# Patient Record
Sex: FEMALE | Race: BLACK OR AFRICAN AMERICAN | Employment: UNEMPLOYED | URBAN - METROPOLITAN AREA
[De-identification: names, ages, dates, MRNs, and addresses within clinical notes are randomized per-mention and may not be internally consistent; named-entity substitution may affect disease eponyms.]

---

## 2017-01-04 ENCOUNTER — HOSPITAL ENCOUNTER (OUTPATIENT)
Dept: PHYSICAL THERAPY | Age: 42
Discharge: HOME | End: 2017-01-04
Payer: MEDICAID

## 2017-01-04 PROCEDURE — 97112 NEUROMUSCULAR REEDUCATION: CPT

## 2017-01-04 PROCEDURE — 97140 MANUAL THERAPY 1/> REGIONS: CPT

## 2017-01-04 NOTE — PROGRESS NOTES
PT DAILY TREATMENT NOTE 12    Patient Name: Yuly Lee  Date:2017  : 1975  [x]  Patient  Verified  Payor: Zoya Lorenzo / Plan: 2 Minutes / Product Type: Managed Care Medicaid /    In time:105  Out time:158  Total Treatment Time (min): 53  Visit #: 2 of 6    Treatment Area: Cervicalgia [M54.2]  Chronic tension-type headache, not intractable [G44.229]    SUBJECTIVE  Pain Level (0-10 scale): 5-6  Any medication changes, allergies to medications, adverse drug reactions, diagnosis change, or new procedure performed?: [x] No    [] Yes (see summary sheet for update)  Subjective functional status/changes:   [] No changes reported  Report she hasn't had any HAs in two weeks. Reports significant relief after last needling sessions and increase in postural awareness.      OBJECTIVE    Modality rationale: decrease pain to improve the patients ability to tolerate post needle soreness   Min Type Additional Details    [] Estim:  []Unatt       []IFC  []Premod                        []Other:  []w/ice   []w/heat  Position:  Location:    [] Estim: []Att    []TENS instruct  []NMES                    []Other:  []w/US   []w/ice   []w/heat  Position:  Location:    []  Traction: [] Cervical       []Lumbar                       [] Prone          []Supine                       []Intermittent   []Continuous Lbs:  [] before manual  [] after manual    []  Ultrasound: []Continuous   [] Pulsed                           []1MHz   []3MHz W/cm2:  Location:    []  Iontophoresis with dexamethasone         Location: [] Take home patch   [] In clinic   10 [x]  Ice     []  heat  []  Ice massage  []  Laser   []  Anodyne Position:s/l  Location:L [facility]    []  Laser with stim  []  Other:  Position:  Location:    []  Vasopneumatic Device Pressure:       [] lo [] med [] hi   Temperature: [] lo [] med [] hi   [x] Skin assessment post-treatment:  []intact []redness- no adverse reaction    []redness - adverse reaction: Dry Needling Procedure Note    Procedure: An intramuscular manual therapy (dry needling) and a neuro-muscular re-education treatment was done to deactivate myofascial trigger points with a 30 gauge filament needle under aseptic technique. Indications:  [x] Myofascial pain and dysfunction [] Muscled spasms  [] Myalgia/myositis   [] Muscle cramps  [x] Muscle imbalances  [] Temporomandibular Dysfunction  [] Other:    Chart reviewed for the following:  Collette LEDEZMA PT, have reviewed the medical history, summary list and precautions/contraindications for [employer].   TIME OUT performed immediately prior to start of procedure:  Collette LEDEZMA PT, have performed the following reviews on [employer] prior to the start of the session:      [x] Verified patient identification by name and date of birth    [x] Agreement on all muscles being treated was verified   [x] Purpose of dry needling, side effects, possible complications, risks and benefits were explained to the patient   [x] Procedure site(s) verified  [x] Patient was positioned for comfort and draped for privacy  [x] Informed Consent was signed (initial visit) and verified verbally (subsequent visits)  [x] Patient was instructed on the need to report the use of blood thinners and/or immunosuppressant medications  [x] How to respond to possible adverse effects of treatment  [x] Self treatment of post needling soreness: ice, heat (moist heat, heat wraps) and stretching  [x] Opportunity was given to ask any questions, all questions were answered            Time: 107  Date of procedure: 1/4/2017  Treatment: The following muscles were treated today with intramuscular dry needling  [] Left [] Right Masster  [] Left [] Right Temporalis  [] Left [] Right Zygomaticus Major / Minor  [] Left [] Right Lateral Pterygoid  [] Left [] Right Medial Pterygoid  [] Left [] Right Digastric Post / Anterior Belly  [] Left [] Right Sternocleidomastoid  [] Left [] Right Scalene Anterior / Medial / Posterior  [] Left [] Right Extra Laryngeal Muscles  [x] Left [] Right Upper Trapezius  [] Left [] Right Middle Trapezius  [] Left [] Right Lower Trapezius  [] Left [] Right Oblique Capitis Inferior  [] Left [] Right Splenius Capitis / Cervicis  [] Left [] Right Semispinalis: Capitis / Cervicis  [] Left [] Right Multifidi / Rotatores Cervicis / Thoracic  [x] Left [] Right Longissimus Thoracis / Illiocostalis  [x] Left [] Right Levator Scapulae  [] Left [] Right Supraspinatus / Infraspinatus  [] Left [] Right Teres Major / Minor  [] Left [] Right Rhomboids / Serratus posterior superior  [] Left [] Right Pectoralis Major / Minor  [] Left [] Right Serratus Anterior  [] Left [] Right Latissimus Dorsi  [] Left [] Right Subscapularis  [] Left [] Right Coracobrachialis  [] Left [] Right Biceps Brachii  [] Left [] Right Deltoid: Anterior / Medial / Posterior  [] Left [] Right Brachialis  [] Left [] Right Triceps  [] Left [] Right Brachioradialis  [] Left [] Right Extensor Carpi Radialis Brevis / Extensor Carpi Radialis Longus    [] Left [] Right  Extensor digitorum  [] Left [] Right Supinator / Pronator Teres  [] Left [] Right Flexor Carpi Radialis/ Flexor Carpi Ulnaris   [] Left [] Right  Flexor Digitorum Superficialis/ Flexor Digitorum Profundus  [] Left [] Right Flexor Pollicis Longus / Flexor Pollicis Brevis / Palmaris Longus  [] Left [] Right Abductor Pollicis Longus / Abductor Pollicis Brevis  [] Left [] Right Opponens Pollicis / Adductor Pollicis  [] Left [] Right Dorsal / Palmar Interossei / Lumbricalis  [] Left [] Right Abductor Digiti Minimi / Opponens Digiti Minimi    Patient's response to today's treatment:  [x] Latent Twitch Response     [x] Muscle relaxation [x] Pain Relief  [x] Post needling soreness    [x] without complications  [] Increased Range of Motion   [] Decreased headaches    [] Decreased Tinnitus  [] Other:     Performed by: Belle Jean, PT      15 min Neuromuscular Re-education:  []  See flow sheet :   Rationale: increase strength and improve coordination  to improve the patients ability to improve stability for posture    28 min Manual Therapy:  DN- see above   Rationale: decrease pain, increase tissue extensibility and decrease trigger points to decrease hypertonicity in L upper quadrant/improve tolerance for prolonged positioning       With   [] TE   [] TA   [] neuro   [] other: Patient Education: [x] Review HEP    [] Progressed/Changed HEP based on:   [] positioning   [] body mechanics   [] transfers   [] heat/ice application    [] other:      Other Objective/Functional Measures:      Pain Level (0-10 scale) post treatment: 7    ASSESSMENT/Changes in Function: Better recruitment of multifidus with isometrics. Fatigues quickly with posterior chain exercises. Patient will continue to benefit from skilled PT services to modify and progress therapeutic interventions, address strength deficits, analyze and address soft tissue restrictions, analyze and cue movement patterns and assess and modify postural abnormalities to attain remaining goals. []  See Plan of Care  []  See progress note/recertification  []  See Discharge Summary         Progress towards goals / Updated goals:  Short Term Goals: To be accomplished in 2 weeks:  1. Pt will be I and compliant with HEP to promote self management of symptoms. -Pt reports performing HEP. 12/02/16  2. Pt will increase B c/s SB to 35 deg without pain for improve functional mobility and ADL performance. not met 12/9/16  Long Term Goals: To be accomplished in 3 weeks:  1. Pt will increase FOTO score to 58 for improved quality of life and activity tolerance. progressing- increased to 52 12/21/16  2. Pt will improve global MMT to 4+/5 for improved postural stability and reduced c/s symptoms. not met; progressing 4-/5  3. Pt will report abolishment of R UE symptoms for improved activity tolerance and work performance. Reports not having symptoms more than 2-3x/wk now 12/19/2016  4. Pt will improve c/s AROM extension to WNL without reproduction of pain for improved functional mobility and ADL performance.  Not met; no change       PLAN  []  Upgrade activities as tolerated     [x]  Continue plan of care  []  Update interventions per flow sheet       []  Discharge due to:_  []  Other:_      Maile Leone, PT 1/4/2017  2:07 PM    Future Appointments  Date Time Provider Pablo Kenney   1/6/2017 10:00 AM Chemo Padgettial [facility]   1/24/2017 2:00 PM Sonny Lowery  [address]   3/13/2017 1:15 PM Abel Essex,  [address]

## 2017-01-06 ENCOUNTER — HOSPITAL ENCOUNTER (OUTPATIENT)
Dept: PHYSICAL THERAPY | Age: 42
End: 2017-01-06
Payer: MEDICAID

## 2017-01-13 NOTE — PROGRESS NOTES
[facility]  [address]  [place], [address]  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 Pt will be I and compliant with HEP to promote self management of symptoms. -Pt reports performing HEP. 16  2. Pt will increase B c/s SB to 35 deg without pain for improve functional mobility and ADL performance. not met 16  Long Term Goals: To be accomplished in 3 weeks:  1. Pt will increase FOTO score to 58 for improved quality of life and activity tolerance. progressing- increased to 52 16  2. Pt will improve global MMT to 4+/5 for improved postural stability and reduced c/s symptoms. not met; progressing 4-/5  3. Pt will report abolishment of R UE symptoms for improved activity tolerance and work performance. Reports not having symptoms more than 2-3x/wk now 2016  4. Pt will improve c/s AROM extension to WNL without reproduction of pain for improved functional mobility and ADL performance. Not met; no change    ASSESSMENT/RECOMMENDATIONS: Pt reports improvement in HA after initiation of DN. Limited by poor attendance/compliance with PT to date. Pt D/C at this time due to breach of attendance policy.  No further goal assessment of instruction given to pt.     [x]Discontinue therapy: []Patient has reached or is progressing toward set goals      [x]Patient is non-compliant or has abdicated      []Due to lack of appreciable progress towards set goals    Eddi Berman DPT 1/13/2017 3:17 PM

## 2017-01-24 ENCOUNTER — OFFICE VISIT (OUTPATIENT)
Dept: ORTHOPEDIC SURGERY | Age: 42
End: 2017-01-24

## 2017-01-24 ENCOUNTER — DOCUMENTATION ONLY (OUTPATIENT)
Dept: ORTHOPEDIC SURGERY | Age: 42
End: 2017-01-24

## 2017-01-24 VITALS
TEMPERATURE: 98.2 F | BODY MASS INDEX: 26.99 KG/M2 | HEIGHT: 65 IN | DIASTOLIC BLOOD PRESSURE: 63 MMHG | HEART RATE: 75 BPM | RESPIRATION RATE: 18 BRPM | SYSTOLIC BLOOD PRESSURE: 115 MMHG | WEIGHT: 162 LBS

## 2017-01-24 DIAGNOSIS — M79.18 CERVICAL MYOFASCIAL PAIN SYNDROME: Primary | ICD-10-CM

## 2017-01-24 DIAGNOSIS — M50.20 HNP (HERNIATED NUCLEUS PULPOSUS), CERVICAL: ICD-10-CM

## 2017-01-24 DIAGNOSIS — R20.2 PARESTHESIA: ICD-10-CM

## 2017-01-24 DIAGNOSIS — M54.2 CERVICAL PAIN: ICD-10-CM

## 2017-01-24 DIAGNOSIS — M50.30 DDD (DEGENERATIVE DISC DISEASE), CERVICAL: ICD-10-CM

## 2017-01-24 NOTE — PROGRESS NOTES
[address]  [address], [address]  [address]  Phone: [phone]  Fax: [phone]  INITIAL CONSULTATION  Patient: uSsu Tinajero                MRN: [id number]       SSN: [id number]  YOB: 1975        AGE: 46 y.o. SEX: female  Body mass index is 26.96 kg/(m^2). PCP: Argenis Ambrosio MD  01/24/17    Chief Complaint   Patient presents with    Neck Pain     SC         HISTORY OF PRESENT ILLNESS, RADIOGRAPHS, and PLAN:         HISTORY OF PRESENT ILLNESS:  Ms. Madelaine Dhaliwal is seen today at the request of Dr. Sukumar Zhang and Dr. Bill Arauz. Ms. Madelaine Dhaliwal is a 72-year-old female who has had this history of progressive neck and arm pain she has found increasingly disabling. She has been through pain management procedures and various physical therapy endeavors, all without improvement. The pain came on atraumatically with neck and arm pain. The patient denies bowel or bladder dysfunction, fevers, chills, night sweats, weight loss, or weight gain. The patient denies myelopathic findings. PHYSICAL EXAMINATION:  The patients physical exam has significant, antalgic range of motion with flexion and extension. No lateral bending symptoms. She has good strength to her deltoids, biceps, triceps, and intrinsics, EHL, tib/ant, hamstrings, and quadriceps. No clonus, no Engels, no Babinski. Normal gait and normal tandem gait. RADIOGRAPHS:  Radiographs demonstrate degenerative protrusion, right paracentral, at C5-6 contacting the cord, but no particular cord compression. No gliosis. This MRI is from a little over a year ago. No EMGs have been done. No x-rays have been done. ASSESSMENT/PLAN: I discussed the matter at length with the patient. This appears to be a radicular type syndrome and disc protrusion at C5-6, and it is just beyond what we normally expect to see from age.   I explained to her that she has what sounds to be radicular type pain syndrome. It is not quite severe enough that I would generally recommend intervention. I feel we need to get further information before we consider this to be a clear cervical radiculopathy. I would like to get some flexion extension x-rays of her cervical spine, as well as a current MRI, as well as a nerve conduction study. The symptoms that she describes in terms of her arm are nonspecific, but this appears to be in the lower cervical distribution than C5-6. At this point, certainly, she may be having a mechanically-induced radiculopathy. Her pain is primarily axial more than radicular. I will see her back again after her studies. We could reevaluate her radicular pain in light of her current studies and nerve conduction studies and see if there is any instability evident on x-ray. Given the chronicity and progression of her pain, it may be to the point where we need to consider a decompressive procedure. However, I would like to see if we can be more definitive on the nature of the symptoms and I can be more certain as to the prognosis with any intervention presented. cc: Veda Reyes M.D. Past Medical History   Diagnosis Date    Anemia 1997     during pregnancy 2nd child    Migraine headache 1997    Ovarian cyst     Vertigo        Family History   Problem Relation Age of Onset    Heart Disease Mother     Hypertension Mother    [facility] Mother     Other Mother 54     Lupus    Stroke Mother      x3    Asthma Sister     Diabetes Paternal Grandmother     Hypertension Paternal Grandmother        Current Outpatient Prescriptions   Medication Sig Dispense Refill    HYDROcodone-acetaminophen (NORCO) 5-325 mg per tablet TAKE 1 T PO PRN P Q 6 H  0    ibuprofen (MOTRIN) 800 mg tablet TK 1 T PO TID  0    baclofen (LIORESAL) 10 mg tablet Take 0.5 Tabs by mouth two (2) times daily as needed.  60 Tab 0    promethazine (PHENERGAN) 25 mg tablet Take 1 Tab by mouth every six (6) hours as needed for Nausea (headache). Caution: This medication may make you drowsy. Avoid driving while under the influence of this medication. 12 Tab 0    diphenhydrAMINE (BENADRYL) 25 mg capsule Take 25 mg by mouth every six (6) hours as needed.  meclizine (ANTIVERT) 25 mg tablet Take 25 mg by mouth three (3) times daily as needed.  diclofenac EC (VOLTAREN) 75 mg EC tablet Take 1 Tab by mouth two (2) times daily (with meals). 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      Social History Main Topics    Smoking status: Never Smoker    Smokeless tobacco: Never Used    Alcohol use Yes      Comment: rare    Drug use: No    Sexual activity: Yes     Partners: Male     Birth control/ protection: Implant      Comment: pregnancy test negative     Other Topics Concern    Not on file     Social History Narrative       REVIEW OF SYSTEMS:   CONSTITUTIONAL SYMPTOMS:  Negative. EYES:  Negative. EARS, NOSE, THROAT AND MOUTH:  Negative. CARDIOVASCULAR:  Negative. RESPIRATORY:  Negative. GENITOURINARY: Negative. GASTROINTESTINAL:  Negative. INTEGUMENTARY (SKIN AND/OR BREAST):  Negative. MUSCULOSKELETAL: Per HPI.   ENDOCRINE/RHEUMATOLOGIC:  Negative. NEUROLOGICAL:  Per HPI. HEMATOLOGIC/LYMPHATIC:  Negative. ALLERGIC/IMMUNOLOGIC:  Negative. PSYCHIATRIC:  Negative. PHYSICAL EXAMINATION:   Visit Vitals    /63 (BP 1 Location: Left arm, BP Patient Position: Sitting)    Pulse 75    Temp 98.2 °F (36.8 °C) (Oral)    Resp 18    Ht 5' 5\" (1.651 m)    Wt 162 lb (73.5 kg)    BMI 26.96 kg/m2    PAIN SCALE: 7/10    CONSTITUTIONAL: The patient is in no apparent distress and is alert and oriented x 3. HEENT: Normocephalic. Hearing grossly intact. NECK: Supple and symmetric. no tenderness, or masses were felt. RESPIRATORY: No labored breathing. CARDIOVASCULAR: The carotid pulses were normal. Peripheral pulses were 2+. CHEST: Normal AP diameter and normal contour without any kyphoscoliosis. LYMPHATIC: No lymphadenopathy was appreciated in the neck, axillae or groin. SKIN:  Negative for scars, rashes, lesions, or ulcers on the right upper, right lower, left upper, left lower and trunk. NEUROLOGICAL: Alert and oriented x 3. Ambulation without assistive device. FWB. EXTREMITIES:  See musculoskeletal.  MUSCULOSKELETAL:   Head and Neck: Neck pain. Prolonged sitting and laying down increases pain and paresthesias. Painful extension. Negative for misalignment, asymmetry, crepitation, defects, tenderness masses or effusions.  Left Upper Extremity: Paresthesia down posterior aspect of arm and into 4th and 5th digits. Inspection, percussion and palpation preformed. Engels sign is negative.  Right Upper Extremity: Paresthesia down posterior aspect of arm and into 4th and 5th digit.  Inspection, percussion and palpation preformed. Engels sign is negative.  Spine, Ribs and Pelvis: Inspection, percussion and palpation preformed. Negative for misalignment, asymmetry, crepitation, defects, tenderness masses or effusions.  Right Lower Extremity: Inspection, percussion and palpation preformed. Negative straight leg raise.  Left Lower Extremity: Inspection, percussion and palpation preformed. Negative straight leg raise. SPINE EXAM:     Cervical spine: Neck is midline. Normal muscle tone. No focal atrophy is noted. ASSESSMENT    ICD-10-CM ICD-9-CM    1. Cervical myofascial pain syndrome M79.1 729.1 AMB POC XRAY, SPINE, CERVICAL; 4+ VIE      EMG ONE EXTREMITY LOWER RT      MRI CERV SPINE WO CONT   2. DDD (degenerative disc disease), cervical M50.30 722.4 AMB POC XRAY, SPINE, CERVICAL; 4+ VIE      MRI CERV SPINE WO CONT   3. Cervical pain, axial M54.2 723.1 AMB POC XRAY, SPINE, CERVICAL; 4+ VIE      EMG ONE EXTREMITY LOWER RT      MRI CERV SPINE WO CONT   4. HNP (herniated nucleus pulposus), cervical M50.20 722.0 AMB POC XRAY, SPINE, CERVICAL; 4+ VIE      EMG ONE EXTREMITY LOWER RT      MRI CERV SPINE WO CONT   5.  Paresthesia R20.2 782.0 EMG ONE EXTREMITY LOWER RT      MRI CERV SPINE WO CONT       Written by Lashell Thomas, as dictated by Henrik Rodrigues MD.

## 2017-01-24 NOTE — MR AVS SNAPSHOT
Visit Information Date & Time Provider Department Dept. Phone Encounter #  
 1/24/2017  2:00 PM Patrice Judge MD [address] [facility] [phone] [id number] Follow-up Instructions Follow-up and Disposition History Your Appointments 2/16/2017  2:00 PM  
EMG with Kasandra Barillas MD  
[facility]) Appt Note: Dr. Sue Vitale and notes in cc  
 [place] [address] [address] [facility] [address]  
[phone]  
  
   
 [facility] [address]  
  
    
 2/28/2017 11:45 AM  
DIAG TEST F/U with Patrice Judge MD  
[address] [facility], [address] and [facility]ists [facility]) [address] [facility] [address]  
[phone]  
  
   
 [address] [facility] [address] 3/13/2017  1:15 PM  
Follow Up with Jeremiah Keller MD  
[facility]) Appt Note: 3 MONTH FU/NO COPAY PT [address] [facility] [address]  
[phone]  
  
   
 [address] [facility] [address] Upcoming Health Maintenance Date Due INFLUENZA AGE 9 TO ADULT 8/1/2016 PAP AKA CERVICAL CYTOLOGY 12/20/2016 DTaP/Tdap/Td series (2 - Td) 12/13/2022 Allergies as of 1/24/2017  Review Complete On: 1/24/2017 By: Patrice Judge MD  
  
 Severity Noted Reaction Type Reactions Ciprofloxacin (Bulk) High 06/13/2012   Side Effect Diarrhea Codeine High 12/12/2011    Itching Dilaudid [Hydromorphone (Bulk)] High 12/23/2011    Itching Lyrica [Pregabalin] High 12/28/2015    Swelling Tramadol Medium 06/13/2012    Other (comments), Unknown (comments) Gets headaches 
headache Codeine Phosphate  10/15/2015    Unknown (comments) Hydromorphone  10/15/2015    Unknown (comments) Current Immunizations  Never Reviewed Name Date Influenza Vaccine Split 12/13/2012 Tdap 12/13/2012 Not reviewed this visit You Were Diagnosed With   
  
 Codes Comments Cervical myofascial pain syndrome    -  Primary ICD-10-CM: M79.1 ICD-9-CM: 729.1 DDD (degenerative disc disease), cervical     ICD-10-CM: M50.30 ICD-9-CM: 722.4 Cervical pain     ICD-10-CM: M54.2 ICD-9-CM: 723.1 HNP (herniated nucleus pulposus), cervical     ICD-10-CM: M50.20 ICD-9-CM: 722.0 Paresthesia     ICD-10-CM: R20.2 ICD-9-CM: 568. 0 Vitals BP Pulse Temp Resp Height(growth percentile) Weight(growth percentile)  
 115/63 (BP 1 Location: Left arm, BP Patient Position: Sitting) 75 98.2 °F (36.8 °C) (Oral) 18 5' 5\" (1.651 m) 162 lb (73.5 kg) BMI OB Status Smoking Status 26.96 kg/m2 Having regular periods Never Smoker BMI and BSA Data Body Mass Index Body Surface Area  
 26.96 kg/m 2 1.84 m 2 Preferred Pharmacy Pharmacy Name Phone [facility] [address] [place] [address] Your Updated Medication List  
  
   
This list is accurate as of: 1/24/17  4:01 PM.  Always use your most recent med list.  
  
  
  
  
 baclofen 10 mg tablet Commonly known as:  LIORESAL Take 0.5 Tabs by mouth two (2) times daily as needed. BENADRYL 25 mg capsule Generic drug:  diphenhydrAMINE Take 25 mg by mouth every six (6) hours as needed. diclofenac EC 75 mg EC tablet Commonly known as:  VOLTAREN Take 1 Tab by mouth two (2) times daily (with meals). HYDROcodone-acetaminophen 5-325 mg per tablet Commonly known as:  NORCO  
TAKE 1 T PO PRN P Q 6 H  
  
 ibuprofen 800 mg tablet Commonly known as:  MOTRIN  
TK 1 T PO TID  
  
 meclizine 25 mg tablet Commonly known as:  ANTIVERT Take 25 mg by mouth three (3) times daily as needed. methylPREDNISolone 4 mg tablet Commonly known as:  Judithe Shirley  
 Per dose pack instructions  
  
 promethazine 25 mg tablet Commonly known as:  PHENERGAN Take 1 Tab by mouth every six (6) hours as needed for Nausea (headache). Caution: This medication may make you drowsy. Avoid driving while under the influence of this medication. We Performed the Following AMB POC XRAY, SPINE, CERVICAL; 4+ VIE [19090 CPT(R)] To-Do List   
 01/24/2017 Neurology:  EMG ONE EXTREMITY UPPER RT   
  
 01/24/2017 Imaging:  MRI CERV SPINE WO CONT Introducing [facility] SERVICES! Dear Breann Clarity: 
Thank you for requesting a Home-Account account. Our records indicate that you already have an active Home-Account account. You can access your account anytime at [email or web address] Did you know that you can access your hospital and ER discharge instructions at any time in Home-Account? You can also review all of your test results from your hospital stay or ER visit. Additional Information If you have questions, please visit the Frequently Asked Questions section of the Home-Account website at [email or web address] Remember, Home-Account is NOT to be used for urgent needs. For medical emergencies, dial 911. Now available from your iPhone and Android! Please provide this summary of care documentation to your next provider. Your primary care clinician is listed as Linda Lenz. If you have any questions after today's visit, please call [phone].

## 2017-02-16 ENCOUNTER — OFFICE VISIT (OUTPATIENT)
Dept: NEUROLOGY | Age: 42
End: 2017-02-16

## 2017-02-16 ENCOUNTER — HOSPITAL ENCOUNTER (OUTPATIENT)
Dept: MRI IMAGING | Age: 42
Discharge: HOME | End: 2017-02-16
Attending: ORTHOPAEDIC SURGERY
Payer: MEDICAID

## 2017-02-16 DIAGNOSIS — M79.18 CERVICAL MYOFASCIAL PAIN SYNDROME: ICD-10-CM

## 2017-02-16 DIAGNOSIS — M54.2 CERVICAL PAIN: ICD-10-CM

## 2017-02-16 DIAGNOSIS — M47.812 OSTEOARTHRITIS OF CERVICAL SPINE, UNSPECIFIED SPINAL OSTEOARTHRITIS COMPLICATION STATUS: ICD-10-CM

## 2017-02-16 DIAGNOSIS — M50.20 HNP (HERNIATED NUCLEUS PULPOSUS), CERVICAL: ICD-10-CM

## 2017-02-16 DIAGNOSIS — R20.2 PARESTHESIA: ICD-10-CM

## 2017-02-16 DIAGNOSIS — M79.18 CERVICAL MYOFASCIAL PAIN SYNDROME: Primary | ICD-10-CM

## 2017-02-16 DIAGNOSIS — M50.30 DDD (DEGENERATIVE DISC DISEASE), CERVICAL: ICD-10-CM

## 2017-02-16 PROCEDURE — 72141 MRI NECK SPINE W/O DYE: CPT

## 2017-02-16 NOTE — COMMUNICATION BODY
[facility] Neuroscience  [address] [facility] [address], [address] [phone]      [place] [address]    Neurophysiology Report      Patient: Sara Steel     ID: [id number] Physician: Alis England. Kaz Segovia MD   Gender: Male Ref Phys: Jannet Ames MD   Handedness:      Study Date: February 16, 2017         Patient History: This 72-year-old woman has been complaining of right greater than left arm and hand tingling. She also has been having pain in her neck. This been going on for greater than 2 years. On brief exam she has intact strength and sensation.       Nerve Conduction Studies  Anti Sensory Summary Table     Stim Site NR Peak (ms) Norm Peak (ms) O-P Amp (µV) Norm O-P Amp Dist (cm) Geo (m/s)   Left Median 2nd Digit Anti Sensory (2nd Digit)   Wrist    3.4 <3.5 74.4 >20 13.0 76.5   Site 2    3.4  55.3      Site 3    3.4  62.5      Right Median 2nd Digit Anti Sensory (2nd Digit)   Wrist    3.4 <3.5 46.1 >20 13.0 50.0   Site 2    3.5  47.1      Left Ulnar Anti Sensory (5th Digit )   Wrist    3.1 <3.1 45.0 >17.0 11.0 50.0   Site 2    3.1  55.7      Site 3    3.1  51.8      Right Ulnar Anti Sensory (5th Digit )   Wrist    3.1 <3.1 50.0 >17.0 11.0 50.0   Site 2    3.2  31.1        Motor Summary Table     Stim Site NR Onset (ms) Norm Onset (ms) O-P Amp (mV) Norm O-P Amp Dist (cm) Geo (m/s) Norm Geo (m/s)   Left Median Motor (Abd Poll Brev)   Wrist    4.9 <4.4 11.4 >4.0 22.0 62.9 >49   Elbow    8.4  9.4       Right Median Motor (Abd Poll Brev)   Wrist    4.1 <4.4 10.7 >4.0 21.0 53.8 >49   Elbow    8.0  10.0       Left Ulnar Motor (Abd Dig Minimi )   Wrist    3.0 <3.3 5.8 >6.0 18.0 51.4 >49   B Elbow    6.5  6.5  12.0 63.2 >50   A Elbow    8.4  6.2       Right Ulnar Motor (Abd Dig Minimi )   Wrist    2.9 <3.3 7.8 >6.0 19.0 51.4 >49   B Elbow    6.6  6.8  13.0 52.0 >50   A Elbow    9.1  5.7           EMG     Side Muscle Nerve Root Ins Act Fibs Psw Fasc Amp Dur Poly Recrt Int Carol Challenger Comment   Right Deltoid Axillary C5-6 Nml Nml Nml None Nml Nml 0 Nml Nml    Right Biceps Musculocut C5-6 Nml Nml Nml None Nml Nml 0 Nml Nml    Right Triceps Radial C6-7-8 Nml Nml Nml None Nml Nml 0 Nml Nml    Right FlexCarRad Median C6-7 Nml Nml Nml None Nml Nml 0 Nml Nml    Right 1stDorInt Ulnar C8-T1 Nml Nml Nml None Nml Nml 0 Nml Nml    Right Abd Poll Brev Median C8-T1 Nml Nml Nml None Nml Nml 0 Nml Nml    Right Cervical Parasp Up Rami C1-3 Nml Nml Nml          Right Cervical Parasp Mid Rami C4-6 Nml Nml Nml            NCS/EMG FINDINGS:     Evaluation of the Left median motor nerve showed prolonged distal onset latency (4.9 ms).  The Right median motor, the Left ulnar motor, the Right ulnar motor, the Left Median 2nd Digit sensory, the Right Median 2nd Digit sensory, the Left ulnar sensory, and the Right ulnar sensory nerves were unremarkable. INTERPRETATION: This was an abnormal nerve conduction EMG study showing it to be prolongation of the distal latency in the left median nerve. This is consistent with the diagnosis of left-sided carpal tunnel syndrome. No sign of radiculopathy or neuropathy was appreciated.      ___________________________  Wallace Crowe.  Thang Kyle MD          Waveforms:

## 2017-02-16 NOTE — LETTER
2/16/2017 3:32 PM 
 
Patient:  Carmine Trinh YOB: 1975 Date of Visit: 2/16/2017 Dear Fanny Flynn MD 
[address] [address] [place] [id number] VIA In Basket Karuna Kim MD 
[address] [facility] [address] VIA In Basket 
 : Thank you for referring Ms. Kathie Romero to me for evaluation/treatment. Below are the relevant portions of my assessment and plan of care. [facility] [address] [facility], [address] 
[phone]      [facility] [address] Neurophysiology Report Patient: Halle De Jesus    
ID: [id number] Physician: Mady Cordova. Tosha Ansari MD  
Gender: Male Ref Phys: Evette Pruitt MD  
Handedness:     
Study Date: February 16, 2017 Patient History: This 72-year-old woman has been complaining of right greater than left arm and hand tingling. She also has been having pain in her neck. This been going on for greater than 2 years. On brief exam she has intact strength and sensation. Nerve Conduction Studies Anti Sensory Summary Table Stim Site NR Peak (ms) Norm Peak (ms) O-P Amp (µV) Norm O-P Amp Dist (cm) Geo (m/s) Left Median 2nd Digit Anti Sensory (2nd Digit) Wrist    3.4 <3.5 74.4 >20 13.0 76.5 Site 2    3.4  55.3 Site 3    3.4  62.5 Right Median 2nd Digit Anti Sensory (2nd Digit) Wrist    3.4 <3.5 46.1 >20 13.0 50.0 Site 2    3.5  47.1 Left Ulnar Anti Sensory (5th Digit ) Wrist    3.1 <3.1 45.0 >17.0 11.0 50.0 Site 2    3.1  55.7 Site 3    3.1  51.8 Right Ulnar Anti Sensory (5th Digit ) Wrist    3.1 <3.1 50.0 >17.0 11.0 50.0 Site 2    3.2  31.1 Motor Summary Table Stim Site NR Onset (ms) Norm Onset (ms) O-P Amp (mV) Norm O-P Amp Dist (cm) Geo (m/s) Norm Geo (m/s) Left Median Motor (Abd Poll Brev) Wrist    4.9 <4.4 11.4 >4.0 22.0 62.9 >49 Elbow    8.4  9.4 Right Median Motor (Abd Poll Brev) Wrist    4.1 <4.4 10.7 >4.0 21.0 53.8 >49 Elbow    8.0  10.0 Left Ulnar Motor (Abd Dig Minimi ) Wrist    3.0 <3.3 5.8 >6.0 18.0 51.4 >49 B Elbow    6.5  6.5  12.0 63.2 >50 A Elbow    8.4  6.2 Right Ulnar Motor (Abd Dig Minimi ) Wrist    2.9 <3.3 7.8 >6.0 19.0 51.4 >49 B Elbow    6.6  6.8  13.0 52.0 >50 A Elbow    9.1  5.7 EMG Side Muscle Nerve Root Ins Act Fibs Psw Fasc Amp Dur Poly Recrt Int Aleksandr Gene Comment Right Deltoid Axillary C5-6 Nml Nml Nml None Nml Nml 0 Nml Nml Right Biceps Musculocut C5-6 Nml Nml Nml None Nml Nml 0 Nml Nml Right Triceps Radial C6-7-8 Nml Nml Nml None Nml Nml 0 Nml Nml Right FlexCarRad Median C6-7 Nml Nml Nml None Nml Nml 0 Nml Nml Right 1stDorInt Ulnar C8-T1 Nml Nml Nml None Nml Nml 0 Nml Nml Right Abd Poll Brev Median C8-T1 Nml Nml Nml None Nml Nml 0 Nml Nml Right Cervical Parasp Up Rami C1-3 Nml Nml Nml Right Cervical Parasp Mid Rami C4-6 Nml Nml Nml NCS/EMG FINDINGS: 
 
? Evaluation of the Left median motor nerve showed prolonged distal onset latency (4.9 ms). ? The Right median motor, the Left ulnar motor, the Right ulnar motor, the Left Median 2nd Digit sensory, the Right Median 2nd Digit sensory, the Left ulnar sensory, and the Right ulnar sensory nerves were unremarkable. INTERPRETATION: This was an abnormal nerve conduction EMG study showing it to be prolongation of the distal latency in the left median nerve. This is consistent with the diagnosis of left-sided carpal tunnel syndrome. No sign of radiculopathy or neuropathy was appreciated. 
 
 
___________________________ Irving Beyer MD 
 
 
 
 
Waveforms: If you have questions, please do not hesitate to call me. I look forward to following Ms. Nunes along with you.  
 
 
 
Sincerely, 
 
 
Concepcion Jeff MD

## 2017-02-16 NOTE — PROGRESS NOTES
[facility]  [address] [facility] [place], [address] [phone]      [facility] [address]    Neurophysiology Report      Patient: Ravindra Couch     ID: [id number] Physician: Gris Fitch. Bala Asher MD   Gender: Male Ref Phys: Mary Siddiqui MD   Handedness:      Study Date: February 16, 2017         Patient History: This 33-year-old woman has been complaining of right greater than left arm and hand tingling. She also has been having pain in her neck. This been going on for greater than 2 years. On brief exam she has intact strength and sensation.       Nerve Conduction Studies  Anti Sensory Summary Table     Stim Site NR Peak (ms) Norm Peak (ms) O-P Amp (µV) Norm O-P Amp Dist (cm) Geo (m/s)   Left Median 2nd Digit Anti Sensory (2nd Digit)   Wrist    3.4 <3.5 74.4 >20 13.0 76.5   Site 2    3.4  55.3      Site 3    3.4  62.5      Right Median 2nd Digit Anti Sensory (2nd Digit)   Wrist    3.4 <3.5 46.1 >20 13.0 50.0   Site 2    3.5  47.1      Left Ulnar Anti Sensory (5th Digit )   Wrist    3.1 <3.1 45.0 >17.0 11.0 50.0   Site 2    3.1  55.7      Site 3    3.1  51.8      Right Ulnar Anti Sensory (5th Digit )   Wrist    3.1 <3.1 50.0 >17.0 11.0 50.0   Site 2    3.2  31.1        Motor Summary Table     Stim Site NR Onset (ms) Norm Onset (ms) O-P Amp (mV) Norm O-P Amp Dist (cm) Geo (m/s) Norm Geo (m/s)   Left Median Motor (Abd Poll Brev)   Wrist    4.9 <4.4 11.4 >4.0 22.0 62.9 >49   Elbow    8.4  9.4       Right Median Motor (Abd Poll Brev)   Wrist    4.1 <4.4 10.7 >4.0 21.0 53.8 >49   Elbow    8.0  10.0       Left Ulnar Motor (Abd Dig Minimi )   Wrist    3.0 <3.3 5.8 >6.0 18.0 51.4 >49   B Elbow    6.5  6.5  12.0 63.2 >50   A Elbow    8.4  6.2       Right Ulnar Motor (Abd Dig Minimi )   Wrist    2.9 <3.3 7.8 >6.0 19.0 51.4 >49   B Elbow    6.6  6.8  13.0 52.0 >50   A Elbow    9.1  5.7           EMG     Side Muscle Nerve Root Ins Act Fibs Psw Fasc Amp Dur Poly Recrt Int Akash Pates Comment   Right Deltoid Axillary C5-6 Nml Nml Nml None Nml Nml 0 Nml Nml    Right Biceps Musculocut C5-6 Nml Nml Nml None Nml Nml 0 Nml Nml    Right Triceps Radial C6-7-8 Nml Nml Nml None Nml Nml 0 Nml Nml    Right FlexCarRad Median C6-7 Nml Nml Nml None Nml Nml 0 Nml Nml    Right 1stDorInt Ulnar C8-T1 Nml Nml Nml None Nml Nml 0 Nml Nml    Right Abd Poll Brev Median C8-T1 Nml Nml Nml None Nml Nml 0 Nml Nml    Right Cervical Parasp Up Rami C1-3 Nml Nml Nml          Right Cervical Parasp Mid Rami C4-6 Nml Nml Nml            NCS/EMG FINDINGS:     Evaluation of the Left median motor nerve showed prolonged distal onset latency (4.9 ms).  The Right median motor, the Left ulnar motor, the Right ulnar motor, the Left Median 2nd Digit sensory, the Right Median 2nd Digit sensory, the Left ulnar sensory, and the Right ulnar sensory nerves were unremarkable. INTERPRETATION: This was an abnormal nerve conduction EMG study showing it to be prolongation of the distal latency in the left median nerve. This is consistent with the diagnosis of left-sided carpal tunnel syndrome. No sign of radiculopathy or neuropathy was appreciated.      ___________________________  Juventino Hayes.  Diaz Bowles MD          Waveforms:                      [facility]  OFFICE PROCEDURE PROGRESS NOTE        Chart reviewed for the following:   Wes Huff MD, have reviewed the History, Physical and updated the Allergic reactions for [address] [place] [address] performed immediately prior to start of procedure:   Wes Huff MD, have performed the following reviews on [facility] prior to the start of the procedure:            * Patient was identified by name and date of birth   * Agreement on procedure being performed was verified  * Risks and Benefits explained to the patient  * Procedure site verified and marked as necessary  * Patient was positioned for comfort  * Consent was signed and verified     Time: 3:32 PM    Date of procedure: 2/16/2017    Procedure performed by:  Concepcion Jeff MD    Provider assisted by: Elizabeth Collins     Patient assisted by: self    How tolerated by patient: tolerated the procedure well with no complications    Post Procedural Pain Scale: 0 - No Hurt    Comments: none

## 2017-02-28 ENCOUNTER — OFFICE VISIT (OUTPATIENT)
Dept: ORTHOPEDIC SURGERY | Age: 42
End: 2017-02-28

## 2017-02-28 VITALS
WEIGHT: 162 LBS | SYSTOLIC BLOOD PRESSURE: 112 MMHG | RESPIRATION RATE: 18 BRPM | OXYGEN SATURATION: 99 % | DIASTOLIC BLOOD PRESSURE: 63 MMHG | HEIGHT: 65 IN | TEMPERATURE: 98.2 F | BODY MASS INDEX: 26.99 KG/M2 | HEART RATE: 76 BPM

## 2017-02-28 DIAGNOSIS — M50.20 HNP (HERNIATED NUCLEUS PULPOSUS), CERVICAL: Primary | ICD-10-CM

## 2017-02-28 NOTE — PROGRESS NOTES
[address] [facility]   Pre-Surgical Worksheet    Patient: Martin Gupta                         MRN: [id number]     Age:  46 y.o.,      Sex: female    YOB: 1975           SERGIO: February 28, 2017  PCP: Anita Mahan MD    Allergies   Allergen Reactions    Ciprofloxacin (Bulk) Diarrhea    Codeine Itching    Dilaudid [Hydromorphone (Bulk)] Itching    Lyrica [Pregabalin] Swelling    Tramadol Other (comments) and Unknown (comments)     Gets headaches  headache    Codeine Phosphate Unknown (comments)    Hydromorphone Unknown (comments)         ICD-10-CM ICD-9-CM    1. HNP (herniated nucleus pulposus), cervical M50.20 722.0        Surgery: ACDF C5/6. Pain Assessment   Pain Assessment  2/28/2017   Location of Pain Back;Neck   Severity of Pain 6   Quality of Pain Aching   Quality of Pain Comment -   Duration of Pain Persistent   Frequency of Pain Constant   Aggravating Factors Stairs; Walking;Standing;Squatting;Kneeling;Exercise;Bending;Stretching;Straightening   Aggravating Factors Comment -   Limiting Behavior Yes   Relieving Factors NSAID   Result of Injury No       Visit Vitals    /63    Pulse 76    Temp 98.2 °F (36.8 °C) (Oral)    Resp 18    Ht 5' 5\" (1.651 m)    Wt 162 lb (73.5 kg)    SpO2 99%    BMI 26.96 kg/m2       ADL Limits: Bathing and Dressing. PT STATES SHE HAS TROUBLE LIFTING, HAS LIMITED ROM, AND UNABLE TO STAY SEDENTARY FOR TOO LONG. Spine Surgery?: Yes When N/A. Where N/A. Spinal Injections?: Yes  When 2016 AND IN THE PAST. Where DR. BLACK AND OTHER. Physical Therapy?: Yes  When 2016 AND IN THE PAST. Where IN [facility] AND OTHER.    NSAID's?: Yes    Pain Medications?: Yes  Type: DICLOFENAC, BACLOFEN. In Pain Management: NO, Where: IN PAST, SAW DR. Des Riojas FOR INJECTIONS. Current Outpatient Prescriptions   Medication Sig    diclofenac EC (VOLTAREN) 75 mg EC tablet Take 1 Tab by mouth two (2) times daily (with meals).     ibuprofen (MOTRIN) 800 mg tablet TK 1 T PO TID    diphenhydrAMINE (BENADRYL) 25 mg capsule Take 25 mg by mouth every six (6) hours as needed.  HYDROcodone-acetaminophen (NORCO) 5-325 mg per tablet TAKE 1 T PO PRN P Q 6 H    methylPREDNISolone (MEDROL DOSEPACK) 4 mg tablet Per dose pack instructions    baclofen (LIORESAL) 10 mg tablet Take 0.5 Tabs by mouth two (2) times daily as needed.  promethazine (PHENERGAN) 25 mg tablet Take 1 Tab by mouth every six (6) hours as needed for Nausea (headache). Caution: This medication may make you drowsy. Avoid driving while under the influence of this medication.  meclizine (ANTIVERT) 25 mg tablet Take 25 mg by mouth three (3) times daily as needed. No current facility-administered medications for this visit.         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

## 2017-02-28 NOTE — PROGRESS NOTES
[address], [address]  [place]  Phone: [phone]  Fax: [phone]  PROGRESS NOTE  Patient: Martin Gupta                MRN: [id number]       SSN: [id number]  YOB: 1975        AGE: 46 y.o. SEX: female  Body mass index is 26.96 kg/(m^2). PCP: Anita Mahan MD  02/28/17    Chief Complaint   Patient presents with    Back Pain     MRI anf EMG follow up    Neck Pain       HISTORY OF PRESENT ILLNESS, RADIOGRAPHS, and PLAN:     HISTORY:  Ms. Marco Howell comes in today continuing to have neck pain with left greater than right arm pain. I reviewed the studies we had obtained. Her recent MRI demonstrates progression of her disc pathology with now left paracentral disc protrusion of left greater than right lateral recess nerve root compression contacting the cord, moderate but not severe. The remainder of her neck is normal.  EMGs were benign. ASSESSMENT/PLAN: I discussed the matter at length with her. The patient has had chronic, disabling neck pain with left greater than right arm pain. She failed injections and physical therapy. She has been unable to work and function. She is desperate for some relief. I explained to her the uncertainties of her diagnosis and moderate nature for disc pathology. I think surgery is reasonable given the duration of her symptoms, her disabling pain, and concordance of her disc pathology with the complaints of pain. Surgery, as I would see it, would be a discectomy and fusion or a disc arthroplasty, and I discussed the two options with her. Between the two, she would rather go with the more tried and true discectomy and fusion. I reviewed again the risks, benefits, complications, and alternatives and she would like to proceed. We will proceed with the cervical decompression and fusion once the appropriate approvals and clearances take place.     cc: Na Ha, M.D.         Past Medical History:   Diagnosis Date    Anemia     during pregnancy 2nd child    Migraine headache 1997    Ovarian cyst     Vertigo        Family History   Problem Relation Age of Onset    Heart Disease Mother     Hypertension Mother    [facility] MS Mother     Other Mother 54     Lupus    Stroke Mother      x3    Asthma Sister     Diabetes Paternal Grandmother     Hypertension Paternal Grandmother        Current Outpatient Prescriptions   Medication Sig Dispense Refill    diclofenac EC (VOLTAREN) 75 mg EC tablet Take 1 Tab by mouth two (2) times daily (with meals). 60 Tab 1    ibuprofen (MOTRIN) 800 mg tablet TK 1 T PO TID  0    diphenhydrAMINE (BENADRYL) 25 mg capsule Take 25 mg by mouth every six (6) hours as needed.  HYDROcodone-acetaminophen (NORCO) 5-325 mg per tablet TAKE 1 T PO PRN P Q 6 H  0    methylPREDNISolone (MEDROL DOSEPACK) 4 mg tablet Per dose pack instructions 1 Dose Pack 0    baclofen (LIORESAL) 10 mg tablet Take 0.5 Tabs by mouth two (2) times daily as needed. 60 Tab 0    promethazine (PHENERGAN) 25 mg tablet Take 1 Tab by mouth every six (6) hours as needed for Nausea (headache). Caution: This medication may make you drowsy. Avoid driving while under the influence of this medication. 12 Tab 0    meclizine (ANTIVERT) 25 mg tablet Take 25 mg by mouth three (3) times daily as needed.          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 Social History Main Topics    Smoking status: Never Smoker    Smokeless tobacco: Never Used    Alcohol use Yes      Comment: rare    Drug use: No    Sexual activity: Yes     Partners: Male     Birth control/ protection: Implant      Comment: pregnancy test negative     Other Topics Concern    Not on file     Social History Narrative         REVIEW OF SYSTEMS:   CONSTITUTIONAL SYMPTOMS:  Negative. EYES:  Negative. EARS, NOSE, THROAT AND MOUTH:  Negative. CARDIOVASCULAR:  Negative. RESPIRATORY:  Negative. GENITOURINARY: Negative. GASTROINTESTINAL:  Negative. INTEGUMENTARY (SKIN AND/OR BREAST):  Negative. MUSCULOSKELETAL: Per HPI.   ENDOCRINE/RHEUMATOLOGIC:  Negative. NEUROLOGICAL:  Per HPI. HEMATOLOGIC/LYMPHATIC:  Negative. ALLERGIC/IMMUNOLOGIC:  Negative. PSYCHIATRIC:  Negative. PHYSICAL EXAMINATION:   Visit Vitals    /63    Pulse 76    Temp 98.2 °F (36.8 °C) (Oral)    Resp 18    Ht 5' 5\" (1.651 m)    Wt 162 lb (73.5 kg)    SpO2 99%    BMI 26.96 kg/m2    PAIN SCALE: 6/10    CONSTITUTIONAL: The patient is in no apparent distress and is alert and oriented x 3. HEENT: Normocephalic. Hearing grossly intact. NECK: Supple and symmetric. no tenderness, or masses were felt. RESPIRATORY: No labored breathing. CARDIOVASCULAR: The carotid pulses were normal. Peripheral pulses were 2+. CHEST: Normal AP diameter and normal contour without any kyphoscoliosis. LYMPHATIC: No lymphadenopathy was appreciated in the neck, axillae or groin.   SKIN: Negative for scars, rashes, lesions, or ulcers on the right upper, right lower, left upper, left lower and trunk. NEUROLOGICAL: Alert and oriented x 3. Ambulation without assistive device. FWB. EXTREMITIES: See musculoskeletal.  MUSCULOSKELETAL:   Head and Neck: Neck pain that goes in between shoulder blades. ROM increases pain. Prolonged sitting and laying down increasewd pain and paresthesias. Negative for misalignment, asymmetry, crepitation, defects, tenderness masses or effusions.  Left Upper Extremity: Paresthesia. Inspection, percussion and palpation preformed. Engels sign is negative.  Right Upper Extremity: Paresthesia. Inspection, percussion and palpation preformed. Engels sign is negative.  Spine, Ribs and Pelvis: Inspection, percussion and palpation preformed. Negative for misalignment, asymmetry, crepitation, defects, tenderness masses or effusions.  Left Lower Extremity: Inspection, percussion and palpation preformed. Negative straight leg raise.  Right Lower Extremity: Inspection, percussion and palpation preformed. Negative straight leg raise. SPINE EXAM:     Cervical spine: Neck is midline. Normal muscle tone. No focal atrophy is noted. ASSESSMENT    ICD-10-CM ICD-9-CM    1.  HNP (herniated nucleus pulposus), cervical M50.20 722.0        Written by Connie Brooks, as dictated by Kassidy Hampton MD.

## 2017-03-17 ENCOUNTER — HOSPITAL ENCOUNTER (OUTPATIENT)
Dept: LAB | Age: 42
Discharge: HOME | End: 2017-03-17

## 2017-03-17 ENCOUNTER — HOSPITAL ENCOUNTER (OUTPATIENT)
Dept: PREADMISSION TESTING | Age: 42
Discharge: HOME | End: 2017-03-17
Payer: MEDICAID

## 2017-03-17 DIAGNOSIS — Z01.818 PRE-OP TESTING: ICD-10-CM

## 2017-03-17 LAB — SENTARA SPECIMEN COL,SENBCF: NORMAL

## 2017-03-17 PROCEDURE — 93005 ELECTROCARDIOGRAM TRACING: CPT

## 2017-03-17 PROCEDURE — 99001 SPECIMEN HANDLING PT-LAB: CPT | Performed by: ORTHOPAEDIC SURGERY

## 2017-03-18 LAB
ATRIAL RATE: 66 BPM
CALCULATED P AXIS, ECG09: 54 DEGREES
CALCULATED R AXIS, ECG10: 82 DEGREES
CALCULATED T AXIS, ECG11: 68 DEGREES
DIAGNOSIS, 93000: NORMAL
P-R INTERVAL, ECG05: 156 MS
Q-T INTERVAL, ECG07: 372 MS
QRS DURATION, ECG06: 78 MS
QTC CALCULATION (BEZET), ECG08: 389 MS
VENTRICULAR RATE, ECG03: 66 BPM

## 2017-03-20 NOTE — H&P
Pre-Admission History and Physical    Patient: Shashank Mercedes   MRN: [id number]   SSN: [id number]   YOB: 1975   Age: 46 y.o. Sex: female     Patient scheduled for: ACDF C5/6 . Date of surgery: 3/22/17. [facility] Surgeon: Saroj Leger MD    HPI:  Shashank Mercedes is a 46 y.o. female with neck pain with left greater than right arm pain. .   She reports a pain level of 6/10. MRI demonstratesprogression of her disc pathology with now left paracentral disc protrusion of left greater than right lateral recess nerve root compression contacting the cord, moderate but not severe. The remainder of her neck is normal. EMGs were benign. This patient has failed the presurgical conservative treatments  including physical therapy, spinal block injections and medications. Pain has impacted the patient's functional ability to bathe, dress, lift and stand sedentary for long. She  is being admitted for surgical intervention.          Past Medical History:   Diagnosis Date    Anemia     during pregnancy 2nd child    Migraine headache     Ovarian cyst     Vertigo      Social History     Social History    Marital status: SINGLE     Spouse name: N/A    Number of children: N/A    Years of education: N/A     Social History Main Topics    Smoking status: Never Smoker    Smokeless tobacco: Never Used    Alcohol use Yes      Comment: rare    Drug use: No    Sexual activity: Yes     Partners: Male     Birth control/ protection: Implant      Comment: pregnancy test negative     Other Topics Concern    Not on file     Social History Narrative     Past Surgical History:   Procedure Laterality Date     DELIVERY ONLY      DELIVERY       1st child    HX ORTHOPAEDIC  2005    RIGHT ELBOW    HX WISDOM TEETH EXTRACTION  1996    x4    INTESTINE SURG PROCEDURE UNLISTED      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   24 [facility] SHOULDER SURG PROC UNLISTED  9/08/2011    left shoulder     Family History   Problem Relation Age of Onset    Heart Disease Mother     Hypertension Mother    [facility] Pam[facility] MS Mother     Other Mother 54     Lupus    Stroke Mother      x3    Asthma Sister     Diabetes Paternal Grandmother     Hypertension Paternal Grandmother      Allergies   Allergen Reactions    Ciprofloxacin (Bulk) Diarrhea    Codeine Itching    Dilaudid [Hydromorphone (Bulk)] Itching    Lyrica [Pregabalin] Swelling    Tramadol Other (comments) and Unknown (comments)     Gets headaches  headache    Codeine Phosphate Unknown (comments)    Hydromorphone Unknown (comments)     Current Outpatient Prescriptions   Medication Sig Dispense Refill    promethazine (PHENERGAN) 25 mg tablet Take 1 Tab by mouth every six (6) hours as needed for Nausea (headache). Caution: This medication may make you drowsy. Avoid driving while under the influence of this medication. 12 Tab 0    diphenhydrAMINE (BENADRYL) 25 mg capsule Take 25 mg by mouth every six (6) hours as needed.  meclizine (ANTIVERT) 25 mg tablet Take 25 mg by mouth three (3) times daily as needed.  diclofenac EC (VOLTAREN) 75 mg EC tablet Take 1 Tab by mouth two (2) times daily (with meals). 60 Tab 1    HYDROcodone-acetaminophen (NORCO) 5-325 mg per tablet TAKE 1 T PO PRN P Q 6 H  0    baclofen (LIORESAL) 10 mg tablet Take 0.5 Tabs by mouth two (2) times daily as needed. 60 Tab 0       ROS:  Denies chills, fever,night sweats,  bowel or bladder dysfunction, unexplained weight loss/weight gain, chest pain, sob or anxiety. Physical Examination    Gen: Well developed, well nourished 46 y.o. female    /63    Pulse 76    Temp 98.2 °F (36.8 °C) (Oral)    Resp 18    Ht 5' 5\" (1.651 m)    Wt 162 lb (73.5 kg)    SpO2 99%    BMI 26.96 kg/m2    PAIN SCALE: 6/10     CONSTITUTIONAL: The patient is in no apparent distress and is alert and oriented x 3. HEENT: Normocephalic.  Hearing grossly intact. NECK: Supple and symmetric. no tenderness, or masses were felt. RESPIRATORY: No labored breathing. CARDIOVASCULAR: The carotid pulses were normal. Peripheral pulses were 2+. CHEST: Normal AP diameter and normal contour without any kyphoscoliosis. LYMPHATIC: No lymphadenopathy was appreciated in the neck, axillae or groin. SKIN: Negative for scars, rashes, lesions, or ulcers on the right upper, right lower, left upper, left lower and trunk. NEUROLOGICAL: Alert and oriented x 3. Ambulation without assistive device. FWB. EXTREMITIES: See musculoskeletal.  MUSCULOSKELETAL:  · Head and Neck: Neck pain that goes in between shoulder blades. ROM increases pain. Prolonged sitting and laying down increasewd pain and paresthesias. Negative for misalignment, asymmetry, crepitation, defects, tenderness masses or effusions. · Left Upper Extremity: Paresthesia. Inspection, percussion and palpation preformed. Engels sign is negative. · Right Upper Extremity: Paresthesia. Inspection, percussion and palpation preformed. Engels sign is negative. · Spine, Ribs and Pelvis: Inspection, percussion and palpation preformed. Negative for misalignment, asymmetry, crepitation, defects, tenderness masses or effusions. · Left Lower Extremity: Inspection, percussion and palpation preformed. Negative straight leg raise. · Right Lower Extremity: Inspection, percussion and palpation preformed. Negative straight leg raise.        SPINE EXAM:      Cervical spine: Neck is midline. Normal muscle tone. No focal atrophy is noted.           Assessment and Plan    Due to the pt's persistent symptoms unrelieved by conservative measure Leonard Perry is being admitted [facility] to undergo surgical intervention. The post-operative plan of care consists of physical therapy, home health and a 2 week f/u office visit.   The risks, benefits, complications and alternatives to surgery have been discussed in detail with the patient. The patient understands and agrees to proceed.      JOSE GiordanoC dictating for Romi Carpio MD

## 2017-03-21 ENCOUNTER — ANESTHESIA EVENT (OUTPATIENT)
Dept: SURGERY | Age: 42
End: 2017-03-21
Payer: MEDICAID

## 2017-03-22 ENCOUNTER — ANESTHESIA (OUTPATIENT)
Dept: SURGERY | Age: 42
End: 2017-03-22
Payer: MEDICAID

## 2017-03-22 ENCOUNTER — HOSPITAL ENCOUNTER (OUTPATIENT)
Age: 42
Setting detail: OBSERVATION
Discharge: HOME | End: 2017-03-23
Attending: ORTHOPAEDIC SURGERY | Admitting: ORTHOPAEDIC SURGERY
Payer: MEDICAID

## 2017-03-22 ENCOUNTER — APPOINTMENT (OUTPATIENT)
Dept: GENERAL RADIOLOGY | Age: 42
End: 2017-03-22
Attending: ORTHOPAEDIC SURGERY
Payer: MEDICAID

## 2017-03-22 ENCOUNTER — SURGERY (OUTPATIENT)
Age: 42
End: 2017-03-22

## 2017-03-22 LAB — HCG UR QL: NEGATIVE

## 2017-03-22 PROCEDURE — 99218 HC RM OBSERVATION: CPT

## 2017-03-22 PROCEDURE — 74011250636 HC RX REV CODE- 250/636

## 2017-03-22 PROCEDURE — 77030020782 HC GWN BAIR PAWS FLX 3M -B: Performed by: ORTHOPAEDIC SURGERY

## 2017-03-22 PROCEDURE — 77030002933 HC SUT MCRYL J&J -A: Performed by: ORTHOPAEDIC SURGERY

## 2017-03-22 PROCEDURE — 76010000149 HC OR TIME 1 TO 1.5 HR: Performed by: ORTHOPAEDIC SURGERY

## 2017-03-22 PROCEDURE — 77030011640 HC PAD GRND REM COVD -A: Performed by: ORTHOPAEDIC SURGERY

## 2017-03-22 PROCEDURE — 74011250636 HC RX REV CODE- 250/636: Performed by: ORTHOPAEDIC SURGERY

## 2017-03-22 PROCEDURE — 77030004402 HC BUR NEUR STRY -C: Performed by: ORTHOPAEDIC SURGERY

## 2017-03-22 PROCEDURE — 81025 URINE PREGNANCY TEST: CPT

## 2017-03-22 PROCEDURE — 74011000272 HC RX REV CODE- 272: Performed by: ORTHOPAEDIC SURGERY

## 2017-03-22 PROCEDURE — 77030027138 HC INCENT SPIROMETER -A

## 2017-03-22 PROCEDURE — 74011250637 HC RX REV CODE- 250/637: Performed by: ORTHOPAEDIC SURGERY

## 2017-03-22 PROCEDURE — 77030012893: Performed by: ORTHOPAEDIC SURGERY

## 2017-03-22 PROCEDURE — 77030018836 HC SOL IRR NACL ICUM -A: Performed by: ORTHOPAEDIC SURGERY

## 2017-03-22 PROCEDURE — 76060000033 HC ANESTHESIA 1 TO 1.5 HR: Performed by: ORTHOPAEDIC SURGERY

## 2017-03-22 PROCEDURE — 77030033263 HC DRSG MEPILEX 16-48IN BORD MOLN -B: Performed by: ORTHOPAEDIC SURGERY

## 2017-03-22 PROCEDURE — 76210000006 HC OR PH I REC 0.5 TO 1 HR: Performed by: ORTHOPAEDIC SURGERY

## 2017-03-22 PROCEDURE — 77030011267 HC ELECTRD BLD COVD -A: Performed by: ORTHOPAEDIC SURGERY

## 2017-03-22 PROCEDURE — 77030031139 HC SUT VCRL2 J&J -A: Performed by: ORTHOPAEDIC SURGERY

## 2017-03-22 PROCEDURE — 77030010507 HC ADH SKN DERMBND J&J -B: Performed by: ORTHOPAEDIC SURGERY

## 2017-03-22 PROCEDURE — 74011250636 HC RX REV CODE- 250/636: Performed by: NURSE ANESTHETIST, CERTIFIED REGISTERED

## 2017-03-22 PROCEDURE — 77030016293 HC SPCR SPN ZEROP SYNT -I2: Performed by: ORTHOPAEDIC SURGERY

## 2017-03-22 PROCEDURE — C1713 ANCHOR/SCREW BN/BN,TIS/BN: HCPCS | Performed by: ORTHOPAEDIC SURGERY

## 2017-03-22 PROCEDURE — 77030008683 HC TU ET CUF COVD -A: Performed by: NURSE ANESTHETIST, CERTIFIED REGISTERED

## 2017-03-22 PROCEDURE — 77030013079 HC BLNKT BAIR HGGR 3M -A: Performed by: ANESTHESIOLOGY

## 2017-03-22 PROCEDURE — 74011250636 HC RX REV CODE- 250/636: Performed by: NURSE PRACTITIONER

## 2017-03-22 PROCEDURE — 77030032490 HC SLV COMPR SCD KNE COVD -B: Performed by: ORTHOPAEDIC SURGERY

## 2017-03-22 PROCEDURE — 74011000250 HC RX REV CODE- 250: Performed by: ORTHOPAEDIC SURGERY

## 2017-03-22 PROCEDURE — 74011250637 HC RX REV CODE- 250/637: Performed by: NURSE ANESTHETIST, CERTIFIED REGISTERED

## 2017-03-22 PROCEDURE — 74011000250 HC RX REV CODE- 250

## 2017-03-22 PROCEDURE — 77030019908 HC STETH ESOPH SIMS -A: Performed by: ANESTHESIOLOGY

## 2017-03-22 DEVICE — GRAFT BONE PASTE DEMINERLIZED BONE MTRX 1CC ALLOFUSE +: Type: IMPLANTABLE DEVICE | Site: ANTERIOR CERVICAL | Status: FUNCTIONAL

## 2017-03-22 DEVICE — SCREW SPNL L14MM DIA3.7MM G ANT CERV TI SELF DRL ZERO-P VA: Type: IMPLANTABLE DEVICE | Site: ANTERIOR CERVICAL | Status: FUNCTIONAL

## 2017-03-22 DEVICE — SPACER SPNL ZERO-P VA IMPL 8MM LORDOTIC STERILE: Type: IMPLANTABLE DEVICE | Site: ANTERIOR CERVICAL | Status: FUNCTIONAL

## 2017-03-22 RX ORDER — LIDOCAINE HYDROCHLORIDE 20 MG/ML
INJECTION, SOLUTION EPIDURAL; INFILTRATION; INTRACAUDAL; PERINEURAL AS NEEDED
Status: DISCONTINUED | OUTPATIENT
Start: 2017-03-22 | End: 2017-03-22 | Stop reason: HOSPADM

## 2017-03-22 RX ORDER — NALOXONE HYDROCHLORIDE 0.4 MG/ML
0.4 INJECTION, SOLUTION INTRAMUSCULAR; INTRAVENOUS; SUBCUTANEOUS AS NEEDED
Status: DISCONTINUED | OUTPATIENT
Start: 2017-03-22 | End: 2017-03-22 | Stop reason: HOSPADM

## 2017-03-22 RX ORDER — SUCCINYLCHOLINE CHLORIDE 20 MG/ML
INJECTION INTRAMUSCULAR; INTRAVENOUS AS NEEDED
Status: DISCONTINUED | OUTPATIENT
Start: 2017-03-22 | End: 2017-03-22 | Stop reason: HOSPADM

## 2017-03-22 RX ORDER — MIDAZOLAM HYDROCHLORIDE 1 MG/ML
INJECTION, SOLUTION INTRAMUSCULAR; INTRAVENOUS AS NEEDED
Status: DISCONTINUED | OUTPATIENT
Start: 2017-03-22 | End: 2017-03-22 | Stop reason: HOSPADM

## 2017-03-22 RX ORDER — SODIUM CHLORIDE 0.9 % (FLUSH) 0.9 %
5-10 SYRINGE (ML) INJECTION AS NEEDED
Status: DISCONTINUED | OUTPATIENT
Start: 2017-03-22 | End: 2017-03-22 | Stop reason: HOSPADM

## 2017-03-22 RX ORDER — KETOROLAC TROMETHAMINE 30 MG/ML
INJECTION, SOLUTION INTRAMUSCULAR; INTRAVENOUS AS NEEDED
Status: DISCONTINUED | OUTPATIENT
Start: 2017-03-22 | End: 2017-03-22 | Stop reason: HOSPADM

## 2017-03-22 RX ORDER — ALBUTEROL SULFATE 0.83 MG/ML
2.5 SOLUTION RESPIRATORY (INHALATION) AS NEEDED
Status: DISCONTINUED | OUTPATIENT
Start: 2017-03-22 | End: 2017-03-22

## 2017-03-22 RX ORDER — ROCURONIUM BROMIDE 10 MG/ML
INJECTION, SOLUTION INTRAVENOUS AS NEEDED
Status: DISCONTINUED | OUTPATIENT
Start: 2017-03-22 | End: 2017-03-22 | Stop reason: HOSPADM

## 2017-03-22 RX ORDER — ONDANSETRON 2 MG/ML
INJECTION INTRAMUSCULAR; INTRAVENOUS AS NEEDED
Status: DISCONTINUED | OUTPATIENT
Start: 2017-03-22 | End: 2017-03-22 | Stop reason: HOSPADM

## 2017-03-22 RX ORDER — SODIUM CHLORIDE 0.9 % (FLUSH) 0.9 %
5-10 SYRINGE (ML) INJECTION EVERY 8 HOURS
Status: DISCONTINUED | OUTPATIENT
Start: 2017-03-22 | End: 2017-03-23 | Stop reason: HOSPADM

## 2017-03-22 RX ORDER — CEFAZOLIN SODIUM 2 G/50ML
2 SOLUTION INTRAVENOUS EVERY 8 HOURS
Status: DISCONTINUED | OUTPATIENT
Start: 2017-03-22 | End: 2017-03-23 | Stop reason: HOSPADM

## 2017-03-22 RX ORDER — ALBUTEROL SULFATE 0.83 MG/ML
2.5 SOLUTION RESPIRATORY (INHALATION) AS NEEDED
Status: DISCONTINUED | OUTPATIENT
Start: 2017-03-22 | End: 2017-03-22 | Stop reason: HOSPADM

## 2017-03-22 RX ORDER — LORAZEPAM 2 MG/ML
1 INJECTION INTRAMUSCULAR
Status: DISCONTINUED | OUTPATIENT
Start: 2017-03-22 | End: 2017-03-23 | Stop reason: HOSPADM

## 2017-03-22 RX ORDER — METOPROLOL TARTRATE 5 MG/5ML
INJECTION INTRAVENOUS AS NEEDED
Status: DISCONTINUED | OUTPATIENT
Start: 2017-03-22 | End: 2017-03-22 | Stop reason: HOSPADM

## 2017-03-22 RX ORDER — FAMOTIDINE 10 MG/ML
20 INJECTION INTRAVENOUS EVERY 12 HOURS
Status: DISCONTINUED | OUTPATIENT
Start: 2017-03-22 | End: 2017-03-23 | Stop reason: HOSPADM

## 2017-03-22 RX ORDER — SODIUM CHLORIDE 9 MG/ML
INJECTION, SOLUTION INTRAVENOUS
Status: DISCONTINUED | OUTPATIENT
Start: 2017-03-22 | End: 2017-03-22

## 2017-03-22 RX ORDER — SODIUM CHLORIDE 0.9 % (FLUSH) 0.9 %
5-10 SYRINGE (ML) INJECTION AS NEEDED
Status: DISCONTINUED | OUTPATIENT
Start: 2017-03-22 | End: 2017-03-23 | Stop reason: HOSPADM

## 2017-03-22 RX ORDER — BACLOFEN 10 MG/1
5 TABLET ORAL 2 TIMES DAILY
Status: DISCONTINUED | OUTPATIENT
Start: 2017-03-22 | End: 2017-03-23 | Stop reason: HOSPADM

## 2017-03-22 RX ORDER — SODIUM CHLORIDE, SODIUM LACTATE, POTASSIUM CHLORIDE, CALCIUM CHLORIDE 600; 310; 30; 20 MG/100ML; MG/100ML; MG/100ML; MG/100ML
50 INJECTION, SOLUTION INTRAVENOUS CONTINUOUS
Status: DISCONTINUED | OUTPATIENT
Start: 2017-03-22 | End: 2017-03-22 | Stop reason: HOSPADM

## 2017-03-22 RX ORDER — CELECOXIB 400 MG/1
400 CAPSULE ORAL ONCE
Status: COMPLETED | OUTPATIENT
Start: 2017-03-22 | End: 2017-03-22

## 2017-03-22 RX ORDER — FENTANYL CITRATE 50 UG/ML
INJECTION, SOLUTION INTRAMUSCULAR; INTRAVENOUS AS NEEDED
Status: DISCONTINUED | OUTPATIENT
Start: 2017-03-22 | End: 2017-03-22 | Stop reason: HOSPADM

## 2017-03-22 RX ORDER — DIPHENHYDRAMINE HCL 25 MG
25 CAPSULE ORAL
Status: DISCONTINUED | OUTPATIENT
Start: 2017-03-22 | End: 2017-03-23 | Stop reason: HOSPADM

## 2017-03-22 RX ORDER — ONDANSETRON 2 MG/ML
4 INJECTION INTRAMUSCULAR; INTRAVENOUS
Status: DISCONTINUED | OUTPATIENT
Start: 2017-03-22 | End: 2017-03-23 | Stop reason: HOSPADM

## 2017-03-22 RX ORDER — HYDROMORPHONE HYDROCHLORIDE 1 MG/ML
1 INJECTION, SOLUTION INTRAMUSCULAR; INTRAVENOUS; SUBCUTANEOUS
Status: DISCONTINUED | OUTPATIENT
Start: 2017-03-22 | End: 2017-03-23 | Stop reason: HOSPADM

## 2017-03-22 RX ORDER — SODIUM CHLORIDE 9 MG/ML
INJECTION, SOLUTION INTRAVENOUS
Status: DISCONTINUED | OUTPATIENT
Start: 2017-03-22 | End: 2017-03-22 | Stop reason: HOSPADM

## 2017-03-22 RX ORDER — CEFAZOLIN SODIUM 2 G/50ML
2 SOLUTION INTRAVENOUS ONCE
Status: COMPLETED | OUTPATIENT
Start: 2017-03-22 | End: 2017-03-22

## 2017-03-22 RX ORDER — NALOXONE HYDROCHLORIDE 0.4 MG/ML
0.4 INJECTION, SOLUTION INTRAMUSCULAR; INTRAVENOUS; SUBCUTANEOUS AS NEEDED
Status: DISCONTINUED | OUTPATIENT
Start: 2017-03-22 | End: 2017-03-23 | Stop reason: HOSPADM

## 2017-03-22 RX ORDER — GLYCOPYRROLATE 0.2 MG/ML
INJECTION INTRAMUSCULAR; INTRAVENOUS AS NEEDED
Status: DISCONTINUED | OUTPATIENT
Start: 2017-03-22 | End: 2017-03-22 | Stop reason: HOSPADM

## 2017-03-22 RX ORDER — DIPHENHYDRAMINE HYDROCHLORIDE 50 MG/ML
12.5 INJECTION, SOLUTION INTRAMUSCULAR; INTRAVENOUS
Status: DISCONTINUED | OUTPATIENT
Start: 2017-03-22 | End: 2017-03-22 | Stop reason: HOSPADM

## 2017-03-22 RX ORDER — DEXTROSE, SODIUM CHLORIDE, AND POTASSIUM CHLORIDE 5; .45; .15 G/100ML; G/100ML; G/100ML
50 INJECTION INTRAVENOUS CONTINUOUS
Status: DISCONTINUED | OUTPATIENT
Start: 2017-03-22 | End: 2017-03-23 | Stop reason: HOSPADM

## 2017-03-22 RX ORDER — SODIUM CHLORIDE 0.9 % (FLUSH) 0.9 %
5-10 SYRINGE (ML) INJECTION EVERY 8 HOURS
Status: DISCONTINUED | OUTPATIENT
Start: 2017-03-22 | End: 2017-03-22 | Stop reason: HOSPADM

## 2017-03-22 RX ORDER — DEXAMETHASONE SODIUM PHOSPHATE 4 MG/ML
INJECTION, SOLUTION INTRA-ARTICULAR; INTRALESIONAL; INTRAMUSCULAR; INTRAVENOUS; SOFT TISSUE AS NEEDED
Status: DISCONTINUED | OUTPATIENT
Start: 2017-03-22 | End: 2017-03-22 | Stop reason: HOSPADM

## 2017-03-22 RX ORDER — FENTANYL CITRATE 50 UG/ML
50 INJECTION, SOLUTION INTRAMUSCULAR; INTRAVENOUS
Status: DISCONTINUED | OUTPATIENT
Start: 2017-03-22 | End: 2017-03-22 | Stop reason: HOSPADM

## 2017-03-22 RX ORDER — DIPHENHYDRAMINE HYDROCHLORIDE 50 MG/ML
12.5 INJECTION, SOLUTION INTRAMUSCULAR; INTRAVENOUS
Status: DISCONTINUED | OUTPATIENT
Start: 2017-03-22 | End: 2017-03-23 | Stop reason: HOSPADM

## 2017-03-22 RX ORDER — DIAZEPAM 5 MG/1
5 TABLET ORAL
Status: DISCONTINUED | OUTPATIENT
Start: 2017-03-22 | End: 2017-03-23 | Stop reason: HOSPADM

## 2017-03-22 RX ORDER — HYDROCODONE BITARTRATE AND ACETAMINOPHEN 10; 325 MG/1; MG/1
1 TABLET ORAL
Status: DISCONTINUED | OUTPATIENT
Start: 2017-03-22 | End: 2017-03-23 | Stop reason: HOSPADM

## 2017-03-22 RX ORDER — PROPOFOL 10 MG/ML
INJECTION, EMULSION INTRAVENOUS AS NEEDED
Status: DISCONTINUED | OUTPATIENT
Start: 2017-03-22 | End: 2017-03-22 | Stop reason: HOSPADM

## 2017-03-22 RX ORDER — FAMOTIDINE 20 MG/1
20 TABLET, FILM COATED ORAL ONCE
Status: COMPLETED | OUTPATIENT
Start: 2017-03-22 | End: 2017-03-22

## 2017-03-22 RX ORDER — ONDANSETRON 2 MG/ML
4 INJECTION INTRAMUSCULAR; INTRAVENOUS ONCE
Status: DISCONTINUED | OUTPATIENT
Start: 2017-03-22 | End: 2017-03-22 | Stop reason: HOSPADM

## 2017-03-22 RX ADMIN — THROMBIN, TOPICAL (RECOMBINANT) 5000 UNITS: KIT at 13:54

## 2017-03-22 RX ADMIN — GLYCOPYRROLATE 0.2 MG: 0.2 INJECTION INTRAMUSCULAR; INTRAVENOUS at 13:27

## 2017-03-22 RX ADMIN — METOPROLOL TARTRATE 2.5 MG: 5 INJECTION INTRAVENOUS at 14:04

## 2017-03-22 RX ADMIN — DIAZEPAM 5 MG: 5 TABLET ORAL at 18:02

## 2017-03-22 RX ADMIN — CEFAZOLIN SODIUM 2 G: 2 SOLUTION INTRAVENOUS at 22:19

## 2017-03-22 RX ADMIN — Medication 10 ML: at 22:00

## 2017-03-22 RX ADMIN — HYDROCODONE BITARTRATE AND ACETAMINOPHEN 1 TABLET: 10; 325 TABLET ORAL at 19:42

## 2017-03-22 RX ADMIN — ONDANSETRON 4 MG: 2 INJECTION INTRAMUSCULAR; INTRAVENOUS at 14:29

## 2017-03-22 RX ADMIN — MIDAZOLAM HYDROCHLORIDE 2 MG: 1 INJECTION, SOLUTION INTRAMUSCULAR; INTRAVENOUS at 13:27

## 2017-03-22 RX ADMIN — ROCURONIUM BROMIDE 5 MG: 10 INJECTION, SOLUTION INTRAVENOUS at 13:32

## 2017-03-22 RX ADMIN — SODIUM CHLORIDE, SODIUM LACTATE, POTASSIUM CHLORIDE, AND CALCIUM CHLORIDE: 600; 310; 30; 20 INJECTION, SOLUTION INTRAVENOUS at 13:27

## 2017-03-22 RX ADMIN — FENTANYL CITRATE 50 MCG: 50 INJECTION INTRAMUSCULAR; INTRAVENOUS at 15:27

## 2017-03-22 RX ADMIN — FAMOTIDINE 20 MG: 20 TABLET, FILM COATED ORAL at 11:42

## 2017-03-22 RX ADMIN — FENTANYL CITRATE 100 MCG: 50 INJECTION, SOLUTION INTRAMUSCULAR; INTRAVENOUS at 14:42

## 2017-03-22 RX ADMIN — KETOROLAC TROMETHAMINE 30 MG: 30 INJECTION, SOLUTION INTRAMUSCULAR; INTRAVENOUS at 14:28

## 2017-03-22 RX ADMIN — FAMOTIDINE 20 MG: 10 INJECTION, SOLUTION INTRAVENOUS at 20:48

## 2017-03-22 RX ADMIN — FENTANYL CITRATE 50 MCG: 50 INJECTION, SOLUTION INTRAMUSCULAR; INTRAVENOUS at 14:50

## 2017-03-22 RX ADMIN — SODIUM CHLORIDE, SODIUM LACTATE, POTASSIUM CHLORIDE, AND CALCIUM CHLORIDE 50 ML/HR: 600; 310; 30; 20 INJECTION, SOLUTION INTRAVENOUS at 11:43

## 2017-03-22 RX ADMIN — HYDROMORPHONE HYDROCHLORIDE 1 MG: 1 INJECTION, SOLUTION INTRAMUSCULAR; INTRAVENOUS; SUBCUTANEOUS at 16:49

## 2017-03-22 RX ADMIN — DIPHENHYDRAMINE HYDROCHLORIDE 12.5 MG: 50 INJECTION INTRAMUSCULAR; INTRAVENOUS at 16:49

## 2017-03-22 RX ADMIN — FENTANYL CITRATE 50 MCG: 50 INJECTION, SOLUTION INTRAMUSCULAR; INTRAVENOUS at 14:06

## 2017-03-22 RX ADMIN — CELECOXIB 400 MG: 400 CAPSULE ORAL at 11:42

## 2017-03-22 RX ADMIN — FENTANYL CITRATE 50 MCG: 50 INJECTION, SOLUTION INTRAMUSCULAR; INTRAVENOUS at 13:32

## 2017-03-22 RX ADMIN — DEXAMETHASONE SODIUM PHOSPHATE 10 MG: 4 INJECTION, SOLUTION INTRA-ARTICULAR; INTRALESIONAL; INTRAMUSCULAR; INTRAVENOUS; SOFT TISSUE at 13:32

## 2017-03-22 RX ADMIN — GELATIN ABSORBABLE SPONGE SIZE 100 1 EACH: MISC at 13:54

## 2017-03-22 RX ADMIN — SUCCINYLCHOLINE CHLORIDE 140 MG: 20 INJECTION INTRAMUSCULAR; INTRAVENOUS at 13:32

## 2017-03-22 RX ADMIN — SODIUM CHLORIDE: 9 INJECTION, SOLUTION INTRAVENOUS at 13:41

## 2017-03-22 RX ADMIN — DEXTROSE MONOHYDRATE, SODIUM CHLORIDE, AND POTASSIUM CHLORIDE 50 ML/HR: 50; 4.5; 1.49 INJECTION, SOLUTION INTRAVENOUS at 16:49

## 2017-03-22 RX ADMIN — LIDOCAINE HYDROCHLORIDE 50 MG: 20 INJECTION, SOLUTION EPIDURAL; INFILTRATION; INTRACAUDAL; PERINEURAL at 14:28

## 2017-03-22 RX ADMIN — PROPOFOL 200 MG: 10 INJECTION, EMULSION INTRAVENOUS at 13:32

## 2017-03-22 RX ADMIN — HYDROMORPHONE HYDROCHLORIDE 1 MG: 1 INJECTION, SOLUTION INTRAMUSCULAR; INTRAVENOUS; SUBCUTANEOUS at 22:16

## 2017-03-22 RX ADMIN — CEFAZOLIN SODIUM 2 G: 2 SOLUTION INTRAVENOUS at 17:53

## 2017-03-22 RX ADMIN — FENTANYL CITRATE 50 MCG: 50 INJECTION INTRAMUSCULAR; INTRAVENOUS at 15:04

## 2017-03-22 RX ADMIN — ONDANSETRON 4 MG: 2 INJECTION INTRAMUSCULAR; INTRAVENOUS at 13:27

## 2017-03-22 RX ADMIN — MIDAZOLAM HYDROCHLORIDE 1 MG: 1 INJECTION, SOLUTION INTRAMUSCULAR; INTRAVENOUS at 13:24

## 2017-03-22 RX ADMIN — LIDOCAINE HYDROCHLORIDE 50 MG: 20 INJECTION, SOLUTION EPIDURAL; INFILTRATION; INTRACAUDAL; PERINEURAL at 13:32

## 2017-03-22 RX ADMIN — CEFAZOLIN SODIUM 2 G: 2 SOLUTION INTRAVENOUS at 13:27

## 2017-03-22 RX ADMIN — Medication 10 ML: at 18:00

## 2017-03-22 RX ADMIN — DIPHENHYDRAMINE HYDROCHLORIDE 12.5 MG: 50 INJECTION INTRAMUSCULAR; INTRAVENOUS at 15:34

## 2017-03-22 RX ADMIN — FENTANYL CITRATE 100 MCG: 50 INJECTION, SOLUTION INTRAMUSCULAR; INTRAVENOUS at 13:57

## 2017-03-22 NOTE — ROUTINE PROCESS
Patient AOX4, pain 9/10 on the posterior neck denies SOB, pedal pulses palpable, cap refill < 3 sec, sensation present, denies tingling, no edema noted, dressing to neck is  clean , dry and intact, neck collar intact resting in bed in low position. No sign and symptoms of distress. Call bell in reach. Family in  The room.

## 2017-03-22 NOTE — PERIOP NOTES
TRANSFER - OUT REPORT:    Verbal report given to [employer] on Leonard Perry  being transferred to  for routine post - op       Report consisted of patients Situation, Background, Assessment and   Recommendations(SBAR). Information from the following report(s) SBAR and MAR was reviewed with the receiving nurse. Lines:   Peripheral IV 03/22/17 Right Arm (Active)   Site Assessment Clean, dry, & intact 3/22/2017 11:00 AM   Phlebitis Assessment 0 3/22/2017 11:00 AM   Infiltration Assessment 0 3/22/2017 11:00 AM   Dressing Status Clean, dry, & intact 3/22/2017 11:00 AM   Dressing Type Transparent;Tape 3/22/2017 11:00 AM   Hub Color/Line Status Pink; Infusing 3/22/2017 11:00 AM       Peripheral IV 03/22/17 Left Hand (Active)        Opportunity for questions and clarification was provided.       Patient transported with:   O2 @ 2 liters

## 2017-03-22 NOTE — BRIEF OP NOTE
BRIEF OPERATIVE NOTE    Date of Procedure: 3/22/2017   Preoperative Diagnosis: HNP (herniated nucleus pulposus), cervical [M50.20]  Postoperative Diagnosis: HNP (herniated nucleus pulposus), cervical [M50.20]    Procedure(s):  ANTERIOR CERVICAL DISCECTOMY WITH FUSION C5/6  Surgeon(s) and Role:     * Lizeth Caldwell MD - Primary            Surgical Staff:  Circ-1: Danae Lloyd RN  Radiology Technician: Pedro Camarena  Scrub Tech-1: Linda Pearce  Surg Asst-1: Aviva Lopez  Event Time In   Incision Start 1350   Incision Close 1435     Anesthesia: General   Estimated Blood Loss: 5  Specimens: * No specimens in log *   Findings: hnp   Complications: 0  Implants:   Implant Name Type Inv.  Item Serial No.  Lot No. LRB No. Used Action   SPACER 0-P VA ACIF 8MM --  - [id number]  SPACER 0-P VA ACIF 8MM --   SYNTHES [place] [id number] N/A 1 Implanted   GRAFT DBM PLUS PASTE 1ML -- ALLOFUSE - [id number]  GRAFT DBM PLUS PASTE 1ML -- ALLOFUSE  ALLO SOURCE [id number] N/A 1 Implanted   SCR SPNE CERV SD V-A 3.7X14MM -- ZERO-P VA - [id number]   SCR SPNE CERV SD V-A 3.7X14MM -- ZERO-P VA   SYNTHES [place] [id number] N/A 2 Implanted

## 2017-03-22 NOTE — PROGRESS NOTES
conducted an initial consultation and Spiritual Assessment for Es Desai, who is a 46 y.o.,female. Patients Primary Language is: [place]. According to the patients EMR Mandaeism Affiliation is: Non Jainism.     The reason the Patient came to the hospital is:   Patient Active Problem List    Diagnosis Date Noted    Cervical myofascial pain syndrome 11/02/2016    HNP (herniated nucleus pulposus), cervical 11/02/2016    Muscle spasm 11/02/2016    Cervical spondylosis 01/07/2016    DDD (degenerative disc disease), cervical 01/07/2016    Myofascial muscle pain 10/15/2015    Muscle spasms of neck 10/15/2015    Ovarian mass 07/13/2012    Cystitis 06/06/2012    Family history of blood dyscrasia 03/25/2010    Family history of cardiovascular disease 03/25/2010    Abnormal cervical Papanicolaou smear with positive human papilloma virus (HPV) DNA test 03/25/2010        The  provided the following Interventions:  Initiated a relationship of care and support. Explored issues of kina, belief, spirituality and Baptist/ritual needs while hospitalized. Listened empathically. Provided chaplaincy education. Provided information about Spiritual Care Services. Offered prayer and assurance of continued prayers on patient's behalf. Chart reviewed. The following outcomes where achieved:  Patient shared limited information about both their medical narrative and spiritual journey/beliefs.  confirmed Patient's Mandaeism Affiliation. Patient processed feeling about current hospitalization. Patient expressed gratitude for 's visit. Assessment:  Patient does not have any Baptist/cultural needs that will affect patients preferences in health care. There are no spiritual or Baptist issues which require intervention at this time. Plan:  Chaplains will continue to follow and will provide pastoral care on an as needed/requested basis.    recommends bedside caregivers page  on duty if patient shows signs of acute spiritual or emotional distress. Chaplain Kacie DE LA TORRE  [address] [facility]  [phone]

## 2017-03-22 NOTE — PROGRESS NOTES
OR today  ACDF C5/6  VSS  C/o posterior neck pain, waiting pharmacy to confirm meds and will then medicate  Swallowing ice chips okay  LAZARUS = 0 cc  Neuro intact    Barbara Qiu, NP

## 2017-03-22 NOTE — IP AVS SNAPSHOT
Current Discharge Medication List  
  
CONTINUE these medications which have CHANGED Dose & Instructions Dispensing Information Comments Morning Noon Evening Bedtime * HYDROcodone-acetaminophen 5-325 mg per tablet Commonly known as:  Alexandrea Infante What changed:  Another medication with the same name was added. Make sure you understand how and when to take each. Your last dose was: Your next dose is: TAKE 1 T PO PRN P Q 6 H Refills:  0  
     
   
   
   
  
 * HYDROcodone-acetaminophen  mg tablet Commonly known as:  Alexandrea Infante What changed: You were already taking a medication with the same name, and this prescription was added. Make sure you understand how and when to take each. Your last dose was: Your next dose is:    
   
   
 Dose:  1 Tab Take 1 Tab by mouth every six (6) hours as needed for Pain. Max Daily Amount: 4 Tabs. Quantity:  30 Tab Refills:  0  
     
   
   
   
  
 * Notice: This list has 2 medication(s) that are the same as other medications prescribed for you. Read the directions carefully, and ask your doctor or other care provider to review them with you. CONTINUE these medications which have NOT CHANGED Dose & Instructions Dispensing Information Comments Morning Noon Evening Bedtime  
 baclofen 10 mg tablet Commonly known as:  LIORESAL Your last dose was: Your next dose is:    
   
   
 Dose:  5 mg Take 0.5 Tabs by mouth two (2) times daily as needed. Quantity:  60 Tab Refills:  0  
     
   
   
   
  
 BENADRYL 25 mg capsule Generic drug:  diphenhydrAMINE Your last dose was: Your next dose is:    
   
   
 Dose:  25 mg Take 25 mg by mouth every six (6) hours as needed. Refills:  0  
     
   
   
   
  
 meclizine 25 mg tablet Commonly known as:  ANTIVERT Your last dose was:     
   
Your next dose is:    
   
   
 Dose:  25 mg  
 Take 25 mg by mouth three (3) times daily as needed. Refills:  0  
     
   
   
   
  
 promethazine 25 mg tablet Commonly known as:  PHENERGAN Your last dose was: Your next dose is:    
   
   
 Dose:  25 mg Take 1 Tab by mouth every six (6) hours as needed for Nausea (headache). Caution: This medication may make you drowsy. Avoid driving while under the influence of this medication. Quantity:  12 Tab Refills:  0 STOP taking these medications   
 diclofenac EC 75 mg EC tablet Commonly known as:  VOLTAREN Where to Get Your Medications Information on where to get these meds will be given to you by the nurse or doctor. ! Ask your nurse or doctor about these medications HYDROcodone-acetaminophen  mg tablet

## 2017-03-22 NOTE — ANESTHESIA POSTPROCEDURE EVALUATION
Post-Anesthesia Evaluation and Assessment    Patient: Papi Renner MRN: [id number]  SSN: [id number]    YOB: 1975  Age: 46 y.o. Sex: female       Cardiovascular Function/Vital Signs  Visit Vitals    /76 (BP 1 Location: Left arm, BP Patient Position: At rest)    Pulse 83    Temp 36.1 °C (97 °F)    Resp 18    Ht 5' 5\" (1.651 m)    Wt 70.3 kg (155 lb)    SpO2 100%    BMI 25.79 kg/m2       Patient is status post general anesthesia for Procedure(s):  ANTERIOR CERVICAL DISCECTOMY WITH FUSION C5/6. Nausea/Vomiting: None    Postoperative hydration reviewed and adequate. Pain:  Pain Scale 1: Numeric (0 - 10) (03/22/17 1803)  Pain Intensity 1: 6 (03/22/17 1803)   Managed    Neurological Status:   Neuro (WDL): Within Defined Limits (03/22/17 1449)   At baseline    Mental Status and Level of Consciousness: Arousable    Pulmonary Status:   O2 Device: Nasal cannula (03/22/17 1513)   Adequate oxygenation and airway patent    Complications related to anesthesia: None    Post-anesthesia assessment completed.  No concerns    Signed By: Sreedhar Delcid MD     March 22, 2017

## 2017-03-22 NOTE — IP AVS SNAPSHOT
[address] Patient: Kelsey Aguilar MRN: [id number] SRR:[id number] You are allergic to the following Allergen Reactions Ciprofloxacin (Bulk) Diarrhea Codeine Itching Dilaudid (Hydromorphone (Bulk)) Itching Lyrica (Pregabalin) Swelling Tramadol Other (comments) Unknown (comments) Gets headaches 
headache Codeine Phosphate Unknown (comments) Hydromorphone Unknown (comments) Recent Documentation Height Weight BMI OB Status Smoking Status 1.651 m 70.3 kg 25.79 kg/m2 Having regular periods Never Smoker Emergency Contacts Name Discharge Info Relation Home Work Mobile Naty Bassett  Daughter [21] [phone] CAREGIVER [3] Mother [14]   [phone] CervantesCarey torres (Aunt) DISCHARGE CAREGIVER [3] Other Relative [6]   [phone] About your hospitalization You were admitted on:  March 22, 2017 You last received care in the:  [facility] [address] You were discharged on:  March 23, 2017 Unit phone number:  [phone] Why you were hospitalized Your primary diagnosis was:  Not on File Providers Seen During Your Hospitalizations Provider Role Specialty Primary office phone Sonido Mathew MD Attending Provider Orthopedic Surgery [phone] Your Primary Care Physician (PCP) Primary Care Physician Office Phone Office Fax 1202 [address], [address] [address] [phone] Follow-up Information Follow up With Details Comments Contact Info Any Finney MD On 3/29/2017 Appointment at 9:00 am [address] [place] [id number] [phone] Jose Roth NP On 4/4/2017 Appointment at 10:20 am [address] [facility] [address] 
[phone] Your Appointments Wednesday March 29, 2017  9:00 AM EDT TRANSITIONAL CARE MANAGEMENT with Raymond Haas MD  
[facility] (--)  
 [address] [address] [address] [address] [phone] Tuesday April 04, 2017 10:20 AM EDT  
POST OP with Smitha Segura, EZ  
[facility] ([address] [place] [address]) [address] [facility] [address]  
[phone] Tuesday May 02, 2017  1:15 PM EDT  
POST OP with Wing Jonelle MD  
[address] [place] [address], [address] [facility] [address] [place] [address]) [address] [facility] [id number] [phone] Current Discharge Medication List  
  
CONTINUE these medications which have CHANGED Dose & Instructions Dispensing Information Comments Morning Noon Evening Bedtime * HYDROcodone-acetaminophen 5-325 mg per tablet Commonly known as:  Birda Enville What changed:  Another medication with the same name was added. Make sure you understand how and when to take each. Your last dose was: Your next dose is: TAKE 1 T PO PRN P Q 6 H Refills:  0  
     
   
   
   
  
 * HYDROcodone-acetaminophen  mg tablet Commonly known as:  Birda Enville What changed: You were already taking a medication with the same name, and this prescription was added. Make sure you understand how and when to take each. Your last dose was: Your next dose is:    
   
   
 Dose:  1 Tab Take 1 Tab by mouth every six (6) hours as needed for Pain. Max Daily Amount: 4 Tabs. Quantity:  30 Tab Refills:  0  
     
   
   
   
  
 * Notice: This list has 2 medication(s) that are the same as other medications prescribed for you. Read the directions carefully, and ask your doctor or other care provider to review them with you. CONTINUE these medications which have NOT CHANGED Dose & Instructions Dispensing Information Comments Morning Noon Evening Bedtime  
 baclofen 10 mg tablet Commonly known as:  LIORESAL Your last dose was: Your next dose is:    
   
   
 Dose:  5 mg Take 0.5 Tabs by mouth two (2) times daily as needed. Quantity:  60 Tab Refills:  0  
     
   
   
   
  
 BENADRYL 25 mg capsule Generic drug:  diphenhydrAMINE Your last dose was: Your next dose is:    
   
   
 Dose:  25 mg Take 25 mg by mouth every six (6) hours as needed. Refills:  0  
     
   
   
   
  
 meclizine 25 mg tablet Commonly known as:  ANTIVERT Your last dose was: Your next dose is:    
   
   
 Dose:  25 mg Take 25 mg by mouth three (3) times daily as needed. Refills:  0  
     
   
   
   
  
 promethazine 25 mg tablet Commonly known as:  PHENERGAN Your last dose was: Your next dose is:    
   
   
 Dose:  25 mg Take 1 Tab by mouth every six (6) hours as needed for Nausea (headache). Caution: This medication may make you drowsy. Avoid driving while under the influence of this medication. Quantity:  12 Tab Refills:  0 STOP taking these medications   
 diclofenac EC 75 mg EC tablet Commonly known as:  VOLTAREN Where to Get Your Medications Information on where to get these meds will be given to you by the nurse or doctor. ! Ask your nurse or doctor about these medications HYDROcodone-acetaminophen  mg tablet Discharge Instructions [employer] Activation Thank you for requesting access to [employer]. Please follow the instructions below to securely access and download your online medical record. UCampus allows you to send messages to your doctor, view your test results, renew your prescriptions, schedule appointments, and more. How Do I Sign Up? 1. In your internet browser, go to [email or web address] 
2. Click on the First Time User? Click Here link in the Sign In box.  You will be redirect to the New Member Sign Up page. 3. Enter your [employer]t Access Code exactly as it appears below. You will not need to use this code after youve completed the sign-up process. If you do not sign up before the expiration date, you must request a new code. MyChart Access Code: Activation code not generated Current [employer] Status: Active (This is the date your Tracourhart access code will ) 4. Enter the last four digits of your Social Security Number (xxxx) and Date of Birth (mm/dd/yyyy) as indicated and click Submit. You will be taken to the next sign-up page. 5. Create a [employer]t ID. This will be your [employer] login ID and cannot be changed, so think of one that is secure and easy to remember. 6. Create a [employer] password. You can change your password at any time. 7. Enter your Password Reset Question and Answer. This can be used at a later time if you forget your password. 8. Enter your e-mail address. You will receive e-mail notification when new information is available in [address] 9. Click Sign Up. You can now view and download portions of your medical record. 10. Click the Download Summary menu link to download a portable copy of your medical information. Additional Information If you have questions, please visit the Frequently Asked Questions section of the [employer] website at [email or web address] Remember, [employer] is NOT to be used for urgent needs. For medical emergencies, dial 911. Patient armband removed and shredded DISCHARGE SUMMARY from Nurse The following personal items are in your possession at time of discharge: 
 
Dental Appliances: None Visual Aid: None Home Medications: None Jewelry: None Clothing: Jacket/Coat, Footwear, Shirt, Undergarments, Pants, Socks Other Valuables: None PATIENT INSTRUCTIONS: 
 
 
Recognize signs and symptoms of STROKE: 
 
 F-face looks uneven A-arms unable to move or move unevenly S-speech slurred or non-existent T-time-call 911 as soon as signs and symptoms begin-DO NOT go Back to bed or wait to see if you get better-TIME IS BRAIN. Warning Signs of HEART ATTACK Call 911 if you have these symptoms: 
? Chest discomfort. Most heart attacks involve discomfort in the center of the chest that lasts more than a few minutes, or that goes away and comes back. It can feel like uncomfortable pressure, squeezing, fullness, or pain. ? Discomfort in other areas of the upper body. Symptoms can include pain or discomfort in one or both arms, the back, neck, jaw, or stomach. ? Shortness of breath with or without chest discomfort. ? Other signs may include breaking out in a cold sweat, nausea, or lightheadedness. Don't wait more than five minutes to call [address]! Fast action can save your life. Calling 911 is almost always the fastest way to get lifesaving treatment. Emergency Medical Services staff can begin treatment when they arrive  up to an hour sooner than if someone gets to the hospital by car. The discharge information has been reviewed with the patient. The patient verbalized understanding. Discharge medications reviewed with the patient and appropriate educational materials and side effects teaching were provided. Discharge Orders None [facility] Announcement We are excited to announce that we are making your provider's discharge notes available to you in Netviewer. You will see these notes when they are completed and signed by the physician that discharged you from your recent hospital stay. If you have any questions or concerns about any information you see in Xambalat, please call the Health Information Department where you were seen or reach out to your Primary Care Provider for more information about your plan of care. Introducing [facility]!    
 Dear Luis A Dyer: 
 Thank you for requesting a [employer] account. Our records indicate that you already have an active [employer] account. You can access your account anytime at [email or web address] Did you know that you can access your hospital and ER discharge instructions at any time in [employer]? You can also review all of your test results from your hospital stay or ER visit. Additional Information If you have questions, please visit the Frequently Asked Questions section of the [employer] website at [email or web address] Remember, [employer] is NOT to be used for urgent needs. For medical emergencies, dial 911. Now available from your iPhone and Android! General Information Please provide this summary of care documentation to your next provider. Patient Signature:  ____________________________________________________________ Date:  ____________________________________________________________  
  
[facility] Provider Signature:  ____________________________________________________________ Date:  ____________________________________________________________

## 2017-03-22 NOTE — INTERVAL H&P NOTE
H&P Update:  Vonnie [place] was seen and examined. History and physical has been reviewed. The patient has been examined.  There have been no significant clinical changes since the completion of the originally dated History and Physical.    Signed By: Adrain Burkitt, MD     March 22, 2017 12:55 PM

## 2017-03-22 NOTE — ANESTHESIA PREPROCEDURE EVALUATION
Anesthetic History   No history of anesthetic complications            Review of Systems / Medical History  Patient summary reviewed and pertinent labs reviewed    Pulmonary  Within defined limits                 Neuro/Psych   Within defined limits           Cardiovascular                  Exercise tolerance: >4 METS     GI/Hepatic/Renal  Within defined limits              Endo/Other        Arthritis     Other Findings   Comments:   Risk Factors for Postoperative nausea/vomiting:       History of postoperative nausea/vomiting? NO       Female? YES       Motion sickness? NO       Intended opioid administration for postoperative analgesia?   YES           Physical Exam    Airway  Mallampati: II  TM Distance: 4 - 6 cm  Neck ROM: normal range of motion   Mouth opening: Normal     Cardiovascular  Regular rate and rhythm,  S1 and S2 normal,  no murmur, click, rub, or gallop             Dental  No notable dental hx       Pulmonary  Breath sounds clear to auscultation               Abdominal  GI exam deferred       Other Findings            Anesthetic Plan    ASA: 2  Anesthesia type: general          Induction: Intravenous  Anesthetic plan and risks discussed with: Patient

## 2017-03-23 ENCOUNTER — HOME HEALTH ADMISSION (OUTPATIENT)
Dept: HOME HEALTH SERVICES | Facility: HOME HEALTH | Age: 42
End: 2017-03-23
Payer: MEDICAID

## 2017-03-23 VITALS
RESPIRATION RATE: 18 BRPM | TEMPERATURE: 98.5 F | DIASTOLIC BLOOD PRESSURE: 85 MMHG | SYSTOLIC BLOOD PRESSURE: 112 MMHG | HEART RATE: 65 BPM | OXYGEN SATURATION: 95 % | WEIGHT: 155 LBS | HEIGHT: 65 IN | BODY MASS INDEX: 25.83 KG/M2

## 2017-03-23 LAB
ANION GAP BLD CALC-SCNC: 7 MMOL/L (ref 3–18)
BUN SERPL-MCNC: 10 MG/DL (ref 7–18)
BUN/CREAT SERPL: 11 (ref 12–20)
CALCIUM SERPL-MCNC: 8 MG/DL (ref 8.5–10.1)
CHLORIDE SERPL-SCNC: 104 MMOL/L (ref 100–108)
CO2 SERPL-SCNC: 25 MMOL/L (ref 21–32)
CREAT SERPL-MCNC: 0.87 MG/DL (ref 0.6–1.3)
GLUCOSE SERPL-MCNC: 193 MG/DL (ref 74–99)
POTASSIUM SERPL-SCNC: 4.2 MMOL/L (ref 3.5–5.5)
SODIUM SERPL-SCNC: 136 MMOL/L (ref 136–145)

## 2017-03-23 PROCEDURE — 80048 BASIC METABOLIC PNL TOTAL CA: CPT | Performed by: ORTHOPAEDIC SURGERY

## 2017-03-23 PROCEDURE — 74011250636 HC RX REV CODE- 250/636: Performed by: ORTHOPAEDIC SURGERY

## 2017-03-23 PROCEDURE — 36415 COLL VENOUS BLD VENIPUNCTURE: CPT | Performed by: ORTHOPAEDIC SURGERY

## 2017-03-23 PROCEDURE — 99218 HC RM OBSERVATION: CPT

## 2017-03-23 PROCEDURE — 74011250637 HC RX REV CODE- 250/637: Performed by: ORTHOPAEDIC SURGERY

## 2017-03-23 PROCEDURE — 97161 PT EVAL LOW COMPLEX 20 MIN: CPT

## 2017-03-23 PROCEDURE — 74011000250 HC RX REV CODE- 250: Performed by: ORTHOPAEDIC SURGERY

## 2017-03-23 PROCEDURE — 97165 OT EVAL LOW COMPLEX 30 MIN: CPT

## 2017-03-23 PROCEDURE — 97535 SELF CARE MNGMENT TRAINING: CPT

## 2017-03-23 RX ORDER — HYDROCODONE BITARTRATE AND ACETAMINOPHEN 10; 325 MG/1; MG/1
1 TABLET ORAL
Qty: 30 TAB | Refills: 0 | Status: SHIPPED | OUTPATIENT
Start: 2017-03-23 | End: 2017-12-15

## 2017-03-23 RX ADMIN — FAMOTIDINE 20 MG: 10 INJECTION, SOLUTION INTRAVENOUS at 09:55

## 2017-03-23 RX ADMIN — Medication 10 ML: at 05:05

## 2017-03-23 RX ADMIN — CEFAZOLIN SODIUM 2 G: 2 SOLUTION INTRAVENOUS at 05:05

## 2017-03-23 RX ADMIN — HYDROCODONE BITARTRATE AND ACETAMINOPHEN 1 TABLET: 10; 325 TABLET ORAL at 09:55

## 2017-03-23 RX ADMIN — HYDROCODONE BITARTRATE AND ACETAMINOPHEN 1 TABLET: 10; 325 TABLET ORAL at 04:28

## 2017-03-23 NOTE — ROUTINE PROCESS
Pt alert and oriented x 4. Denies pain at this time. Dressing clean dry and intact, neck collar in place. Denies any numbness or tingling. Requesting patch for Some shoulder muscle pain. Instructed to use heat massage as instructed by EZ Maldonado. The discharge information has been reviewed with the patient. The patient verbalized understanding. Discharge medications reviewed with the patient and appropriate educational materials and side effects teaching were provided. Iv removed.

## 2017-03-23 NOTE — ROUTINE PROCESS
MEDHAT drain DC'ed , pt tolerated well. Tip intact. 4x4 and clear tape used to cover the incision and medhat drain site. Pt stable ready to go home.

## 2017-03-23 NOTE — ROUTINE PROCESS
Bedside and Verbal shift change report given to [address]  (oncoming nurse) by Carine Edwards (offgoing nurse). Report included the following information SBAR, Kardex, Procedure Summary, Intake/Output, MAR, Recent Results and Med Rec Status.

## 2017-03-23 NOTE — HOME CARE
[facility] received referral for PT - Kimberly Protocol** - verified address and contact numbers - daughter and mother live with patient and will assist post discharge - no DME used prior to admission or recommended by therapy - discharge orders noted - referral processed for [facility] 3/24/17 - SVEN Candelario LPN

## 2017-03-23 NOTE — ROUTINE PROCESS
Bedside and Verbal shift change report given to [address] [place] [address] (oncoming nurse) by Kristin Moore RN (offgoing nurse). Report included the following information SBAR, Kardex, MAR and Recent Results.     SITUATION:    Code Status: Prior   Reason for Admission: HNP (herniated nucleus pulposus), cervical [M50.20]    [facility] day: 0   Problem List:       Hospital Problems  Date Reviewed: 1/24/2017    None          BACKGROUND:    Past Medical History:   Past Medical History:   Diagnosis Date    Anemia 1997    during pregnancy 2nd child    Migraine headache 1997    Ovarian cyst     Vertigo          Patient taking anticoagulants no     ASSESSMENT:    Changes in Assessment Throughout Shift: no     Patient has Central Line: no Reasons if yes: n/a   Patient has Roche Cath: no Reasons if yes: ny      Last Vitals:     Vitals:    03/22/17 1628 03/22/17 2000 03/23/17 0000 03/23/17 0400   BP: 110/76 101/63 94/64 97/65   Pulse: 83 66 70 64   Resp: 18 18 18 18   Temp: 97 °F (36.1 °C) 96.6 °F (35.9 °C) 97.3 °F (36.3 °C) 96.6 °F (35.9 °C)   SpO2: 100% 100% 97% 100%   Weight:       Height:       LMP: 03/02/2017        IV and DRAINS (will only show if present)   Simon-Sinclair Drain 03/22/17 Anterior Neck-Site Assessment: Clean, dry, & intact  Peripheral IV 03/22/17 Right Arm-Site Assessment: Clean, dry, & intact  Peripheral IV 03/22/17 Left Hand-Site Assessment: Clean, dry, & intact     WOUND (if present)   Wound Type:  none   Dressing present Dressing Present : No   Wound Concerns/Notes:  none     PAIN    Pain Assessment    Pain Intensity 1: 3 (03/23/17 0429)    Pain Location 1: Neck    Pain Intervention(s) 1: Medication (see MAR)    Patient Stated Pain Goal: 0  o Interventions for Pain:  none  o Intervention effective: no  o Time of last intervention: 0428   o Reassessment Completed: no      Last 3 Weights:  Last 3 Recorded Weights in this Encounter    03/17/17 1351 03/22/17 1148   Weight: 73.5 kg (162 lb) 70.3 kg (155 lb) Weight change:      INTAKE/OUPUT    Current Shift: 03/22 1901 - 03/23 0700  In: 1080 [P.O.:480; I.V.:600]  Out: 602 [Urine:600; Drains:2]    Last three shifts: 03/21 0701 - 03/22 1900  In: 1150 [I.V.:1150]  Out: 50      LAB RESULTS   No results for input(s): WBC, HGB, HCT, PLT, HGBEXT, HCTEXT, PLTEXT in the last 72 hours. Recent Labs      03/23/17   0348   NA  136   K  4.2   GLU  193*   BUN  10   CREA  0.87   CA  8.0*       RECOMMENDATIONS AND DISCHARGE PLANNING     1. Pending tests/procedures/ Plan of Care or Other Needs: PT     2. Discharge plan for patient and Needs/Barriers: Home    3. Estimated Discharge Date: 3/23/17 Posted on Whiteboard in Patients Room: no      4. The patient's care plan was reviewed with the oncoming nurse. \"HEALS\" SAFETY CHECK      Fall Risk    Total Score: 3    Safety Measures: Safety Measures: Bed in low position, Call light within reach    A safety check occurred in the patient's room between off going nurse and oncoming nurse listed above. The safety check included the below items  Area Items   H  High Alert Medications - Verify all high alert medication drips (heparin, PCA, etc.)   E  Equipment - Suction is set up for ALL patients (with yanker)  - Red plugs utilized for all equipment (IV pumps, etc.)  - WOWs wiped down at end of shift.  - Room stocked with oxygen, suction, and other unit-specific supplies   A  Alarms - Bed alarm is set for fall risk patients  - Ensure chair alarm is in place and activated if patient is up in a chair   L  Lines - Check IV for any infiltration  - Roche bag is empty if patient has a Roche   - Tubing and IV bags are labeled   S  Safety   - Room is clean, patient is clean, and equipment is clean. - Hallways are clear from equipment besides carts. - Fall bracelet on for fall risk patients  - Ensure room is clear and free of clutter  - Suction is set up for ALL patients (with yanker)  - Hallways are clear from equipment besides carts. - Isolation precautions followed, supplies available outside room, sign posted     Germaine Chavez RN

## 2017-03-23 NOTE — DISCHARGE INSTRUCTIONS
[employer] Activation    Thank you for requesting access to CompleteSet. Please follow the instructions below to securely access and download your online medical record. CompleteSet allows you to send messages to your doctor, view your test results, renew your prescriptions, schedule appointments, and more. How Do I Sign Up? 1. In your internet browser, go to [email or web address]  2. Click on the First Time User? Click Here link in the Sign In box. You will be redirect to the New Member Sign Up page. 3. Enter your CompleteSet Access Code exactly as it appears below. You will not need to use this code after youve completed the sign-up process. If you do not sign up before the expiration date, you must request a new code. CompleteSet Access Code: Activation code not generated  Current CompleteSet Status: Active (This is the date your CompleteSet access code will )    4. Enter the last four digits of your Social Security Number (xxxx) and Date of Birth (mm/dd/yyyy) as indicated and click Submit. You will be taken to the next sign-up page. 5. Create a CompleteSet ID. This will be your CompleteSet login ID and cannot be changed, so think of one that is secure and easy to remember. 6. Create a CompleteSet password. You can change your password at any time. 7. Enter your Password Reset Question and Answer. This can be used at a later time if you forget your password. 8. Enter your e-mail address. You will receive e-mail notification when new information is available in [address] 9. Click Sign Up. You can now view and download portions of your medical record. 10. Click the Download Summary menu link to download a portable copy of your medical information. Additional Information    If you have questions, please visit the Frequently Asked Questions section of the CompleteSet website at [email or web address] Remember, CompleteSet is NOT to be used for urgent needs. For medical emergencies, dial 911.       Patient armband removed and shredded    DISCHARGE SUMMARY from Nurse    The following personal items are in your possession at time of discharge:    Dental Appliances: None  Visual Aid: None     Home Medications: None  Jewelry: None  Clothing: Jacket/Coat, Footwear, Shirt, Undergarments, Pants, Socks  Other Valuables: None             PATIENT INSTRUCTIONS:    After general anesthesia or intravenous sedation, for 24 hours or while taking prescription Narcotics:  · Limit your activities  · Do not drive and operate hazardous machinery  · Do not make important personal or business decisions  · Do  not drink alcoholic beverages  · If you have not urinated within 8 hours after discharge, please contact your surgeon on call. Report the following to your surgeon:  · Excessive pain, swelling, redness or odor of or around the surgical area  · Temperature over 100.5  · Nausea and vomiting lasting longer than 4 hours or if unable to take medications  · Any signs of decreased circulation or nerve impairment to extremity: change in color, persistent  numbness, tingling, coldness or increase pain  · Any questions        What to do at Home:  Recommended activity: Activity as tolerated,      *  Please give a list of your current medications to your Primary Care Provider. *  Please update this list whenever your medications are discontinued, doses are      changed, or new medications (including over-the-counter products) are added. *  Please carry medication information at all times in case of emergency situations. These are general instructions for a healthy lifestyle:    No smoking/ No tobacco products/ Avoid exposure to second hand smoke    Surgeon General's Warning:  Quitting smoking now greatly reduces serious risk to your health.     Obesity, smoking, and sedentary lifestyle greatly increases your risk for illness    A healthy diet, regular physical exercise & weight monitoring are important for maintaining a healthy lifestyle    You may be retaining fluid if you have a history of heart failure or if you experience any of the following symptoms:  Weight gain of 3 pounds or more overnight or 5 pounds in a week, increased swelling in our hands or feet or shortness of breath while lying flat in bed. Please call your doctor as soon as you notice any of these symptoms; do not wait until your next office visit. Recognize signs and symptoms of STROKE:    F-face looks uneven    A-arms unable to move or move unevenly    S-speech slurred or non-existent    T-time-call 911 as soon as signs and symptoms begin-DO NOT go       Back to bed or wait to see if you get better-TIME IS BRAIN. Warning Signs of HEART ATTACK     Call 911 if you have these symptoms:   Chest discomfort. Most heart attacks involve discomfort in the center of the chest that lasts more than a few minutes, or that goes away and comes back. It can feel like uncomfortable pressure, squeezing, fullness, or pain.  Discomfort in other areas of the upper body. Symptoms can include pain or discomfort in one or both arms, the back, neck, jaw, or stomach.  Shortness of breath with or without chest discomfort.  Other signs may include breaking out in a cold sweat, nausea, or lightheadedness. Don't wait more than five minutes to call 911 - MINUTES MATTER! Fast action can save your life. Calling 911 is almost always the fastest way to get lifesaving treatment. Emergency Medical Services staff can begin treatment when they arrive -- up to an hour sooner than if someone gets to the hospital by car. The discharge information has been reviewed with the patient. The patient verbalized understanding. Discharge medications reviewed with the patient and appropriate educational materials and side effects teaching were provided.

## 2017-03-23 NOTE — PROGRESS NOTES
Care Management Interventions  Transition of Care Consult (CM Consult): Other, [address]: Yes  Physical Therapy Consult: Yes  Occupational Therapy Consult: Yes  Current Support Network: Relative's Home, Family Lives Nearby  Confirm Follow Up Transport: Family  Plan discussed with Pt/Family/Caregiver: Yes  Freedom of Choice Offered: Yes (For home health)  Discharge Location  Discharge Placement: Home with home health     Pt is a 46year old female admitted for HNP. Pt is alert and oriented in room with her daughter and friend. Pt consents to guest remaining in room during visit. Pt states that she resides with her mother and daughter, her plan is to return home and family will be transporting her home. Pt indicates being independent with ADLs and ambulation prior to admission. Pt is being discharged with home health and chooses [facility]. Agency Negar Nichols) informed and referral submitted.

## 2017-03-23 NOTE — DISCHARGE SUMMARY
Discharge  Summary     Patient: Susu Tinajero MRN: [id number]  SSN: [id number]    YOB: 1975  Age: 46 y.o.   Sex: female       Admit Date: 3/22/2017    Discharge Date: 3/23/2017      Admission Diagnoses: HNP (herniated nucleus pulposus), cervical [M50.20]    Discharge Diagnoses:   Problem List as of 3/23/2017  Date Reviewed: 2017          Codes Class Noted - Resolved    Cervical myofascial pain syndrome ICD-10-CM: M79.1  ICD-9-CM: 729.1  2016 - Present        HNP (herniated nucleus pulposus), cervical ICD-10-CM: M50.20  ICD-9-CM: 722.0  2016 - Present        Muscle spasm ICD-10-CM: G77.868  ICD-9-CM: 728.85  2016 - Present        Cervical spondylosis ICD-10-CM: D31.903  ICD-9-CM: 721.0  2016 - Present        DDD (degenerative disc disease), cervical ICD-10-CM: M50.30  ICD-9-CM: 722.4  2016 - Present        Myofascial muscle pain ICD-10-CM: M79.1  ICD-9-CM: 729.1  10/15/2015 - Present        Muscle spasms of neck ICD-10-CM: M62.838  ICD-9-CM: 728.85  10/15/2015 - Present        Ovarian mass ICD-10-CM: N83.9  ICD-9-CM: 620.9  2012 - Present        Cystitis ICD-10-CM: N30.90  ICD-9-CM: 595.9  2012 - Present        Family history of blood dyscrasia ICD-10-CM: Z83.2  ICD-9-CM: V18.3  3/25/2010 - Present        Family history of cardiovascular disease ICD-10-CM: Z82.49  ICD-9-CM: V17.49  3/25/2010 - Present        Abnormal cervical Papanicolaou smear with positive human papilloma virus (HPV) DNA test ICD-10-CM: [id number]  ICD-9-CM: 795.09, 079.4  3/25/2010 - Present        RESOLVED: Previous  section ICD-10-CM: I83.823  ICD-9-CM: V45.89  3/29/2013 - 2013        RESOLVED: GBS (group B Streptococcus carrier), +RV culture, currently pregnant ICD-10-CM: O99.820  ICD-9-CM: V23.89, V02.51  3/20/2013 - 2013        RESOLVED: Normal pregnancy, repeat ICD-10-CM: Z34.80  ICD-9-CM: V22.1  3/8/2013 - 2013        RESOLVED: Breech presentation ICD-10-CM: O32. 1XX0  ICD-9-CM: 652.20  2/8/2013 - 3/8/2013               Discharge Condition: Good    Procedure: Anterior cervical diskectomy and  fusion, C5-6; Zero-P interbody device with demineralized bone matrix;  Zero-P plate and screws.       Hospital Course: Normal hospital course for this procedure. Tolerated surgical intervention well. Incision dry and intact. Ambulatory. Swallowing well. Disposition: home    Discharge Medications:   Current Discharge Medication List      START taking these medications    Details   HYDROcodone-acetaminophen (NORCO)  mg tablet Take 1 Tab by mouth every six (6) hours as needed for Pain. Max Daily Amount: 4 Tabs. Qty: 30 Tab, Refills: 0         CONTINUE these medications which have NOT CHANGED    Details   HYDROcodone-acetaminophen (NORCO) 5-325 mg per tablet TAKE 1 T PO PRN P Q 6 H  Refills: 0      baclofen (LIORESAL) 10 mg tablet Take 0.5 Tabs by mouth two (2) times daily as needed. Qty: 60 Tab, Refills: 0    Associated Diagnoses: Cervical myofascial pain syndrome; HNP (herniated nucleus pulposus), cervical      promethazine (PHENERGAN) 25 mg tablet Take 1 Tab by mouth every six (6) hours as needed for Nausea (headache). Caution: This medication may make you drowsy. Avoid driving while under the influence of this medication. Qty: 12 Tab, Refills: 0      diphenhydrAMINE (BENADRYL) 25 mg capsule Take 25 mg by mouth every six (6) hours as needed. meclizine (ANTIVERT) 25 mg tablet Take 25 mg by mouth three (3) times daily as needed. STOP taking these medications       diclofenac EC (VOLTAREN) 75 mg EC tablet Comments:   Reason for Stopping: Follow-up Appointments   Procedures    FOLLOW UP VISIT Appointment in: Two Weeks     Standing Status:   Standing     Number of Occurrences:   1     Order Specific Question:   Appointment in     Answer:    Two Weeks       Signed By: Marshall Hernández NP     March 23, 2017

## 2017-03-23 NOTE — PROGRESS NOTES
Problem: Self Care Deficits Care Plan (Adult)  Goal: *Acute Goals and Plan of Care (Insert Text)  Occupational Therapy Goals  Initiated 3/23/2017     1. Patient will perform upper/lower body dressing with modified independence with adaptive strategies. -achieved 3/23/17  Outcome: Resolved/Met Date Met:  03/23/17  OCCUPATIONAL THERAPY EVALUATION/DISCHARGE     Patient: Latanya Hudson (61 y.o. female)  Date: 3/23/2017  Primary Diagnosis: HNP (herniated nucleus pulposus), cervical [M50.20]  Procedure(s) (LRB):  ANTERIOR CERVICAL DISCECTOMY WITH FUSION C5/6 (N/A) 1 Day Post-Op   Precautions:   Fall (cervical)      ASSESSMENT AND RECOMMENDATIONS:  Based on the objective data described below, the patient was independent with functional mobility with no AD and was independent/modified independent with self care tasks. Patient had decreased, but functional BUE AROM, secondary to cervical precautions, and WFL  strength. Educated patient on cervical precautions as it relates to function; patient was able to recall and implement them during functional tasks independently. Educated patient on adaptive strategies during UE/LE dressing while adhering to cervical precautions; patient reported/demonstrated understanding with mod I. Discharge Recommendations: Home Health  Further Equipment Recommendations for Discharge: N/A       COMPLEXITY      Eval Complexity: History: LOW Complexity : Brief history review ; Examination: LOW Complexity : 1-3 performance deficits relating to physical, cognitive , or psychosocial skils that result in activity limitations and / or participation restrictions ; Decision Making:LOW Complexity : No comorbidities that affect functional and no verbal or physical assistance needed to complete eval tasks  Assessment: Low Complexity          G-CODES:      Self Care  Current  CI= 1-19%   Goal  CI= 1-19%   D/C  CI= 1-19%.   The severity rating is based on the Level of Assistance required for Functional Mobility and ADLs. SUBJECTIVE:   Patient stated I can get dressed? I would like to do that. 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 Home Situation  Home Environment: Private residence  # Steps to Enter: 4  One/Two Story Residence: Two story  # of Interior Steps: 13  Lift Chair Available: No  Living Alone: No  Support Systems: Family member(s)  Patient Expects to be Discharged to[de-identified] Private residence  Current DME Used/Available at Home: None  [X]     Right hand dominant       [ ]     Left hand dominant  Cognitive/Behavioral Status:  Neurologic State: Alert  Orientation Level: Oriented X4  Cognition: Follows commands     Skin: No skin changes noted     Edema: No edema noted      Vision/Perceptual:    Acuity: Within Defined Limits       Coordination:  Coordination: Within functional limits (BUEs)     Balance:  Sitting: Intact  Standing: Intact; Without support      Strength:  Strength:  Within functional limits ( strength)      Tone & Sensation:  Tone: Normal (BUEs)  Sensation: Intact (BUEs)      Range of Motion:  AROM: Generally decreased, functional (BUEs, within cervical precautions)      Functional Mobility and Transfers for ADLs:  Bed Mobility:  Rolling: Independent  Supine to Sit: Independent  Sit to Supine: Independent  Scooting: Independent  Transfers:  Sit to Stand: Independent              Toilet Transfer : Independent (simulated)                             ADL Assessment:(clincial judgement)  Feeding: Independent     Oral Facial Hygiene/Grooming: Modified Independent     Bathing: Modified independent     Upper Body Dressing: Modified independent     Lower Body Dressing: Modified independent     Toileting: Independent      Pain:  Pt reports 2/10 pain or discomfort prior to treatment. Pt reports 2/10 pain or discomfort post treatment. Activity Tolerance:   Good     Please refer to the flowsheet for vital signs taken during this treatment. After treatment:   [ ]  Patient left in no apparent distress sitting up in chair  [X]  Patient left in no apparent distress in bed  [X]  Call bell left within reach  [X]  Nursing present  [X]  Caregiver present  [ ]  Bed alarm activated      COMMUNICATION/EDUCATION:   Communication/Collaboration:  [X]      Home safety education was provided and the patient/caregiver indicated understanding. [X]      Patient/family have participated as able and agree with findings and recommendations. [ ]      Patient is unable to participate in plan of care at this time.      Marissa France OTR/L  Time Calculation: 28 mins

## 2017-03-23 NOTE — ROUTINE PROCESS
Pain score:  Patient complaining 6/10 incisional pain    Name and time of last pain med given:  Dilaudid 1 mg iv was given at 2216    Neuro status: Patient is AAOx4     Dressing/incision status:  Anterior neck dressing dry and clean, LAZARUS drain intact. LAZARUS/Hemovace output:  2cc scant serosanguinous liquid    Voiding status:  Patient assisted to bathroom voided 350    Ml if yellow urine    Mobility status:  Ambulates with steady gait. Other:  Patient move all extremities denies numbness or tingling.

## 2017-03-23 NOTE — OP NOTES
[address]  OPERATIVE REPORTS    Name:  Robinson Villavicencio  MR#:  [id number]  :  1975  Account #:  [de-identified]  Date of Adm:  2017  Date of Surgery:  2017      SURGEON: Aniya Apple MD    PREOPERATIVE DIAGNOSIS: Herniated nucleus pulposus C5-6. POSTOPERATIVE DIAGNOSIS: Herniated nucleus pulposus C5-6. PROCEDURES PERFORMED: Anterior cervical diskectomy and  fusion, C5-6; Zero-P interbody device with demineralized bone matrix;  Zero-P plate and screws. FINDINGS: The patient had a defect in the posterior longitudinal  ligament on the left with disk material from the left lateral recess. ANESTHESIA:    DESCRIPTION OF PROCEDURE: Following induction of endotracheal  anesthesia, the patient was placed with head in neutral position in  White headrest. The patient was prepped and draped in the usual  fashion. Right-sided approach was utilized. Sternocleidomastoid  vessels mobilized laterally. The esophagus and trachea were  mobilized medially with blunt finger dissection. Prevertebral fascia was  entered and C-arm image verified our surgical level. Lignum pins  placed at bodies of C5 and C6. Cloward self-retaining retractor was  placed to cover longus coli musculature bilaterally. Annular tissues  incised and a radical diskectomy done back to the posterior margin. Posterior longitudinal ligament was defined and there was a defect in  the left aspect of it. Posterior longitudinal ligament was resected and  nuclear material was found in the left lateral recess. A thorough  decompression was accomplished and most of the uncinate on the left  resected. Once decompression was confirmed, endplates prepared. Sizers utilized and appropriate Zero-P interbody device was tamped  firmly into place with excellent bony apposition. It was filled with  demineralized bone matrix, tamped firmly into place.  The anterior plate  was utilized to locking plate and a screw in C5, a screw in C6, and the  locking device engaged. The wound was copiously irrigated. There  was no bleeding. A drain placed. The neck closed in layers. Skin  closed with subcuticular suture and Dermabond. Sterile occlusive  dressing was placed upon the wound. All counts were correct. ESTIMATED BLOOD LOSS: 5 mL. COMPLICATIONS: There were no complications. SPECIMENS REMOVED: No specimens. CONDITION: The patient went to the recovery room in good and stable  condition.         MD Dwayne [facility] / Ana Laura Adhikari  D:  03/22/2017   14:45  T:  03/22/2017   21:29  Job #:  [id number]

## 2017-03-23 NOTE — PROGRESS NOTES
Problem: Mobility Impaired (Adult and Pediatric)  Goal: *Acute Goals and Plan of Care (Insert Text)  Outcome: Resolved/Met Date Met:  17  PHYSICAL THERAPY EVALUATION & DISCHARGE     Patient: Armand Guerrero (61 y.o. female)  Date: 3/23/2017  Primary Diagnosis: HNP (herniated nucleus pulposus), cervical [M50.20]  Procedure(s) (LRB):  ANTERIOR CERVICAL DISCECTOMY WITH FUSION C5/6 (N/A) 1 Day Post-Op   Precautions: cervical         ASSESSMENT AND RECOMMENDATIONS:  Based on the objective data described below, the patient presents at independent level with ambulation and transfers without assistive device. Skilled physical therapy is not indicated at this time. Discharge Recommendations: None  Further Equipment Recommendations for Discharge: N/A        G-CODES:      Mobility  Current  CH= 0%   Goal  CH= 0%  D/C  CH= 0%. The severity rating is based on the Level of Assistance required for Functional Mobility and ADLs. Eval Complexity: History: MEDIUM  Complexity : 1-2 comorbidities / personal factors will impact the outcome/ POC Exam:LOW Complexity : 1-2 Standardized tests and measures addressing body structure, function, activity limitation and / or participation in recreation  Presentation: MEDIUM Complexity : Evolving with changing characteristics  Clinical Decision Making:Low Complexity , Overall Complexity:LOW       SUBJECTIVE:   Patient stated I'm sore but not having a lot of pain. 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   Mirta Leaks SHOULDER SURG PROC UNLISTED   9/08/2011     left shoulder     Barriers to Learning/Limitations: None  Compensate with: visual, verbal, tactile, kinesthetic cues/model  Prior Level of Function/Home Situation: independent with mobility without assistive device  Home Situation  Home Environment: Private residence  # Steps to Enter: 4  One/Two Story Residence: Two story  # of Interior Steps: 13  Lift Chair Available: No  Living Alone: No  Support Systems: Family member(s)  Patient Expects to be Discharged to[de-identified] Private residence  Current DME Used/Available at Home: None  Critical Behavior:  Neurologic State: Alert  Orientation Level: Oriented X4  Cognition: Appropriate decision making     Strength:    Strength: Within functional limits (B LEs)      Tone & Sensation:   Tone: Normal (B LEs)       Range Of Motion:  AROM: Within functional limits (B LEs)      Functional Mobility:  Bed Mobility:  Supine to Sit: Independent  Sit to Supine: Independent     Transfers:  Sit to Stand: Independent  Stand to Sit: Independent  Balance:   Sitting: Intact  Standing: Intact; Without support  Ambulation/Gait Training:  Distance (ft): 300 Feet (ft)  Assistive Device:  (none)  Ambulation - Level of Assistance: Independent              Ascended/descended 4 steps with rail mod I     Pain:  Pt reports 4/10 pain or discomfort prior to treatment. Pt reports 5/10 pain or discomfort post treatment. Activity Tolerance:   good  Please refer to the flowsheet for vital signs taken during this treatment.   After treatment:   [ ]         Patient left in no apparent distress sitting up in chair  [X]         Patient left in no apparent distress in bed  [X]         Call bell left within reach  [X]         Nursing notified that patient is requesting pain medication and cleared for mobility independently  [X]         Caregiver present  [ ]         Bed alarm activated      COMMUNICATION/EDUCATION:   [X]         Fall prevention education was provided and the patient/caregiver indicated understanding. [X]         Patient/family have participated as able in goal setting and plan of care.-eval only  [ ]         Patient/family agree to work toward stated goals and plan of care. [ ]         Patient understands intent and goals of therapy, but is neutral about his/her participation. [ ]         Patient is unable to participate in goal setting and plan of care.   Pt was educated on cervical precautions and ambulating in the hallway throughout the day     Thank you for this referral.  Yen Miller, PT   Time Calculation: 15 mins

## 2017-03-23 NOTE — PROGRESS NOTES
03/23/17 0749   Patient Observation   Observations pt  in bed sleeping. no s/s of distress at this time. bed in low position, call bell within reach. family in the room.

## 2017-03-25 ENCOUNTER — HOME CARE VISIT (OUTPATIENT)
Dept: SCHEDULING | Facility: HOME HEALTH | Age: 42
End: 2017-03-25
Payer: MEDICAID

## 2017-03-25 PROCEDURE — G0151 HHCP-SERV OF PT,EA 15 MIN: HCPCS

## 2017-03-25 PROCEDURE — 400013 HH SOC

## 2017-03-27 ENCOUNTER — HOME CARE VISIT (OUTPATIENT)
Dept: HOME HEALTH SERVICES | Facility: HOME HEALTH | Age: 42
End: 2017-03-27
Payer: MEDICAID

## 2017-03-27 PROCEDURE — G0157 HHC PT ASSISTANT EA 15: HCPCS

## 2017-03-27 PROCEDURE — A4450 NON-WATERPROOF TAPE: HCPCS

## 2017-03-27 PROCEDURE — A6402 STERILE GAUZE <= 16 SQ IN: HCPCS

## 2017-03-28 ENCOUNTER — HOME CARE VISIT (OUTPATIENT)
Dept: SCHEDULING | Facility: HOME HEALTH | Age: 42
End: 2017-03-28
Payer: MEDICAID

## 2017-03-28 VITALS — SYSTOLIC BLOOD PRESSURE: 106 MMHG | OXYGEN SATURATION: 98 % | HEART RATE: 88 BPM | DIASTOLIC BLOOD PRESSURE: 74 MMHG

## 2017-03-28 PROCEDURE — G0157 HHC PT ASSISTANT EA 15: HCPCS

## 2017-03-29 ENCOUNTER — OFFICE VISIT (OUTPATIENT)
Dept: FAMILY MEDICINE CLINIC | Age: 42
End: 2017-03-29

## 2017-03-29 VITALS
RESPIRATION RATE: 14 BRPM | BODY MASS INDEX: 25.66 KG/M2 | HEIGHT: 65 IN | WEIGHT: 154 LBS | SYSTOLIC BLOOD PRESSURE: 118 MMHG | OXYGEN SATURATION: 100 % | DIASTOLIC BLOOD PRESSURE: 80 MMHG | TEMPERATURE: 98 F | HEART RATE: 83 BPM

## 2017-03-29 DIAGNOSIS — R11.0 NAUSEA: ICD-10-CM

## 2017-03-29 DIAGNOSIS — Z98.890 S/P CERVICAL DISCECTOMY: Primary | ICD-10-CM

## 2017-03-29 DIAGNOSIS — K21.9 GASTROESOPHAGEAL REFLUX DISEASE, ESOPHAGITIS PRESENCE NOT SPECIFIED: ICD-10-CM

## 2017-03-29 NOTE — PROGRESS NOTES
Erika Santos 46 y.o. female   Chief Complaint   Patient presents with   [facility] Follow Up     S/P Anterior Cervical Discectomy w/DR. Jesn Weaver @ [facility] on 3-22-17. Pain scale today 4/10, no f/u w/surgeon until May    Nausea     after drinking water    Heartburn     when eating sweet foods         1. Have you been to the ER, urgent care clinic since your last visit? Hospitalized since your last visit? Yes When: 3-22-17 Where: [facility] Reason for visit: Neck surgery    2. Have you seen or consulted any other health care providers outside of the [address] [place] [address] since your last visit? Include any pap smears or colon screening.  No

## 2017-03-29 NOTE — PROGRESS NOTES
HISTORY OF PRESENT ILLNESS  Daniella Morris is a 46 y.o. female. Chief Complaint   Patient presents with    Transitions Of Care     S/P Anterior Cervical Discectomy w/. Jaclyn Gunter @ [facility] on 3-22-17. Pain scale today 4/10, no f/u w/surgeon until May    Nausea     after drinking water    Heartburn     when eating sweet foods       HPI  Pt here for hospital f/u. She has had an ant cervical discectomy with Dr Jaclyn Gunter. She has been doing well at home since her surgery. Pt states that her home health agency advised her that we should clean her wound today and clear her to remove her soft collar. She is aware that she cannot drive until cleared by Dr Jaclyn Gunter. She would like to know when she is able to lay flat to sleep. Pt is finding that plain water makes her nauseous. She has been drinking lots of ginger ale. She has not tried water for 2 days now. She did drink it with not problems in the hospital.  Pt has been having heartburn with sweets like gummy bears or cinnamon rolls. She is not having any problems with regular food. Review of Systems   Constitutional: Negative. HENT: Negative. Respiratory: Negative. Cardiovascular: Negative. Gastrointestinal: Positive for heartburn and nausea. Musculoskeletal: Positive for neck pain. All other systems reviewed and are negative. Physical Exam  Physical Exam   Nursing note and vitals reviewed. Constitutional: She is oriented to person, place, and time. She appears well-developed and well-nourished. HENT:   Head: Normocephalic and atraumatic. Right Ear: External ear normal.   Left Ear: External ear normal.   Nose: Nose normal.   Eyes: Conjunctivae and EOM are normal.   Neck: Normal range of motion. Neck supple. No JVD present. Carotid bruit is not present. No thyromegaly present. Cardiovascular: Normal rate, regular rhythm, normal heart sounds and intact distal pulses. Exam reveals no gallop and no friction rub.     No murmur heard.  Pulmonary/Chest: Effort normal and breath sounds normal. She has no wheezes. She has no rhonchi. She has no rales. Abdominal: Soft. Bowel sounds are normal.   Musculoskeletal: Normal range of motion. Neurological: She is alert and oriented to person, place, and time. Coordination normal.   Skin: Skin is warm and dry. Psychiatric: She has a normal mood and affect. Her behavior is normal. Judgment and thought content normal.     ASSESSMENT and PLAN  [facility] was seen today for hospital follow up, nausea and heartburn. Diagnoses and all orders for this visit:    S/P cervical discectomy  Appears to be healing well. Pt advised to contact Jamil Jovi, NP today for clarification on when she can recline. Pt also advised that we typically are not involved in wound care after a surgery such as hers and would completely defer wound care to the spine surg team.  Pt can discuss this with the NP as well. Nausea  Encouraged pt to try water again but to just have small sips. Gastroesophageal reflux disease, esophagitis presence not specified  Ok to take otc antacids prn.       Follow-up Disposition: prn

## 2017-03-30 ENCOUNTER — HOME CARE VISIT (OUTPATIENT)
Dept: SCHEDULING | Facility: HOME HEALTH | Age: 42
End: 2017-03-30
Payer: MEDICAID

## 2017-03-30 VITALS — OXYGEN SATURATION: 98 % | DIASTOLIC BLOOD PRESSURE: 78 MMHG | HEART RATE: 98 BPM | SYSTOLIC BLOOD PRESSURE: 116 MMHG

## 2017-03-30 PROCEDURE — G0157 HHC PT ASSISTANT EA 15: HCPCS

## 2017-03-31 ENCOUNTER — HOME CARE VISIT (OUTPATIENT)
Dept: SCHEDULING | Facility: HOME HEALTH | Age: 42
End: 2017-03-31
Payer: MEDICAID

## 2017-03-31 VITALS — OXYGEN SATURATION: 98 % | SYSTOLIC BLOOD PRESSURE: 114 MMHG | DIASTOLIC BLOOD PRESSURE: 74 MMHG | HEART RATE: 78 BPM

## 2017-03-31 VITALS — OXYGEN SATURATION: 98 % | HEART RATE: 61 BPM

## 2017-03-31 PROCEDURE — G0151 HHCP-SERV OF PT,EA 15 MIN: HCPCS

## 2017-04-04 ENCOUNTER — OFFICE VISIT (OUTPATIENT)
Dept: ORTHOPEDIC SURGERY | Age: 42
End: 2017-04-04

## 2017-04-04 VITALS
HEIGHT: 65 IN | RESPIRATION RATE: 18 BRPM | OXYGEN SATURATION: 100 % | DIASTOLIC BLOOD PRESSURE: 63 MMHG | BODY MASS INDEX: 26.33 KG/M2 | TEMPERATURE: 98.1 F | HEART RATE: 74 BPM | SYSTOLIC BLOOD PRESSURE: 107 MMHG | WEIGHT: 158 LBS

## 2017-04-04 DIAGNOSIS — Z98.1 S/P CERVICAL SPINAL FUSION: Primary | ICD-10-CM

## 2017-04-04 NOTE — PATIENT INSTRUCTIONS
Cervical Spinal Fusion: What to Expect at Home  Your Recovery    After surgery, you can expect your neck to feel stiff and sore. This should improve in the weeks after surgery. You may have trouble sitting or standing in one position for very long and may need pain medicine in the weeks after your surgery. You may need to wear a neck brace for a while. It may take 4 to 6 weeks to get back to your usual activities, but it may depend on what kind of surgery you had. Your doctor may advise you to work with a physical therapist to strengthen the muscles around your neck and back. This care sheet gives you a general idea about how long it will take for you to recover. But each person recovers at a different pace. Follow the steps below to get better as quickly as possible. How can you care for yourself at home? Activity  · Rest when you feel tired. Getting enough sleep will help you recover. · Try to walk each day. Start by walking a little more than you did the day before. Bit by bit, increase the amount you walk. Walking boosts blood flow and helps prevent pneumonia and constipation. Walking may also decrease your muscle soreness after surgery. · Follow your doctor's directions about not lifting anything that would strain your neck and back. This may include a child, heavy grocery bags and milk containers, a heavy briefcase or backpack, cat litter or dog food bags, or a vacuum . · Avoid strenuous activities, such as bicycle riding, jogging, weightlifting, or aerobic exercise, until your doctor says it is okay. · Do not drive for 2 to 4 weeks after your surgery or until your doctor says it is okay. · Avoid taking long car trips for 2 to 4 weeks after surgery. Your neck may become tired and painful from sitting too long in one position. · You will probably need to take 4 to 6 weeks off from work. It depends on the type of work you do and how you feel.   · You may have sex as soon as you feel able, but avoid positions that put stress on your neck or cause pain. Diet  · You can eat your normal diet. If your stomach is upset, try bland, low-fat foods like plain rice, broiled chicken, toast, and yogurt. · Drink plenty of fluids. If you have kidney, heart, or liver disease and have to limit fluids, talk with your doctor before you increase the amount of fluids you drink. · You may notice that your bowel movements are not regular right after your surgery. This is common. Try to avoid constipation and straining with bowel movements. You may want to take a fiber supplement every day. If you have not had a bowel movement after a couple of days, ask your doctor about taking a mild laxative. Medicines  · Your doctor will tell you if and when you can restart your medicines. He or she will also give you instructions about taking any new medicines. · If you take blood thinners, such as warfarin (Coumadin), clopidogrel (Plavix), or aspirin, be sure to talk to your doctor. He or she will tell you if and when to start taking those medicines again. Make sure that you understand exactly what your doctor wants you to do. · Be safe with medicines. Take pain medicines exactly as directed. ¨ If the doctor gave you a prescription medicine for pain, take it as prescribed. ¨ If you are not taking a prescription pain medicine, ask your doctor if you can take an over-the-counter medicine. · If your doctor prescribed antibiotics, take them as directed. Do not stop taking them just because you feel better. You need to take the full course of antibiotics. · If you think your pain pill is making you sick to your stomach:  ¨ Take your pills after meals (unless your doctor has told you not to). ¨ Ask your doctor for a different pain pill. Incision care  · You will be given specific instructions about how to care for the cut (incision) the doctor made.  The instructions will depend on the type of materials used to close the cut.  Exercise  · Do exercises as instructed by your doctor or physical therapist to improve your strength and flexibility. Other instructions  · To reduce stiffness and help sore muscles, use a warm water bottle, a heating pad set on low, or a warm cloth on your neck. Do not put heat right over the incision. Do not go to sleep with a heating pad on your skin. Follow-up care is a key part of your treatment and safety. Be sure to make and go to all appointments, and call your doctor if you are having problems. It's also a good idea to know your test results and keep a list of the medicines you take. When should you call for help? Call 911 anytime you think you may need emergency care. For example, call if:  · You passed out (lost consciousness). · You have sudden chest pain and shortness of breath, or you cough up blood. · You cannot swallow. · You have severe pain in your neck or back. · You are unable to move an arm or a leg at all. Call your doctor now or seek immediate medical care if:  · You have pain that does not get better after you take pain pills. · You have signs of infection, such as:  ¨ Increased pain, swelling, warmth, or redness. ¨ Red streaks leading from the site. ¨ Pus draining from the site. ¨ A fever. · You have new or worse symptoms in your arms, legs, chest, belly, or buttocks. Symptoms may include:  ¨ Numbness or tingling. ¨ Weakness. ¨ Pain. · You lose bladder or bowel control. · You have loose stitches, or your incision comes open. · You have blood or fluid draining from the incision. Watch closely for changes in your health, and be sure to contact your doctor if:  · You do not have a bowel movement after taking a laxative. · You are not getting better as expected. Where can you learn more? Go to http://delores-scotty.info/. Enter R357 in the search box to learn more about \"Cervical Spinal Fusion: What to Expect at Home. \"  Current as of: May 23, 2016  Content Version: 11.2  © 7898-9981 [employer]. Care instructions adapted under license by [employer] (which disclaims liability or warranty for this information). If you have questions about a medical condition or this instruction, always ask your healthcare professional. [address] any warranty or liability for your use of this information.

## 2017-04-04 NOTE — PROGRESS NOTES
Chief complaint/History of Present Illness:  Chief Complaint   Patient presents with    Neck Pain     post op     HPI  Jacqueline Lew is a  46 y.o.  female      HISTORY OF PRESENT ILLNESS:  The patient comes in today two weeks status post her ACDF of C5-6. She is doing really good. Her symptoms with the numbness and tingling in her arms has resolved. She gets a little bit of right shoulder tightening at night. She is off all pain medications. She is swallowing good. She denies fever or bowel or bladder dysfunction. She is very happy with the outcome of her surgery. She does not work currently. She is a student working on becoming a . She is a nonsmoker. PHYSICAL EXAM:  Ms. Mary Jane Vilchis is a 42-year-old female. She is alert and oriented. She has a full weight bearing, non-antalgic gait. She has 5/5 strength of the bilateral upper extremities and negative Engels. The anterior cervical incision is healing nicely. The edges are well approximated. There is no erythema, warmth, drainage, or signs of infection. ASSESSENT/PLAN:  This is a patient two weeks out from her ACDF of C5-6. She is doing well. We went over wound care and activity level. We will see her back in four weeks with Dr. Fabián Ulloa at which time we will get a cervical AP and lateral x-ray.         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 Social History Main Topics    Smoking status: Never Smoker    Smokeless tobacco: Never Used    Alcohol use Yes      Comment: rare    Drug use: No    Sexual activity: Yes     Partners: Male     Birth control/ protection: Implant      Comment: pregnancy test negative     Other Topics Concern    Not on file     Social History Narrative     Family History   Problem Relation Age of Onset    Heart Disease Mother     Hypertension Mother    [facility] Mother     Other Mother 54     Lupus    Stroke Mother      x3    Asthma Sister     Diabetes Paternal Grandmother     Hypertension Paternal Grandmother        Physical Exam:  Visit Vitals    /63    Pulse 74    Temp 98.1 °F (36.7 °C) (Oral)    Resp 18    Ht 5' 5\" (1.651 m)    Wt 158 lb (71.7 kg)    LMP 03/02/2017    SpO2 100%    BMI 26.29 kg/m2     Pain Scale: 4/10     has been . reviewed and is appropriate      Noreen Bowles was seen today for neck pain. Diagnoses and all orders for this visit:    S/P cervical spinal fusion            Follow-up Disposition:  Return in about 4 weeks (around 5/2/2017) for with Dr Los Young.         We have informed Lauri Marcial to notify us for immediate appointment if she has any worsening neurogical symptoms or if an emergency situation presents, then call 911

## 2017-05-02 ENCOUNTER — OFFICE VISIT (OUTPATIENT)
Dept: ORTHOPEDIC SURGERY | Age: 42
End: 2017-05-02

## 2017-05-02 VITALS
RESPIRATION RATE: 20 BRPM | SYSTOLIC BLOOD PRESSURE: 114 MMHG | DIASTOLIC BLOOD PRESSURE: 65 MMHG | HEIGHT: 65 IN | HEART RATE: 73 BPM | BODY MASS INDEX: 26.49 KG/M2 | WEIGHT: 159 LBS | TEMPERATURE: 98.2 F | OXYGEN SATURATION: 100 %

## 2017-05-02 DIAGNOSIS — M50.20 HNP (HERNIATED NUCLEUS PULPOSUS), CERVICAL: ICD-10-CM

## 2017-05-02 DIAGNOSIS — Z98.1 S/P CERVICAL SPINAL FUSION: Primary | ICD-10-CM

## 2017-06-27 ENCOUNTER — HOSPITAL ENCOUNTER (OUTPATIENT)
Dept: PHYSICAL THERAPY | Age: 42
Discharge: HOME | End: 2017-06-27
Payer: MEDICAID

## 2017-06-27 PROCEDURE — 97110 THERAPEUTIC EXERCISES: CPT

## 2017-06-27 PROCEDURE — 97161 PT EVAL LOW COMPLEX 20 MIN: CPT

## 2017-06-27 NOTE — PROGRESS NOTES
PT DAILY TREATMENT NOTE/CERVICAL EVAL3-16    Patient Name: Anne Marie Hernandez  Date:2017  : 1975  [x]  Patient  Verified  Payor: Pat Owusu / Plan: [facility] MEDICAID / Product Type: Managed Care Medicaid /    In time:502  Out time:538  Total Treatment Time (min):36  Total Timed Codes (min): 8  1:1 Treatment Time ( [address] only): 8   Visit #: 1 of 10    Treatment Area: Other cervical disc displacement, unspecified cervical region [M50.20]  Arthrodesis status [Z98.1]    SUBJECTIVE  Pain Level (0-10 scale): 3  [x]constant []intermittent [x]improving []worsening []no change since onset  Worse sedentary   Better moving around,   Any medication changes, allergies to medications, adverse drug reactions, diagnosis change, or new procedure performed?: [x] No    [] Yes (see summary sheet for update)  Subjective functional status/changes:     PLOF: I   Limitations to PLOF: shoulder and neck muscle tightness, pain  Mechanism of Injury:insidious onset approx  , progressively worsened leading to the surgery  Current symptoms/Complaints: 47 YO female diagnosed as above and with S/S consistent with above diagnosis presents to skilled outpatient PT. CCO B shoulder and neck muscle tightness/knots, mild residual pain P/O Csp fusion C5-6 3/22/17. Residual pain 3/10 however not the same type of pain she had preoperatively. Pain is worse when sedentary and better with moving around.      Previous Treatment/Compliance: PT, Dry needling,medication, injections, MRI, X-rays, HHPT P/O  PMHx/Surgical Hx: anemia and low blood pressure  Work Hx: unemployed  Living Situation: lives in 2 story house , 4-5 steps to enter with rails, does not live alone ( mother and daughter are there)  Pt Goals: loosen muscles , strengthening  Barriers: [x]pain []financial []time []transportation []other  Motivation: high  Substance use: []Alcohol []Tobacco []other:   FABQ Score: []low []elevate  Cognition: A & O x 4 Other: OBJECTIVE/EXAMINATION  Domestic Life: household chores, enjoyed rose and exercise  Activity/Recreational Limitations: involvement and now post op  Mobility: I  Self Care: I        Modality rationale:     Min Type Additional Details    [] Estim:  []Unatt       []IFC  []Premod                        []Other:  []w/ice   []w/heat  Position:  Location:    [] Estim: []Att    []TENS instruct  []NMES                    []Other:  []w/US   []w/ice   []w/heat  Position:  Location:    []  Traction: [] Cervical       []Lumbar                       [] Prone          []Supine                       []Intermittent   []Continuous Lbs:  [] before manual  [] after manual    []  Ultrasound: []Continuous   [] Pulsed                           []1MHz   []3MHz Location:  W/cm2:    []  Iontophoresis with dexamethasone         Location: [] Take home patch   [] In clinic    []  Ice     []  heat  []  Ice massage  []  Laser   []  Anodyne Position:  Location:    []  Laser with stim  []  Other: Position:  Location:    []  Vasopneumatic Device Pressure:       [] lo [] med [] hi   Temperature: [] lo [] med [] hi   [] Skin assessment post-treatment:  []intact []redness- no adverse reaction    []redness - adverse reaction:     28 min [x]Eval                  []Re-Eval       8 min Therapeutic Exercise:  [x] See flow sheet :   Rationale: increase ROM, increase strength and improve coordination to improve the patients ability to aid with increase tolerance to ADLS and activities     min Therapeutic Activity:  []  See flow sheet :   Rationale:   to improve the patients ability to       min Neuromuscular Re-education:  []  See flow sheet :   Rationale:   to improve the patients ability to      min Manual Therapy:     Rationale:  to      min Gait Training:  ___ feet with ___ device on level surfaces with ___ level of assist   Rationale:           With   [] TE   [] TA   [] neuro   [] other: Patient Education: [x] Review HEP    [] Progressed/Changed HEP based on:   [] positioning   [] body mechanics   [] transfers   [] heat/ice application    [] other:      Other Objective/Functional Measures:     Physical Therapy Evaluation Cervical Spine     SUBJECTIVE  Chief Complaint:    Mechanism of injury:    Symptoms  Aggravated by:   [] Bending [] Sitting [] Standing [] Reaching Overhead   [] Moving [] Cough [] Sneeze [] Eating   [] AM  [] PM  Lying:  [] sup   [] pro   [] sidelying   [] Other:     Eased by:    [] Bending [] Sitting [] Standing Lying: [] sup  [] pro  [] sidelying   [] Moving [] AM  [] PM  [] Other:     General Health:  Red Flags Indicated? [] Yes    [] No  [] Yes [x] No Recent weight change (If yes, due to dieting? [] Yes  [] No)   [] Yes [x] No Persistent cough  [] Yes [x] No Unremitting pain at night  [] Yes [x] No Dizziness  [] Yes [x] No Blurred vision  [] Yes [x] No Hands more cold or painful in cold weather  [] Yes [x] No Ringing in ears  [] Yes [x] No Difficulty swallowing  [] Yes [x] No Dysfunction of bowel or bladder  [] Yes [x] No Recent illness within past 3 weeks (i.e, cold, flu)  [] Yes [x] No Jaw pain    Past History/Treatments:    Diagnostic Tests: [] Lab work [x] X-rays    [] CT [x] MRI     [] Other:  Results:    Functional Status  Prior level of function:  Present functional limitations:  What position do you sleep in?:    Headaches: Do you have headaches? [] Yes   [x] No  How often do you get headaches? How long does the headache last?  What aggravates it? What relieves it? Does the headache coincide with any other symptoms (visual disturbances, light sensitivity)? Where is the headache? Does it change locations?   Other:    OBJECTIVE  Posture: [] WNL  Head Position:FH, RS guarded Csp  Shoulder/Scapular Position:  C-Kyphosis:  [] increased   [] decreased   C-Lordosis:   [] increased   [] decreased  T-Kyphosis:  [] increased   [] decreased  T-Lordosis:   [] increased   [] decreased     TMJ: [x] N/A [] Abnormal - ROM:   Palpation:    Cervical Retraction: [x] WNL    [] Abnormal:    Shoulder/Scapular Screen: [x] WNL  B UE AROM essen = and full for overhead F and abduction , also for HBH HBB    [] Abnormal:    Active Movements: [] N/A   [] Too acute   [] Other:  ROM  AROM  degrees % PROM Comments:pain, area   Forward flexion 25  ERP   Extension 30  ERP   SB right 33     SB left  35     Rotation right 45     Rotation left 42       Thoracic Spine: [] N/A    [] WNL   [] Other:    PROM:    Palpation:  [] Min  [x] Mod  [] Severe    Location:[facility]medial [facility]  [] Min  [] Mod  [] Severe    Location:  [] Min  [] Mod  [] Severe    Location:    Neuro Screen (myotome/dematome/felexes): [] WNL  Myotome Level Muscle Test Myotome Level Muscle Test   C5 Shoulder Adduction - Deltoid C8 Finger Flexors   C6 Wrist Extension T1 Finger Abduction - Interossei   C7 Elbow Extension     Comments:  Upper Limb Tension Tests: [] N/A       Ulnar: [] R    [] L    [] +    [] -       Median: [] R    [] L    [] +    [] -       Radial: [] R    [] L    [] +    [] -    Special Tests:  Cervical:        Vertebral Artery:  [] R    [] L    [] +    [] -       Alar Ligament: [] R    [] L    [] +    [] -       Transverse Lig: [] R    [] L    [] +    [] -       Spurling's:  [] R    [] L    [] +    [] -       Distraction:  [] R    [] L    [] +    [] -       Compression: [] R    [] L    [] +    [] -    Thoracic Outlet Tests: [] N/A       Adson's:  [] R    [] L    [] +    [] -       Hyperabduction: [] R    [] L    [] +    [] -       Santos's:  [] R    [] L    [] +    [] -       Barrera:  [] R    [] L    [] +    [] -    Diaphragmatic Breathing: [] Normal    [] Abnormal    Muscle Flexibility: [] N/A   Scalenes: [] WNL    [] Tight    [] R    [] L   Upper Trap: [] WNL    [] Tight    [] R    [] L   Levator: [] WNL    [] Tight    [] R    [] L   Pect.  Minor: [] WNL    [] Tight    [] R    [] L    Global Muscular Weakness: [] N/A   Lower Trap:   Rhomboids:   Middle Trap:   Serratus Ant:   Ext Rotators: Other:    Other tests/comments:       Pain Level (0-10 scale) post treatment: 3    ASSESSMENT/Changes in Function: Patient demonstrates the potential to make gains with improved ROM, strength, endurance/activity tolerance, functional FOTO survey score  and all within a reasonable time frame so as to increase their functional independence with ADLs and activities for carryover to  Improve quality of life and tolerance to household chores and long term resume to rose and exercising. Patient requires skilled Physical Therapy so as to monitor their response to and modify their treatment plan accordingly. Patient appears to be an appropriate candidate for skilled outpatient Physical Therapy. Patient will continue to benefit from skilled PT services to modify and progress therapeutic interventions, address functional mobility deficits, address ROM deficits, address strength deficits, analyze and address soft tissue restrictions, analyze and cue movement patterns, analyze and modify body mechanics/ergonomics, assess and modify postural abnormalities, address imbalance/dizziness and instruct in home and community integration to attain remaining goals.      [x]  See Plan of Care  []  See progress note/recertification  []  See Discharge Summary         Progress towards goals / Updated goals:       PLAN  [x]  Upgrade activities as tolerated     [x]  Continue plan of care  []  Update interventions per flow sheet       []  Discharge due to:_  []  Other:_      Alex Melo, PT 6/27/2017  4:57 PM

## 2017-06-27 NOTE — PROGRESS NOTES
[facility]  [address] [facility], [address],[place] [address]  [phone] fax    Plan of Care/ Statement of Necessity for Physical Therapy Services    Patient name: Anne Marie Hernandez Start of Care: 2017   Referral source: Alba Wall MD : 1975    Medical Diagnosis: Other cervical disc displacement, unspecified cervical region [M50.20]  Arthrodesis status [Z98.1]   Onset Date: now P/O 3/22/17    Treatment Diagnosis: decrease tolerance to ADLS and activities due to P/O residual neck pain and shoulder/neck muscle tightness, LOM Csp   Prior Hospitalization: see medical history Provider#: [id number]   Medications: Verified on Patient summary List    Comorbidities: anemia and low blood pressure   Prior Level of Function: I all areas, no AD use, does not work, needs to tolerate household chores and community activities, enjoys exercise and rose     The Plan of Care and following information is based on the information from the initial evaluation. Assessment/ key information: 47 YO female diagnosed as above and with S/S consistent with above diagnosis presents to skilled outpatient PT. CCO B shoulder and neck muscle tightness/knots, mild residual pain P/O Csp fusion C5-6 3/22/17. Residual pain 3/10 however not the same type of pain she had preoperatively.  Pain is worse when sedentary and better with moving around.    Previous Treatment/Compliance: PT, Dry needling,medication, injections, MRI, X-rays, HHPT P/O  Pain 3, FOTO 57, no headaches, FH, RS, guarded Csp, AROM Csp FF 25, BB 30,  SB R/L 33/35  ROT R/L 45/42, Moderated TTP STC B UT/LI/medial ST. B UE AROM essen = and full for overhead F and abduction , also for [address] HBB  Patient demonstrates the potential to make gains with improved ROM, strength, endurance/activity tolerance, functional FOTO survey score  and all within a reasonable time frame so as to increase their functional independence with ADLs and activities for carryover to  Improve quality of life and tolerance to household chores and long term resume to rose and exercising. Patient requires skilled Physical Therapy so as to monitor their response to and modify their treatment plan accordingly. Patient appears to be an appropriate candidate for skilled outpatient Physical Therapy.     Evaluation Complexity History MEDIUM  Complexity : 1-2 comorbidities / personal factors will impact the outcome/ POC ; Examination HIGH Complexity : 4+ Standardized tests and measures addressing body structure, function, activity limitation and / or participation in recreation  ;Presentation LOW Complexity : Stable, uncomplicated  ;Clinical Decision Making MEDIUM Complexity : FOTO score of 26-74  Overall Complexity Rating: LOW   Problem List: pain affecting function, decrease ROM, decrease strength, decrease ADL/ functional abilitiies, decrease activity tolerance, decrease flexibility/ joint mobility and other FOTO 57   Treatment Plan may include any combination of the following: Therapeutic exercise, Therapeutic activities, Neuromuscular re-education, Physical agent/modality, Manual therapy and Patient education  Patient / Family readiness to learn indicated by: asking questions, trying to perform skills and interest  Persons(s) to be included in education: patient (P)  Barriers to Learning/Limitations: None  Patient Goal (s): loosen muscles , strengthening  Patient Self Reported Health Status: excellent  Rehabilitation Potential: good    Short Term Goals: To be accomplished in 5 treatments:   1 patient will have established and be independent with HEP to aid with progression of skilled PT program   EVAL issued   CURRENT   2 patient will have pain <2/10 to aid with increase tolerance to ADLS and activities   EVAL 3   CURRENT   3 patient will have FOTO improved to 61 to show increase tolerance to ADLS and household chores   EVAL 57   CURRENT  Long Term Goals:  To be accomplished in 10 treatments:   1 patient will have pain <0-1/10 to aid with increase tolerance to ADLS and activities   EVAL 3   CURRENT   2 patient will have FOTO improved to 65 to show increase tolerance to ADLS and household chores   EVAL 57   CURRENT   3 patient will have overall improvement 50% to aid with increase tolerance to ADLS and activities   EVAL    CURRENT   4 patient will tolerate mid back strengthening exercises with red Tband 15 reps to aid with improved posture awareness   EVAL    CURRENT    Frequency / Duration: Patient to be seen 2-3 times per week for 10 treatments. Patient/ Caregiver education and instruction: Diagnosis, prognosis, self care, activity modification and exercises   [x]  Plan of care has been reviewed with MARIKA Melo, PT 6/27/2017 4:58 PM  ________________________________________________________________________    I certify that the above Therapy Services are being furnished while the patient is under my care. I agree with the treatment plan and certify that this therapy is necessary.     [de-identified] Signature:____________________  Date:____________Time: _________    Please sign and return to [facility] - CESAR MACHUCA [address],[facility] [address]  [phone] fax

## 2017-07-12 ENCOUNTER — APPOINTMENT (OUTPATIENT)
Dept: PHYSICAL THERAPY | Age: 42
End: 2017-07-12

## 2017-07-13 ENCOUNTER — APPOINTMENT (OUTPATIENT)
Dept: PHYSICAL THERAPY | Age: 42
End: 2017-07-13

## 2017-07-17 ENCOUNTER — APPOINTMENT (OUTPATIENT)
Dept: PHYSICAL THERAPY | Age: 42
End: 2017-07-17

## 2017-07-19 ENCOUNTER — APPOINTMENT (OUTPATIENT)
Dept: PHYSICAL THERAPY | Age: 42
End: 2017-07-19

## 2017-07-21 ENCOUNTER — APPOINTMENT (OUTPATIENT)
Dept: PHYSICAL THERAPY | Age: 42
End: 2017-07-21

## 2017-07-24 ENCOUNTER — APPOINTMENT (OUTPATIENT)
Dept: PHYSICAL THERAPY | Age: 42
End: 2017-07-24

## 2017-07-26 ENCOUNTER — APPOINTMENT (OUTPATIENT)
Dept: PHYSICAL THERAPY | Age: 42
End: 2017-07-26

## 2017-07-28 ENCOUNTER — APPOINTMENT (OUTPATIENT)
Dept: PHYSICAL THERAPY | Age: 42
End: 2017-07-28

## 2017-08-03 NOTE — PROGRESS NOTES
[facility]  [address] [facility], [address] [address],[place] [address]  [phone] fax    Discharge Summary    Patient name: Piyush Fernandez     Start of Care:17  Referral source: Raleigh Ahmadi MD    : 1975  Medical/Treatment Diagnosis: Other cervical disc displacement, unspecified cervical region [M50.20]  Arthrodesis status [Z98.1]  Onset Date:, now P/O 3/22/17  Prior Hospitalization: see medical history   Provider#: [id number]  Comorbidities:anemia and low blood pressure  Prior Level of Function: I all areas, no AD use, does not work, needs to tolerate household chores and community activities, enjoys exercise and rose      Medications: Verified on Patient Summary List    Visits from [facility] of Care:1    Missed Visits:4  Reporting Period : 17to 17      Summary of Care:Patient seen for 1 session, eval only. She had multiple missed visits and is being DC due to non attendance. Thank you. Goal:patient will have established and be independent with HEP to aid with progression of skilled PT program  Status at last note/certification:eval  Status at discharge: met    Goal: patient will have pain <2/10 to aid with increase tolerance to ADLS and activities  Status at last note/certification:eval  Status at discharge: not met    Goal:patient will have FOTO improved to 61 to show increase tolerance to ADLS and household chores  Status at last note/certification:eval  Status at discharge: not met    Goal:patient will have overall improvement 50% to aid with increase tolerance to ADLS and activities  Status at last note/certification:eval  Status at discharge: not met      ASSESSMENT/RECOMMENDATIONS:  []Discontinue therapy progressing towards or have reached established goals  []Discontinue therapy due to lack of appreciable progress towards goals  [x]Discontinue therapy due to lack of attendance or compliance  []Other:     Thank you for this referral.     Melanie Vogel Bryant Conroy, PT 8/3/2017 1:52 PM

## 2017-12-15 ENCOUNTER — OFFICE VISIT (OUTPATIENT)
Dept: FAMILY MEDICINE CLINIC | Facility: CLINIC | Age: 42
End: 2017-12-15

## 2017-12-15 VITALS
BODY MASS INDEX: 26.36 KG/M2 | TEMPERATURE: 98.3 F | DIASTOLIC BLOOD PRESSURE: 71 MMHG | OXYGEN SATURATION: 100 % | SYSTOLIC BLOOD PRESSURE: 107 MMHG | RESPIRATION RATE: 14 BRPM | WEIGHT: 164 LBS | HEIGHT: 66 IN | HEART RATE: 67 BPM

## 2017-12-15 DIAGNOSIS — R42 VERTIGO: Primary | ICD-10-CM

## 2017-12-15 DIAGNOSIS — R11.0 NAUSEA: ICD-10-CM

## 2017-12-15 RX ORDER — MECLIZINE HYDROCHLORIDE 25 MG/1
25 TABLET ORAL
Qty: 30 TAB | Refills: 1 | Status: SHIPPED | OUTPATIENT
Start: 2017-12-15 | End: 2017-12-25

## 2017-12-15 RX ORDER — ONDANSETRON 4 MG/1
4 TABLET, ORALLY DISINTEGRATING ORAL
COMMUNITY
End: 2018-03-06 | Stop reason: SDUPTHER

## 2017-12-15 NOTE — PATIENT INSTRUCTIONS
Vertigo: Exercises  Your Care Instructions  Here are some examples of typical rehabilitation exercises for your condition. Start each exercise slowly. Ease off the exercise if you start to have pain. Your doctor or physical therapist will tell you when you can start these exercises and which ones will work best for you. How to do the exercises  Exercise 1    1. Stand with a chair in front of you and a wall behind you. If you begin to fall, you may use them for support. 2. Stand with your feet together and your arms at your sides. 3. Move your head up and down 10 times. Exercise 2    1. Move your head side to side 10 times. Exercise 3    1. Move your head diagonally up and down 10 times. Exercise 4    1. Move your head diagonally up and down 10 times on the other side. Follow-up care is a key part of your treatment and safety. Be sure to make and go to all appointments, and call your doctor if you are having problems. It's also a good idea to know your test results and keep a list of the medicines you take. Where can you learn more? Go to http://delores-scotty.info/. Enter F349 in the search box to learn more about \"Vertigo: Exercises. \"  Current as of: May 4, 2017  Content Version: 11.4  © 3328-7982 Healthwise, Incorporated. Care instructions adapted under license by [employer] (which disclaims liability or warranty for this information). If you have questions about a medical condition or this instruction, always ask your healthcare professional. [address] any warranty or liability for your use of this information.

## 2017-12-15 NOTE — MR AVS SNAPSHOT
Visit Information Date & Time Provider Department Dept. Phone Encounter #  
 12/15/2017  3:15 PM Quan Velasco NP [facility] [employer] [phone] [id number] Follow-up Instructions Return if symptoms worsen or fail to improve, for f/u with PCP in 1 week. Your Appointments 12/22/2017  9:00 AM  
Office Visit with Karmen Wyatt MD  
[facility] (--) Appt Note: f/u from 12/15 visit at [facility] [id number] [phone]  
  
   
 [facility] [address] Upcoming Health Maintenance Date Due  
 PAP AKA CERVICAL CYTOLOGY 12/20/2016 Influenza Age 5 to Adult 8/1/2017 DTaP/Tdap/Td series (2 - Td) 12/13/2022 Allergies as of 12/15/2017  Review Complete On: 12/15/2017 By: Joleen Fisher Severity Noted Reaction Type Reactions Ciprofloxacin (Bulk) High 06/13/2012   Side Effect Diarrhea Codeine High 12/12/2011    Itching Dilaudid [Hydromorphone (Bulk)] High 12/23/2011    Itching Lyrica [Pregabalin] High 12/28/2015    Swelling Tramadol Medium 06/13/2012    Other (comments), Unknown (comments) Gets headaches 
headache Codeine Phosphate  10/15/2015    Unknown (comments) Hydromorphone  10/15/2015    Unknown (comments) Current Immunizations  Never Reviewed Name Date Influenza Vaccine Split 12/13/2012 Tdap 12/13/2012 Not reviewed this visit You Were Diagnosed With   
  
 Codes Comments Vertigo    -  Primary ICD-10-CM: S31 ICD-9-CM: 780.4 Nausea     ICD-10-CM: R11.0 ICD-9-CM: 787.02 Vitals BP Pulse Temp Resp Height(growth percentile) Weight(growth percentile) 107/71 67 98.3 °F (36.8 °C) 14 5' 6\" (1.676 m) 164 lb (74.4 kg) LMP SpO2 BMI OB Status Smoking Status 12/04/2017 100% 26.47 kg/m2 Having regular periods Never Smoker Vitals History BMI and BSA Data Body Mass Index Body Surface Area 26.47 kg/m 2 1.86 m 2 Preferred Pharmacy Pharmacy Name Phone [facility] [address] [place] [address] [phone] Your Updated Medication List  
  
   
This list is accurate as of: 12/15/17  3:51 PM.  Always use your most recent med list.  
  
  
  
  
 BENADRYL 25 mg capsule Generic drug:  diphenhydrAMINE Take 25 mg by mouth every six (6) hours as needed. prn allergy  
  
 meclizine 25 mg tablet Commonly known as:  ANTIVERT Take 1 Tab by mouth three (3) times daily as needed for up to 10 days. ondansetron 4 mg disintegrating tablet Commonly known as:  ZOFRAN ODT Take 4 mg by mouth every eight (8) hours as needed for Nausea. Prescriptions Sent to Pharmacy Refills  
 meclizine (ANTIVERT) 25 mg tablet 1 Sig: Take 1 Tab by mouth three (3) times daily as needed for up to 10 days. Class: Normal  
 Pharmacy: [facility] [address] [place] [address] Ph #: [phone] Route: Oral  
  
We Performed the Following REFERRAL TO ENT-OTOLARYNGOLOGY [DVW62 Custom] Follow-up Instructions Return if symptoms worsen or fail to improve, for f/u with PCP in 1 week. Referral Information Referral ID Referred By Referred To  
  
 [id number] [place], [address] Phone: [phone] Fax: [phone] Visits Status Start Date End Date 1 New Request 12/15/17 12/15/18 If your referral has a status of pending review or denied, additional information will be sent to support the outcome of this decision. Patient Instructions Vertigo: Exercises Your Care Instructions Here are some examples of typical rehabilitation exercises for your condition. Start each exercise slowly. Ease off the exercise if you start to have pain. Your doctor or physical therapist will tell you when you can start these exercises and which ones will work best for you. How to do the exercises Exercise 1 1. Stand with a chair in front of you and a wall behind you. If you begin to fall, you may use them for support. 2. Stand with your feet together and your arms at your sides. 3. Move your head up and down 10 times. Exercise 2 1. Move your head side to side 10 times. Exercise 3 1. Move your head diagonally up and down 10 times. Exercise 4 1. Move your head diagonally up and down 10 times on the other side. Follow-up care is a key part of your treatment and safety. Be sure to make and go to all appointments, and call your doctor if you are having problems. It's also a good idea to know your test results and keep a list of the medicines you take. Where can you learn more? Go to http://delores-scotty.info/. Enter F349 in the search box to learn more about \"Vertigo: Exercises. \" Current as of: May 4, 2017 Content Version: 11.4 © 1677-0504 Healthwise, Incorporated. Care instructions adapted under license by [employer] (which disclaims liability or warranty for this information). If you have questions about a medical condition or this instruction, always ask your healthcare professional. [place] [address] any warranty or liability for your use of this information. Introducing [facility]! Dear Brijesh Shelton: 
Thank you for requesting a PROnoise account. Our records indicate that you already have an active PROnoise account. You can access your account anytime at [email or web address] Did you know that you can access your hospital and ER discharge instructions at any time in PROnoise? You can also review all of your test results from your hospital stay or ER visit. Additional Information If you have questions, please visit the Frequently Asked Questions section of the [email or web address] website at [email or web address] Remember, get2play is NOT to be used for urgent needs. For medical emergencies, dial 911. Now available from your iPhone and Android! Please provide this summary of care documentation to your next provider. Your primary care clinician is listed as Becky Lewis. If you have any questions after today's visit, please call [phone].

## 2017-12-15 NOTE — PROGRESS NOTES
HISTORY OF PRESENT ILLNESS  Ree Castillo is a 38 y.o. female. HPI Comments: Acute care visit. Pt with a h/o vertigo presents with c/o dizziness and nausea for more than 2 days. She reports similar symptoms in the past and she wa sgiven a script for meclizine. She had a few tabs left but has not taken this because they are old. She has however been taking zofran as needed which is not helping the dizziness. She reports dizziness with or without activity. Going up and down the stairs makes the dizziness worse. Reports she was seen by and ENT about 2 years ago and told \"it was the way my ears are shaped\" that is causing the vertigo. She did not f/u with the ENT at the time and is requesting a new referral today. Dizziness    The history is provided by the patient. This is a new problem. The current episode started more than 1 week ago. The problem occurs daily. The problem has not changed since onset. There was no loss of consciousness. The problem is associated with nothing. Associated symptoms include nausea and dizziness. Pertinent negatives include no fever, no seizures and no slurred speech. She has tried bed rest (zofran) for the symptoms. The treatment provided no relief. Her past medical history is significant for vertigo. Review of Systems   Constitutional: Negative for fever. HENT: Negative. Respiratory: Negative. Cardiovascular: Negative. Gastrointestinal: Positive for nausea. Genitourinary: Negative. Neurological: Positive for dizziness. Negative for seizures.      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 prn allergy       No current facility-administered medications on file prior to visit. Allergies and Intolerances: Allergies   Allergen Reactions    Ciprofloxacin (Bulk) Diarrhea    Codeine Itching    Dilaudid [Hydromorphone (Bulk)] Itching    Lyrica [Pregabalin] Swelling    Tramadol Other (comments) and Unknown (comments)     Gets headaches  headache    Codeine Phosphate Unknown (comments)    Hydromorphone Unknown (comments)       Family History:   Family History   Problem Relation Age of Onset    Heart Disease Mother     Hypertension Mother    [facility] MS Mother     Other Mother 54     Lupus    Stroke Mother      x3    Asthma Sister     Diabetes Paternal Grandmother     Hypertension Paternal Grandmother        Social History:   She  reports that she has never smoked. She has never used smokeless tobacco. She  reports that she drinks alcohol. Vitals:   Visit Vitals    /71    Pulse 67    Temp 98.3 °F (36.8 °C)    Resp 14    Ht 5' 6\" (1.676 m)    Wt 164 lb (74.4 kg)    LMP 12/04/2017    SpO2 100%    BMI 26.47 kg/m2     Body surface area is 1.86 meters squared. Physical Exam   Constitutional: She is oriented to person, place, and time. She appears well-developed and well-nourished. HENT:   Head: Atraumatic. Cardiovascular: Normal rate. Pulmonary/Chest: Effort normal.   Neurological: She is alert and oriented to person, place, and time. Psychiatric: She has a normal mood and affect. Her behavior is normal.   Nursing note and vitals reviewed. ASSESSMENT and PLAN    ICD-10-CM ICD-9-CM    1.  Vertigo R42 780.4 meclizine (ANTIVERT) 25 mg tablet      REFERRAL TO ENT-OTOLARYNGOLOGY   2. Nausea R11.0 787.02      Follow-up Disposition:  Return if symptoms worsen or fail to improve, for f/u with PCP in 1 week. reviewed medications and side effects in detail  Informed pt to have medical release form completed at [facility] office for previous ENT records. Stand up slow. - Alarm signals discussed. ER precautions  - Plan of care reviewed with patient. Understanding verbalized and they are in agreement with plan of care.

## 2018-01-18 ENCOUNTER — HOSPITAL ENCOUNTER (EMERGENCY)
Age: 43
Discharge: HOME | End: 2018-01-18
Attending: EMERGENCY MEDICINE
Payer: MEDICAID

## 2018-01-18 ENCOUNTER — APPOINTMENT (OUTPATIENT)
Dept: GENERAL RADIOLOGY | Age: 43
End: 2018-01-18
Attending: PHYSICIAN ASSISTANT
Payer: MEDICAID

## 2018-01-18 ENCOUNTER — APPOINTMENT (OUTPATIENT)
Dept: CT IMAGING | Age: 43
End: 2018-01-18
Attending: PHYSICIAN ASSISTANT
Payer: MEDICAID

## 2018-01-18 VITALS
SYSTOLIC BLOOD PRESSURE: 138 MMHG | HEART RATE: 69 BPM | RESPIRATION RATE: 18 BRPM | HEIGHT: 65 IN | DIASTOLIC BLOOD PRESSURE: 87 MMHG | OXYGEN SATURATION: 99 % | TEMPERATURE: 98.1 F | WEIGHT: 162 LBS | BODY MASS INDEX: 26.99 KG/M2

## 2018-01-18 DIAGNOSIS — G43.909 MIGRAINE WITHOUT STATUS MIGRAINOSUS, NOT INTRACTABLE, UNSPECIFIED MIGRAINE TYPE: Primary | ICD-10-CM

## 2018-01-18 DIAGNOSIS — R42 VERTIGO: ICD-10-CM

## 2018-01-18 LAB
ALBUMIN SERPL-MCNC: 4 G/DL (ref 3.4–5)
ALBUMIN/GLOB SERPL: 1 {RATIO} (ref 0.8–1.7)
ALP SERPL-CCNC: 56 U/L (ref 45–117)
ALT SERPL-CCNC: 23 U/L (ref 13–56)
ANION GAP SERPL CALC-SCNC: 7 MMOL/L (ref 3–18)
AST SERPL-CCNC: 48 U/L (ref 15–37)
BASOPHILS # BLD: 0 K/UL (ref 0–0.1)
BASOPHILS NFR BLD: 1 % (ref 0–2)
BILIRUB SERPL-MCNC: 0.6 MG/DL (ref 0.2–1)
BUN SERPL-MCNC: 11 MG/DL (ref 7–18)
BUN/CREAT SERPL: 15 (ref 12–20)
CALCIUM SERPL-MCNC: 8.5 MG/DL (ref 8.5–10.1)
CHLORIDE SERPL-SCNC: 106 MMOL/L (ref 100–108)
CO2 SERPL-SCNC: 24 MMOL/L (ref 21–32)
CREAT SERPL-MCNC: 0.71 MG/DL (ref 0.6–1.3)
DIFFERENTIAL METHOD BLD: ABNORMAL
EOSINOPHIL # BLD: 0.1 K/UL (ref 0–0.4)
EOSINOPHIL NFR BLD: 3 % (ref 0–5)
ERYTHROCYTE [DISTWIDTH] IN BLOOD BY AUTOMATED COUNT: 12.6 % (ref 11.6–14.5)
GLOBULIN SER CALC-MCNC: 3.9 G/DL (ref 2–4)
GLUCOSE SERPL-MCNC: 78 MG/DL (ref 74–99)
HCG SERPL-ACNC: <1 MIU/ML (ref 0–10)
HCT VFR BLD AUTO: 34.3 % (ref 35–45)
HGB BLD-MCNC: 11.4 G/DL (ref 12–16)
LYMPHOCYTES # BLD: 1.9 K/UL (ref 0.9–3.6)
LYMPHOCYTES NFR BLD: 37 % (ref 21–52)
MCH RBC QN AUTO: 29 PG (ref 24–34)
MCHC RBC AUTO-ENTMCNC: 33.2 G/DL (ref 31–37)
MCV RBC AUTO: 87.3 FL (ref 74–97)
MONOCYTES # BLD: 0.4 K/UL (ref 0.05–1.2)
MONOCYTES NFR BLD: 7 % (ref 3–10)
NEUTS SEG # BLD: 2.7 K/UL (ref 1.8–8)
NEUTS SEG NFR BLD: 52 % (ref 40–73)
PLATELET # BLD AUTO: 104 K/UL (ref 135–420)
PMV BLD AUTO: 11.6 FL (ref 9.2–11.8)
POTASSIUM SERPL-SCNC: 4.9 MMOL/L (ref 3.5–5.5)
PROT SERPL-MCNC: 7.9 G/DL (ref 6.4–8.2)
RBC # BLD AUTO: 3.93 M/UL (ref 4.2–5.3)
SODIUM SERPL-SCNC: 137 MMOL/L (ref 136–145)
TROPONIN I SERPL-MCNC: <0.02 NG/ML (ref 0–0.04)
WBC # BLD AUTO: 5.1 K/UL (ref 4.6–13.2)

## 2018-01-18 PROCEDURE — 93005 ELECTROCARDIOGRAM TRACING: CPT

## 2018-01-18 PROCEDURE — 80053 COMPREHEN METABOLIC PANEL: CPT | Performed by: PHYSICIAN ASSISTANT

## 2018-01-18 PROCEDURE — 70450 CT HEAD/BRAIN W/O DYE: CPT

## 2018-01-18 PROCEDURE — 74011250637 HC RX REV CODE- 250/637: Performed by: PHYSICIAN ASSISTANT

## 2018-01-18 PROCEDURE — 99283 EMERGENCY DEPT VISIT LOW MDM: CPT

## 2018-01-18 PROCEDURE — 85025 COMPLETE CBC W/AUTO DIFF WBC: CPT | Performed by: PHYSICIAN ASSISTANT

## 2018-01-18 PROCEDURE — 84484 ASSAY OF TROPONIN QUANT: CPT | Performed by: PHYSICIAN ASSISTANT

## 2018-01-18 PROCEDURE — 71046 X-RAY EXAM CHEST 2 VIEWS: CPT

## 2018-01-18 PROCEDURE — 84702 CHORIONIC GONADOTROPIN TEST: CPT | Performed by: PHYSICIAN ASSISTANT

## 2018-01-18 RX ORDER — ACETAMINOPHEN 500 MG
1000 TABLET ORAL
Status: COMPLETED | OUTPATIENT
Start: 2018-01-18 | End: 2018-01-18

## 2018-01-18 RX ADMIN — ACETAMINOPHEN 1000 MG: 500 TABLET ORAL at 17:00

## 2018-01-18 NOTE — LETTER
[facility] 
[facility] EMERGENCY DEPT 
[address] [facility] [address] 
[phone] Work/School Note Date: 1/18/2018 To Whom It May concern: 
 
Siddhartha Benson was seen and treated today in the emergency room by the following provider(s): 
Attending Provider: Roslyn Fam MD 
Physician Assistant: Juan Antonio Mason. Siddhartha Benson may return to work on 1/20/18. Sincerely, 
 
 
 
 
Juan Antonio Mason

## 2018-01-18 NOTE — DISCHARGE INSTRUCTIONS
Dizziness: Care Instructions  Your Care Instructions  Dizziness is the feeling of unsteadiness or fuzziness in your head. It is different than having vertigo, which is a feeling that the room is spinning or that you are moving or falling. It is also different from lightheadedness, which is the feeling that you are about to faint. It can be hard to know what causes dizziness. Some people feel dizzy when they have migraine headaches. Sometimes bouts of flu can make you feel dizzy. Some medical conditions, such as heart problems or high blood pressure, can make you feel dizzy. Many medicines can cause dizziness, including medicines for high blood pressure, pain, or anxiety. If a medicine causes your symptoms, your doctor may recommend that you stop or change the medicine. If it is a problem with your heart, you may need medicine to help your heart work better. If there is no clear reason for your symptoms, your doctor may suggest watching and waiting for a while to see if the dizziness goes away on its own. Follow-up care is a key part of your treatment and safety. Be sure to make and go to all appointments, and call your doctor if you are having problems. It's also a good idea to know your test results and keep a list of the medicines you take. How can you care for yourself at home? · If your doctor recommends or prescribes medicine, take it exactly as directed. Call your doctor if you think you are having a problem with your medicine. · Do not drive while you feel dizzy. · Try to prevent falls. Steps you can take include:  ¨ Using nonskid mats, adding grab bars near the tub, and using night-lights. ¨ Clearing your home so that walkways are free of anything you might trip on. ¨ Letting family and friends know that you have been feeling dizzy. This will help them know how to help you. When should you call for help? Call 911 anytime you think you may need emergency care.  For example, call if:  ? · You passed out (lost consciousness). ? · You have dizziness along with symptoms of a heart attack. These may include:  ¨ Chest pain or pressure, or a strange feeling in the chest.  ¨ Sweating. ¨ Shortness of breath. ¨ Nausea or vomiting. ¨ Pain, pressure, or a strange feeling in the back, neck, jaw, or upper belly or in one or both shoulders or arms. ¨ Lightheadedness or sudden weakness. ¨ A fast or irregular heartbeat. ? · You have symptoms of a stroke. These may include:  ¨ Sudden numbness, tingling, weakness, or loss of movement in your face, arm, or leg, especially on only one side of your body. ¨ Sudden vision changes. ¨ Sudden trouble speaking. ¨ Sudden confusion or trouble understanding simple statements. ¨ Sudden problems with walking or balance. ¨ A sudden, severe headache that is different from past headaches. ?Call your doctor now or seek immediate medical care if:  ? · You feel dizzy and have a fever, headache, or ringing in your ears. ? · You have new or increased nausea and vomiting. ? · Your dizziness does not go away or comes back. ? Watch closely for changes in your health, and be sure to contact your doctor if:  ? · You do not get better as expected. Where can you learn more? Go to http://delores-scotty.info/. Enter Z695 in the search box to learn more about \"Dizziness: Care Instructions. \"  Current as of: March 20, 2017  Content Version: 11.4  © 6184-6262 [employer]. Care instructions adapted under license by [employer] (which disclaims liability or warranty for this information). If you have questions about a medical condition or this instruction, always ask your healthcare professional. [place] [address] any warranty or liability for your use of this information. Lightheadedness or Faintness: Care Instructions  Your Care Instructions  Lightheadedness is a feeling that you are about to faint or \"pass out. \" You do not feel as if you or your surroundings are moving. It is different from vertigo, which is the feeling that you or things around you are spinning or tilting. Lightheadedness usually goes away or gets better when you lie down. If lightheadedness gets worse, it can lead to a fainting spell. It is common to feel lightheaded from time to time. Lightheadedness usually is not caused by a serious problem. It often is caused by a short-lasting drop in blood pressure and blood flow to your head that occurs when you get up too quickly from a seated or lying position. Follow-up care is a key part of your treatment and safety. Be sure to make and go to all appointments, and call your doctor if you are having problems. It's also a good idea to know your test results and keep a list of the medicines you take. How can you care for yourself at home? · Lie down for 1 or 2 minutes when you feel lightheaded. After lying down, sit up slowly and remain sitting for 1 to 2 minutes before slowly standing up. · Avoid movements, positions, or activities that have made you lightheaded in the past.  · Get plenty of rest, especially if you have a cold or flu, which can cause lightheadedness. · Make sure you drink plenty of fluids, especially if you have a fever or have been sweating. · Do not drive or put yourself and others in danger while you feel lightheaded. When should you call for help? Call 911 anytime you think you may need emergency care. For example, call if:  ? · You have symptoms of a stroke. These may include:  ¨ Sudden numbness, tingling, weakness, or loss of movement in your face, arm, or leg, especially on only one side of your body. ¨ Sudden vision changes. ¨ Sudden trouble speaking. ¨ Sudden confusion or trouble understanding simple statements. ¨ Sudden problems with walking or balance. ¨ A sudden, severe headache that is different from past headaches. ? · You have symptoms of a heart attack.  These may include:  ¨ Chest pain or pressure, or a strange feeling in the chest.  ¨ Sweating. ¨ Shortness of breath. ¨ Nausea or vomiting. ¨ Pain, pressure, or a strange feeling in the back, neck, jaw, or upper belly or in one or both shoulders or arms. ¨ Lightheadedness or sudden weakness. ¨ A fast or irregular heartbeat. After you call 911, the  may tell you to chew 1 adult-strength or 2 to 4 low-dose aspirin. Wait for an ambulance. Do not try to drive yourself. ? Watch closely for changes in your health, and be sure to contact your doctor if:  ? · Your lightheadedness gets worse or does not get better with home care. Where can you learn more? Go to http://delores-scotty.info/. Enter B468 in the search box to learn more about \"Lightheadedness or Faintness: Care Instructions. \"  Current as of: March 20, 2017  Content Version: 11.4  © 6421-4671 [employer]. Care instructions adapted under license by [employer] (which disclaims liability or warranty for this information). If you have questions about a medical condition or this instruction, always ask your healthcare professional. [place] [address] any warranty or liability for your use of this information. Migraine Headache: Care Instructions  Your Care Instructions  Migraines are painful, throbbing headaches that often start on one side of the head. They may cause nausea and vomiting and make you sensitive to light, sound, or smell. Without treatment, migraines can last from 4 hours to a few days. Medicines can help prevent migraines or stop them after they have started. Your doctor can help you find which ones work best for you. Follow-up care is a key part of your treatment and safety. Be sure to make and go to all appointments, and call your doctor if you are having problems. It's also a good idea to know your test results and keep a list of the medicines you take.   How can you care for yourself at home? · Do not drive if you have taken a prescription pain medicine. · Rest in a quiet, dark room until your headache is gone. Close your eyes, and try to relax or go to sleep. Don't watch TV or read. · Put a cold, moist cloth or cold pack on the painful area for 10 to 20 minutes at a time. Put a thin cloth between the cold pack and your skin. · Use a warm, moist towel or a heating pad set on low to relax tight shoulder and neck muscles. · Have someone gently massage your neck and shoulders. · Take your medicines exactly as prescribed. Call your doctor if you think you are having a problem with your medicine. You will get more details on the specific medicines your doctor prescribes. · Be careful not to take pain medicine more often than the instructions allow. You could get worse or more frequent headaches when the medicine wears off. To prevent migraines  · Keep a headache diary so you can figure out what triggers your headaches. Avoiding triggers may help you prevent headaches. Record when each headache began, how long it lasted, and what the pain was like. (Was it throbbing, aching, stabbing, or dull?) Write down any other symptoms you had with the headache, such as nausea, flashing lights or dark spots, or sensitivity to bright light or loud noise. Note if the headache occurred near your period. List anything that might have triggered the headache. Triggers may include certain foods (chocolate, cheese, wine) or odors, smoke, bright light, stress, or lack of sleep. · If your doctor has prescribed medicine for your migraines, take it as directed. You may have medicine that you take only when you get a migraine and medicine that you take all the time to help prevent migraines. ¨ If your doctor has prescribed medicine for when you get a headache, take it at the first sign of a migraine, unless your doctor has given you other instructions.   ¨ If your doctor has prescribed medicine to prevent migraines, take it exactly as prescribed. Call your doctor if you think you are having a problem with your medicine. · Find healthy ways to deal with stress. Migraines are most common during or right after stressful times. Take time to relax before and after you do something that has caused a migraine in the past.  · Try to keep your muscles relaxed by keeping good posture. Check your jaw, face, neck, and shoulder muscles for tension. Try to relax them. When you sit at a desk, change positions often. And make sure to stretch for 30 seconds each hour. · Get plenty of sleep and exercise. · Eat meals on a regular schedule. Avoid foods and drinks that often trigger migraines. These include chocolate, alcohol (especially red wine and port), aspartame, monosodium glutamate (MSG), and some additives found in foods (such as hot dogs, bauer, cold cuts, aged cheeses, and pickled foods). · Limit caffeine. Don't drink too much coffee, tea, or soda. But don't quit caffeine suddenly. That can also give you migraines. · Do not smoke or allow others to smoke around you. If you need help quitting, talk to your doctor about stop-smoking programs and medicines. These can increase your chances of quitting for good. · If you are taking birth control pills or hormone therapy, talk to your doctor about whether they are triggering your migraines. When should you call for help? Call 911 anytime you think you may need emergency care. For example, call if:  ? · You have signs of a stroke. These may include:  ¨ Sudden numbness, paralysis, or weakness in your face, arm, or leg, especially on only one side of your body. ¨ Sudden vision changes. ¨ Sudden trouble speaking. ¨ Sudden confusion or trouble understanding simple statements. ¨ Sudden problems with walking or balance. ¨ A sudden, severe headache that is different from past headaches.    ?Call your doctor now or seek immediate medical care if:  ? · You have new or worse nausea and vomiting. ? · You have a new or higher fever. ? · Your headache gets much worse. ? Watch closely for changes in your health, and be sure to contact your doctor if:  ? · You are not getting better after 2 days (48 hours). Where can you learn more? Go to http://delores-scotty.info/. Enter Q905 in the search box to learn more about \"Migraine Headache: Care Instructions. \"  Current as of: 2016  Content Version: 11.4  © 7369-6899 [employer]. Care instructions adapted under license by [employer] (which disclaims liability or warranty for this information). If you have questions about a medical condition or this instruction, always ask your healthcare professional. [address] any warranty or liability for your use of this information. [employer] Activation    Thank you for requesting access to [employer]. Please follow the instructions below to securely access and download your online medical record. [employer] allows you to send messages to your doctor, view your test results, renew your prescriptions, schedule appointments, and more. How Do I Sign Up? 1. In your internet browser, go to [email or web address]  2. Click on the First Time User? Click Here link in the Sign In box. You will be redirect to the New Member Sign Up page. 3. Enter your [employer] Access Code exactly as it appears below. You will not need to use this code after youve completed the sign-up process. If you do not sign up before the expiration date, you must request a new code. [employer] Access Code: Activation code not generated  Current [employer] Status: Active (This is the date your [employer] access code will )    4. Enter the last four digits of your Social Security Number (xxxx) and Date of Birth (mm/dd/yyyy) as indicated and click Submit. You will be taken to the next sign-up page. 5. Create a [employer] ID.  This will be your [employer] login ID and cannot be changed, so think of one that is secure and easy to remember. 6. Create a [employer] password. You can change your password at any time. 7. Enter your Password Reset Question and Answer. This can be used at a later time if you forget your password. 8. Enter your e-mail address. You will receive e-mail notification when new information is available in [address] 9. Click Sign Up. You can now view and download portions of your medical record. 10. Click the Download Summary menu link to download a portable copy of your medical information. Additional Information    If you have questions, please visit the Frequently Asked Questions section of the [employer] website at [email or web address] Remember, [employer] is NOT to be used for urgent needs. For medical emergencies, dial 911.

## 2018-01-18 NOTE — ED TRIAGE NOTES
Pt reports headache last night and then becoming \"unconcious\" for 8+ hours over night. States she was put to bed by mother and woke up feeling dizzy. No weakness, no drift, normal speech. Hx of similar sx with previous migraines. Has apt with Dr. Talisha Michelle Monday. I performed a brief evaluation, including history and physical, of the patient here in triage and I have determined that pt will need further treatment and evaluation from the main side ER physician. I have placed initial orders to help in expediting patients care.      January 18, 2018 at 1:26 PM - Pamla Schirmer, PA-C        Visit Vitals    /87 (BP 1 Location: Left arm, BP Patient Position: At rest)    Pulse 92    Temp 98.1 °F (36.7 °C)    Resp 18    Ht 5' 5\" (1.651 m)    Wt 73.5 kg (162 lb)    SpO2 99%    BMI 26.96 kg/m2

## 2018-01-18 NOTE — ED PROVIDER NOTES
EMERGENCY DEPARTMENT HISTORY AND PHYSICAL EXAM    4:04 PM      Date: 1/18/2018  Patient Name: Tomas Sheldon    History of Presenting Illness     Chief Complaint   Patient presents with    Headache    Dizziness         History Provided By: Patient    Chief Complaint: headache, dizziness  Duration:  Days  Timing:  Acute  Location: diffuse  Quality: Aching  Severity: Moderate  Modifying Factors: None  Associated Symptoms: syncopal episode      Additional History (Context): Tomas Sheldon is a 38 y.o. female with anemia, headache who presents with c/o diffuse headache x 1 day. Pt notes she felt \"dizzy\" yesterday that seemed consistent with her vertigo. She took Meclizine which helped with the symptoms. Pt notes she had a witnessed syncopal episode yesterday while sitting on the couch. Her friend notes she was \"out for a few minutes\". Notes she was alert and oriented when she woke up. No urinary/bowel incontinence/ retention. Then \"I went to bed for 8 hours\". Denies changes in vision, chest pain, dyspnea, n/v, weakness, hx of cardiac disease. Notes she has had a similar episode in the past. Notes she has not been evaluated by Dr. Emery Coyne in years, has an appointment next week for follow-up . PCP: Donnie Damon MD    Current Facility-Administered Medications   Medication Dose Route Frequency Provider Last Rate Last Dose    acetaminophen (TYLENOL) tablet 1,000 mg  1,000 mg Oral NOW Susi Oliver         Current Outpatient Prescriptions   Medication Sig Dispense Refill    ondansetron (ZOFRAN ODT) 4 mg disintegrating tablet Take 4 mg by mouth every eight (8) hours as needed for Nausea.  diphenhydrAMINE (BENADRYL) 25 mg capsule Take 25 mg by mouth every six (6) hours as needed.  prn allergy         Past History     Past Medical History:  Past Medical History:   Diagnosis Date    Anemia 1997    during pregnancy 2nd child    Migraine headache 1997    Ovarian cyst     Vertigo        Past Surgical History:  Past Surgical History:   Procedure Laterality Date     DELIVERY ONLY      DELIVERY   14    1st child    HX ORTHOPAEDIC  2005    RIGHT ELBOW    HX WISDOM TEETH EXTRACTION  1996    x4    INTESTINE SURG PROCEDURE UNLISTED      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   [employer] SHOULDER SURG [address] UNLISTED  2011    left shoulder       Family History:  Family History   Problem Relation Age of Onset    Heart Disease Mother     Hypertension Mother    [employer] MS Mother     Other Mother 54     Lupus    Stroke Mother      x3    Asthma Sister     Diabetes Paternal Grandmother     Hypertension Paternal Grandmother        Social History:  Social History   Substance Use Topics    Smoking status: Never Smoker    Smokeless tobacco: Never Used    Alcohol use Yes      Comment: rare       Allergies: Allergies   Allergen Reactions    Ciprofloxacin (Bulk) Diarrhea    Codeine Itching    Dilaudid [Hydromorphone (Bulk)] Itching    Lyrica [Pregabalin] Swelling    Tramadol Other (comments) and Unknown (comments)     Gets headaches  headache    Codeine Phosphate Unknown (comments)    Hydromorphone Unknown (comments)         Review of Systems       Review of Systems   Constitutional: Negative for chills and fever. HENT: Positive for dental problem. Respiratory: Negative for shortness of breath. Cardiovascular: Negative for chest pain. Gastrointestinal: Negative for abdominal pain, nausea and vomiting. Skin: Negative for rash. Neurological: Positive for dizziness and headaches. Negative for seizures, facial asymmetry, weakness and numbness. All other systems reviewed and are negative.         Physical Exam     Visit Vitals    /87 (BP 1 Location: Left arm, BP Patient Position: At rest)    Pulse 92    Temp 98.1 °F (36.7 °C)    Resp 18    Ht 5' 5\" (1.651 m)    Wt 73.5 kg (162 lb)    SpO2 92%    BMI 26.96 kg/m2       Physical Exam   Constitutional: She is oriented to person, place, and time. She appears well-developed and well-nourished. No distress. HENT:   Head: Normocephalic and atraumatic. Eyes: Pupils are equal, round, and reactive to light. Neck: Normal range of motion. Neck supple. Cardiovascular: Normal rate, regular rhythm, normal heart sounds and intact distal pulses. Exam reveals no gallop and no friction rub. No murmur heard. Pulmonary/Chest: Effort normal and breath sounds normal. No respiratory distress. She has no wheezes. She has no rales. Abdominal: Soft. Bowel sounds are normal. She exhibits no distension and no mass. There is no tenderness. There is no rebound and no guarding. Musculoskeletal: Normal range of motion. Neurological: She is alert and oriented to person, place, and time. She has normal strength. She is not disoriented. No cranial nerve deficit or sensory deficit. She displays a negative Romberg sign. Coordination and gait normal. GCS eye subscore is 4. GCS verbal subscore is 5. GCS motor subscore is 6. Skin: Skin is warm. No rash noted. She is not diaphoretic. Nursing note and vitals reviewed. Diagnostic Study Results     Labs -  Recent Results (from the past 12 hour(s))   CBC WITH AUTOMATED DIFF    Collection Time: 01/18/18  2:09 PM   Result Value Ref Range    WBC 5.1 4.6 - 13.2 K/uL    RBC 3.93 (L) 4.20 - 5.30 M/uL    HGB 11.4 (L) 12.0 - 16.0 g/dL    HCT 34.3 (L) 35.0 - 45.0 %    MCV 87.3 74.0 - 97.0 FL    MCH 29.0 24.0 - 34.0 PG    MCHC 33.2 31.0 - 37.0 g/dL    RDW 12.6 11.6 - 14.5 %    PLATELET 158 (L) 872 - 420 K/uL    MPV 11.6 9.2 - 11.8 FL    NEUTROPHILS 52 40 - 73 %    LYMPHOCYTES 37 21 - 52 %    MONOCYTES 7 3 - 10 %    EOSINOPHILS 3 0 - 5 %    BASOPHILS 1 0 - 2 %    ABS. NEUTROPHILS 2.7 1.8 - 8.0 K/UL    ABS. LYMPHOCYTES 1.9 0.9 - 3.6 K/UL    ABS. MONOCYTES 0.4 0.05 - 1.2 K/UL    ABS. EOSINOPHILS 0.1 0.0 - 0.4 K/UL    ABS.  BASOPHILS 0.0 0.0 - 0.1 K/UL    DF AUTOMATED     METABOLIC PANEL, COMPREHENSIVE    Collection Time: 01/18/18  2:09 PM   Result Value Ref Range    Sodium 137 136 - 145 mmol/L    Potassium 4.9 3.5 - 5.5 mmol/L    Chloride 106 100 - 108 mmol/L    CO2 24 21 - 32 mmol/L    Anion gap 7 3.0 - 18 mmol/L    Glucose 78 74 - 99 mg/dL    BUN 11 7.0 - 18 MG/DL    Creatinine 0.71 0.6 - 1.3 MG/DL    BUN/Creatinine ratio 15 12 - 20      GFR est AA >60 >60 ml/min/1.73m2    GFR est non-AA >60 >60 ml/min/1.73m2    Calcium 8.5 8.5 - 10.1 MG/DL    Bilirubin, total 0.6 0.2 - 1.0 MG/DL    ALT (SGPT) 23 13 - 56 U/L    AST (SGOT) 48 (H) 15 - 37 U/L    Alk. phosphatase 56 45 - 117 U/L    Protein, total 7.9 6.4 - 8.2 g/dL    Albumin 4.0 3.4 - 5.0 g/dL    Globulin 3.9 2.0 - 4.0 g/dL    A-G Ratio 1.0 0.8 - 1.7     BETA HCG, QT    Collection Time: 01/18/18  2:09 PM   Result Value Ref Range    Beta HCG, QT <1 0 - 10 MIU/ML   TROPONIN I    Collection Time: 01/18/18  2:09 PM   Result Value Ref Range    Troponin-I, Qt. <0.02 0.0 - 0.045 NG/ML   EKG, 12 LEAD, INITIAL    Collection Time: 01/18/18  4:11 PM   Result Value Ref Range    Ventricular Rate 62 BPM    Atrial Rate 62 BPM    P-R Interval 154 ms    QRS Duration 80 ms    Q-T Interval 390 ms    QTC Calculation (Bezet) 395 ms    Calculated P Axis 37 degrees    Calculated R Axis 70 degrees    Calculated T Axis 69 degrees    Diagnosis       Normal sinus rhythm  Normal ECG  When compared with ECG of 17-MAR-2017 14:24,  No significant change was found         Radiologic Studies -   XR CHEST PA LAT   Final Result      CT HEAD WO CONT   Final Result      IMPRESSION:  1. No acute cardiopulmonary process. CT head:  IMPRESSION  IMPRESSION: CT head is within normal limits. Medical Decision Making   I am the first provider for this patient. I reviewed the vital signs, available nursing notes, past medical history, past surgical history, family history and social history. Vital Signs-Reviewed the patient's vital signs. EKG: Interpreted by the EP.     Rate: 62   Rhythm: sinus rhythm    Records Reviewed: Nursing Notes and Old Medical Records (Time of Review: 4:04 PM)    ED Course: Progress Notes, Reevaluation, and Consults:  4:51 PM: Reviewed all results with patient and family member. Feel comfortable with discharge home. Provider Notes (Medical Decision Making): Pt with hx of vertigo and migraines presents due to dizziness yesterday that resolved. Notes possible witnessed syncopal episode without head trauma or seizure-like activity. CT brain without acute process. No neurologic deficit on examination. EKG without ischemic changes or arrhythmia, troponin negative. Pt looks well, VS stable. Stable for d/c with outpatient follow-up with neurology. Diagnosis     Clinical Impression:   1. Migraine without status migrainosus, not intractable, unspecified migraine type    2. Vertigo        Disposition: home    Follow-up Information     Follow up With Details Comments Contact Info    [facility] EMERGENCY DEPT  If symptoms worsen [address] [facility] [address] [facility], Blane Currie MD In 2 days  [address] [facility] [id number] [phone]             Patient's Medications   Start Taking    No medications on file   Continue Taking    DIPHENHYDRAMINE (BENADRYL) 25 MG CAPSULE    Take 25 mg by mouth every six (6) hours as needed. prn allergy    ONDANSETRON (ZOFRAN ODT) 4 MG DISINTEGRATING TABLET    Take 4 mg by mouth every eight (8) hours as needed for Nausea.    These Medications have changed    No medications on file   Stop Taking    No medications on file

## 2018-01-18 NOTE — ED TRIAGE NOTES
Pt c/o headache last night with dizziness & c/o \"passing out. \"  Pt states she passed out for the entire night- Pt alert & oriented times 4. Pt denies seizure.

## 2018-01-19 LAB
ATRIAL RATE: 62 BPM
CALCULATED P AXIS, ECG09: 37 DEGREES
CALCULATED R AXIS, ECG10: 70 DEGREES
CALCULATED T AXIS, ECG11: 69 DEGREES
DIAGNOSIS, 93000: NORMAL
P-R INTERVAL, ECG05: 154 MS
Q-T INTERVAL, ECG07: 390 MS
QRS DURATION, ECG06: 80 MS
QTC CALCULATION (BEZET), ECG08: 395 MS
VENTRICULAR RATE, ECG03: 62 BPM

## 2018-01-22 ENCOUNTER — OFFICE VISIT (OUTPATIENT)
Dept: NEUROLOGY | Age: 43
End: 2018-01-22

## 2018-01-22 VITALS
TEMPERATURE: 98.4 F | HEART RATE: 86 BPM | OXYGEN SATURATION: 99 % | BODY MASS INDEX: 27.86 KG/M2 | SYSTOLIC BLOOD PRESSURE: 100 MMHG | RESPIRATION RATE: 14 BRPM | DIASTOLIC BLOOD PRESSURE: 68 MMHG | WEIGHT: 167.2 LBS | HEIGHT: 65 IN

## 2018-01-22 DIAGNOSIS — G43.009 MIGRAINE WITHOUT AURA AND WITHOUT STATUS MIGRAINOSUS, NOT INTRACTABLE: Primary | ICD-10-CM

## 2018-01-22 RX ORDER — TOPIRAMATE 25 MG/1
25 TABLET ORAL 2 TIMES DAILY
Qty: 60 TAB | Refills: 2 | Status: SHIPPED | OUTPATIENT
Start: 2018-01-22 | End: 2018-06-04 | Stop reason: SDUPTHER

## 2018-01-22 RX ORDER — MECLIZINE HCL 12.5 MG 12.5 MG/1
TABLET ORAL
COMMUNITY
End: 2018-12-11

## 2018-01-22 NOTE — COMMUNICATION BODY
Annabel Vogel is a 38 y.o., right handed female, with history of cervical spinal fusion, who comes in now complaining of headaches and vertigo. She gets a headache 3-4 times per week, starting as a pain behind the eyes and in the back of the head. Associated with the headache is the sense that the room is spinning. She also gets nausea but no vomiting. She did have one spell where she actually lost consciousness for a few minutes during a headache. She's been getting headaches/migraines starting in . She's been getting pain this frequently 4 months. She was taking Imitrex at one time with good results. The pain itself feels like an aching, pressure like sensation. In the back of the head she feels sharp pain. Generally she has pain between 7-10/10. The pain lasts hours to days. Nothing in particular brings them on. There's family histroy with her mother having migraines. Social History; she's single, lives with her mother and daughter. She denies alcohol, tobacco or illicit drugs. Works as  with [employer]. Family History; mother alive with heart disease, stroke and heart attack. Father  from complications from drug abuse. Older brother in good health, 1 sister with asthma and seizures. Current Outpatient Prescriptions   Medication Sig Dispense Refill    meclizine (ANTIVERT) 12.5 mg tablet Take  by mouth three (3) times daily as needed.  ondansetron (ZOFRAN ODT) 4 mg disintegrating tablet Take 4 mg by mouth every eight (8) hours as needed for Nausea.          Past Medical History:   Diagnosis Date    Anemia     during pregnancy 2nd child    Migraine headache     Ovarian cyst     Vertigo        Past Surgical History:   Procedure Laterality Date     DELIVERY ONLY      DELIVERY   14    1st child    HX ORTHOPAEDIC      RIGHT ELBOW    HX WISDOM TEETH EXTRACTION  1996    x4    INTESTINE SURG PROCEDURE UNLISTED      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   [facility] [place] SHOULDER SURG [address] UNLISTED  9/08/2011    left shoulder       Allergies   Allergen Reactions    Ciprofloxacin (Bulk) Diarrhea    Codeine Itching    Dilaudid [Hydromorphone (Bulk)] Itching    Lyrica [Pregabalin] Swelling    Tramadol Other (comments) and Unknown (comments)     Gets headaches  headache    Codeine Phosphate Unknown (comments)    Hydromorphone Unknown (comments)       Patient Active Problem List   Diagnosis Code    Cystitis N30.90    Ovarian mass N83.9    Family history of blood dyscrasia Z83.2    Family history of cardiovascular disease Z82.49    Abnormal cervical Papanicolaou smear with positive human papilloma virus (HPV) DNA test [id number]    Myofascial muscle pain M79.1    Muscle spasms of neck M62.838    Cervical spondylosis M47.812    DDD (degenerative disc disease), cervical M50.30    Cervical myofascial pain syndrome M79.1    HNP (herniated nucleus pulposus), cervical M50.20    Muscle spasm M62.838    S/P cervical spinal fusion Z98.1         Review of Systems:   As above otherwise 11 point review of systems negative including;   Constitutional no fever or chills  Skin denies rash or itching  HENT  Denies tinnitus, hearing lose  Eyes denies diplopia vision lose  Respiratory denies shortness of breath  Cardiovascular denies chest pain, dyspnea on exertion  Gastrointestinal denies nausea, vomiting, diarrhea, constipation  Genitourinary denies incontinence  Musculoskeletal denies joint pain or swelling  Endocrine denies weight change  Hematology denies easy bruising or bleeding   Neurological as above in HPI      PHYSICAL EXAMINATION:      VITAL SIGNS:    Visit Vitals    /68 (BP 1 Location: Left arm, BP Patient Position: Sitting)    Pulse 86    Temp 98.4 °F (36.9 °C) (Oral)    Resp 14    Ht 5' 5\" (1.651 m)    Wt 75.8 kg (167 lb 3.2 oz)    LMP 01/12/2018    SpO2 99%    BMI 27.82 kg/m2       GENERAL: The patient is well developed, well nourished, and in no apparent distress. EXTREMITIES: No clubbing, cyanosis, or edema is identified. Pulses 2+ and symmetrical.  Muscle tone is normal.  HEAD:   Ear, nose, and throat appear to be without trauma. The patient is normocephalic. NEUROLOGIC EXAMINATION    MENTAL STATUS: The patient is awake, alert, and oriented x 4. Fund of knowledge is adequate. Speech is fluent and memory appears to be intact, both long and short term. CRANIAL NERVES: II - Visual fields are full to confrontation. Funduscopic examination reveals flat disks bilaterally. Pupils are both 4 mm and briskly reactive to light and accommodation. III, IV, VI - Extraocular movements are intact and there is no nystagmus. V - Facial sensation is intact to pinprick and light touch. VII - Face is symmetrical.   VIII - Hearing is present. IX, X, XII- Palate rises symmetrically. Gag is present. Tongue is in the midline. XI - Shoulder shrugging and head turning intact  MOTOR:  The patient is 5/5 in all four limbs without any drift. Fine finger movements are symmetrical.  Isolated motor group testing reveals no focal abnormalities. Tone is normal.  Sensory examination is intact to pinprick, light touch and position sense testing. Reflexes are 2+ and symmetrical. Plantars are down going. Cerebellar examination reveals no gross ataxia or dysmetria. Gait is normal and the patient can tandem walk without any difficulty. Final result (Exam End: 1/18/2018  2:29 PM) Open    Study Result   EXAM:  CT HEAD WO CONT     INDICATION:   migraine     COMPARISON: CT head October 2016.     TECHNIQUE: Unenhanced CT of the head was performed using 5 mm images. Brain and  bone windows were generated.   CT dose reduction was achieved through use of a  standardized protocol tailored for this examination and automatic exposure  control for dose modulation.      FINDINGS:  The ventricles and sulci are normal in size, shape and configuration and  midline. There is no significant white matter disease. There is no intracranial  hemorrhage, extra-axial collection, mass, mass effect or midline shift. The  basilar cisterns are open. The bone windows demonstrate no abnormalities. The  visualized portions of the paranasal sinuses and mastoid air cells are clear.     IMPRESSION  IMPRESSION: CT head is within normal limits.            I have reviewed the above imagines myself. CBC:   Lab Results   Component Value Date/Time    WBC 5.1 01/18/2018 02:09 PM    RBC 3.93 01/18/2018 02:09 PM    HGB 11.4 01/18/2018 02:09 PM    HCT 34.3 01/18/2018 02:09 PM    PLATELET 769 47/15/7489 02:09 PM     BMP:   Lab Results   Component Value Date/Time    Glucose 78 01/18/2018 02:09 PM    Sodium 137 01/18/2018 02:09 PM    Potassium 4.9 01/18/2018 02:09 PM    Chloride 106 01/18/2018 02:09 PM    CO2 24 01/18/2018 02:09 PM    BUN 11 01/18/2018 02:09 PM    Creatinine 0.71 01/18/2018 02:09 PM    Calcium 8.5 01/18/2018 02:09 PM     CMP:   Lab Results   Component Value Date/Time    Glucose 78 01/18/2018 02:09 PM    Sodium 137 01/18/2018 02:09 PM    Potassium 4.9 01/18/2018 02:09 PM    Chloride 106 01/18/2018 02:09 PM    CO2 24 01/18/2018 02:09 PM    BUN 11 01/18/2018 02:09 PM    Creatinine 0.71 01/18/2018 02:09 PM    Calcium 8.5 01/18/2018 02:09 PM    Anion gap 7 01/18/2018 02:09 PM    BUN/Creatinine ratio 15 01/18/2018 02:09 PM    Alk. phosphatase 56 01/18/2018 02:09 PM    Protein, total 7.9 01/18/2018 02:09 PM    Albumin 4.0 01/18/2018 02:09 PM    Globulin 3.9 01/18/2018 02:09 PM    A-G Ratio 1.0 01/18/2018 02:09 PM     Coagulation: No results found for: PTP, INR, APTT, PTTT  Cardiac markers: No results found for: CPK, CKND1, CLEOPATRA       Impression: Likely that this patient has migraine headaches with associated nausea and vertigo who has risk factors including strong family history with her mother having migraines.   She has had migraine headaches for many years and I think the nature of the headache is change especially with her cervical spine disease. Now that she is postsurgical she still has the trigger of having had the surgery which has her headaches occurring up to 4 times per week. I explained to the patient the nature of migraines and the reason for treatment. Plan: Will initiate treatment at this time with Topamax 25 mg twice a day. She was told to take medication with that she has a headache or not. I will see her back in the office in about 4 weeks. PLEASE NOTE:   This document has been produced using voice recognition software. Unrecognized errors in transcription may be present.

## 2018-01-22 NOTE — PROGRESS NOTES
Khushbu Greer is a 38 y.o., right handed female, with history of cervical spinal fusion, who comes in now complaining of headaches and vertigo. She gets a headache 3-4 times per week, starting as a pain behind the eyes and in the back of the head. Associated with the headache is the sense that the room is spinning. She also gets nausea but no vomiting. She did have one spell where she actually lost consciousness for a few minutes during a headache. She's been getting headaches/migraines starting in . She's been getting pain this frequently 4 months. She was taking Imitrex at one time with good results. The pain itself feels like an aching, pressure like sensation. In the back of the head she feels sharp pain. Generally she has pain between 7-10/10. The pain lasts hours to days. Nothing in particular brings them on. There's family histroy with her mother having migraines. Social History; she's single, lives with her mother and daughter. She denies alcohol, tobacco or illicit drugs. Works as  with [employer]. Family History; mother alive with heart disease, stroke and heart attack. Father  from complications from drug abuse. Older brother in good health, 1 sister with asthma and seizures. Current Outpatient Prescriptions   Medication Sig Dispense Refill    meclizine (ANTIVERT) 12.5 mg tablet Take  by mouth three (3) times daily as needed.  ondansetron (ZOFRAN ODT) 4 mg disintegrating tablet Take 4 mg by mouth every eight (8) hours as needed for Nausea.          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   24 \[facility] [place] [facility]"" SHOULDER SURG [address] UNLISTED  9/08/2011    left shoulder       Allergies   Allergen Reactions    Ciprofloxacin (Bulk) Diarrhea    Codeine Itching    Dilaudid [Hydromorphone (Bulk)] Itching    Lyrica [Pregabalin] Swelling    Tramadol Other (comments) and Unknown (comments)     Gets headaches  headache    Codeine Phosphate Unknown (comments)    Hydromorphone Unknown (comments)       Patient Active Problem List   Diagnosis Code    Cystitis N30.90    Ovarian mass N83.9    Family history of blood dyscrasia Z83.2    Family history of cardiovascular disease Z82.49    Abnormal cervical Papanicolaou smear with positive human papilloma virus (HPV) DNA test [id number]    Myofascial muscle pain M79.1    Muscle spasms of neck M62.838    Cervical spondylosis M47.812    DDD (degenerative disc disease), cervical M50.30    Cervical myofascial pain syndrome M79.1    HNP (herniated nucleus pulposus), cervical M50.20    Muscle spasm M62.838    S/P cervical spinal fusion Z98.1         Review of Systems:   As above otherwise 11 point review of systems negative including;   Constitutional no fever or chills  Skin denies rash or itching  HENT  Denies tinnitus, hearing lose  Eyes denies diplopia vision lose  Respiratory denies shortness of breath  Cardiovascular denies chest pain, dyspnea on exertion  Gastrointestinal denies nausea, vomiting, diarrhea, constipation  Genitourinary denies incontinence  Musculoskeletal denies joint pain or swelling  Endocrine denies weight change  Hematology denies easy bruising or bleeding   Neurological as above in HPI      PHYSICAL EXAMINATION:      VITAL SIGNS:    Visit Vitals    /68 (BP 1 Location: Left arm, BP Patient Position: Sitting)    Pulse 86    Temp 98.4 °F (36.9 °C) (Oral)    Resp 14    Ht 5' 5\" (1.651 m)    Wt 75.8 kg (167 lb 3.2 oz)    LMP 01/12/2018    SpO2 99%    BMI 27.82 kg/m2       GENERAL: The patient is well developed, well nourished, and in no apparent distress. EXTREMITIES: No clubbing, cyanosis, or edema is identified. Pulses 2+ and symmetrical.  Muscle tone is normal.  HEAD:   Ear, nose, and throat appear to be without trauma. The patient is normocephalic. NEUROLOGIC EXAMINATION    MENTAL STATUS: The patient is awake, alert, and oriented x 4. Fund of knowledge is adequate. Speech is fluent and memory appears to be intact, both long and short term. CRANIAL NERVES: II - Visual fields are full to confrontation. Funduscopic examination reveals flat disks bilaterally. Pupils are both 4 mm and briskly reactive to light and accommodation. III, IV, VI - Extraocular movements are intact and there is no nystagmus. V - Facial sensation is intact to pinprick and light touch. VII - Face is symmetrical.   VIII - Hearing is present. IX, X, XII- Palate rises symmetrically. Gag is present. Tongue is in the midline. XI - Shoulder shrugging and head turning intact  MOTOR:  The patient is 5/5 in all four limbs without any drift. Fine finger movements are symmetrical.  Isolated motor group testing reveals no focal abnormalities. Tone is normal.  Sensory examination is intact to pinprick, light touch and position sense testing. Reflexes are 2+ and symmetrical. Plantars are down going. Cerebellar examination reveals no gross ataxia or dysmetria. Gait is normal and the patient can tandem walk without any difficulty. Final result (Exam End: 1/18/2018  2:29 PM) Open    Study Result   EXAM:  CT HEAD WO CONT     INDICATION:   migraine     COMPARISON: CT head October 2016.     TECHNIQUE: Unenhanced CT of the head was performed using 5 mm images. Brain and  bone windows were generated.   CT dose reduction was achieved through use of a  standardized protocol tailored for this examination and automatic exposure  control for dose modulation.      FINDINGS:  The ventricles and sulci are normal in size, shape and configuration and  midline. There is no significant white matter disease. There is no intracranial  hemorrhage, extra-axial collection, mass, mass effect or midline shift. The  basilar cisterns are open. The bone windows demonstrate no abnormalities. The  visualized portions of the paranasal sinuses and mastoid air cells are clear.     IMPRESSION  IMPRESSION: CT head is within normal limits.            I have reviewed the above imagines myself. CBC:   Lab Results   Component Value Date/Time    WBC 5.1 01/18/2018 02:09 PM    RBC 3.93 01/18/2018 02:09 PM    HGB 11.4 01/18/2018 02:09 PM    HCT 34.3 01/18/2018 02:09 PM    PLATELET 219 83/18/9059 02:09 PM     BMP:   Lab Results   Component Value Date/Time    Glucose 78 01/18/2018 02:09 PM    Sodium 137 01/18/2018 02:09 PM    Potassium 4.9 01/18/2018 02:09 PM    Chloride 106 01/18/2018 02:09 PM    CO2 24 01/18/2018 02:09 PM    BUN 11 01/18/2018 02:09 PM    Creatinine 0.71 01/18/2018 02:09 PM    Calcium 8.5 01/18/2018 02:09 PM     CMP:   Lab Results   Component Value Date/Time    Glucose 78 01/18/2018 02:09 PM    Sodium 137 01/18/2018 02:09 PM    Potassium 4.9 01/18/2018 02:09 PM    Chloride 106 01/18/2018 02:09 PM    CO2 24 01/18/2018 02:09 PM    BUN 11 01/18/2018 02:09 PM    Creatinine 0.71 01/18/2018 02:09 PM    Calcium 8.5 01/18/2018 02:09 PM    Anion gap 7 01/18/2018 02:09 PM    BUN/Creatinine ratio 15 01/18/2018 02:09 PM    Alk. phosphatase 56 01/18/2018 02:09 PM    Protein, total 7.9 01/18/2018 02:09 PM    Albumin 4.0 01/18/2018 02:09 PM    Globulin 3.9 01/18/2018 02:09 PM    A-G Ratio 1.0 01/18/2018 02:09 PM     Coagulation: No results found for: PTP, INR, APTT, PTTT  Cardiac markers: No results found for: CPK, CKND1, CLEOPATRA       Impression: Likely that this patient has migraine headaches with associated nausea and vertigo who has risk factors including strong family history with her mother having migraines.   She has had migraine headaches for many years and I think the nature of the headache is change especially with her cervical spine disease. Now that she is postsurgical she still has the trigger of having had the surgery which has her headaches occurring up to 4 times per week. I explained to the patient the nature of migraines and the reason for treatment. Plan: Will initiate treatment at this time with Topamax 25 mg twice a day. She was told to take medication with that she has a headache or not. I will see her back in the office in about 4 weeks. PLEASE NOTE:   This document has been produced using voice recognition software. Unrecognized errors in transcription may be present.

## 2018-01-22 NOTE — LETTER
2018 2:52 PM 
 
Patient:  Cat Robert YOB: 1975 Date of Visit: 2018 Dear Ashwin Marcial MD 
[place] [id number] [address] [address] VIA In Basket 
 : Thank you for referring Ms. Sada Lee to me for evaluation/treatment. Below are the relevant portions of my assessment and plan of care. Cat Robert is a 38 y.o., right handed female, with history of cervical spinal fusion, who comes in now complaining of headaches and vertigo. She gets a headache 3-4 times per week, starting as a pain behind the eyes and in the back of the head. Associated with the headache is the sense that the room is spinning. She also gets nausea but no vomiting. She did have one spell where she actually lost consciousness for a few minutes during a headache. She's been getting headaches/migraines starting in . She's been getting pain this frequently 4 months. She was taking Imitrex at one time with good results. The pain itself feels like an aching, pressure like sensation. In the back of the head she feels sharp pain. Generally she has pain between 7-10/10. The pain lasts hours to days. Nothing in particular brings them on. There's family histroy with her mother having migraines. Social History; she's single, lives with her mother and daughter. She denies alcohol, tobacco or illicit drugs. Works as  with [employer]. Family History; mother alive with heart disease, stroke and heart attack. Father  from complications from drug abuse. Older brother in good health, 1 sister with asthma and seizures. Current Outpatient Prescriptions Medication Sig Dispense Refill  meclizine (ANTIVERT) 12.5 mg tablet Take  by mouth three (3) times daily as needed.  ondansetron (ZOFRAN ODT) 4 mg disintegrating tablet Take 4 mg by mouth every eight (8) hours as needed for Nausea. Past Medical History:  
Diagnosis Date  Anemia  during pregnancy 2nd child  Migraine headache   
 Ovarian cyst   
 Vertigo Past Surgical History:  
Procedure Laterality Date   DELIVERY ONLY  DELIVERY     
 1st child  HX ORTHOPAEDIC  2005 RIGHT ELBOW  
 HX WISDOM TEETH EXTRACTION  1996  
 x4  
 INTESTINE SURG PROCEDURE UNLISTED    
 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  
 left shoulder Allergies Allergen Reactions  Ciprofloxacin (Bulk) Diarrhea  Codeine Itching  Dilaudid [Hydromorphone (Bulk)] Itching  Lyrica [Pregabalin] Swelling  Tramadol Other (comments) and Unknown (comments) Gets headaches 
headache  Codeine Phosphate Unknown (comments)  Hydromorphone Unknown (comments) Patient Active Problem List  
Diagnosis Code  Cystitis N30.90  
 Ovarian mass N83.9  Family history of blood dyscrasia Z83.2  Family history of cardiovascular disease Z82.49  Abnormal cervical Papanicolaou smear with positive human papilloma virus (HPV) DNA test [id number]  Myofascial muscle pain M79.1  Muscle spasms of neck M62.838  
 Cervical spondylosis M47.812  
 DDD (degenerative disc disease), cervical M50.30  Cervical myofascial pain syndrome M79.1  HNP (herniated nucleus pulposus), cervical M50.20  Muscle spasm U14.398  S/P cervical spinal fusion Z98.1 Review of Systems: As above otherwise 11 point review of systems negative including;  
Constitutional no fever or chills Skin denies rash or itching HENT  Denies tinnitus, hearing lose Eyes denies diplopia vision lose Respiratory denies shortness of breath Cardiovascular denies chest pain, dyspnea on exertion Gastrointestinal denies nausea, vomiting, diarrhea, constipation Genitourinary denies incontinence Musculoskeletal denies joint pain or swelling Endocrine denies weight change Hematology denies easy bruising or bleeding Neurological as above in HPI PHYSICAL EXAMINATION:   
 
VITAL SIGNS:   
Visit Vitals  /68 (BP 1 Location: Left arm, BP Patient Position: Sitting)  Pulse 86  Temp 98.4 °F (36.9 °C) (Oral)  Resp 14  
 Ht 5' 5\" (1.651 m)  Wt 75.8 kg (167 lb 3.2 oz)  LMP 01/12/2018  SpO2 99%  BMI 27.82 kg/m2 GENERAL: The patient is well developed, well nourished, and in no apparent distress. EXTREMITIES: No clubbing, cyanosis, or edema is identified. Pulses 2+ and symmetrical.  Muscle tone is normal. 
HEAD:   Ear, nose, and throat appear to be without trauma. The patient is normocephalic. NEUROLOGIC EXAMINATION 
 
MENTAL STATUS: The patient is awake, alert, and oriented x 4. Fund of knowledge is adequate. Speech is fluent and memory appears to be intact, both long and short term. CRANIAL NERVES: II  Visual fields are full to confrontation. Funduscopic examination reveals flat disks bilaterally. Pupils are both 4 mm and briskly reactive to light and accommodation. III, IV, VI  Extraocular movements are intact and there is no nystagmus. V  Facial sensation is intact to pinprick and light touch. VII  Face is symmetrical.  
VIII - Hearing is present. IX, X, [address] rises symmetrically. Gag is present. Tongue is in the midline. XI - Shoulder shrugging and head turning intact MOTOR:  The patient is 5/5 in all four limbs without any drift. Fine finger movements are symmetrical.  Isolated motor group testing reveals no focal abnormalities. Tone is normal.  Sensory examination is intact to pinprick, light touch and position sense testing. Reflexes are 2+ and symmetrical. Plantars are down going. Cerebellar examination reveals no gross ataxia or dysmetria. Gait is normal and the patient can tandem walk without any difficulty. Final result (Exam End: 1/18/2018  2:29 PM) Open Study Result EXAM:  CT HEAD WO CONT 
  
INDICATION:   migraine 
  
 COMPARISON: CT head October 2016. 
  
TECHNIQUE: Unenhanced CT of the head was performed using 5 mm images. Brain and 
bone windows were generated. CT dose reduction was achieved through use of a 
standardized protocol tailored for this examination and automatic exposure 
control for dose modulation.  
  
FINDINGS: 
The ventricles and sulci are normal in size, shape and configuration and 
midline. There is no significant white matter disease. There is no intracranial 
hemorrhage, extra-axial collection, mass, mass effect or midline shift. The 
basilar cisterns are open. The bone windows demonstrate no abnormalities. The 
visualized portions of the paranasal sinuses and mastoid air cells are clear. 
  
IMPRESSION IMPRESSION: CT head is within normal limits. 
   
 
 
 
I have reviewed the above imagines myself. CBC:  
Lab Results Component Value Date/Time WBC 5.1 01/18/2018 02:09 PM  
 RBC 3.93 01/18/2018 02:09 PM  
 HGB 11.4 01/18/2018 02:09 PM  
 HCT 34.3 01/18/2018 02:09 PM  
 PLATELET 958 31/47/5552 02:09 PM  
 
BMP:  
Lab Results Component Value Date/Time Glucose 78 01/18/2018 02:09 PM  
 Sodium 137 01/18/2018 02:09 PM  
 Potassium 4.9 01/18/2018 02:09 PM  
 Chloride 106 01/18/2018 02:09 PM  
 CO2 24 01/18/2018 02:09 PM  
 BUN 11 01/18/2018 02:09 PM  
 Creatinine 0.71 01/18/2018 02:09 PM  
 Calcium 8.5 01/18/2018 02:09 PM  
 
CMP:  
Lab Results Component Value Date/Time Glucose 78 01/18/2018 02:09 PM  
 Sodium 137 01/18/2018 02:09 PM  
 Potassium 4.9 01/18/2018 02:09 PM  
 Chloride 106 01/18/2018 02:09 PM  
 CO2 24 01/18/2018 02:09 PM  
 BUN 11 01/18/2018 02:09 PM  
 Creatinine 0.71 01/18/2018 02:09 PM  
 Calcium 8.5 01/18/2018 02:09 PM  
 Anion gap 7 01/18/2018 02:09 PM  
 BUN/Creatinine ratio 15 01/18/2018 02:09 PM  
 Alk.  phosphatase 56 01/18/2018 02:09 PM  
 Protein, total 7.9 01/18/2018 02:09 PM  
 Albumin 4.0 01/18/2018 02:09 PM  
 Globulin 3.9 01/18/2018 02:09 PM  
 A-G Ratio 1.0 01/18/2018 02:09 PM  
 
Coagulation: No results found for: PTP, INR, APTT, PTTT Cardiac markers: No results found for: CPK, CKND1, CLEOPATRA Impression: Likely that this patient has migraine headaches with associated nausea and vertigo who has risk factors including strong family history with her mother having migraines. She has had migraine headaches for many years and I think the nature of the headache is change especially with her cervical spine disease. Now that she is postsurgical she still has the trigger of having had the surgery which has her headaches occurring up to 4 times per week. I explained to the patient the nature of migraines and the reason for treatment. Plan: Will initiate treatment at this time with Topamax 25 mg twice a day. She was told to take medication with that she has a headache or not. I will see her back in the office in about 4 weeks. PLEASE NOTE:  
This document has been produced using voice recognition software. Unrecognized errors in transcription may be present. If you have questions, please do not hesitate to call me. I look forward to following Ms. Nunes along with you.  
 
 
 
Sincerely, 
 
 
Glenda Lopez MD

## 2018-01-22 NOTE — PROGRESS NOTES
Chief Complaint   Patient presents with    Establish Care    Migraine     Pt states she didn't know she was having migraines until ENT told her that was what was causing her vertigo. Had an episode this past Wednesday- she had a sudden, intense head pain and lost consciousness while sitting at the table with her family. Went to ER afterwards.     Dizziness     vertigo- has tried Meclizine

## 2018-01-22 NOTE — MR AVS SNAPSHOT
[address] [address] 
[phone] Patient: Siddhartha Benson MRN: [id number] JLS:[id number] Visit Information Date & Time Provider Department Dept. Phone Encounter #  
 1/22/2018  2:00 PM Jose Montenegro, [address] [phone] [id number] Follow-up Instructions Return in about 6 weeks (around 3/5/2018). Follow-up and Disposition History Upcoming Health Maintenance Date Due  
 PAP AKA CERVICAL CYTOLOGY 12/20/2016 Influenza Age 5 to Adult 8/1/2017 DTaP/Tdap/Td series (2 - Td) 12/13/2022 Allergies as of 1/22/2018  Review Complete On: 1/22/2018 By: Jose Montenegro MD  
  
 Severity Noted Reaction Type Reactions Ciprofloxacin (Bulk) High 06/13/2012   Side Effect Diarrhea Codeine High 12/12/2011    Itching Dilaudid [Hydromorphone (Bulk)] High 12/23/2011    Itching Lyrica [Pregabalin] High 12/28/2015    Swelling Tramadol Medium 06/13/2012    Other (comments), Unknown (comments) Gets headaches 
headache Codeine Phosphate  10/15/2015    Unknown (comments) Hydromorphone  10/15/2015    Unknown (comments) Current Immunizations  Never Reviewed Name Date Influenza Vaccine Split 12/13/2012 Tdap 12/13/2012 Not reviewed this visit You Were Diagnosed With   
  
 Codes Comments Migraine without aura and without status migrainosus, not intractable    -  Primary ICD-10-CM: G43.009 ICD-9-CM: 346.10 Vitals BP Pulse Temp Resp Height(growth percentile) Weight(growth percentile) 100/68 (BP 1 Location: Left arm, BP Patient Position: Sitting) 86 98.4 °F (36.9 °C) (Oral) 14 5' 5\" (1.651 m) 167 lb 3.2 oz (75.8 kg) LMP SpO2 BMI OB Status Smoking Status 01/12/2018 99% 27.82 kg/m2 Having regular periods Never Smoker Vitals History BMI and BSA Data  Body Mass Index Body Surface Area  
 27.82 kg/m 2 1.86 m 2  
  
  
 Preferred Pharmacy Pharmacy Name Phone [facility] [address] [place], [place] [address] [phone] Your Updated Medication List  
  
   
This list is accurate as of: 1/22/18  3:04 PM.  Always use your most recent med list.  
  
  
  
  
 meclizine 12.5 mg tablet Commonly known as:  ANTIVERT Take  by mouth three (3) times daily as needed. ondansetron 4 mg disintegrating tablet Commonly known as:  ZOFRAN ODT Take 4 mg by mouth every eight (8) hours as needed for Nausea. topiramate 25 mg tablet Commonly known as:  TOPAMAX Take 1 Tab by mouth two (2) times a day. Indications: MIGRAINE PREVENTION Prescriptions Sent to Pharmacy Refills  
 topiramate (TOPAMAX) 25 mg tablet 2 Sig: Take 1 Tab by mouth two (2) times a day. Indications: MIGRAINE PREVENTION Class: Normal  
 Pharmacy: [facility] [address] [place] [address] Ph #: [phone] Route: Oral  
  
Follow-up Instructions Return in about 6 weeks (around 3/5/2018). Introducing [facility]! Dear Lázaro Khan: 
Thank you for requesting a [employer] account. Our records indicate that you already have an active [employer] account. You can access your account anytime at [email or web address] Did you know that you can access your hospital and ER discharge instructions at any time in [employer]? You can also review all of your test results from your hospital stay or ER visit. Additional Information If you have questions, please visit the Frequently Asked Questions section of the [employer] website at [email or web address] Remember, [employer] is NOT to be used for urgent needs. For medical emergencies, dial 911. Now available from your iPhone and Android! Please provide this summary of care documentation to your next provider. Your primary care clinician is listed as Annmarie Love. If you have any questions after today's visit, please call [phone].

## 2018-02-12 ENCOUNTER — TELEPHONE (OUTPATIENT)
Dept: NEUROLOGY | Age: 43
End: 2018-02-12

## 2018-02-12 DIAGNOSIS — R55 VASOVAGAL SYNCOPE: Primary | ICD-10-CM

## 2018-02-12 NOTE — TELEPHONE ENCOUNTER
Pt states that Topamax is not working. She has been taking it as directed since prescribed date. She complains of still having headaches and passing out. Please advise for stronger dose or different medication.  Pt can be reached at [phone]

## 2018-02-13 ENCOUNTER — OFFICE VISIT (OUTPATIENT)
Dept: FAMILY MEDICINE CLINIC | Age: 43
End: 2018-02-13

## 2018-02-13 VITALS
TEMPERATURE: 97.8 F | HEIGHT: 65 IN | WEIGHT: 165 LBS | BODY MASS INDEX: 27.49 KG/M2 | SYSTOLIC BLOOD PRESSURE: 106 MMHG | RESPIRATION RATE: 16 BRPM | DIASTOLIC BLOOD PRESSURE: 78 MMHG | HEART RATE: 78 BPM

## 2018-02-13 DIAGNOSIS — G43.009 MIGRAINE WITHOUT AURA AND WITHOUT STATUS MIGRAINOSUS, NOT INTRACTABLE: ICD-10-CM

## 2018-02-13 DIAGNOSIS — R05.9 COUGH: ICD-10-CM

## 2018-02-13 DIAGNOSIS — J01.40 ACUTE NON-RECURRENT PANSINUSITIS: Primary | ICD-10-CM

## 2018-02-13 DIAGNOSIS — J02.9 SORE THROAT: ICD-10-CM

## 2018-02-13 DIAGNOSIS — N89.8 VAGINAL DISCHARGE: ICD-10-CM

## 2018-02-13 LAB
S PYO AG THROAT QL: NEGATIVE
VALID INTERNAL CONTROL?: YES

## 2018-02-13 RX ORDER — AMOXICILLIN AND CLAVULANATE POTASSIUM 875; 125 MG/1; MG/1
1 TABLET, FILM COATED ORAL 2 TIMES DAILY
Qty: 20 TAB | Refills: 0 | Status: SHIPPED | OUTPATIENT
Start: 2018-02-13 | End: 2018-02-23 | Stop reason: ALTCHOICE

## 2018-02-13 NOTE — TELEPHONE ENCOUNTER
Patient aware of recommendations; however she is still concerned about the passing out. Attempted to clarify if her pain from HAs were causing her to pass out; patient unsure. Please advise further.

## 2018-02-13 NOTE — PROGRESS NOTES
Sidra Gravely 38 y.o. female   Chief Complaint   Patient presents with    Cough     x 5 days    Nasal Congestion    Sore Throat         1. Have you been to the ER, urgent care clinic since your last visit? Hospitalized since your last visit? No    2. Have you seen or consulted any other health care providers outside of the [address] [place] [address] since your last visit? Include any pap smears or colon screening.  No

## 2018-02-13 NOTE — PROGRESS NOTES
HISTORY OF PRESENT ILLNESS  Merline Rivera is a 38 y.o. female   Chief Complaint   Patient presents with    Cough     x 5 days    Nasal Congestion    Sore Throat       HPI    Merline Rivera is a 38 y.o. female who complains of congestion, sore throat and cough x 5 days. Patient denies any fever or chills. The right side of her face feels puffy and she has a pressure sensation behind the r eye. Pt also reports that her migraines are an issue again. She is having syncopal episodes. She is seeing neuro for this. Pt reports that she has had a vaginal discharge. It is yellow and irritating. The d/c is thin and copious. There is not foul odor. She does have some itching. She has tried an otc yeast treatment. Review of Systems   Constitutional: Positive for malaise/fatigue. Negative for chills and fever. HENT: Positive for congestion, sinus pain and sore throat. Respiratory: Positive for cough and shortness of breath. Negative for sputum production and wheezing. Cardiovascular: Negative. All other systems reviewed and are negative. Physical Exam   Constitutional: She is oriented to person, place, and time. She appears well-developed and well-nourished. No distress. HENT:   Head: Normocephalic and atraumatic. Right Ear: Hearing, tympanic membrane, external ear and ear canal normal.   Left Ear: Hearing, tympanic membrane, external ear and ear canal normal.   Nose: Mucosal edema present. Right sinus exhibits maxillary sinus tenderness and frontal sinus tenderness. Left sinus exhibits no maxillary sinus tenderness. Mouth/Throat: Uvula is midline and mucous membranes are normal. Posterior oropharyngeal erythema present. No oropharyngeal exudate or posterior oropharyngeal edema. Eyes: Conjunctivae and EOM are normal.   Neck: Normal range of motion. Neck supple. No thyromegaly present. Cardiovascular: Normal rate, regular rhythm and normal heart sounds.   Exam reveals no gallop and no friction rub. No murmur heard. Pulmonary/Chest: Effort normal and breath sounds normal. She has no wheezes. She has no rhonchi. She has no rales. Musculoskeletal: Normal range of motion. Neurological: She is alert and oriented to person, place, and time. Coordination normal.   Skin: Skin is warm and dry. Psychiatric: She has a normal mood and affect. Her behavior is normal. Judgment and thought content normal.   Nursing note and vitals reviewed. Recent Results (from the past 12 hour(s))   AMB POC RAPID STREP A    Collection Time: 02/13/18 10:46 AM   Result Value Ref Range    VALID INTERNAL CONTROL POC Yes     Group A Strep Ag Negative Negative       ASSESSMENT and PLAN  Diagnoses and all orders for this visit:    1. Acute non-recurrent pansinusitis  -     amoxicillin-clavulanate (AUGMENTIN) 875-125 mg per tablet; Take 1 Tab by mouth two (2) times a day for 10 days. 2. Cough  -     AMB POC RAPID STREP A    3. Sore throat  -     AMB POC RAPID STREP A    4. Vaginal discharge  Advised that this may be bv. Would recommend that pt return for pelvic exam after completing abx. Pt understands and agrees with plan. 5. Migraine   Care as per neuro.      Follow-up Disposition: prn

## 2018-02-23 ENCOUNTER — OFFICE VISIT (OUTPATIENT)
Dept: FAMILY MEDICINE CLINIC | Age: 43
End: 2018-02-23

## 2018-02-23 VITALS
TEMPERATURE: 98.5 F | RESPIRATION RATE: 16 BRPM | DIASTOLIC BLOOD PRESSURE: 68 MMHG | WEIGHT: 168.5 LBS | HEIGHT: 65 IN | BODY MASS INDEX: 28.07 KG/M2 | HEART RATE: 68 BPM | SYSTOLIC BLOOD PRESSURE: 107 MMHG

## 2018-02-23 DIAGNOSIS — N30.01 ACUTE CYSTITIS WITH HEMATURIA: ICD-10-CM

## 2018-02-23 DIAGNOSIS — R31.9 HEMATURIA, UNSPECIFIED TYPE: ICD-10-CM

## 2018-02-23 DIAGNOSIS — N89.8 VAGINAL DISCHARGE: Primary | ICD-10-CM

## 2018-02-23 DIAGNOSIS — N89.8 VAGINAL ITCHING: ICD-10-CM

## 2018-02-23 LAB
BILIRUB UR QL STRIP: NEGATIVE
GLUCOSE UR-MCNC: NEGATIVE MG/DL
KETONES P FAST UR STRIP-MCNC: NEGATIVE MG/DL
PH UR STRIP: 6 [PH] (ref 4.6–8)
PROT UR QL STRIP: NEGATIVE
SP GR UR STRIP: 1.02 (ref 1–1.03)
UA UROBILINOGEN AMB POC: NORMAL (ref 0.2–1)
URINALYSIS CLARITY POC: CLEAR
URINALYSIS COLOR POC: YELLOW
URINE BLOOD POC: NORMAL
URINE LEUKOCYTES POC: NORMAL
URINE NITRITES POC: NEGATIVE

## 2018-02-23 RX ORDER — FLUCONAZOLE 150 MG/1
150 TABLET ORAL DAILY
Qty: 1 TAB | Refills: 0 | Status: SHIPPED | OUTPATIENT
Start: 2018-02-23 | End: 2018-02-24

## 2018-02-23 RX ORDER — SULFAMETHOXAZOLE AND TRIMETHOPRIM 800; 160 MG/1; MG/1
1 TABLET ORAL 2 TIMES DAILY
Qty: 6 TAB | Refills: 0 | Status: SHIPPED | OUTPATIENT
Start: 2018-02-23 | End: 2018-02-26

## 2018-02-23 NOTE — PATIENT INSTRUCTIONS
Please contact our office if you have any questions about your visit today. Urinary Tract Infection in Women: Care Instructions  Your Care Instructions    A urinary tract infection, or UTI, is a general term for an infection anywhere between the kidneys and the urethra (where urine comes out). Most UTIs are bladder infections. They often cause pain or burning when you urinate. UTIs are caused by bacteria and can be cured with antibiotics. Be sure to complete your treatment so that the infection goes away. Follow-up care is a key part of your treatment and safety. Be sure to make and go to all appointments, and call your doctor if you are having problems. It's also a good idea to know your test results and keep a list of the medicines you take. How can you care for yourself at home? · Take your antibiotics as directed. Do not stop taking them just because you feel better. You need to take the full course of antibiotics. · Drink extra water and other fluids for the next day or two. This may help wash out the bacteria that are causing the infection. (If you have kidney, heart, or liver disease and have to limit fluids, talk with your doctor before you increase your fluid intake.)  · Avoid drinks that are carbonated or have caffeine. They can irritate the bladder. · Urinate often. Try to empty your bladder each time. · To relieve pain, take a hot bath or lay a heating pad set on low over your lower belly or genital area. Never go to sleep with a heating pad in place. To prevent UTIs  · Drink plenty of water each day. This helps you urinate often, which clears bacteria from your system. (If you have kidney, heart, or liver disease and have to limit fluids, talk with your doctor before you increase your fluid intake.)  · Urinate when you need to. · Urinate right after you have sex. · Change sanitary pads often.   · Avoid douches, bubble baths, feminine hygiene sprays, and other feminine hygiene products that have deodorants. · After going to the bathroom, wipe from front to back. When should you call for help? Call your doctor now or seek immediate medical care if:  ? · Symptoms such as fever, chills, nausea, or vomiting get worse or appear for the first time. ? · You have new pain in your back just below your rib cage. This is called flank pain. ? · There is new blood or pus in your urine. ? · You have any problems with your antibiotic medicine. ? Watch closely for changes in your health, and be sure to contact your doctor if:  ? · You are not getting better after taking an antibiotic for 2 days. ? · Your symptoms go away but then come back. Where can you learn more? Go to http://delores-scotty.info/. Enter A230 in the search box to learn more about \"Urinary Tract Infection in Women: Care Instructions. \"  Current as of: May 12, 2017  Content Version: 11.4  © 2443-5061 [employer]. Care instructions adapted under license by [employer] (which disclaims liability or warranty for this information). If you have questions about a medical condition or this instruction, always ask your healthcare professional. [place] [address] any warranty or liability for your use of this information. Sulfamethoxazole/Trimethoprim (By mouth)   Sulfamethoxazole (sul-fa-meth-OX-a-zole), Trimethoprim (trye-METH-oh-prim)  Treats or prevents infections. Brand Name(s): Bactrim, Bactrim DS, SMZ-TMP Pediatric, Sulfatrim, Sulfatrim Pediatric   There may be other brand names for this medicine. When This Medicine Should Not Be Used: This medicine is not right for everyone. Do not use it if you had an allergic reaction to trimethoprim, sulfamethoxazole, or any sulfa drug. Do not use this medicine if you are pregnant, if you have anemia caused by low levels of folic acid, or if you have a history of drug-induced thrombocytopenia.    How to Use This Medicine:   Liquid, Tablet  · Your doctor will tell you how much medicine to use. Do not use more than directed. · Measure the oral liquid medicine with a marked measuring spoon, oral syringe, or medicine cup. · Drink extra fluids so you will urinate more often and help prevent kidney problems. · Take all of the medicine in your prescription to clear up your infection, even if you feel better after the first few doses. · Missed dose: Take a dose as soon as you remember. If it is almost time for your next dose, wait until then and take a regular dose. Do not take extra medicine to make up for a missed dose. · Store the medicine in a closed container at room temperature, away from heat, moisture, and direct light. Do not freeze the oral liquid. Drugs and Foods to Avoid:   Ask your doctor or pharmacist before using any other medicine, including over-the-counter medicines, vitamins, and herbal products. · Some medicines can affect how this medicine works. Tell your doctor if you also use the following:   ¨ amantadine, cyclosporine, digoxin, indomethacin, memantine, methotrexate, phenytoin, pyrimethamine, or warfarin  ¨ an ACE inhibitor, diabetes medicine (glipizide, glyburide, metformin, pioglitazone, repaglinide, rosiglitazone), a diuretic (water pill, such as hydrochlorothiazide), or a tricyclic antidepressant  Warnings While Using This Medicine:   · It is not safe to take this medicine during pregnancy. It could harm an unborn baby. Tell your doctor right away if you become pregnant. · Tell your doctor if you are breastfeeding, or if you have kidney disease, liver disease, diabetes, malabsorption or malnutrition, folate deficiency, porphyria, thyroid problems, or a history of alcoholism. Tell your doctor if you have asthma or severe allergies, especially if you are allergic to any medicines. It is important for your doctor to know if you have HIV or AIDS, because this medicine might work differently for you.   · This medicine may cause a severe allergic reaction. · This medicine may lower the number of platelets in your body, which are necessary for proper blood clotting. This may cause you to bleed or get infections more easily. Talk with your doctor if you have concerns about this. · This medicine can cause diarrhea. Call your doctor if the diarrhea becomes severe, does not stop, or is bloody. Do not take any medicine to stop diarrhea until you have talked to your doctor. Diarrhea can occur 2 months or more after you stop taking this medicine. · Tell any doctor or dentist who treats you that you are using this medicine. This medicine may affect certain medical test results. · Your doctor will do lab tests at regular visits to check on the effects of this medicine. Keep all appointments. · Keep all medicine out of the reach of children. Never share your medicine with anyone. Possible Side Effects While Using This Medicine:   Call your doctor right away if you notice any of these side effects:  · Allergic reaction: Itching or hives, swelling in your face or hands, swelling or tingling in your mouth or throat, chest tightness, trouble breathing  · Blistering, peeling, or red skin rash  · Dark urine or pale stools, nausea, vomiting, loss of appetite, stomach pain, yellow skin or eyes  · Chest pain, cough, or trouble breathing  · Confusion, weakness  · Muscle twitching  · Severe diarrhea, stomach pain, cramps, bloating  · Skin rash, purple spots on your skin, or very pale or yellow skin  · Sore throat, fever, muscle pain  · Uneven heartbeat, numbness or tingling in your hands, feet, or lips  · Unusual bleeding, bruising, or weakness  If you notice these less serious side effects, talk with your doctor:   · Mild nausea, vomiting, or loss of appetite  If you notice other side effects that you think are caused by this medicine, tell your doctor. Call your doctor for medical advice about side effects.  You may report side effects to FDA at 1-800-FDA-1088  © 2017 [facility] Information is for End User's use only and may not be sold, redistributed or otherwise used for commercial purposes. The above information is an  only. It is not intended as medical advice for individual conditions or treatments. Talk to your doctor, nurse or pharmacist before following any medical regimen to see if it is safe and effective for you. Sulfamethoxazole/Trimethoprim (Bactrim, Bactrim DS, SMZ-TMP Pediatric, Septra) - (By mouth)   Why this medicine is used:   Treats or prevents infections. Contact a nurse or doctor right away if you have:  · Severe nausea, vomiting, or stomach pain  · Dark urine or pale stools  · Confusion, weakness  · Severe or bloody diarrhea  · Skin rash, purple spots on your skin, or very pale or yellow skin     Common side effects:  · Mild nausea or vomiting  · Loss of apetite  © 2017 [facility] Information is for End User's use only and may not be sold, redistributed or otherwise used for commercial purposes. Fluconazole (By mouth)   Fluconazole (czwr-XUN-r-zole)  Prevents and treats fungal infections. Brand Name(s): Diflucan   There may be other brand names for this medicine. When This Medicine Should Not Be Used: This medicine is not right for everyone. Do not use it if you had an allergic reaction to fluconazole, or if you are pregnant. How to Use This Medicine:   Liquid, Tablet  · Your doctor will tell you how much medicine to use. Do not use more than directed. · Oral liquid: Shake well just before each use. Measure the oral liquid medicine with a marked measuring spoon, oral syringe, or medicine cup. · Take all of the medicine in your prescription to clear up your infection, even if you feel better after the first few doses. · Read and follow the patient instructions that come with this medicine. Talk to your doctor or pharmacist if you have any questions. · Missed dose:  Take a dose as soon as you remember. If it is almost time for your next dose, wait until then and take a regular dose. Do not take extra medicine to make up for a missed dose. · Store the medicine in a closed container at room temperature, away from heat, moisture, and direct light. Store the oral liquid in the refrigerator or at room temperature and use it within 14 days. Do not freeze. Drugs and Foods to Avoid:   Ask your doctor or pharmacist before using any other medicine, including over-the-counter medicines, vitamins, and herbal products. · Do not use this medicine together with astemizole, cisapride, erythromycin, pimozide, quinidine, or terfenadine. · Some foods and medicines can affect how fluconazole works. Tell your doctor if you are using cimetidine, midazolam, prednisone, rifabutin, rifampin, theophylline, tofacitinib, triazolam, vitamin A supplements, or voriconazole. Also tell your doctor if you are using any of the following:   ¨ A blood thinner (such as warfarin)  ¨ A diuretic or \"water pill\" (such as hydrochlorothiazide), or blood pressure medicine (such as amlodipine, felodipine, isradipine, losartan, nifedipine)  ¨ Birth control pills  ¨ Cancer medicine (cyclophosphamide, vinblastine, vincristine)  ¨ Diabetes medicine that you take by mouth (glipizide, glyburide, tolbutamide)  ¨ Medicine to lower cholesterol (atorvastatin, fluvastatin, simvastatin)  ¨ Medicine to treat depression (amitriptyline, nortriptyline)  ¨ Medicine to treat HIV/AIDS (saquinavir, zidovudine)  ¨ Medicine to treat malaria (halofantrine)  ¨ Medicine to treat seizures (carbamazepine, phenytoin)  ¨ Medicine that weakens the immune system (cyclosporine, sirolimus, tacrolimus)  ¨ Narcotic pain medicine (alfentanil, fentanyl, methadone)  ¨ Pain or arthritis medicine (aspirin, celecoxib, diclofenac, ibuprofen, naproxen)  Warnings While Using This Medicine:   · It is not safe to take this medicine during pregnancy.  It could harm an unborn baby. Tell your doctor right away if you become pregnant. · Tell your doctor if you are breastfeeding, or if you have kidney disease, liver disease, heart disease, heart rhythm problems, cancer, or HIV/AIDS. · This medicine may cause the following problems:   ¨ Liver problems  ¨ Serious skin reactions  ¨ Changes in heart rhythm, such as a condition called QT prolongation  · This medicine may make you dizzy or drowsy. Do not drive or do anything that could be dangerous until you know how this medicine affects you. · Call your doctor if your symptoms do not improve or if they get worse. · Keep all medicine out of the reach of children. Never share your medicine with anyone. Possible Side Effects While Using This Medicine:   Call your doctor right away if you notice any of these side effects:  · Allergic reaction: Itching or hives, swelling in your face or hands, swelling or tingling in your mouth or throat, chest tightness, trouble breathing  · Blistering, peeling, or red skin rash  · Dark urine or pale stools, nausea, vomiting, loss of appetite, stomach pain, yellow skin or eyes  · Fast, pounding, or uneven heartbeat  · Unusual bleeding, bruising, or weakness  If you notice these less serious side effects, talk with your doctor:   · Headache  · Mild nausea, vomiting, stomach pain, or diarrhea  If you notice other side effects that you think are caused by this medicine, tell your doctor. Call your doctor for medical advice about side effects. You may report side effects to FDA at 6-147-FDA-8224  © 2017 [address]  Information is for End User's use only and may not be sold, redistributed or otherwise used for commercial purposes. The above information is an  only. It is not intended as medical advice for individual conditions or treatments. Talk to your doctor, nurse or pharmacist before following any medical regimen to see if it is safe and effective for you.

## 2018-02-23 NOTE — PROGRESS NOTES
Chief Complaint   Patient presents with    Yeast Infection     vaginal discharge with blood, itching. Just finished a course of antibiotic for a sinus infection. 1. Have you been to the ER, urgent care clinic since your last visit? Hospitalized since your last visit? No    2. Have you seen or consulted any other health care providers outside of the [address] [place] [address] since your last visit? Include any pap smears or colon screening.  No

## 2018-02-23 NOTE — MR AVS SNAPSHOT
[address] [facility] 
 
 
 [address] [facility] [id number] [phone] Patient: Anaid Orellana MRN: [id number] Visit Information Date & Time Provider Department Dept. Phone Encounter #  
 2/23/2018  2:00 PM Lay Man NP [facility] [phone] [id number] Your Appointments 3/13/2018  3:40 PM  
Follow Up with Se Kearns MD  
[address] [place] [address] at [address] [facility] 365[address] [facility]) Appt Note: 6 wk f/u  
 [address] [facility]-2 [facility] [address] [facility] 630 [facility] 200 [facility] Upcoming Health Maintenance Date Due  
 PAP AKA CERVICAL CYTOLOGY 12/20/2016 Influenza Age 5 to Adult 8/1/2017 DTaP/Tdap/Td series (2 - Td) 12/13/2022 Allergies as of 2/23/2018  Review Complete On: 2/23/2018 By: Lay Man NP Severity Noted Reaction Type Reactions Ciprofloxacin (Bulk) High 06/13/2012   Side Effect Diarrhea Codeine High 12/12/2011    Itching Dilaudid [Hydromorphone (Bulk)] High 12/23/2011    Itching Lyrica [Pregabalin] High 12/28/2015    Swelling Tramadol Medium 06/13/2012    Other (comments), Unknown (comments) Gets headaches 
headache Codeine Phosphate  10/15/2015    Unknown (comments) Hydromorphone  10/15/2015    Unknown (comments) Current Immunizations  Never Reviewed Name Date Influenza Vaccine Split 12/13/2012 Tdap 12/13/2012 Not reviewed this visit You Were Diagnosed With   
  
 Codes Comments Vaginal discharge    -  Primary ICD-10-CM: N89.8 ICD-9-CM: 623.5 Hematuria, unspecified type     ICD-10-CM: R31.9 ICD-9-CM: 599.70 Acute cystitis with hematuria     ICD-10-CM: N30.01 
ICD-9-CM: 595.0 Vaginal itching     ICD-10-CM: L29.8 ICD-9-CM: 698.1 Vitals BP Pulse Temp Resp Height(growth percentile) Weight(growth percentile) 107/68 (BP 1 Location: Right arm, BP Patient Position: Sitting) 68 98.5 °F (36.9 °C) (Oral) 16 5' 5\" (1.651 m) 168 lb 8 oz (76.4 kg) BMI OB Status Smoking Status 28.04 kg/m2 Having regular periods Never Smoker BMI and BSA Data Body Mass Index Body Surface Area 28.04 kg/m 2 1.87 m 2 Preferred Pharmacy Pharmacy Name Phone [facility] [address] [place] [address] [phone] Your Updated Medication List  
  
   
This list is accurate as of 2/23/18  2:45 PM.  Always use your most recent med list.  
  
  
  
  
 fluconazole 150 mg tablet Commonly known as:  DIFLUCAN Take 1 Tab by mouth daily for 1 day. FDA advises cautious prescribing of oral fluconazole in pregnancy. meclizine 12.5 mg tablet Commonly known as:  ANTIVERT Take  by mouth three (3) times daily as needed. ondansetron 4 mg disintegrating tablet Commonly known as:  ZOFRAN ODT Take 4 mg by mouth every eight (8) hours as needed for Nausea. topiramate 25 mg tablet Commonly known as:  TOPAMAX Take 1 Tab by mouth two (2) times a day. Indications: MIGRAINE PREVENTION  
  
 trimethoprim-sulfamethoxazole 160-800 mg per tablet Commonly known as:  BACTRIM DS, SEPTRA DS Take 1 Tab by mouth two (2) times a day for 3 days. Prescriptions Sent to Pharmacy Refills  
 trimethoprim-sulfamethoxazole (BACTRIM DS, SEPTRA DS) 160-800 mg per tablet 0 Sig: Take 1 Tab by mouth two (2) times a day for 3 days. Class: Normal  
 Pharmacy: [facility] [address] [place] [address] Ph #: [phone] Route: Oral  
 fluconazole (DIFLUCAN) 150 mg tablet 0 Sig: Take 1 Tab by mouth daily for 1 day. FDA advises cautious prescribing of oral fluconazole in pregnancy.   
 Class: Normal  
 Pharmacy: [facility] [address] [place], [place] [address]  [facility] Ph #: [phone] Route: Oral  
  
We Performed the Following AMB POC URINALYSIS DIP STICK AUTO W/O MICRO [81066 CPT(R)] CULTURE, URINE [id number] CPT(R)] To-Do List   
 02/26/2018 8:00 AM  
  Appointment with CINTHIA PINEDA BEH [facility] EEG RM 1 at [facility] ([phone]) All patients (\"Awake only\" and \"Awake & Asleep\"): 1) Hair must be clean, free of oils, gels, spray, mousse. 2) Do not consume any caffeine products (coffee, tea, soda, chocolate) for 24 hours prior to test. 3) Have someone available to drive the patient home if patient is sedated. 4) May take medications or eat meals prior to study as normal.  If doing AWAKE ONLY --- There are no sleep instructions for this test and meals are allowed. If doing AWAKE & ASLEEP:  patient must stay up until 2:00 am and then wake up at 6:00 am on the day of study (sleep 4 hours only), without the aid of Caffeine. Additional instructions for \"Awake & Asleep\" only study: ADULT patients should ONLY get four (4) hours of sleep the night prior to study. (Preferred: Stay awake until 2:00 am and sleep until 6:00 am). CHILDREN should ONLY get five (5) hours of sleep the night prior to study. (Preferred: Stay awake until 12:00 midnight and sleep until 5:00 am) Appointments scheduled before 3:00pm:  Please arrive in the [address] [place] [address] and check in with the registrar 15 minutes prior to your scheduled appointment time. This is the same entrance as the [facility], facing [address]. Heart Center Parking: turn off [address] onto [address]. Proceed one block and make a right onto [address] [place] will be on your left). Go to the end of [address] and parking is located on your left. Appointments scheduled at 3:00pm or later:  Registration in the [address] [place] [address] CLOSES at 3:00 p.m.   Patients should report to Patient Access/Admitting located on the left after entering the MAIN entrance of [facility] [place]. Patient should plan to arrive 20 minutes prior to their appointment time if going to Patient Access/Admitting. After test, patient may exit from the [address] [place] [address] or [facility] Entrance. Patient Instructions Please contact our office if you have any questions about your visit today. Urinary Tract Infection in Women: Care Instructions Your Care Instructions A urinary tract infection, or UTI, is a general term for an infection anywhere between the kidneys and the urethra (where urine comes out). Most UTIs are bladder infections. They often cause pain or burning when you urinate. UTIs are caused by bacteria and can be cured with antibiotics. Be sure to complete your treatment so that the infection goes away. Follow-up care is a key part of your treatment and safety. Be sure to make and go to all appointments, and call your doctor if you are having problems. It's also a good idea to know your test results and keep a list of the medicines you take. How can you care for yourself at home? · Take your antibiotics as directed. Do not stop taking them just because you feel better. You need to take the full course of antibiotics. · Drink extra water and other fluids for the next day or two. This may help wash out the bacteria that are causing the infection. (If you have kidney, heart, or liver disease and have to limit fluids, talk with your doctor before you increase your fluid intake.) · Avoid drinks that are carbonated or have caffeine. They can irritate the bladder. · Urinate often. Try to empty your bladder each time. · To relieve pain, take a hot bath or lay a heating pad set on low over your lower belly or genital area. Never go to sleep with a heating pad in place. To prevent UTIs · Drink plenty of water each day. This helps you urinate often, which clears bacteria from your system.  (If you have kidney, heart, or liver disease and have to limit fluids, talk with your doctor before you increase your fluid intake.) · Urinate when you need to. · Urinate right after you have sex. · Change sanitary pads often. · Avoid douches, bubble baths, feminine hygiene sprays, and other feminine hygiene products that have deodorants. · After going to the bathroom, wipe from front to back. When should you call for help? Call your doctor now or seek immediate medical care if: 
? · Symptoms such as fever, chills, nausea, or vomiting get worse or appear for the first time. ? · You have new pain in your back just below your rib cage. This is called flank pain. ? · There is new blood or pus in your urine. ? · You have any problems with your antibiotic medicine. ? Watch closely for changes in your health, and be sure to contact your doctor if: 
? · You are not getting better after taking an antibiotic for 2 days. ? · Your symptoms go away but then come back. Where can you learn more? Go to http://delores-scotty.info/. Enter E365 in the search box to learn more about \"Urinary Tract Infection in Women: Care Instructions. \" Current as of: May 12, 2017 Content Version: 11.4 © 1800-5373 [employer]. Care instructions adapted under license by [employer] (which disclaims liability or warranty for this information). If you have questions about a medical condition or this instruction, always ask your healthcare professional. [place] [address] any warranty or liability for your use of this information. Sulfamethoxazole/Trimethoprim (By mouth) Sulfamethoxazole (sul-fa-meth-OX-a-zole), Trimethoprim (trye-METH-oh-prim) Treats or prevents infections. Brand Name(s): Bactrim, Bactrim DS, SMZ-TMP Pediatric, Sulfatrim, Sulfatrim Pediatric There may be other brand names for this medicine. When This Medicine Should Not Be Used: This medicine is not right for everyone. Do not use it if you had an allergic reaction to trimethoprim, sulfamethoxazole, or any sulfa drug. Do not use this medicine if you are pregnant, if you have anemia caused by low levels of folic acid, or if you have a history of drug-induced thrombocytopenia. How to Use This Medicine:  
Liquid, Tablet · Your doctor will tell you how much medicine to use. Do not use more than directed. · Measure the oral liquid medicine with a marked measuring spoon, oral syringe, or medicine cup. · Drink extra fluids so you will urinate more often and help prevent kidney problems. · Take all of the medicine in your prescription to clear up your infection, even if you feel better after the first few doses. · Missed dose: Take a dose as soon as you remember. If it is almost time for your next dose, wait until then and take a regular dose. Do not take extra medicine to make up for a missed dose. · Store the medicine in a closed container at room temperature, away from heat, moisture, and direct light. Do not freeze the oral liquid. Drugs and Foods to Avoid: Ask your doctor or pharmacist before using any other medicine, including over-the-counter medicines, vitamins, and herbal products. · Some medicines can affect how this medicine works. Tell your doctor if you also use the following:  
¨ amantadine, cyclosporine, digoxin, indomethacin, memantine, methotrexate, phenytoin, pyrimethamine, or warfarin ¨ an ACE inhibitor, diabetes medicine (glipizide, glyburide, metformin, pioglitazone, repaglinide, rosiglitazone), a diuretic (water pill, such as hydrochlorothiazide), or a tricyclic antidepressant Warnings While Using This Medicine: · It is not safe to take this medicine during pregnancy. It could harm an unborn baby. Tell your doctor right away if you become pregnant.  
· Tell your doctor if you are breastfeeding, or if you have kidney disease, liver disease, diabetes, malabsorption or malnutrition, folate deficiency, porphyria, thyroid problems, or a history of alcoholism. Tell your doctor if you have asthma or severe allergies, especially if you are allergic to any medicines. It is important for your doctor to know if you have HIV or AIDS, because this medicine might work differently for you. · This medicine may cause a severe allergic reaction. · This medicine may lower the number of platelets in your body, which are necessary for proper blood clotting. This may cause you to bleed or get infections more easily. Talk with your doctor if you have concerns about this. · This medicine can cause diarrhea. Call your doctor if the diarrhea becomes severe, does not stop, or is bloody. Do not take any medicine to stop diarrhea until you have talked to your doctor. Diarrhea can occur 2 months or more after you stop taking this medicine. · Tell any doctor or dentist who treats you that you are using this medicine. This medicine may affect certain medical test results. · Your doctor will do lab tests at regular visits to check on the effects of this medicine. Keep all appointments. · Keep all medicine out of the reach of children. Never share your medicine with anyone. Possible Side Effects While Using This Medicine:  
Call your doctor right away if you notice any of these side effects: · Allergic reaction: Itching or hives, swelling in your face or hands, swelling or tingling in your mouth or throat, chest tightness, trouble breathing · Blistering, peeling, or red skin rash · Dark urine or pale stools, nausea, vomiting, loss of appetite, stomach pain, yellow skin or eyes · Chest pain, cough, or trouble breathing · Confusion, weakness · Muscle twitching · Severe diarrhea, stomach pain, cramps, bloating · Skin rash, purple spots on your skin, or very pale or yellow skin · Sore throat, fever, muscle pain · Uneven heartbeat, numbness or tingling in your hands, feet, or lips · Unusual bleeding, bruising, or weakness If you notice these less serious side effects, talk with your doctor: · Mild nausea, vomiting, or loss of appetite If you notice other side effects that you think are caused by this medicine, tell your doctor. Call your doctor for medical advice about side effects. You may report side effects to FDA at 2-324-UBE-0084 © 2017 [facility] Information is for End User's use only and may not be sold, redistributed or otherwise used for commercial purposes. The above information is an  only. It is not intended as medical advice for individual conditions or treatments. Talk to your doctor, nurse or pharmacist before following any medical regimen to see if it is safe and effective for you. Sulfamethoxazole/Trimethoprim (Bactrim, Bactrim DS, SMZ-TMP Pediatric, Septra) - (By mouth) Why this medicine is used:  
Treats or prevents infections. Contact a nurse or doctor right away if you have: · Severe nausea, vomiting, or stomach pain · Dark urine or pale stools · Confusion, weakness · Severe or bloody diarrhea 
· Skin rash, purple spots on your skin, or very pale or yellow skin Common side effects: · Mild nausea or vomiting · Loss of apetite © 2017 [facility] Information is for End User's use only and may not be sold, redistributed or otherwise used for commercial purposes. Fluconazole (By mouth) Fluconazole (tzih-QEU-l-zole) Prevents and treats fungal infections. Brand Name(s): Diflucan There may be other brand names for this medicine. When This Medicine Should Not Be Used: This medicine is not right for everyone. Do not use it if you had an allergic reaction to fluconazole, or if you are pregnant. How to Use This Medicine:  
Liquid, Tablet · Your doctor will tell you how much medicine to use. Do not use more than directed. · Oral liquid: Shake well just before each use. Measure the oral liquid medicine with a marked measuring spoon, oral syringe, or medicine cup. · Take all of the medicine in your prescription to clear up your infection, even if you feel better after the first few doses. · Read and follow the patient instructions that come with this medicine. Talk to your doctor or pharmacist if you have any questions. · Missed dose: Take a dose as soon as you remember. If it is almost time for your next dose, wait until then and take a regular dose. Do not take extra medicine to make up for a missed dose. · Store the medicine in a closed container at room temperature, away from heat, moisture, and direct light. Store the oral liquid in the refrigerator or at room temperature and use it within 14 days. Do not freeze. Drugs and Foods to Avoid: Ask your doctor or pharmacist before using any other medicine, including over-the-counter medicines, vitamins, and herbal products. · Do not use this medicine together with astemizole, cisapride, erythromycin, pimozide, quinidine, or terfenadine. · Some foods and medicines can affect how fluconazole works. Tell your doctor if you are using cimetidine, midazolam, prednisone, rifabutin, rifampin, theophylline, tofacitinib, triazolam, vitamin A supplements, or voriconazole. Also tell your doctor if you are using any of the following: ¨ A blood thinner (such as warfarin) ¨ A diuretic or \"water pill\" (such as hydrochlorothiazide), or blood pressure medicine (such as amlodipine, felodipine, isradipine, losartan, nifedipine) ¨ Birth control pills ¨ Cancer medicine (cyclophosphamide, vinblastine, vincristine) ¨ Diabetes medicine that you take by mouth (glipizide, glyburide, tolbutamide) ¨ Medicine to lower cholesterol (atorvastatin, fluvastatin, simvastatin) ¨ Medicine to treat depression (amitriptyline, nortriptyline) ¨ Medicine to treat HIV/AIDS (saquinavir, zidovudine) ¨ Medicine to treat malaria (halofantrine) ¨ Medicine to treat seizures (carbamazepine, phenytoin) ¨ Medicine that weakens the immune system (cyclosporine, sirolimus, tacrolimus) ¨ Narcotic pain medicine (alfentanil, fentanyl, methadone) ¨ Pain or arthritis medicine (aspirin, celecoxib, diclofenac, ibuprofen, naproxen) Warnings While Using This Medicine: · It is not safe to take this medicine during pregnancy. It could harm an unborn baby. Tell your doctor right away if you become pregnant. · Tell your doctor if you are breastfeeding, or if you have kidney disease, liver disease, heart disease, heart rhythm problems, cancer, or HIV/AIDS. · This medicine may cause the following problems:  
¨ Liver problems ¨ Serious skin reactions ¨ Changes in heart rhythm, such as a condition called QT prolongation · This medicine may make you dizzy or drowsy. Do not drive or do anything that could be dangerous until you know how this medicine affects you. · Call your doctor if your symptoms do not improve or if they get worse. · Keep all medicine out of the reach of children. Never share your medicine with anyone. Possible Side Effects While Using This Medicine:  
Call your doctor right away if you notice any of these side effects: · Allergic reaction: Itching or hives, swelling in your face or hands, swelling or tingling in your mouth or throat, chest tightness, trouble breathing · Blistering, peeling, or red skin rash · Dark urine or pale stools, nausea, vomiting, loss of appetite, stomach pain, yellow skin or eyes · Fast, pounding, or uneven heartbeat · Unusual bleeding, bruising, or weakness If you notice these less serious side effects, talk with your doctor:  
· Headache · Mild nausea, vomiting, stomach pain, or diarrhea If you notice other side effects that you think are caused by this medicine, tell your doctor. Call your doctor for medical advice about side effects.  You may report side effects to FDA at 1-140-FDA-8118 © 2017 [address] Information is for End User's use only and may not be sold, redistributed or otherwise used for commercial purposes. The above information is an  only. It is not intended as medical advice for individual conditions or treatments. Talk to your doctor, nurse or pharmacist before following any medical regimen to see if it is safe and effective for you. Introducing [facility] SERVICES! Dear Alpha Bong: 
Thank you for requesting a [email or web address] account. Our records indicate that you already have an active [email or web address] account. You can access your account anytime at [email or web address] Did you know that you can access your hospital and ER discharge instructions at any time in [email or web address]? You can also review all of your test results from your hospital stay or ER visit. Additional Information If you have questions, please visit the Frequently Asked Questions section of the [email or web address] website at [email or web address] Remember, [email or web address] is NOT to be used for urgent needs. For medical emergencies, dial 911. Now available from your iPhone and Android! Please provide this summary of care documentation to your next provider. Your primary care clinician is listed as Jenny Levers. If you have any questions after today's visit, please call [phone].

## 2018-02-23 NOTE — PROGRESS NOTES
HPI  Pamela Young is a 38 y.o. female  Chief Complaint   Patient presents with    Yeast Infection     vaginal discharge with blood, itching. Just finished a course of antibiotic for a sinus infection. Reports her symptoms started three weeks ago. Admits she did use her friends soap which was Dove and the vaginal irritation started right after. Reports she has a vaginal itch and irritation. Reports she used Monistat 7 and states this did not work. Reports vaginal discharge that is yellow. Reports she has blood in her urine. Reports she thinks this is a bacterial infection. Denies abdominal pain, side pain, or back pain. However reports burning in her bladder area. Denies being sexually active. 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 6 Tab 0    meclizine (ANTIVERT) 12.5 mg tablet Take  by mouth three (3) times daily as needed.  topiramate (TOPAMAX) 25 mg tablet Take 1 Tab by mouth two (2) times a day. Indications: MIGRAINE PREVENTION 60 Tab 2    ondansetron (ZOFRAN ODT) 4 mg disintegrating tablet Take 4 mg by mouth every eight (8) hours as needed for Nausea.          Allergies  Allergies   Allergen Reactions    Ciprofloxacin (Bulk) Diarrhea    Codeine Itching    Dilaudid [Hydromorphone (Bulk)] Itching    Lyrica [Pregabalin] Swelling    Tramadol Other (comments) and Unknown (comments)     Gets headaches  headache    Codeine Phosphate Unknown (comments)    Hydromorphone Unknown (comments)       Family History  Family History   Problem Relation Age of Onset    Heart Disease Mother     Hypertension Mother    [place] MS Mother     Other Mother 54     Lupus    Stroke Mother      x3    Asthma Sister     Diabetes Paternal Grandmother     Hypertension Paternal Grandmother        Social History  Social History     Social History    Marital status: SINGLE     Spouse name: N/A    Number of children: N/A    Years of education: N/A     Occupational History    Not on file. Social History Main Topics    Smoking status: Never Smoker    Smokeless tobacco: Never Used    Alcohol use Yes      Comment: rare    Drug use: No    Sexual activity: Yes     Partners: Male     Birth control/ protection: Implant      Comment: pregnancy test negative     Other Topics Concern    Not on file     Social History Narrative       Problem List  Patient Active Problem List   Diagnosis Code    Cystitis N30.90    Ovarian mass N83.9    Family history of blood dyscrasia Z83.2    Family history of cardiovascular disease Z82.49    Abnormal cervical Papanicolaou smear with positive human papilloma virus (HPV) DNA test [id number]    Myofascial muscle pain M79.1    Muscle spasms of neck M62.838    Cervical spondylosis M47.812    DDD (degenerative disc disease), cervical M50.30    Cervical myofascial pain syndrome M79.1    HNP (herniated nucleus pulposus), cervical M50.20    Muscle spasm M62.838    S/P cervical spinal fusion Z98.1    Migraine without aura and without status migrainosus, not intractable G43.009       Review of Systems  Review of Systems   Constitutional: Negative for chills and fever. Gastrointestinal: Positive for abdominal pain. Negative for nausea and vomiting.    Genitourinary: Positive for dysuria, frequency, hematuria and urgency. Negative for flank pain. Musculoskeletal: Negative for back pain. Vital Signs  Vitals:    02/23/18 1418   BP: 107/68   Pulse: 68   Resp: 16   Temp: 98.5 °F (36.9 °C)   TempSrc: Oral   Weight: 168 lb 8 oz (76.4 kg)   Height: 5' 5\" (1.651 m)   PainSc:   0 - No pain       Physical Exam  Physical Exam   Constitutional: She is oriented to person, place, and time. HENT:   Mouth/Throat: Oropharynx is clear and moist.   Cardiovascular: Normal rate, regular rhythm and normal heart sounds. No murmur heard. Pulmonary/Chest: Effort normal and breath sounds normal. No respiratory distress. Abdominal: Soft. Bowel sounds are normal. There is tenderness in the suprapubic area. There is no CVA tenderness. Neurological: She is alert and oriented to person, place, and time. Psychiatric: She has a normal mood and affect. Her behavior is normal.   Vitals reviewed. Diagnostics  Orders Placed This Encounter    CULTURE, URINE    AMB POC URINALYSIS DIP STICK OR TABLET REAGENT AUTO W/O MICRO    trimethoprim-sulfamethoxazole (BACTRIM DS, SEPTRA DS) 160-800 mg per tablet     Sig: Take 1 Tab by mouth two (2) times a day for 3 days. Dispense:  6 Tab     Refill:  0    fluconazole (DIFLUCAN) 150 mg tablet     Sig: Take 1 Tab by mouth daily for 1 day. FDA advises cautious prescribing of oral fluconazole in pregnancy.      Dispense:  1 Tab     Refill:  0       Results  Results for orders placed or performed in visit on 02/23/18   AMB POC URINALYSIS DIP STICK AUTO W/O MICRO   Result Value Ref Range    Color (UA POC) Yellow     Clarity (UA POC) Clear     Glucose (UA POC) Negative Negative    Bilirubin (UA POC) Negative Negative    Ketones (UA POC) Negative Negative    Specific gravity (UA POC) 1.025 1.001 - 1.035    Blood (UA POC) Trace Negative    pH (UA POC) 6.0 4.6 - 8.0    Protein (UA POC) Negative Negative    Urobilinogen (UA POC) 1 mg/dL 0.2 - 1    Nitrites (UA POC) Negative Negative    Leukocyte esterase (UA POC) 3+ Negative       Assessment and Plan  Diagnoses and all orders for this visit:    1. Vaginal discharge  -     AMB POC URINALYSIS DIP STICK OR TABLET REAGENT AUTO W/O MICRO  -     CULTURE, URINE    2. Hematuria, unspecified type  -     CULTURE, URINE  -     trimethoprim-sulfamethoxazole (BACTRIM DS, SEPTRA DS) 160-800 mg per tablet; Take 1 Tab by mouth two (2) times a day for 3 days. 3. Acute cystitis with hematuria  -     trimethoprim-sulfamethoxazole (BACTRIM DS, SEPTRA DS) 160-800 mg per tablet; Take 1 Tab by mouth two (2) times a day for 3 days. 4. Vaginal itching  -     fluconazole (DIFLUCAN) 150 mg tablet; Take 1 Tab by mouth daily for 1 day. FDA advises cautious prescribing of oral fluconazole in pregnancy. Medication, side effects, possible allergic reactions and warnings reviewed with patient. Patient verbalized understanding. Fluids increase. Good handwashing. Good vaginal hygiene. After care summary printed and reviewed with patient. Plan reviewed with patient. Questions answered. Patient verbalized understanding of plan and is in agreement with plan. Patient to follow up in two week or earlier if symptoms worsen.  Repeat urine at next visit    SHIVANI Mcneal

## 2018-02-26 ENCOUNTER — HOSPITAL ENCOUNTER (OUTPATIENT)
Dept: NEUROLOGY | Age: 43
Discharge: HOME | End: 2018-02-26
Attending: PSYCHIATRY & NEUROLOGY
Payer: MEDICAID

## 2018-02-26 ENCOUNTER — TELEPHONE (OUTPATIENT)
Dept: NEUROLOGY | Age: 43
End: 2018-02-26

## 2018-02-26 DIAGNOSIS — R55 VASOVAGAL SYNCOPE: ICD-10-CM

## 2018-02-26 PROCEDURE — 95816 EEG AWAKE AND DROWSY: CPT

## 2018-02-26 PROCEDURE — 95819 EEG AWAKE AND ASLEEP: CPT

## 2018-02-26 NOTE — TELEPHONE ENCOUNTER
Ms. Rob Esteban stopped in the office following her EEG appointment. She states she is taking Topamax 25mg, two tablets, BID without relief. States she has had a migraine for the past 3 days, with dizziness and nausea. She is asking what she should do. Next appointment 3/13/18. Requests call back on her cell phone. Patient was informed that physician is not in the office until tomorrow. Patient states she may go to the ER but is not sure what they can do for her. Encouraged patient to seek treatment if she feels she needs it. Informed patient that she will receive a phone call once Dr. Nikia Cortez responds to message. Patient verbalized understanding.

## 2018-03-01 NOTE — PROCEDURES
[facility]  EEG    Key Drake  MR#: [id number]  : 1975  ACCOUNT #: [de-identified]   DATE OF SERVICE: 2018    REFERRING PHYSICIAN:  Son Dunham MD    READING PHYSICIAN:  Kirsten Santiago MD    HISTORY OF PRESENT ILLNESS:  This is a 53-year-old right-handed female who presents for history of headaches and vertigo. MEDICATIONS:  Not listed other than Topamax. The EEG examination is performed as an outpatient utilizing both referential and differential montages, as well as international 10-20 electrode placement system. The patient is initially awake. She demonstrates well formed posteriorly dominant and reactive alpha rhythm up to 10 Hz. She does enter into drowsiness, but not into deeper stage of sleep. Hyperventilation mental alerting. Photic stimulation failed to elicit abnormal activity. There were no focal, lateralized or abnormal paroxysmal discharges noted on single channel EKG monitoring and no ectopy was appreciated. There were no focal, lateralizing or abnormal paroxysmal discharges. EEG INTERPRETATION:  Normal awake and light drowsy recording.       MD KRISTI Baker / ALYCIA  D: 2018 11:40     T: 2018 14:10  JOB #: [id number]

## 2018-03-02 ENCOUNTER — HOSPITAL ENCOUNTER (EMERGENCY)
Age: 43
Discharge: HOME | End: 2018-03-02
Attending: EMERGENCY MEDICINE
Payer: MEDICAID

## 2018-03-02 VITALS — HEART RATE: 82 BPM | OXYGEN SATURATION: 100 % | RESPIRATION RATE: 18 BRPM | TEMPERATURE: 98.5 F

## 2018-03-02 DIAGNOSIS — R42 DIZZINESS: Primary | ICD-10-CM

## 2018-03-02 LAB
ANION GAP SERPL CALC-SCNC: 7 MMOL/L (ref 3–18)
BASOPHILS # BLD: 0 K/UL (ref 0–0.1)
BASOPHILS NFR BLD: 0 % (ref 0–2)
BUN SERPL-MCNC: 14 MG/DL (ref 7–18)
BUN/CREAT SERPL: 18 (ref 12–20)
CALCIUM SERPL-MCNC: 9.1 MG/DL (ref 8.5–10.1)
CHLORIDE SERPL-SCNC: 106 MMOL/L (ref 100–108)
CK MB CFR SERPL CALC: NORMAL % (ref 0–4)
CK MB SERPL-MCNC: <1 NG/ML (ref 5–25)
CK SERPL-CCNC: 49 U/L (ref 26–192)
CO2 SERPL-SCNC: 24 MMOL/L (ref 21–32)
CREAT SERPL-MCNC: 0.76 MG/DL (ref 0.6–1.3)
DIFFERENTIAL METHOD BLD: ABNORMAL
EOSINOPHIL # BLD: 0.1 K/UL (ref 0–0.4)
EOSINOPHIL NFR BLD: 2 % (ref 0–5)
ERYTHROCYTE [DISTWIDTH] IN BLOOD BY AUTOMATED COUNT: 12.1 % (ref 11.6–14.5)
GLUCOSE SERPL-MCNC: 87 MG/DL (ref 74–99)
HCG SERPL QL: NEGATIVE
HCT VFR BLD AUTO: 31.7 % (ref 35–45)
HGB BLD-MCNC: 10.5 G/DL (ref 12–16)
LYMPHOCYTES # BLD: 2.4 K/UL (ref 0.9–3.6)
LYMPHOCYTES NFR BLD: 50 % (ref 21–52)
MCH RBC QN AUTO: 28.5 PG (ref 24–34)
MCHC RBC AUTO-ENTMCNC: 33.1 G/DL (ref 31–37)
MCV RBC AUTO: 86.1 FL (ref 74–97)
MONOCYTES # BLD: 0.5 K/UL (ref 0.05–1.2)
MONOCYTES NFR BLD: 10 % (ref 3–10)
NEUTS SEG # BLD: 1.8 K/UL (ref 1.8–8)
NEUTS SEG NFR BLD: 38 % (ref 40–73)
PLATELET # BLD AUTO: 245 K/UL (ref 135–420)
PMV BLD AUTO: 10.7 FL (ref 9.2–11.8)
POTASSIUM SERPL-SCNC: 3.9 MMOL/L (ref 3.5–5.5)
RBC # BLD AUTO: 3.68 M/UL (ref 4.2–5.3)
SODIUM SERPL-SCNC: 137 MMOL/L (ref 136–145)
TROPONIN I SERPL-MCNC: <0.02 NG/ML (ref 0–0.04)
WBC # BLD AUTO: 4.8 K/UL (ref 4.6–13.2)

## 2018-03-02 PROCEDURE — 93005 ELECTROCARDIOGRAM TRACING: CPT

## 2018-03-02 PROCEDURE — 99283 EMERGENCY DEPT VISIT LOW MDM: CPT

## 2018-03-02 PROCEDURE — 80048 BASIC METABOLIC PNL TOTAL CA: CPT | Performed by: PHYSICIAN ASSISTANT

## 2018-03-02 PROCEDURE — 96361 HYDRATE IV INFUSION ADD-ON: CPT

## 2018-03-02 PROCEDURE — 74011250636 HC RX REV CODE- 250/636: Performed by: EMERGENCY MEDICINE

## 2018-03-02 PROCEDURE — 82550 ASSAY OF CK (CPK): CPT | Performed by: PHYSICIAN ASSISTANT

## 2018-03-02 PROCEDURE — 85025 COMPLETE CBC W/AUTO DIFF WBC: CPT | Performed by: PHYSICIAN ASSISTANT

## 2018-03-02 PROCEDURE — 84703 CHORIONIC GONADOTROPIN ASSAY: CPT | Performed by: PHYSICIAN ASSISTANT

## 2018-03-02 PROCEDURE — 96374 THER/PROPH/DIAG INJ IV PUSH: CPT

## 2018-03-02 RX ORDER — MECLIZINE HCL 12.5 MG 12.5 MG/1
25 TABLET ORAL
Status: COMPLETED | OUTPATIENT
Start: 2018-03-02 | End: 2018-03-02

## 2018-03-02 RX ORDER — LORAZEPAM 0.5 MG/1
0.5-1 TABLET ORAL
Qty: 9 TAB | Refills: 0 | Status: SHIPPED | OUTPATIENT
Start: 2018-03-02 | End: 2018-12-11

## 2018-03-02 RX ORDER — LORAZEPAM 2 MG/ML
0.5 INJECTION INTRAMUSCULAR
Status: COMPLETED | OUTPATIENT
Start: 2018-03-02 | End: 2018-03-02

## 2018-03-02 RX ADMIN — SODIUM CHLORIDE 1000 ML: 900 INJECTION, SOLUTION INTRAVENOUS at 22:07

## 2018-03-02 RX ADMIN — LORAZEPAM 0.5 MG: 2 INJECTION INTRAMUSCULAR; INTRAVENOUS at 22:04

## 2018-03-02 RX ADMIN — MECLIZINE 25 MG: 12.5 TABLET ORAL at 22:03

## 2018-03-03 LAB
ATRIAL RATE: 72 BPM
CALCULATED P AXIS, ECG09: 55 DEGREES
CALCULATED R AXIS, ECG10: 51 DEGREES
CALCULATED T AXIS, ECG11: 51 DEGREES
DIAGNOSIS, 93000: NORMAL
P-R INTERVAL, ECG05: 152 MS
Q-T INTERVAL, ECG07: 360 MS
QRS DURATION, ECG06: 82 MS
QTC CALCULATION (BEZET), ECG08: 394 MS
VENTRICULAR RATE, ECG03: 72 BPM

## 2018-03-03 NOTE — ED NOTES
I performed a brief evaluation, including history and physical, of the patient here in triage and I have determined that pt will need further treatment and evaluation from the main side ER physician. I have placed initial orders to help in expediting patients care.      March 02, 2018 at 8:01 PM - Candace Hatch PA-C        Visit Vitals    Pulse 82    Temp 98.5 °F (36.9 °C)    Resp 18    SpO2 100%

## 2018-03-03 NOTE — ED PROVIDER NOTES
EMERGENCY DEPARTMENT HISTORY AND PHYSICAL EXAM    8:25 PM      Date: 3/2/2018  Patient Name: Mary Jo Drake    History of Presenting Illness     Chief Complaint   Patient presents with    Dizziness    Chest Pain         History Provided By: Patient    Chief Complaint: Dizziness  Duration: Today, Hours   Timing:  Waxing and Waning  Severity: Moderate  Modifying Factors: Ambulation exacerbates. Otherwise, nothing makes better or worse  Associated Symptoms: chest pain, tingling in hands, difficulty speaking and walking, feeling hot      Additional History (Context): Mary Jo Drake is a 38 y.o. female with PMHx of anemia and migraines who presents with moderate dizziness beginning today that is waxing and waning. She states she is experiencing tingling in her hands and is feeling \"hot. \" and also feeling like she is stuttering. Patient notes that she began feeling like her chest felt \"tight\" on the way to the ED. She also notes that she is \"shaking\" internally and feels like she is having difficulty speaking. Denies any incomprehensible speech and denies not being able to say what she wants to say. She has had an episode of syncope in the past with previous experience of current symptoms. She states that she has been unable to walk all day as she is \"afraid to fall\" due to \"spinning\" dizziness with movement and position changes. Patient reports that her episode of dizziness today is similar to past dizziness, but has been more frequent. She reports 5-6 episodes today, whereas she typically only has one per day. She notes that she is a migraine sufferer. Patient states that she is on Meclizine, which does not improve her symptoms. She states that nothing works for her migraines. She denies tingling in her legs. Denies any further complaints or symptoms at the moment.         PCP: Kelsie Melendez MD   Neuro: Dr. Paulette Neely    Current Outpatient Prescriptions   Medication Sig Dispense Refill    LORazepam (ATIVAN) 0.5 mg tablet Take 1-2 Tabs by mouth every eight (8) hours as needed (dizziness). Max Daily Amount: 3 mg. 9 Tab 0    meclizine (ANTIVERT) 12.5 mg tablet Take  by mouth three (3) times daily as needed.  topiramate (TOPAMAX) 25 mg tablet Take 1 Tab by mouth two (2) times a day. Indications: MIGRAINE PREVENTION 60 Tab 2    ondansetron (ZOFRAN ODT) 4 mg disintegrating tablet Take 4 mg by mouth every eight (8) hours as needed for Nausea. 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   [facility] SHOULDER SURG [address] UNLISTED  2011    left shoulder       Family History:  Family History   Problem Relation Age of Onset    Heart Disease Mother     Hypertension Mother    [facility] MS Mother     Other Mother 54     Lupus    Stroke Mother      x3    Asthma Sister     Diabetes Paternal Grandmother     Hypertension Paternal Grandmother        Social History:  Social History   Substance Use Topics    Smoking status: Never Smoker    Smokeless tobacco: Never Used    Alcohol use Yes      Comment: rare       Allergies: Allergies   Allergen Reactions    Ciprofloxacin (Bulk) Diarrhea    Codeine Itching    Dilaudid [Hydromorphone (Bulk)] Itching    Lyrica [Pregabalin] Swelling    Tramadol Other (comments) and Unknown (comments)     Gets headaches  headache    Codeine Phosphate Unknown (comments)    Hydromorphone Unknown (comments)         Review of Systems       Review of Systems   Constitutional: Negative for chills and fever. HENT: Negative for rhinorrhea. Respiratory: Positive for chest tightness. Negative for shortness of breath. Cardiovascular: Positive for chest pain (chest feels \"tight\"). Gastrointestinal: Negative for abdominal pain, nausea and vomiting. Endocrine: Negative for polyuria. Genitourinary: Negative for dysuria. Musculoskeletal: Negative for back pain. Skin: Negative for rash. Neurological: Positive for dizziness and speech difficulty. Negative for headaches. Physical Exam     Visit Vitals    Pulse 82    Temp 98.5 °F (36.9 °C)    Resp 18    SpO2 100%       Physical Exam   Constitutional: She is oriented to person, place, and time. She appears well-developed and well-nourished. HENT:   Head: Normocephalic and atraumatic. Eyes: Conjunctivae and EOM are normal. Pupils are equal, round, and reactive to light. Neck: Normal range of motion. Neck supple. Cardiovascular: Normal rate and regular rhythm. Pulmonary/Chest: Effort normal and breath sounds normal.   Abdominal: Soft. Bowel sounds are normal. She exhibits no distension. There is no tenderness. There is no rebound. Musculoskeletal: Normal range of motion. Neurological: She is alert and oriented to person, place, and time. She has normal strength. No cranial nerve deficit. Coordination normal. GCS eye subscore is 4. GCS verbal subscore is 5. GCS motor subscore is 6. Normal upper and lower extremity strength and sensation. Normal finger to nose. Normal cranial nerves. Skin: Skin is warm and dry. Psychiatric: She has a normal mood and affect. Thought content normal.   Nursing note and vitals reviewed.         Diagnostic Study Results     Labs -  Recent Results (from the past 12 hour(s))   EKG, 12 LEAD, INITIAL    Collection Time: 03/02/18  9:50 PM   Result Value Ref Range    Ventricular Rate 72 BPM    Atrial Rate 72 BPM    P-R Interval 152 ms    QRS Duration 82 ms    Q-T Interval 360 ms    QTC Calculation (Bezet) 394 ms    Calculated P Axis 55 degrees Calculated R Axis 51 degrees    Calculated T Axis 51 degrees    Diagnosis       Normal sinus rhythm  Possible Left atrial enlargement  Borderline ECG  When compared with ECG of 18-JAN-2018 16:11,  No significant change was found     CBC WITH AUTOMATED DIFF    Collection Time: 03/02/18 10:00 PM   Result Value Ref Range    WBC 4.8 4.6 - 13.2 K/uL    RBC 3.68 (L) 4.20 - 5.30 M/uL    HGB 10.5 (L) 12.0 - 16.0 g/dL    HCT 31.7 (L) 35.0 - 45.0 %    MCV 86.1 74.0 - 97.0 FL    MCH 28.5 24.0 - 34.0 PG    MCHC 33.1 31.0 - 37.0 g/dL    RDW 12.1 11.6 - 14.5 %    PLATELET 430 133 - 559 K/uL    MPV 10.7 9.2 - 11.8 FL    NEUTROPHILS 38 (L) 40 - 73 %    LYMPHOCYTES 50 21 - 52 %    MONOCYTES 10 3 - 10 %    EOSINOPHILS 2 0 - 5 %    BASOPHILS 0 0 - 2 %    ABS. NEUTROPHILS 1.8 1.8 - 8.0 K/UL    ABS. LYMPHOCYTES 2.4 0.9 - 3.6 K/UL    ABS. MONOCYTES 0.5 0.05 - 1.2 K/UL    ABS. EOSINOPHILS 0.1 0.0 - 0.4 K/UL    ABS. BASOPHILS 0.0 0.0 - 0.1 K/UL    DF AUTOMATED     METABOLIC PANEL, BASIC    Collection Time: 03/02/18 10:00 PM   Result Value Ref Range    Sodium 137 136 - 145 mmol/L    Potassium 3.9 3.5 - 5.5 mmol/L    Chloride 106 100 - 108 mmol/L    CO2 24 21 - 32 mmol/L    Anion gap 7 3.0 - 18 mmol/L    Glucose 87 74 - 99 mg/dL    BUN 14 7.0 - 18 MG/DL    Creatinine 0.76 0.6 - 1.3 MG/DL    BUN/Creatinine ratio 18 12 - 20      GFR est AA >60 >60 ml/min/1.73m2    GFR est non-AA >60 >60 ml/min/1.73m2    Calcium 9.1 8.5 - 10.1 MG/DL   HCG QL SERUM    Collection Time: 03/02/18 10:00 PM   Result Value Ref Range    HCG, Ql. NEGATIVE  NEG     CARDIAC PANEL,(CK, CKMB & TROPONIN)    Collection Time: 03/02/18 10:00 PM   Result Value Ref Range    CK 49 26 - 192 U/L    CK - MB <1.0 <3.6 ng/ml    CK-MB Index  0.0 - 4.0 %     CALCULATION NOT PERFORMED WHEN RESULT IS BELOW LINEAR LIMIT    Troponin-I, Qt. <0.02 0.0 - 0.045 NG/ML       Radiologic Studies -   No orders to display         Medical Decision Making   I am the first provider for this patient.     I reviewed the vital signs, available nursing notes, past medical history, past surgical history, family history and social history. Vital Signs-Reviewed the patient's vital signs. EKG: Interpreted by the EP. Time Interpreted: 9:50 PM   Rate: 72 bpm   Rhythm: Normal Sinus Rhythm   Interpretation: No STEMI. Records Reviewed: Nursing Notes, Old Medical Records, Previous electrocardiograms, Previous Radiology Studies and Previous Laboratory Studies (Time of Review: 8:25 PM)    ED Course: Progress Notes, Reevaluation, and Consults:  Re-evaluated patient. She states that she is no longer having any dizziness after Ativan given in the ED. Will give a short course of Ativan at discharge and have her follow-up as an outpatient with Dr. Gab Sams. 10:59 PM     Provider Notes (Medical Decision Making): Patient said she feels completely back to normal and feels better after ativan. She has had many of these episodes in the past and I do not see any current indication for inpatient work-up. Due to her syncope in the past, although she did not have syncope during this episode, I will refer her to cardiology. Diagnosis     Clinical Impression:   1. Dizziness        Disposition: Discharge    Follow-up Information     Follow up With Details Comments Contact Info    Se Kearns MD Schedule an appointment as soon as possible for a visit  [address] [place] [phone]  [place] [id number], DO Schedule an appointment as soon as possible for a visit  [address] [facility] EMERGENCY DEPT  If symptoms worsen [address] [place] [id number]  [phone]           Patient's Medications   Start Taking    LORAZEPAM (ATIVAN) 0.5 MG TABLET    Take 1-2 Tabs by mouth every eight (8) hours as needed (dizziness). Max Daily Amount: 3 mg.    Continue Taking    MECLIZINE (ANTIVERT) 12.5 MG TABLET    Take  by mouth three (3) times daily as needed. ONDANSETRON (ZOFRAN ODT) 4 MG DISINTEGRATING TABLET    Take 4 mg by mouth every eight (8) hours as needed for Nausea. TOPIRAMATE (TOPAMAX) 25 MG TABLET    Take 1 Tab by mouth two (2) times a day. Indications: MIGRAINE PREVENTION   These Medications have changed    No medications on file   Stop Taking    No medications on file     _______________________________    Attestations:  [place] [address] acting as a scribe for and in the presence of Pierce López MD      March 02, 2018 at 8:25 PM    Scribe Attestation:     Carolyn Waterman, acting as a scribe for and in the presence of Pierce López MD      March 02, 2018 at 8:48 PM       Provider Attestation:      I personally performed the services described in the documentation, reviewed the documentation, as recorded by the scribe in my presence, and it accurately and completely records my words and actions.  March 02, 2018 at 8:25 PM - Pierce López MD    _______________________________

## 2018-03-03 NOTE — ED TRIAGE NOTES
C/o dizziness with headache and syncope since this past January , and difficulty speaking at times that started Wednesday 2/27/18. Pt also states some chest tightness that started today. Pt states she has had an occurrence like this before and is seen by neurologist Dr Gabino Martinez and diagnosed with migraines.  Pt states she is being seen by Dr Gabino Martinez to find out why she has been having dizziness and syncope

## 2018-03-03 NOTE — DISCHARGE INSTRUCTIONS
Dizziness: Care Instructions  Your Care Instructions  Dizziness is the feeling of unsteadiness or fuzziness in your head. It is different than having vertigo, which is a feeling that the room is spinning or that you are moving or falling. It is also different from lightheadedness, which is the feeling that you are about to faint. It can be hard to know what causes dizziness. Some people feel dizzy when they have migraine headaches. Sometimes bouts of flu can make you feel dizzy. Some medical conditions, such as heart problems or high blood pressure, can make you feel dizzy. Many medicines can cause dizziness, including medicines for high blood pressure, pain, or anxiety. If a medicine causes your symptoms, your doctor may recommend that you stop or change the medicine. If it is a problem with your heart, you may need medicine to help your heart work better. If there is no clear reason for your symptoms, your doctor may suggest watching and waiting for a while to see if the dizziness goes away on its own. Follow-up care is a key part of your treatment and safety. Be sure to make and go to all appointments, and call your doctor if you are having problems. It's also a good idea to know your test results and keep a list of the medicines you take. How can you care for yourself at home? · If your doctor recommends or prescribes medicine, take it exactly as directed. Call your doctor if you think you are having a problem with your medicine. · Do not drive while you feel dizzy. · Try to prevent falls. Steps you can take include:  ¨ Using nonskid mats, adding grab bars near the tub, and using night-lights. ¨ Clearing your home so that walkways are free of anything you might trip on. ¨ Letting family and friends know that you have been feeling dizzy. This will help them know how to help you. When should you call for help? Call 911 anytime you think you may need emergency care.  For example, call if:  ? · You passed out (lost consciousness). ? · You have dizziness along with symptoms of a heart attack. These may include:  ¨ Chest pain or pressure, or a strange feeling in the chest.  ¨ Sweating. ¨ Shortness of breath. ¨ Nausea or vomiting. ¨ Pain, pressure, or a strange feeling in the back, neck, jaw, or upper belly or in one or both shoulders or arms. ¨ Lightheadedness or sudden weakness. ¨ A fast or irregular heartbeat. ? · You have symptoms of a stroke. These may include:  ¨ Sudden numbness, tingling, weakness, or loss of movement in your face, arm, or leg, especially on only one side of your body. ¨ Sudden vision changes. ¨ Sudden trouble speaking. ¨ Sudden confusion or trouble understanding simple statements. ¨ Sudden problems with walking or balance. ¨ A sudden, severe headache that is different from past headaches. ?Call your doctor now or seek immediate medical care if:  ? · You feel dizzy and have a fever, headache, or ringing in your ears. ? · You have new or increased nausea and vomiting. ? · Your dizziness does not go away or comes back. ? Watch closely for changes in your health, and be sure to contact your doctor if:  ? · You do not get better as expected. Where can you learn more? Go to http://delores-scotty.info/. Enter K190 in the search box to learn more about \"Dizziness: Care Instructions. \"  Current as of: March 20, 2017  Content Version: 11.4  © 8876-1430 [employer]. Care instructions adapted under license by [employer] (which disclaims liability or warranty for this information). If you have questions about a medical condition or this instruction, always ask your healthcare professional. [place] [address] any warranty or liability for your use of this information.

## 2018-03-05 ENCOUNTER — HOSPITAL ENCOUNTER (EMERGENCY)
Age: 43
Discharge: HOME | End: 2018-03-05
Attending: EMERGENCY MEDICINE
Payer: MEDICAID

## 2018-03-05 ENCOUNTER — APPOINTMENT (OUTPATIENT)
Dept: GENERAL RADIOLOGY | Age: 43
End: 2018-03-05
Attending: STUDENT IN AN ORGANIZED HEALTH CARE EDUCATION/TRAINING PROGRAM
Payer: MEDICAID

## 2018-03-05 VITALS
BODY MASS INDEX: 25.71 KG/M2 | HEART RATE: 63 BPM | DIASTOLIC BLOOD PRESSURE: 69 MMHG | HEIGHT: 66 IN | TEMPERATURE: 98 F | RESPIRATION RATE: 18 BRPM | WEIGHT: 160 LBS | SYSTOLIC BLOOD PRESSURE: 102 MMHG | OXYGEN SATURATION: 100 %

## 2018-03-05 DIAGNOSIS — R07.89 ATYPICAL CHEST PAIN: Primary | ICD-10-CM

## 2018-03-05 DIAGNOSIS — R55 SYNCOPE AND COLLAPSE: ICD-10-CM

## 2018-03-05 LAB
ALBUMIN SERPL-MCNC: 3.9 G/DL (ref 3.4–5)
ALBUMIN/GLOB SERPL: 1 {RATIO} (ref 0.8–1.7)
ALP SERPL-CCNC: 59 U/L (ref 45–117)
ALT SERPL-CCNC: 18 U/L (ref 13–56)
AMPHET UR QL SCN: NEGATIVE
ANION GAP SERPL CALC-SCNC: 5 MMOL/L (ref 3–18)
AST SERPL-CCNC: 15 U/L (ref 15–37)
ATRIAL RATE: 70 BPM
BARBITURATES UR QL SCN: POSITIVE
BASOPHILS # BLD: 0 K/UL (ref 0–0.1)
BASOPHILS NFR BLD: 0 % (ref 0–2)
BENZODIAZ UR QL: NEGATIVE
BILIRUB DIRECT SERPL-MCNC: <0.1 MG/DL (ref 0–0.2)
BILIRUB SERPL-MCNC: 0.4 MG/DL (ref 0.2–1)
BUN SERPL-MCNC: 16 MG/DL (ref 7–18)
BUN/CREAT SERPL: 23 (ref 12–20)
CALCIUM SERPL-MCNC: 9.4 MG/DL (ref 8.5–10.1)
CALCULATED P AXIS, ECG09: 55 DEGREES
CALCULATED R AXIS, ECG10: 66 DEGREES
CALCULATED T AXIS, ECG11: 67 DEGREES
CANNABINOIDS UR QL SCN: NEGATIVE
CHLORIDE SERPL-SCNC: 105 MMOL/L (ref 100–108)
CO2 SERPL-SCNC: 30 MMOL/L (ref 21–32)
COCAINE UR QL SCN: NEGATIVE
CREAT SERPL-MCNC: 0.71 MG/DL (ref 0.6–1.3)
DIAGNOSIS, 93000: NORMAL
DIFFERENTIAL METHOD BLD: ABNORMAL
EOSINOPHIL # BLD: 0.2 K/UL (ref 0–0.4)
EOSINOPHIL NFR BLD: 5 % (ref 0–5)
ERYTHROCYTE [DISTWIDTH] IN BLOOD BY AUTOMATED COUNT: 12.2 % (ref 11.6–14.5)
GLOBULIN SER CALC-MCNC: 3.8 G/DL (ref 2–4)
GLUCOSE SERPL-MCNC: 90 MG/DL (ref 74–99)
HCG UR QL: NEGATIVE
HCT VFR BLD AUTO: 34 % (ref 35–45)
HDSCOM,HDSCOM: ABNORMAL
HGB BLD-MCNC: 11.1 G/DL (ref 12–16)
LIPASE SERPL-CCNC: 104 U/L (ref 73–393)
LYMPHOCYTES # BLD: 2.6 K/UL (ref 0.9–3.6)
LYMPHOCYTES NFR BLD: 51 % (ref 21–52)
MAGNESIUM SERPL-MCNC: 2.1 MG/DL (ref 1.6–2.6)
MCH RBC QN AUTO: 28.5 PG (ref 24–34)
MCHC RBC AUTO-ENTMCNC: 32.6 G/DL (ref 31–37)
MCV RBC AUTO: 87.4 FL (ref 74–97)
METHADONE UR QL: NEGATIVE
MONOCYTES # BLD: 0.6 K/UL (ref 0.05–1.2)
MONOCYTES NFR BLD: 12 % (ref 3–10)
NEUTS SEG # BLD: 1.6 K/UL (ref 1.8–8)
NEUTS SEG NFR BLD: 32 % (ref 40–73)
OPIATES UR QL: NEGATIVE
P-R INTERVAL, ECG05: 154 MS
PCP UR QL: NEGATIVE
PLATELET # BLD AUTO: 275 K/UL (ref 135–420)
PMV BLD AUTO: 10.9 FL (ref 9.2–11.8)
POTASSIUM SERPL-SCNC: 4.4 MMOL/L (ref 3.5–5.5)
PROT SERPL-MCNC: 7.7 G/DL (ref 6.4–8.2)
Q-T INTERVAL, ECG07: 352 MS
QRS DURATION, ECG06: 80 MS
QTC CALCULATION (BEZET), ECG08: 380 MS
RBC # BLD AUTO: 3.89 M/UL (ref 4.2–5.3)
SODIUM SERPL-SCNC: 140 MMOL/L (ref 136–145)
TROPONIN I SERPL-MCNC: <0.02 NG/ML (ref 0–0.04)
TROPONIN I SERPL-MCNC: <0.02 NG/ML (ref 0–0.04)
VENTRICULAR RATE, ECG03: 70 BPM
WBC # BLD AUTO: 5.1 K/UL (ref 4.6–13.2)

## 2018-03-05 PROCEDURE — 80076 HEPATIC FUNCTION PANEL: CPT

## 2018-03-05 PROCEDURE — 80307 DRUG TEST PRSMV CHEM ANLYZR: CPT

## 2018-03-05 PROCEDURE — 74011250636 HC RX REV CODE- 250/636: Performed by: STUDENT IN AN ORGANIZED HEALTH CARE EDUCATION/TRAINING PROGRAM

## 2018-03-05 PROCEDURE — 83690 ASSAY OF LIPASE: CPT

## 2018-03-05 PROCEDURE — 71046 X-RAY EXAM CHEST 2 VIEWS: CPT

## 2018-03-05 PROCEDURE — 93005 ELECTROCARDIOGRAM TRACING: CPT

## 2018-03-05 PROCEDURE — 84484 ASSAY OF TROPONIN QUANT: CPT

## 2018-03-05 PROCEDURE — 74011250637 HC RX REV CODE- 250/637: Performed by: STUDENT IN AN ORGANIZED HEALTH CARE EDUCATION/TRAINING PROGRAM

## 2018-03-05 PROCEDURE — 96361 HYDRATE IV INFUSION ADD-ON: CPT

## 2018-03-05 PROCEDURE — 96374 THER/PROPH/DIAG INJ IV PUSH: CPT

## 2018-03-05 PROCEDURE — 85025 COMPLETE CBC W/AUTO DIFF WBC: CPT

## 2018-03-05 PROCEDURE — 99284 EMERGENCY DEPT VISIT MOD MDM: CPT

## 2018-03-05 PROCEDURE — 81025 URINE PREGNANCY TEST: CPT | Performed by: EMERGENCY MEDICINE

## 2018-03-05 PROCEDURE — 83735 ASSAY OF MAGNESIUM: CPT

## 2018-03-05 PROCEDURE — 80048 BASIC METABOLIC PNL TOTAL CA: CPT

## 2018-03-05 RX ORDER — LORAZEPAM 2 MG/ML
0.5 INJECTION INTRAMUSCULAR
Status: COMPLETED | OUTPATIENT
Start: 2018-03-05 | End: 2018-03-05

## 2018-03-05 RX ADMIN — SODIUM CHLORIDE 1000 ML: 900 INJECTION, SOLUTION INTRAVENOUS at 16:57

## 2018-03-05 RX ADMIN — PHENOBARBITAL 30 ML: 16.2; .1037; .0065; .0194 ELIXIR ORAL at 16:00

## 2018-03-05 RX ADMIN — LORAZEPAM 0.5 MG: 2 INJECTION INTRAMUSCULAR; INTRAVENOUS at 16:59

## 2018-03-05 NOTE — ED PROVIDER NOTES
HPI Comments: 39 yo female presents to ED with 1 week of intermittent chest pressure, dizziness, shortness of breath, nausea. Syncopal episode on 5 days ago. Transported by EMS to [facility] [place] and discharged with meclazine. Took work off 4 days ago. Back to work 3 days ago, had onset of pain, dizziness, shortness of breath, near syncope. DId not improve so seen here. Given ativan with improvement of symptoms and discharged. Presents today with continued episodes of chest pressure, dizziness, SOB, nausea. No syncope today. Endorses difficulty eating today because she feels pressure when she tries to swallow. Patient is a 38 y.o. female presenting with chest pain and dizziness. The history is provided by the patient. Chest Pain (Angina)    This is a recurrent problem. The current episode started more than 1 week ago. The problem has been gradually worsening. The problem occurs daily. The pain is associated with normal activity. Associated symptoms include back pain, dizziness, headaches, nausea, palpitations, shortness of breath and weakness. Pertinent negatives include no abdominal pain, no cough, no fever and no vomiting. Dizziness    Associated symptoms include chest pain, palpitations, nausea, headaches, back pain, dizziness, light-headedness and weakness. Pertinent negatives include no fever, no abdominal pain, no vomiting, no congestion and no seizures. Her past medical history is significant for syncope.         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 Social History Main Topics    Smoking status: Never Smoker    Smokeless tobacco: Never Used    Alcohol use Yes      Comment: rare    Drug use: No    Sexual activity: Yes     Partners: Male     Birth control/ protection: Implant      Comment: pregnancy test negative     Other Topics Concern    Not on file     Social History Narrative         ALLERGIES: Ciprofloxacin (bulk); Codeine; Dilaudid [hydromorphone (bulk)]; Lyrica [pregabalin]; Tramadol; Codeine phosphate; and Hydromorphone    Review of Systems   Constitutional: Positive for appetite change. Negative for activity change, chills, fatigue and fever. HENT: Negative for congestion, rhinorrhea and sore throat. Respiratory: Positive for chest tightness and shortness of breath. Negative for cough, choking, wheezing and stridor. Cardiovascular: Positive for chest pain and palpitations. Negative for leg swelling. Gastrointestinal: Positive for nausea. Negative for abdominal pain and vomiting. Endocrine: Negative for polydipsia, polyphagia and polyuria. Genitourinary: Negative for difficulty urinating and dysuria. Musculoskeletal: Positive for back pain. Negative for neck pain. Neurological: Positive for dizziness, syncope, weakness, light-headedness and headaches. Negative for seizures, facial asymmetry and speech difficulty. Psychiatric/Behavioral: Negative for sleep disturbance. The patient is not nervous/anxious.         Vitals:    03/05/18 1446   BP: 107/73   Pulse: 76   Resp: 16   Temp: 98.2 °F (36.8 °C)   SpO2: 100%   Weight: 72.6 kg (160 lb)   Height: 5' 6\" (1.676 m)            Physical Exam   Constitutional: She is oriented to person, place, and time. She appears well-developed and well-nourished. No distress. HENT:   Head: Normocephalic and atraumatic. Eyes: Pupils are equal, round, and reactive to light. Right eye exhibits no discharge. Left eye exhibits no discharge. Neck: Normal range of motion. Neck supple. Cardiovascular: Normal rate and regular rhythm. Exam reveals no friction rub. No murmur heard. Pulmonary/Chest: Effort normal and breath sounds normal. No respiratory distress. She has no wheezes. She has no rales. She exhibits no tenderness. Abdominal: Soft. Bowel sounds are normal. She exhibits no distension. There is no tenderness. There is no rebound. Musculoskeletal: Normal range of motion. She exhibits no edema, tenderness or deformity. Neurological: She is alert and oriented to person, place, and time. Skin: Skin is warm and dry. No rash noted. She is not diaphoretic. MDM  Number of Diagnoses or Management Options  Diagnosis management comments: Considered ACS, pneumonia, MSK pain, diffuse esophageal spasm, anxiety, PE, among others. ED Course     37 yo female presents to ED with intermittent chest tightness with associated nausea, dizziness, SOB, and syncopal episodes. Has been passing out for 2+ months. Chest pressure is new over the past week. Report 5 episodes of chest pressure with spontaneous resolution after minutes. On exam, NAD. Pain not reproducible. EKG NSR without acute ST-T wave abnormalities. Labs significant for mild anemia, consistent with prior. Otherwise, lytes, LFTs, lipase normal. Troponin negative. CXR without acute cardiopulmonary processes. HEART score 0. Low risk ACS. Delta troponin negative. Patient treated in ED with 1L NS. GI cocktail without significant improvement, but reports fewer episodes in ED. Given 0.5mg IV ativan with good effect. Patient safe for discharge home with follow up. Etiology of her symptoms today are unclear at this time, but effectively ruled out emergent condition here today. Will advise patient to follow up with [facility] for Holter monitor placement. She has ativan at home, which she was discharged home with on Friday. She has not used.      Procedures: None     Genaro Santiago MD   PGY-2 Emergency Medicine  [facility]

## 2018-03-05 NOTE — ED TRIAGE NOTES
Pt. States \"I've been having chest tightness and dizziness all day off and on; when I try to eat it makes it worst\". Reports symptoms started \"last week\".

## 2018-03-06 ENCOUNTER — OFFICE VISIT (OUTPATIENT)
Dept: FAMILY MEDICINE CLINIC | Age: 43
End: 2018-03-06

## 2018-03-06 VITALS
HEART RATE: 77 BPM | DIASTOLIC BLOOD PRESSURE: 80 MMHG | WEIGHT: 169 LBS | TEMPERATURE: 98.1 F | HEIGHT: 66 IN | RESPIRATION RATE: 16 BRPM | BODY MASS INDEX: 27.16 KG/M2 | SYSTOLIC BLOOD PRESSURE: 116 MMHG

## 2018-03-06 DIAGNOSIS — R11.0 NAUSEA: ICD-10-CM

## 2018-03-06 DIAGNOSIS — R07.9 CHEST PAIN, UNSPECIFIED TYPE: ICD-10-CM

## 2018-03-06 DIAGNOSIS — N30.90 CYSTITIS: Primary | ICD-10-CM

## 2018-03-06 LAB
BILIRUB UR QL STRIP: NEGATIVE
GLUCOSE UR-MCNC: NEGATIVE MG/DL
KETONES P FAST UR STRIP-MCNC: NORMAL MG/DL
PH UR STRIP: 5.5 [PH] (ref 4.6–8)
PROT UR QL STRIP: NEGATIVE
SP GR UR STRIP: 1.02 (ref 1–1.03)
UA UROBILINOGEN AMB POC: NORMAL (ref 0.2–1)
URINALYSIS CLARITY POC: CLEAR
URINALYSIS COLOR POC: YELLOW
URINE BLOOD POC: NORMAL
URINE LEUKOCYTES POC: NORMAL
URINE NITRITES POC: NEGATIVE

## 2018-03-06 RX ORDER — ONDANSETRON 4 MG/1
4 TABLET, ORALLY DISINTEGRATING ORAL
Qty: 10 TAB | Refills: 0 | Status: SHIPPED | OUTPATIENT
Start: 2018-03-06 | End: 2018-12-14

## 2018-03-06 NOTE — DISCHARGE INSTRUCTIONS
Lightheadedness or Faintness: Care Instructions  Your Care Instructions  Lightheadedness is a feeling that you are about to faint or \"pass out. \" You do not feel as if you or your surroundings are moving. It is different from vertigo, which is the feeling that you or things around you are spinning or tilting. Lightheadedness usually goes away or gets better when you lie down. If lightheadedness gets worse, it can lead to a fainting spell. It is common to feel lightheaded from time to time. Lightheadedness usually is not caused by a serious problem. It often is caused by a short-lasting drop in blood pressure and blood flow to your head that occurs when you get up too quickly from a seated or lying position. Follow-up care is a key part of your treatment and safety. Be sure to make and go to all appointments, and call your doctor if you are having problems. It's also a good idea to know your test results and keep a list of the medicines you take. How can you care for yourself at home? · Lie down for 1 or 2 minutes when you feel lightheaded. After lying down, sit up slowly and remain sitting for 1 to 2 minutes before slowly standing up. · Avoid movements, positions, or activities that have made you lightheaded in the past.  · Get plenty of rest, especially if you have a cold or flu, which can cause lightheadedness. · Make sure you drink plenty of fluids, especially if you have a fever or have been sweating. · Do not drive or put yourself and others in danger while you feel lightheaded. When should you call for help? Call 911 anytime you think you may need emergency care. For example, call if:  ? · You have symptoms of a stroke. These may include:  ¨ Sudden numbness, tingling, weakness, or loss of movement in your face, arm, or leg, especially on only one side of your body. ¨ Sudden vision changes. ¨ Sudden trouble speaking. ¨ Sudden confusion or trouble understanding simple statements.   ¨ Sudden problems with walking or balance. ¨ A sudden, severe headache that is different from past headaches. ? · You have symptoms of a heart attack. These may include:  ¨ Chest pain or pressure, or a strange feeling in the chest.  ¨ Sweating. ¨ Shortness of breath. ¨ Nausea or vomiting. ¨ Pain, pressure, or a strange feeling in the back, neck, jaw, or upper belly or in one or both shoulders or arms. ¨ Lightheadedness or sudden weakness. ¨ A fast or irregular heartbeat. After you call 911, the  may tell you to chew 1 adult-strength or 2 to 4 low-dose aspirin. Wait for an ambulance. Do not try to drive yourself. ? Watch closely for changes in your health, and be sure to contact your doctor if:  ? · Your lightheadedness gets worse or does not get better with home care. Where can you learn more? Go to http://deloresNimayascotty.info/. Enter R687 in the search box to learn more about \"Lightheadedness or Faintness: Care Instructions. \"  Current as of: March 20, 2017  Content Version: 11.4  © 4872-1818 [employer]. Care instructions adapted under license by [employer] (which disclaims liability or warranty for this information). If you have questions about a medical condition or this instruction, always ask your healthcare professional. [address] any warranty or liability for your use of this information. Chest Pain: Care Instructions  Your Care Instructions    There are many things that can cause chest pain. Some are not serious and will get better on their own in a few days. But some kinds of chest pain need more testing and treatment. Your doctor may have recommended a follow-up visit in the next 8 to 12 hours. If you are not getting better, you may need more tests or treatment. Even though your doctor has released you, you still need to watch for any problems. The doctor carefully checked you, but sometimes problems can develop later. If you have new symptoms or if your symptoms do not get better, get medical care right away. If you have worse or different chest pain or pressure that lasts more than 5 minutes or you passed out (lost consciousness), call 911 or seek other emergency help right away. A medical visit is only one step in your treatment. Even if you feel better, you still need to do what your doctor recommends, such as going to all suggested follow-up appointments and taking medicines exactly as directed. This will help you recover and help prevent future problems. How can you care for yourself at home? · Rest until you feel better. · Take your medicine exactly as prescribed. Call your doctor if you think you are having a problem with your medicine. · Do not drive after taking a prescription pain medicine. When should you call for help? Call 911 if:  ? · You passed out (lost consciousness). ? · You have severe difficulty breathing. ? · You have symptoms of a heart attack. These may include:  ¨ Chest pain or pressure, or a strange feeling in your chest.  ¨ Sweating. ¨ Shortness of breath. ¨ Nausea or vomiting. ¨ Pain, pressure, or a strange feeling in your back, neck, jaw, or upper belly or in one or both shoulders or arms. ¨ Lightheadedness or sudden weakness. ¨ A fast or irregular heartbeat. After you call 911, the  may tell you to chew 1 adult-strength or 2 to 4 low-dose aspirin. Wait for an ambulance. Do not try to drive yourself. ?Call your doctor today if:  ? · You have any trouble breathing. ? · Your chest pain gets worse. ? · You are dizzy or lightheaded, or you feel like you may faint. ? · You are not getting better as expected. ? · You are having new or different chest pain. Where can you learn more? Go to http://delores-scotty.info/. Enter A120 in the search box to learn more about \"Chest Pain: Care Instructions. \"  Current as of: March 20, 2017  Content Version: 11.4  © 0756-8776 [employer]. Care instructions adapted under license by [employer] (which disclaims liability or warranty for this information). If you have questions about a medical condition or this instruction, always ask your healthcare professional. [address] any warranty or liability for your use of this information.

## 2018-03-06 NOTE — MR AVS SNAPSHOT
Talha Nassar 
 
 
 [address] [address] [address] [id number] [phone] Patient: Khushbu Greer MRN: [id number] ORLIN:[id number] Visit Information Date & Time Provider Department Dept. Phone Encounter #  
 3/6/2018  3:00 PM Stephen Moya NP [address] [phone] Follow-up Instructions Return in about 1 week (around 3/13/2018), or if symptoms worsen or fail to improve. Your Appointments 3/13/2018  3:40 PM  
Follow Up with Carrol Will MD  
[facility] at [address] [facility] Appt Note: 6 wk f/u  
 [address] [facility] [address] [facility] 630 [facility] [address] [address] [address] [address]  
  
    
 4/2/2018  9:20 AM  
New Patient with Andres Olson MD  
[facility] ([facility]) Appt Note: ref by [facility] ER //  dx dizziness & chest pain  //  notes in cc  
 [place] [address] [address] [address] [phone] [address] Upcoming Health Maintenance Date Due  
 PAP AKA CERVICAL CYTOLOGY 12/20/2016 Influenza Age 5 to Adult 8/1/2017 DTaP/Tdap/Td series (2 - Td) 12/13/2022 Allergies as of 3/6/2018  Review Complete On: 3/2/2018 By: Marsha Slaughter RN Severity Noted Reaction Type Reactions Ciprofloxacin (Bulk) High 06/13/2012   Side Effect Diarrhea Codeine High 12/12/2011    Itching Dilaudid [Hydromorphone (Bulk)] High 12/23/2011    Itching Lyrica [Pregabalin] High 12/28/2015    Swelling Tramadol Medium 06/13/2012    Other (comments), Unknown (comments) Gets headaches 
headache Codeine Phosphate  10/15/2015    Unknown (comments) Hydromorphone  10/15/2015    Unknown (comments) Current Immunizations  Never Reviewed Name Date Influenza Vaccine Split 12/13/2012 Tdap 12/13/2012 Not reviewed this visit You Were Diagnosed With   
  
 Codes Comments Cystitis    -  Primary ICD-10-CM: N30.90 ICD-9-CM: 595.9 Chest pain, unspecified type     ICD-10-CM: R07.9 ICD-9-CM: 786.50 Nausea     ICD-10-CM: R11.0 ICD-9-CM: 787.02 Vitals BP Pulse Temp Resp Height(growth percentile) Weight(growth percentile) 116/80 (BP 1 Location: Right arm, BP Patient Position: Sitting) 77 98.1 °F (36.7 °C) (Oral) 16 5' 6\" (1.676 m) 169 lb (76.7 kg) LMP BMI OB Status Smoking Status 03/02/2018 27.28 kg/m2 Having regular periods Never Smoker BMI and BSA Data Body Mass Index Body Surface Area  
 27.28 kg/m 2 1.89 m 2 Preferred Pharmacy Pharmacy Name Phone [facility] [address] [place] [address] [phone] Your Updated Medication List  
  
   
This list is accurate as of 3/6/18  4:03 PM.  Always use your most recent med list.  
  
  
  
  
 LORazepam 0.5 mg tablet Commonly known as:  ATIVAN Take 1-2 Tabs by mouth every eight (8) hours as needed (dizziness). Max Daily Amount: 3 mg.  
  
 meclizine 12.5 mg tablet Commonly known as:  ANTIVERT Take  by mouth three (3) times daily as needed. ondansetron 4 mg disintegrating tablet Commonly known as:  ZOFRAN ODT Take 1 Tab by mouth every eight (8) hours as needed for Nausea. topiramate 25 mg tablet Commonly known as:  TOPAMAX Take 1 Tab by mouth two (2) times a day. Indications: MIGRAINE PREVENTION Prescriptions Sent to Pharmacy Refills  
 ondansetron (ZOFRAN ODT) 4 mg disintegrating tablet 0 Sig: Take 1 Tab by mouth every eight (8) hours as needed for Nausea. Class: Normal  
 Pharmacy: [facility] [address] [place] [address] #: [phone] Route: Oral  
  
We Performed the Following AMB POC URINALYSIS DIP STICK AUTO W/O MICRO [09470 CPT(R)] Follow-up Instructions Return in about 1 week (around 3/13/2018), or if symptoms worsen or fail to improve. Patient Instructions Please contact our office if you have any questions about your visit today. Chest Pain: Care Instructions Your Care Instructions There are many things that can cause chest pain. Some are not serious and will get better on their own in a few days. But some kinds of chest pain need more testing and treatment. Your doctor may have recommended a follow-up visit in the next 8 to 12 hours. If you are not getting better, you may need more tests or treatment. Even though your doctor has released you, you still need to watch for any problems. The doctor carefully checked you, but sometimes problems can develop later. If you have new symptoms or if your symptoms do not get better, get medical care right away. If you have worse or different chest pain or pressure that lasts more than 5 minutes or you passed out (lost consciousness), call 911 or seek other emergency help right away. A medical visit is only one step in your treatment. Even if you feel better, you still need to do what your doctor recommends, such as going to all suggested follow-up appointments and taking medicines exactly as directed. This will help you recover and help prevent future problems. How can you care for yourself at home? · Rest until you feel better. · Take your medicine exactly as prescribed. Call your doctor if you think you are having a problem with your medicine. · Do not drive after taking a prescription pain medicine. When should you call for help? Call 911 if: 
? · You passed out (lost consciousness). ? · You have severe difficulty breathing. ? · You have symptoms of a heart attack. These may include: ¨ Chest pain or pressure, or a strange feeling in your chest. 
¨ Sweating. ¨ Shortness of breath. ¨ Nausea or vomiting. ¨ Pain, pressure, or a strange feeling in your back, neck, jaw, or upper belly or in one or both shoulders or arms. ¨ Lightheadedness or sudden weakness. ¨ A fast or irregular heartbeat. After you call 911, the  may tell you to chew 1 adult-strength or 2 to 4 low-dose aspirin. Wait for an ambulance. Do not try to drive yourself. ?Call your doctor today if: 
? · You have any trouble breathing. ? · Your chest pain gets worse. ? · You are dizzy or lightheaded, or you feel like you may faint. ? · You are not getting better as expected. ? · You are having new or different chest pain. Where can you learn more? Go to http://deloresiSquarescotty.info/. Enter A120 in the search box to learn more about \"Chest Pain: Care Instructions. \" Current as of: March 20, 2017 Content Version: 11.4 © 0530-3220 makeena. Care instructions adapted under license by [employer] (which disclaims liability or warranty for this information). If you have questions about a medical condition or this instruction, always ask your healthcare professional. [place] [address] any warranty or liability for your use of this information. Urinary Tract Infection in Women: Care Instructions Your Care Instructions A urinary tract infection, or UTI, is a general term for an infection anywhere between the kidneys and the urethra (where urine comes out). Most UTIs are bladder infections. They often cause pain or burning when you urinate. UTIs are caused by bacteria and can be cured with antibiotics. Be sure to complete your treatment so that the infection goes away. Follow-up care is a key part of your treatment and safety. Be sure to make and go to all appointments, and call your doctor if you are having problems. It's also a good idea to know your test results and keep a list of the medicines you take. How can you care for yourself at home? · Take your antibiotics as directed. Do not stop taking them just because you feel better. You need to take the full course of antibiotics. · Drink extra water and other fluids for the next day or two. This may help wash out the bacteria that are causing the infection. (If you have kidney, heart, or liver disease and have to limit fluids, talk with your doctor before you increase your fluid intake.) · Avoid drinks that are carbonated or have caffeine. They can irritate the bladder. · Urinate often. Try to empty your bladder each time. · To relieve pain, take a hot bath or lay a heating pad set on low over your lower belly or genital area. Never go to sleep with a heating pad in place. To prevent UTIs · Drink plenty of water each day. This helps you urinate often, which clears bacteria from your system. (If you have kidney, heart, or liver disease and have to limit fluids, talk with your doctor before you increase your fluid intake.) · Urinate when you need to. · Urinate right after you have sex. · Change sanitary pads often. · Avoid douches, bubble baths, feminine hygiene sprays, and other feminine hygiene products that have deodorants. · After going to the bathroom, wipe from front to back. When should you call for help? Call your doctor now or seek immediate medical care if: 
? · Symptoms such as fever, chills, nausea, or vomiting get worse or appear for the first time. ? · You have new pain in your back just below your rib cage. This is called flank pain. ? · There is new blood or pus in your urine. ? · You have any problems with your antibiotic medicine. ? Watch closely for changes in your health, and be sure to contact your doctor if: 
? · You are not getting better after taking an antibiotic for 2 days. ? · Your symptoms go away but then come back. Where can you learn more? Go to http://delores-scotty.info/. Enter B137 in the search box to learn more about \"Urinary Tract Infection in Women: Care Instructions. \" Current as of: May 12, 2017 Content Version: 11.4 © 7759-5603 Moondo. Care instructions adapted under license by [employer] (which disclaims liability or warranty for this information). If you have questions about a medical condition or this instruction, always ask your healthcare professional. [address] any warranty or liability for your use of this information. Introducing [facility]! Dear Karely Gorman: 
Thank you for requesting a [employer] account. Our records indicate that you already have an active [employer] account. You can access your account anytime at [email or web address] Did you know that you can access your hospital and ER discharge instructions at any time in [employer]? You can also review all of your test results from your hospital stay or ER visit. Additional Information If you have questions, please visit the Frequently Asked Questions section of the [employer] website at [email or web address] Remember, [employer] is NOT to be used for urgent needs. For medical emergencies, dial 911. Now available from your iPhone and Android! Please provide this summary of care documentation to your next provider. Your primary care clinician is listed as [facility] Ajay. If you have any questions after today's visit, please call [phone].

## 2018-03-06 NOTE — PROGRESS NOTES
Chief Complaint   Patient presents with    Follow-up     2 week f/u    Vaginal Discharge    Blood in Urine    Bladder Infection       Health Maintenance Due   Topic Date Due    PAP AKA CERVICAL CYTOLOGY  12/20/2016    Influenza Age 5 to Adult  08/01/2017       Health Maintenance reviewed     1. Have you been to the ER, urgent care clinic since your last visit? Hospitalized since your last visit? Yes When: 3/18 Where: [facility] Reason for visit: chest tightness    2. Have you seen or consulted any other health care providers outside of the [address] [place] [address] since your last visit? Include any pap smears or colon screening. No      Note:  When NP Alessia Cunha entered exam room patient informed her that she was having chest pains. Ms Alessia Cunha advised her that due to recent cardiac issues she should be transported to ED by EMS. Patient then stated she did not wish to go to ED, she was no longer having chest pain.

## 2018-03-06 NOTE — PATIENT INSTRUCTIONS
Please contact our office if you have any questions about your visit today. Chest Pain: Care Instructions  Your Care Instructions    There are many things that can cause chest pain. Some are not serious and will get better on their own in a few days. But some kinds of chest pain need more testing and treatment. Your doctor may have recommended a follow-up visit in the next 8 to 12 hours. If you are not getting better, you may need more tests or treatment. Even though your doctor has released you, you still need to watch for any problems. The doctor carefully checked you, but sometimes problems can develop later. If you have new symptoms or if your symptoms do not get better, get medical care right away. If you have worse or different chest pain or pressure that lasts more than 5 minutes or you passed out (lost consciousness), call 911 or seek other emergency help right away. A medical visit is only one step in your treatment. Even if you feel better, you still need to do what your doctor recommends, such as going to all suggested follow-up appointments and taking medicines exactly as directed. This will help you recover and help prevent future problems. How can you care for yourself at home? · Rest until you feel better. · Take your medicine exactly as prescribed. Call your doctor if you think you are having a problem with your medicine. · Do not drive after taking a prescription pain medicine. When should you call for help? Call 911 if:  ? · You passed out (lost consciousness). ? · You have severe difficulty breathing. ? · You have symptoms of a heart attack. These may include:  ¨ Chest pain or pressure, or a strange feeling in your chest.  ¨ Sweating. ¨ Shortness of breath. ¨ Nausea or vomiting. ¨ Pain, pressure, or a strange feeling in your back, neck, jaw, or upper belly or in one or both shoulders or arms. ¨ Lightheadedness or sudden weakness. ¨ A fast or irregular heartbeat.   After you call 911, the  may tell you to chew 1 adult-strength or 2 to 4 low-dose aspirin. Wait for an ambulance. Do not try to drive yourself. ?Call your doctor today if:  ? · You have any trouble breathing. ? · Your chest pain gets worse. ? · You are dizzy or lightheaded, or you feel like you may faint. ? · You are not getting better as expected. ? · You are having new or different chest pain. Where can you learn more? Go to http://delores-scotty.info/. Enter A120 in the search box to learn more about \"Chest Pain: Care Instructions. \"  Current as of: March 20, 2017  Content Version: 11.4  © 9929-7439 Digital Legends. Care instructions adapted under license by [employer] (which disclaims liability or warranty for this information). If you have questions about a medical condition or this instruction, always ask your healthcare professional. [place] [address] any warranty or liability for your use of this information. Urinary Tract Infection in Women: Care Instructions  Your Care Instructions    A urinary tract infection, or UTI, is a general term for an infection anywhere between the kidneys and the urethra (where urine comes out). Most UTIs are bladder infections. They often cause pain or burning when you urinate. UTIs are caused by bacteria and can be cured with antibiotics. Be sure to complete your treatment so that the infection goes away. Follow-up care is a key part of your treatment and safety. Be sure to make and go to all appointments, and call your doctor if you are having problems. It's also a good idea to know your test results and keep a list of the medicines you take. How can you care for yourself at home? · Take your antibiotics as directed. Do not stop taking them just because you feel better. You need to take the full course of antibiotics. · Drink extra water and other fluids for the next day or two.  This may help wash out the bacteria that are causing the infection. (If you have kidney, heart, or liver disease and have to limit fluids, talk with your doctor before you increase your fluid intake.)  · Avoid drinks that are carbonated or have caffeine. They can irritate the bladder. · Urinate often. Try to empty your bladder each time. · To relieve pain, take a hot bath or lay a heating pad set on low over your lower belly or genital area. Never go to sleep with a heating pad in place. To prevent UTIs  · Drink plenty of water each day. This helps you urinate often, which clears bacteria from your system. (If you have kidney, heart, or liver disease and have to limit fluids, talk with your doctor before you increase your fluid intake.)  · Urinate when you need to. · Urinate right after you have sex. · Change sanitary pads often. · Avoid douches, bubble baths, feminine hygiene sprays, and other feminine hygiene products that have deodorants. · After going to the bathroom, wipe from front to back. When should you call for help? Call your doctor now or seek immediate medical care if:  ? · Symptoms such as fever, chills, nausea, or vomiting get worse or appear for the first time. ? · You have new pain in your back just below your rib cage. This is called flank pain. ? · There is new blood or pus in your urine. ? · You have any problems with your antibiotic medicine. ? Watch closely for changes in your health, and be sure to contact your doctor if:  ? · You are not getting better after taking an antibiotic for 2 days. ? · Your symptoms go away but then come back. Where can you learn more? Go to http://delores-scotty.info/. Enter S128 in the search box to learn more about \"Urinary Tract Infection in Women: Care Instructions. \"  Current as of: May 12, 2017  Content Version: 11.4  © 6631-5055 [employer]Room International.  Care instructions adapted under license by [employer] (which disclaims liability or warranty for this information). If you have questions about a medical condition or this instruction, always ask your healthcare professional. [place] [address] any warranty or liability for your use of this information.

## 2018-03-06 NOTE — PROGRESS NOTES
HPI  Khushbu Greer is a 38 y.o. female  Chief Complaint   Patient presents with    Follow-up     2 week f/u    Vaginal Discharge    Blood in Urine    Bladder Infection     Reports she went to her ER and they told her last night that she needs a cardiologist and a halter monitor. Requesting that halter monitor be ordered as her appointment with cardiology is  with Dr. Prachi Zepeda. Reports she has had ongoing chest pain since 6 days ago and reports she has been gone to the ER 3 times and they told her her EKG was normal with her last visit last night. Reports they gave her ativan and sent her home. Reports her chest pain comes and goes. Reports she is having dizziness and hot flashes and they informed her that exam was negative. Reports she had some stuttering at the hospital two days ago but they did not think this was concerning. Reports she passed out 6 days ago and this is when she initially went to the ER. Reports she still has inconsistent episodes of nausea and requesting zofran be refilled. Reports her dizziness is not new and she has seen neurology which does not think her dizziness is related to neurological symptoms. Reports she follows up with neurology again next week. Reports vaginal area still feels mildly irritated. But she denies urgency, frequency, or dysuria. Reports a mild vaginal itch when she wipes. Reports her period is going off so her symptoms may be related to the discomfort she gets with her period.            Past Medical History  Past Medical History:   Diagnosis Date    Anemia     during pregnancy 2nd child    Migraine headache     Ovarian cyst     Vertigo        Surgical History  Past Surgical History:   Procedure Laterality Date     DELIVERY ONLY      DELIVERY   14    1st child    HX ORTHOPAEDIC  2005    RIGHT ELBOW    HX WISDOM TEETH EXTRACTION  1996    x4    INTESTINE SURG PROCEDURE UNLISTED      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   [facility] SHOULDER SURG [address]  9/08/2011    left shoulder        Medications  Current Outpatient Prescriptions   Medication Sig Dispense Refill    ondansetron (ZOFRAN ODT) 4 mg disintegrating tablet Take 1 Tab by mouth every eight (8) hours as needed for Nausea. 10 Tab 0    LORazepam (ATIVAN) 0.5 mg tablet Take 1-2 Tabs by mouth every eight (8) hours as needed (dizziness). Max Daily Amount: 3 mg. 9 Tab 0    meclizine (ANTIVERT) 12.5 mg tablet Take  by mouth three (3) times daily as needed.  topiramate (TOPAMAX) 25 mg tablet Take 1 Tab by mouth two (2) times a day. Indications: MIGRAINE PREVENTION 60 Tab 2       Allergies  Allergies   Allergen Reactions    Ciprofloxacin (Bulk) Diarrhea    Codeine Itching    Dilaudid [Hydromorphone (Bulk)] Itching    Lyrica [Pregabalin] Swelling    Tramadol Other (comments) and Unknown (comments)     Gets headaches  headache    Codeine Phosphate Unknown (comments)    Hydromorphone Unknown (comments)       Family History  Family History   Problem Relation Age of Onset    Heart Disease Mother     Hypertension Mother     MS Mother     Other Mother 54     Lupus    Stroke Mother      x3    Asthma Sister     Diabetes Paternal Grandmother     Hypertension Paternal Grandmother        Social History  Social History     Social History    Marital status: SINGLE     Spouse name: N/A    Number of children: N/A    Years of education: N/A     Occupational History    Not on file.      Social History Main Topics    Smoking status: Never Smoker    Smokeless tobacco: Never Used    Alcohol use Yes      Comment: rare    Drug use: No    Sexual activity: Yes     Partners: Male     Birth control/ protection: Implant      Comment: pregnancy test negative     Other Topics Concern    Not on file     Social History Narrative       Problem List  Patient Active Problem List   Diagnosis Code    Cystitis N30.90    Ovarian mass N83.9    Family history of blood dyscrasia Z83.2    Family history of cardiovascular disease Z82.49    Abnormal cervical Papanicolaou smear with positive human papilloma virus (HPV) DNA test [id number]    Myofascial muscle pain M79.1    Muscle spasms of neck M62.838    Cervical spondylosis M47.812    DDD (degenerative disc disease), cervical M50.30    Cervical myofascial pain syndrome M79.1    HNP (herniated nucleus pulposus), cervical M50.20    Muscle spasm M62.838    S/P cervical spinal fusion Z98.1    Migraine without aura and without status migrainosus, not intractable G43.009       Review of Systems  Review of Systems   Constitutional: Negative for chills and fever. Respiratory: Negative for shortness of breath. Cardiovascular: Positive for chest pain. Gastrointestinal: Positive for nausea. Negative for abdominal pain and vomiting. Genitourinary: Negative for dysuria, frequency, hematuria and urgency. Neurological: Positive for dizziness, speech change and loss of consciousness. Vital Signs  Vitals:    03/06/18 1507   BP: 116/80   Pulse: 77   Resp: 16   Temp: 98.1 °F (36.7 °C)   TempSrc: Oral   Weight: 169 lb (76.7 kg)   Height: 5' 6\" (1.676 m)   PainSc:   0 - No pain   LMP: 03/02/2018       Physical Exam  Physical Exam   Constitutional: She is oriented to person, place, and time. HENT:   Mouth/Throat: Oropharynx is clear and moist.   Eyes: EOM are normal. Pupils are equal, round, and reactive to light. Neck: Normal range of motion. Cardiovascular: Normal rate, regular rhythm, normal heart sounds and intact distal pulses. No murmur heard. Pulmonary/Chest: Effort normal and breath sounds normal. No respiratory distress. Abdominal: Soft. Bowel sounds are normal. She exhibits no distension. There is no tenderness. There is no rebound, no guarding and no CVA tenderness. Musculoskeletal: Normal range of motion. She exhibits no edema. Lymphadenopathy:     She has no cervical adenopathy.    Neurological: She is alert and oriented to person, place, and time. No cranial nerve deficit. Psychiatric: She has a normal mood and affect. Vitals reviewed. Diagnostics  Orders Placed This Encounter    AMB POC URINALYSIS DIP STICK OR TABLET REAGENT AUTO W/O MICRO    ondansetron (ZOFRAN ODT) 4 mg disintegrating tablet     Sig: Take 1 Tab by mouth every eight (8) hours as needed for Nausea. Dispense:  10 Tab     Refill:  0       Results  Results for orders placed or performed in visit on 03/06/18   AMB POC URINALYSIS DIP STICK AUTO W/O MICRO   Result Value Ref Range    Color (UA POC) Yellow     Clarity (UA POC) Clear     Glucose (UA POC) Negative Negative    Bilirubin (UA POC) Negative Negative    Ketones (UA POC) Trace Negative    Specific gravity (UA POC) 1.020 1.001 - 1.035    Blood (UA POC) Trace Negative    pH (UA POC) 5.5 4.6 - 8.0    Protein (UA POC) Negative Negative    Urobilinogen (UA POC) 0.2 mg/dL 0.2 - 1    Nitrites (UA POC) Negative Negative    Leukocyte esterase (UA POC) Trace Negative           Assessment and Plan  Diagnoses and all orders for this visit:    1. Cystitis  -     AMB POC URINALYSIS DIP STICK OR TABLET REAGENT AUTO W/O MICRO    2. Chest pain, unspecified type    3. Nausea  -     ondansetron (ZOFRAN ODT) 4 mg disintegrating tablet; Take 1 Tab by mouth every eight (8) hours as needed for Nausea. Patient to try an OTC Monistat if vaginal irritation continues. Strongly recommend patient go to ER if she is having chest pain. Patient denies chest pain. After care summary printed and reviewed with patient. Plan reviewed with patient. Questions answered. Patient verbalized understanding of plan and is in agreement with plan. Patient to follow up in one week with her PCP or earlier if symptoms worsen. She is to go to ER for any chest pain or shortness of breath  Will have nursing staff attempt to get patient and earlier appointment with cardiology.   She is to follow up with neurology at her appointment next week.     TORRI Valentine-C

## 2018-03-13 ENCOUNTER — APPOINTMENT (OUTPATIENT)
Dept: GENERAL RADIOLOGY | Age: 43
End: 2018-03-13
Attending: PHYSICIAN ASSISTANT
Payer: MEDICAID

## 2018-03-13 ENCOUNTER — HOSPITAL ENCOUNTER (EMERGENCY)
Age: 43
Discharge: HOME | End: 2018-03-13
Attending: EMERGENCY MEDICINE
Payer: MEDICAID

## 2018-03-13 VITALS
BODY MASS INDEX: 28.16 KG/M2 | OXYGEN SATURATION: 100 % | HEIGHT: 65 IN | SYSTOLIC BLOOD PRESSURE: 108 MMHG | WEIGHT: 169 LBS | RESPIRATION RATE: 19 BRPM | HEART RATE: 85 BPM | TEMPERATURE: 97.5 F | DIASTOLIC BLOOD PRESSURE: 63 MMHG
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
## 2018-03-13 DIAGNOSIS — R07.9 CHEST PAIN, UNSPECIFIED TYPE: ICD-10-CM

## 2018-03-13 DIAGNOSIS — M79.641 RIGHT HAND PAIN: ICD-10-CM

## 2018-03-13 DIAGNOSIS — S16.1XXA STRAIN OF NECK MUSCLE, INITIAL ENCOUNTER: Primary | ICD-10-CM

## 2018-03-13 LAB
ANION GAP SERPL CALC-SCNC: 7 MMOL/L (ref 3–18)
BASOPHILS # BLD: 0.1 K/UL (ref 0–0.06)
BASOPHILS NFR BLD: 1 % (ref 0–2)
BUN SERPL-MCNC: 11 MG/DL (ref 7–18)
BUN/CREAT SERPL: 15 (ref 12–20)
CALCIUM SERPL-MCNC: 9 MG/DL (ref 8.5–10.1)
CHLORIDE SERPL-SCNC: 104 MMOL/L (ref 100–108)
CO2 SERPL-SCNC: 23 MMOL/L (ref 21–32)
CREAT SERPL-MCNC: 0.72 MG/DL (ref 0.6–1.3)
DIFFERENTIAL METHOD BLD: ABNORMAL
EOSINOPHIL # BLD: 0.2 K/UL (ref 0–0.4)
EOSINOPHIL NFR BLD: 3 % (ref 0–5)
ERYTHROCYTE [DISTWIDTH] IN BLOOD BY AUTOMATED COUNT: 12.5 % (ref 11.6–14.5)
GLUCOSE SERPL-MCNC: 74 MG/DL (ref 74–99)
HCT VFR BLD AUTO: 36.5 % (ref 35–45)
HGB BLD-MCNC: 12.2 G/DL (ref 12–16)
LYMPHOCYTES # BLD: 2.4 K/UL (ref 0.9–3.6)
LYMPHOCYTES NFR BLD: 41 % (ref 21–52)
MCH RBC QN AUTO: 29.3 PG (ref 24–34)
MCHC RBC AUTO-ENTMCNC: 33.4 G/DL (ref 31–37)
MCV RBC AUTO: 87.7 FL (ref 74–97)
MONOCYTES # BLD: 0.5 K/UL (ref 0.05–1.2)
MONOCYTES NFR BLD: 9 % (ref 3–10)
NEUTS SEG # BLD: 2.6 K/UL (ref 1.8–8)
NEUTS SEG NFR BLD: 46 % (ref 40–73)
PLATELET # BLD AUTO: 246 K/UL (ref 135–420)
PLATELET COMMENTS,PCOM: ABNORMAL
PMV BLD AUTO: 10.8 FL (ref 9.2–11.8)
POTASSIUM SERPL-SCNC: 4.2 MMOL/L (ref 3.5–5.5)
RBC # BLD AUTO: 4.16 M/UL (ref 4.2–5.3)
RBC MORPH BLD: ABNORMAL
SODIUM SERPL-SCNC: 134 MMOL/L (ref 136–145)
TROPONIN I SERPL-MCNC: <0.02 NG/ML (ref 0–0.04)
TROPONIN I SERPL-MCNC: <0.02 NG/ML (ref 0–0.04)
WBC # BLD AUTO: 5.8 K/UL (ref 4.6–13.2)

## 2018-03-13 PROCEDURE — 80048 BASIC METABOLIC PNL TOTAL CA: CPT | Performed by: PHYSICIAN ASSISTANT

## 2018-03-13 PROCEDURE — 72040 X-RAY EXAM NECK SPINE 2-3 VW: CPT

## 2018-03-13 PROCEDURE — 74011250636 HC RX REV CODE- 250/636: Performed by: PHYSICIAN ASSISTANT

## 2018-03-13 PROCEDURE — 73130 X-RAY EXAM OF HAND: CPT

## 2018-03-13 PROCEDURE — 84484 ASSAY OF TROPONIN QUANT: CPT | Performed by: PHYSICIAN ASSISTANT

## 2018-03-13 PROCEDURE — 74011250637 HC RX REV CODE- 250/637: Performed by: PHYSICIAN ASSISTANT

## 2018-03-13 PROCEDURE — 99284 EMERGENCY DEPT VISIT MOD MDM: CPT

## 2018-03-13 PROCEDURE — 71046 X-RAY EXAM CHEST 2 VIEWS: CPT

## 2018-03-13 PROCEDURE — 85025 COMPLETE CBC W/AUTO DIFF WBC: CPT | Performed by: PHYSICIAN ASSISTANT

## 2018-03-13 PROCEDURE — 96372 THER/PROPH/DIAG INJ SC/IM: CPT

## 2018-03-13 PROCEDURE — 93005 ELECTROCARDIOGRAM TRACING: CPT

## 2018-03-13 RX ORDER — CYCLOBENZAPRINE HCL 5 MG
10 TABLET ORAL 3 TIMES DAILY
Qty: 9 TAB | Refills: 0 | Status: SHIPPED | OUTPATIENT
Start: 2018-03-13 | End: 2018-12-11

## 2018-03-13 RX ORDER — GUAIFENESIN 100 MG/5ML
324 LIQUID (ML) ORAL
Status: COMPLETED | OUTPATIENT
Start: 2018-03-13 | End: 2018-03-13

## 2018-03-13 RX ORDER — KETOROLAC TROMETHAMINE 30 MG/ML
60 INJECTION, SOLUTION INTRAMUSCULAR; INTRAVENOUS
Status: COMPLETED | OUTPATIENT
Start: 2018-03-13 | End: 2018-03-13

## 2018-03-13 RX ORDER — IBUPROFEN 600 MG/1
600 TABLET ORAL
Qty: 20 TAB | Refills: 0 | Status: SHIPPED | OUTPATIENT
Start: 2018-03-13 | End: 2018-12-11

## 2018-03-13 RX ADMIN — KETOROLAC TROMETHAMINE 60 MG: 30 INJECTION, SOLUTION INTRAMUSCULAR at 13:24

## 2018-03-13 RX ADMIN — ASPIRIN 81 MG 324 MG: 81 TABLET ORAL at 14:47

## 2018-03-13 NOTE — DISCHARGE INSTRUCTIONS
Hand Pain: Care Instructions  Your Care Instructions    Common causes of hand pain are overuse and injuries, such as might happen during sports or home repair projects. Everyday wear and tear, especially as you get older, also can cause hand pain. Most minor hand injuries will heal on their own, and home treatment is usually all you need to do. If you have sudden and severe pain, you may need tests and treatment. Follow-up care is a key part of your treatment and safety. Be sure to make and go to all appointments, and call your doctor if you are having problems. It's also a good idea to know your test results and keep a list of the medicines you take. How can you care for yourself at home? · Take pain medicines exactly as directed. ¨ If the doctor gave you a prescription medicine for pain, take it as prescribed. ¨ If you are not taking a prescription pain medicine, ask your doctor if you can take an over-the-counter medicine. · Rest and protect your hand. Take a break from any activity that may cause pain. · Put ice or a cold pack on your hand for 10 to 20 minutes at a time. Put a thin cloth between the ice and your skin. · Prop up the sore hand on a pillow when you ice it or anytime you sit or lie down during the next 3 days. Try to keep it above the level of your heart. This will help reduce swelling. · If your doctor recommends a sling, splint, or elastic bandage to support your hand, wear it as directed. When should you call for help? Call 911 anytime you think you may need emergency care. For example, call if:  ? · Your hand turns cool or pale or changes color. ?Call your doctor now or seek immediate medical care if:  ? · You cannot move your hand. ? · Your hand pops, moves out of its normal position, and then returns to its normal position. ? · You have signs of infection, such as:  ¨ Increased pain, swelling, warmth, or redness. ¨ Red streaks leading from the sore area.   ¨ Pus draining from a place on your hand. ¨ A fever. ? · Your hand feels numb or tingly. ? Watch closely for changes in your health, and be sure to contact your doctor if:  ? · Your hand feels unstable when you try to use it. ? · You do not get better as expected. ? · You have any new symptoms, such as swelling. ? · Bruises from an injury to your hand last longer than 2 weeks. Where can you learn more? Go to http://delores-scotty.info/. Enter R273 in the search box to learn more about \"Hand Pain: Care Instructions. \"  Current as of: March 20, 2017  Content Version: 11.4  © 0783-3788 [employer]. Care instructions adapted under license by [employer] (which disclaims liability or warranty for this information). If you have questions about a medical condition or this instruction, always ask your healthcare professional. [place] [address] any warranty or liability for your use of this information. Neck Strain: Care Instructions  Your Care Instructions    You have strained the muscles and ligaments in your neck. A sudden, awkward movement can strain the neck. This often occurs with falls or car accidents or during certain sports. Everyday activities like working on a computer or sleeping can also cause neck strain if they force you to hold your neck in an awkward position for a long time. It is common for neck pain to get worse for a day or two after an injury, but it should start to feel better after that. You may have more pain and stiffness for several days before it gets better. This is expected. It may take a few weeks or longer for it to heal completely. Good home treatment can help you get better faster and avoid future neck problems. Follow-up care is a key part of your treatment and safety. Be sure to make and go to all appointments, and call your doctor if you are having problems.  It's also a good idea to know your test results and keep a list of the medicines you take. How can you care for yourself at home? · If you were given a neck brace (cervical collar) to limit neck motion, wear it as instructed for as many days as your doctor tells you to. Do not wear it longer than you were told to. Wearing a brace for too long can make neck stiffness worse and weaken the neck muscles. · You can try using heat or ice to see if it helps. ¨ Try using a heating pad on a low or medium setting for 15 to 20 minutes every 2 to 3 hours. Try a warm shower in place of one session with the heating pad. You can also buy single-use heat wraps that last up to 8 hours. ¨ You can also try an ice pack for 10 to 15 minutes every 2 to 3 hours. · Take pain medicines exactly as directed. ¨ If the doctor gave you a prescription medicine for pain, take it as prescribed. ¨ If you are not taking a prescription pain medicine, ask your doctor if you can take an over-the-counter medicine. · Gently rub the area to relieve pain and help with blood flow. Do not massage the area if it hurts to do so. · Do not do anything that makes the pain worse. Take it easy for a couple of days. You can do your usual activities if they do not hurt your neck or put it at risk for more stress or injury. · Try sleeping on a special neck pillow. Place it under your neck, not under your head. Placing a tightly rolled-up towel under your neck while you sleep will also work. If you use a neck pillow or rolled towel, do not use your regular pillow at the same time. · To prevent future neck pain, do exercises to stretch and strengthen your neck and back. Learn how to use good posture, safe lifting techniques, and proper body mechanics. When should you call for help? Call 911 anytime you think you may need emergency care. For example, call if:  ? · You are unable to move an arm or a leg at all.    ?Call your doctor now or seek immediate medical care if:  ? · You have new or worse symptoms in your arms, legs, chest, belly, or buttocks. Symptoms may include:  ¨ Numbness or tingling. ¨ Weakness. ¨ Pain. ? · You lose bladder or bowel control. ? Watch closely for changes in your health, and be sure to contact your doctor if:  ? · You are not getting better as expected. Where can you learn more? Go to http://delores-scotty.info/. Enter M253 in the search box to learn more about \"Neck Strain: Care Instructions. \"  Current as of: March 21, 2017  Content Version: 11.4  © 6842-6457 [employer]. Care instructions adapted under license by [employer] (which disclaims liability or warranty for this information). If you have questions about a medical condition or this instruction, always ask your healthcare professional. [address] any warranty or liability for your use of this information.

## 2018-03-13 NOTE — ED NOTES
Patient assessment completed. Patient given medication per [facility] call [facility] within reach. No additional wants or needs at this time.
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
## 2018-03-14 LAB
ATRIAL RATE: 68 BPM
CALCULATED P AXIS, ECG09: 50 DEGREES
CALCULATED R AXIS, ECG10: 58 DEGREES
CALCULATED T AXIS, ECG11: 67 DEGREES
DIAGNOSIS, 93000: NORMAL
P-R INTERVAL, ECG05: 146 MS
Q-T INTERVAL, ECG07: 364 MS
QRS DURATION, ECG06: 76 MS
QTC CALCULATION (BEZET), ECG08: 387 MS
VENTRICULAR RATE, ECG03: 68 BPM

## 2018-03-19 ENCOUNTER — OFFICE VISIT (OUTPATIENT)
Dept: FAMILY MEDICINE CLINIC | Age: 43
End: 2018-03-19

## 2018-03-19 VITALS
HEIGHT: 65 IN | BODY MASS INDEX: 27.66 KG/M2 | RESPIRATION RATE: 14 BRPM | SYSTOLIC BLOOD PRESSURE: 95 MMHG | TEMPERATURE: 98.4 F | HEART RATE: 71 BPM | DIASTOLIC BLOOD PRESSURE: 62 MMHG | WEIGHT: 166 LBS | OXYGEN SATURATION: 100 %

## 2018-03-19 DIAGNOSIS — S16.1XXD STRAIN OF NECK MUSCLE, SUBSEQUENT ENCOUNTER: Primary | ICD-10-CM

## 2018-03-19 DIAGNOSIS — R89.9 ABNORMAL LABORATORY TEST RESULT: ICD-10-CM

## 2018-03-19 DIAGNOSIS — M79.641 RIGHT HAND PAIN: ICD-10-CM

## 2018-03-19 DIAGNOSIS — R07.9 CHEST PAIN, UNSPECIFIED TYPE: ICD-10-CM
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
## 2018-03-28 ENCOUNTER — TELEPHONE (OUTPATIENT)
Dept: FAMILY MEDICINE CLINIC | Age: 43
End: 2018-03-28

## 2018-03-28 DIAGNOSIS — N30.90 CYSTITIS: Primary | ICD-10-CM

## 2018-04-02 ENCOUNTER — OFFICE VISIT (OUTPATIENT)
Dept: CARDIOLOGY CLINIC | Age: 43
End: 2018-04-02

## 2018-04-02 VITALS
SYSTOLIC BLOOD PRESSURE: 104 MMHG | DIASTOLIC BLOOD PRESSURE: 82 MMHG | WEIGHT: 166 LBS | HEIGHT: 65 IN | BODY MASS INDEX: 27.66 KG/M2 | HEART RATE: 79 BPM

## 2018-04-02 DIAGNOSIS — Z82.49 FAMILY HISTORY OF CARDIOVASCULAR DISEASE: ICD-10-CM

## 2018-04-02 DIAGNOSIS — G43.009 MIGRAINE WITHOUT AURA AND WITHOUT STATUS MIGRAINOSUS, NOT INTRACTABLE: ICD-10-CM

## 2018-04-02 DIAGNOSIS — R55 SYNCOPE, UNSPECIFIED SYNCOPE TYPE: Primary | ICD-10-CM

## 2018-04-02 NOTE — MR AVS SNAPSHOT
[address] [facility] [address] 
 
 
 [address], [address] [facility] [address] 
[phone] Patient: Piyush Fernandez MRN: [id number] GQD:[birth year] Visit Information Date & Time Provider Department Dept. Phone Encounter #  
 4/2/2018 11:45 AM Tex Marcano MD [facility] [address] [place] [address] [id number] Follow-up Instructions Return in about 3 weeks (around 4/23/2018). Your Appointments 4/18/2018 10:45 AM  
PROCEDURE with CA ECHO [facility] [place] ([address]) Appt Note: in[place] [address], [address] [facility] [address]  
[address], [address] [address]  
  
    
 4/27/2018  9:30 AM  
Office Visit with Tex Marcano MD  
[facility] Replaced by [facility]) Appt Note: post tilt table/ echo [address], [address] [facility] [address]  
[address], [address] [facility] [address] [place] [address] Upcoming Health Maintenance Date Due  
 PAP AKA CERVICAL CYTOLOGY 12/20/2016 Influenza Age 5 to Adult 8/1/2017 DTaP/Tdap/Td series (2 - Td) 12/13/2022 Allergies as of 4/2/2018  Review Complete On: 4/2/2018 By: Starling Marylou. Oswaldo Hutchins Severity Noted Reaction Type Reactions Ciprofloxacin (Bulk) High 06/13/2012   Side Effect Diarrhea Codeine High 12/12/2011    Itching Dilaudid [Hydromorphone (Bulk)] High 12/23/2011    Itching Lyrica [Pregabalin] High 12/28/2015    Swelling Tramadol Medium 06/13/2012    Other (comments), Unknown (comments) Gets headaches 
headache Codeine Phosphate  10/15/2015    Unknown (comments) Hydromorphone  10/15/2015    Unknown (comments) Current Immunizations  Never Reviewed Name Date Influenza Vaccine Split 12/13/2012 Tdap 12/13/2012 Not reviewed this visit You Were Diagnosed With   
  
 Codes Comments Syncope, unspecified syncope type    -  Primary ICD-10-CM: R55 
ICD-9-CM: 780. 2 Vitals BP Pulse Height(growth percentile) Weight(growth percentile) BMI OB Status 104/82 79 5' 5\" (1.651 m) 166 lb (75.3 kg) 27.62 kg/m2 Having regular periods Smoking Status Never Smoker Vitals History BMI and BSA Data Body Mass Index Body Surface Area  
 27.62 kg/m 2 1.86 m 2 Preferred Pharmacy Pharmacy Name Phone [facility] [address] [place] [address] [phone] Your Updated Medication List  
  
   
This list is accurate as of 4/2/18  1:31 PM.  Always use your most recent med list.  
  
  
  
  
 cyclobenzaprine 5 mg tablet Commonly known as:  FLEXERIL Take 2 Tabs by mouth three (3) times daily. ibuprofen 600 mg tablet Commonly known as:  MOTRIN Take 1 Tab by mouth every six (6) hours as needed for Pain. LORazepam 0.5 mg tablet Commonly known as:  ATIVAN Take 1-2 Tabs by mouth every eight (8) hours as needed (dizziness). Max Daily Amount: 3 mg.  
  
 meclizine 12.5 mg tablet Commonly known as:  ANTIVERT Take  by mouth three (3) times daily as needed. ondansetron 4 mg disintegrating tablet Commonly known as:  ZOFRAN ODT Take 1 Tab by mouth every eight (8) hours as needed for Nausea. topiramate 25 mg tablet Commonly known as:  TOPAMAX Take 1 Tab by mouth two (2) times a day. Indications: MIGRAINE PREVENTION Follow-up Instructions Return in about 3 weeks (around 4/23/2018). To-Do List   
 04/16/2018 Cardiac Services:  2D ECHO COMPLETE ADULT (TTE)   
  
 04/16/2018 ECG:  TILT TABLE EVAL W/WO DRUG Introducing [facility]! Dear Robert Limon: 
Thank you for requesting a [employer] account. Our records indicate that you already have an active [employer] account.   You can access your account anytime at [email or web address] Did you know that you can access your hospital and ER discharge instructions at any time in [employer]? You can also review all of your test results from your hospital stay or ER visit. Additional Information If you have questions, please visit the Frequently Asked Questions section of the [employer] website at [email or web address] Remember, [employer] is NOT to be used for urgent needs. For medical emergencies, dial 911. Now available from your iPhone and Android! Please provide this summary of care documentation to your next provider. Your primary care clinician is listed as Sydnie Robertson. If you have any questions after today's visit, please call [phone].

## 2018-04-03 NOTE — PROGRESS NOTES
HISTORY OF PRESENT ILLNESS  Marcin Zabala is a 38 y.o. female. New Patient   The history is provided by the patient. This is a recurrent problem. The current episode started more than 1 week ago. The problem occurs every several days. Associated symptoms include chest pain. Pertinent negatives include no abdominal pain, no headaches and no shortness of breath. Chest Pain    The history is provided by the patient. This is a chronic problem. The problem occurs rarely. The quality of the pain is described as sharp. The pain does not radiate. Pertinent negatives include no abdominal pain, no claudication, no cough, no diaphoresis, no dizziness, no fever, no headaches, no hemoptysis, no malaise/fatigue, no nausea, no orthopnea, no palpitations, no PND, no shortness of breath, no sputum production, no vomiting and no weakness. Pertinent negatives include no cardiac catheterization, no echocardiogram and no exercise treadmill test.      Review of Systems   Constitutional: Negative for chills, diaphoresis, fever, malaise/fatigue and weight loss. HENT: Negative for congestion, ear discharge, ear pain, hearing loss, nosebleeds and tinnitus. Eyes: Negative for blurred vision. Respiratory: Negative for cough, hemoptysis, sputum production, shortness of breath, wheezing and stridor. Cardiovascular: Positive for chest pain. Negative for palpitations, orthopnea, claudication, leg swelling and PND. Gastrointestinal: Negative for abdominal pain, heartburn, nausea and vomiting. Musculoskeletal: Negative for myalgias and neck pain. Skin: Negative for itching and rash. Neurological: Negative for dizziness, tingling, tremors, focal weakness, loss of consciousness, weakness and headaches. Psychiatric/Behavioral: Negative for depression and suicidal ideas.      Family History   Problem Relation Age of Onset    Heart Disease Mother     Hypertension Mother    [facility] MS Mother    [facility] Other Mother 54     Lupus    Stroke Mother      x3    Asthma Sister     Diabetes Paternal Grandmother     Hypertension Paternal Grandmother        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   Laurie Asa SHOULDER SURG [address] [facility]  2011    left shoulder       Social History   Substance Use Topics    Smoking status: Never Smoker    Smokeless tobacco: Never Used    Alcohol use Yes      Comment: rare       Allergies   Allergen Reactions    Ciprofloxacin (Bulk) Diarrhea    Codeine Itching    Dilaudid [Hydromorphone (Bulk)] Itching    Lyrica [Pregabalin] Swelling    Tramadol Other (comments) and Unknown (comments)     Gets headaches  headache    Codeine Phosphate Unknown (comments)    Hydromorphone Unknown (comments)       Outpatient Prescriptions Marked as Taking for the 18 encounter (Office Visit) with Celia Watkins MD   Medication Sig Dispense Refill    cyclobenzaprine (FLEXERIL) 5 mg tablet Take 2 Tabs by mouth three (3) times daily. (Patient taking differently: Take 5 mg by mouth as needed.) 9 Tab 0    ibuprofen (MOTRIN) 600 mg tablet Take 1 Tab by mouth every six (6) hours as needed for Pain. 20 Tab 0    ondansetron (ZOFRAN ODT) 4 mg disintegrating tablet Take 1 Tab by mouth every eight (8) hours as needed for Nausea. 10 Tab 0    LORazepam (ATIVAN) 0.5 mg tablet Take 1-2 Tabs by mouth every eight (8) hours as needed (dizziness).  Max Daily Amount: 3 mg. 9 Tab 0        Visit Vitals    /82    Pulse 79    Ht 5' 5\" (1.651 m)    Wt 75.3 kg (166 lb)    BMI 27.62 kg/m2     Physical Exam   Constitutional: She is oriented to person, place, and time. She appears well-developed and well-nourished. No distress. HENT:   Head: Atraumatic. Mouth/Throat: No oropharyngeal exudate. Eyes: Conjunctivae are normal. Right eye exhibits no discharge. Left eye exhibits no discharge. No scleral icterus. Neck: Normal range of motion. Neck supple. No JVD present. No tracheal deviation present. No thyromegaly present. Cardiovascular: Normal rate and regular rhythm. Exam reveals no gallop. No murmur heard. Pulmonary/Chest: Effort normal and breath sounds normal. No stridor. She has no wheezes. She has no rales. Abdominal: Soft. There is no tenderness. There is no rebound and no guarding. Musculoskeletal: Normal range of motion. She exhibits no edema or tenderness. Lymphadenopathy:     She has no cervical adenopathy. Neurological: She is alert and oriented to person, place, and time. She exhibits normal muscle tone. Skin: Skin is warm. She is not diaphoretic. Psychiatric: She has a normal mood and affect. Her behavior is normal.     ekg sinus rhythm with no acute st-t changes  ASSESSMENT and PLAN    ICD-10-CM ICD-9-CM    1. Syncope, unspecified syncope type R55 780.2 2D ECHO COMPLETE ADULT (TTE)      TILT TABLE EVAL W/WO DRUG   2. Migraine without aura and without status migrainosus, not intractable G43.009 346.10    3. Family history of cardiovascular disease Z82.49 V17.49      Orders Placed This Encounter    2D ECHO COMPLETE ADULT (TTE)     Standing Status:   Future     Standing Expiration Date:   10/2/2018     Order Specific Question:   Reason for Exam:     Answer:   syncope    TILT TABLE EVAL W/WO DRUG     Standing Status:   Future     Standing Expiration Date:   10/3/2018     Order Specific Question:   Reason for Exam:     Answer:   syncope     Follow-up Disposition:  Return in about 3 weeks (around 4/23/2018). current treatment plan is effective, no change in therapy. Patient with h/o recurrent syncope.   No significant VHD by exam.  No preexcitation on ekg  Will obtain echo and tilt table testing

## 2018-04-06 ENCOUNTER — HOSPITAL ENCOUNTER (OUTPATIENT)
Dept: CARDIAC CATH/INVASIVE PROCEDURES | Age: 43
Discharge: HOME | End: 2018-04-06
Attending: INTERNAL MEDICINE | Admitting: INTERNAL MEDICINE
Payer: MEDICAID

## 2018-04-06 VITALS
HEIGHT: 66 IN | WEIGHT: 166 LBS | OXYGEN SATURATION: 99 % | DIASTOLIC BLOOD PRESSURE: 66 MMHG | SYSTOLIC BLOOD PRESSURE: 105 MMHG | BODY MASS INDEX: 26.68 KG/M2 | HEART RATE: 70 BPM | RESPIRATION RATE: 10 BRPM

## 2018-04-06 PROCEDURE — 93660 TILT TABLE EVALUATION: CPT

## 2018-04-06 PROCEDURE — 74011250637 HC RX REV CODE- 250/637: Performed by: INTERNAL MEDICINE

## 2018-04-06 PROCEDURE — 74011250636 HC RX REV CODE- 250/636: Performed by: INTERNAL MEDICINE

## 2018-04-06 RX ORDER — SODIUM CHLORIDE 9 MG/ML
500 INJECTION, SOLUTION INTRAVENOUS ONCE
Status: COMPLETED | OUTPATIENT
Start: 2018-04-06 | End: 2018-04-06

## 2018-04-06 RX ORDER — NITROGLYCERIN 400 UG/1
1 SPRAY ORAL
Status: DISCONTINUED | OUTPATIENT
Start: 2018-04-06 | End: 2018-04-06 | Stop reason: HOSPADM

## 2018-04-06 RX ADMIN — NITROGLYCERIN 1 SPRAY: 400 SPRAY ORAL at 11:12

## 2018-04-06 RX ADMIN — SODIUM CHLORIDE 500 ML: 900 INJECTION, SOLUTION INTRAVENOUS at 10:40

## 2018-04-06 NOTE — PROGRESS NOTES
After being supine for 30 min, placed standing @ 70 degrees. Received NS, IV, 500 ml. Denied any complaints.

## 2018-04-06 NOTE — ROUTINE PROCESS
Phone report to RONNELL Andrade. A+Ox4, ambulatory without difficulty, denied any complaints. Escorted to [facility], tot her friend for transport home.

## 2018-04-06 NOTE — PROGRESS NOTES
A+Ox4, ambulatory without any difficulty, denied any complaints. Good respiratory effort, lung sounds clear.

## 2018-04-06 NOTE — DISCHARGE INSTRUCTIONS
DISCHARGE SUMMARY from Nurse    PATIENT INSTRUCTIONS:    After general anesthesia or intravenous sedation, for 24 hours or while taking prescription Narcotics:  · Limit your activities  · Do not drive and operate hazardous machinery  · Do not make important personal or business decisions  · Do  not drink alcoholic beverages  · If you have not urinated within 8 hours after discharge, please contact your surgeon on call. Report the following to your surgeon:  · Excessive pain, swelling, redness or odor of or around the surgical area  · Temperature over 100.5  · Nausea and vomiting lasting longer than 4 hours or if unable to take medications  · Any signs of decreased circulation or nerve impairment to extremity: change in color, persistent  numbness, tingling, coldness or increase pain  · Any questions    What to do at Home:  Recommended activity: Activity as tolerated. *  Please give a list of your current medications to your Primary Care Provider. *  Please update this list whenever your medications are discontinued, doses are      changed, or new medications (including over-the-counter products) are added. *  Please carry medication information at all times in case of emergency situations. These are general instructions for a healthy lifestyle:    No smoking/ No tobacco products/ Avoid exposure to second hand smoke  Surgeon General's Warning:  Quitting smoking now greatly reduces serious risk to your health. Obesity, smoking, and sedentary lifestyle greatly increases your risk for illness    A healthy diet, regular physical exercise & weight monitoring are important for maintaining a healthy lifestyle    You may be retaining fluid if you have a history of heart failure or if you experience any of the following symptoms:  Weight gain of 3 pounds or more overnight or 5 pounds in a week, increased swelling in our hands or feet or shortness of breath while lying flat in bed.   Please call your doctor as soon as you notice any of these symptoms; do not wait until your next office visit. Recognize signs and symptoms of STROKE:    F-face looks uneven    A-arms unable to move or move unevenly    S-speech slurred or non-existent    T-time-call 911 as soon as signs and symptoms begin-DO NOT go       Back to bed or wait to see if you get better-TIME IS BRAIN. Warning Signs of HEART ATTACK     Call 911 if you have these symptoms:   Chest discomfort. Most heart attacks involve discomfort in the center of the chest that lasts more than a few minutes, or that goes away and comes back. It can feel like uncomfortable pressure, squeezing, fullness, or pain.  Discomfort in other areas of the upper body. Symptoms can include pain or discomfort in one or both arms, the back, neck, jaw, or stomach.  Shortness of breath with or without chest discomfort.  Other signs may include breaking out in a cold sweat, nausea, or lightheadedness. Don't wait more than five minutes to call 911 - MINUTES MATTER! Fast action can save your life. Calling 911 is almost always the fastest way to get lifesaving treatment. Emergency Medical Services staff can begin treatment when they arrive -- up to an hour sooner than if someone gets to the hospital by car. The discharge information has been reviewed with the patient. The patient verbalized understanding. Discharge medications reviewed with the patient and appropriate educational materials and side effects teaching were provided. Patient armband removed and shredded  MyChart Activation    Thank you for requesting access to [employer]. Please follow the instructions below to securely access and download your online medical record. [employer] allows you to send messages to your doctor, view your test results, renew your prescriptions, schedule appointments, and more. How Do I Sign Up? 1. In your internet browser, go to [email or web address].   2. Click on the First Time User? Click Here link in the Sign In box. You will see the New Member Sign Up page. 3. Enter your [employer] Access Code exactly as it appears below. You will not need to use this code after youve completed the sign-up process. If you do not sign up before the expiration date, you must request a new code. [employer] Access Code: Activation code not generated  Current [employer] Status: Active (This is the date your [employer] access code will )    4. Enter the last four digits of your Social Security Number (xxxx) and Date of Birth (mm/dd/yyyy) as indicated and click Submit. You will be taken to the next sign-up page. 5. Create a ZIIBRAt ID. This will be your [employer] login ID and cannot be changed, so think of one that is secure and easy to remember. 6. Create a [employer] password. You can change your password at any time. 7. Enter your Password Reset Question and Answer. This can be used at a later time if you forget your password. 8. Enter your e-mail address. You will receive e-mail notification when new information is available in [address] 9. Click Sign Up. You can now view and download portions of your medical record. 10. Click the Download Summary menu link to download a portable copy of your medical information. Additional Information    If you have questions, please visit the Frequently Asked Questions section of the [employer] website at [email or web address] Remember, [employer] is NOT to be used for urgent needs.  For medical emergencies, dial 911.      ___________________________________________________________________________________________________________________________________

## 2018-04-06 NOTE — IP AVS SNAPSHOT
[address] [id number] [address] Patient: Mery Zaidi MRN: [id number] About your hospitalization You were admitted on:  April 6, 2018 You last received care in the:  [facility] CATH HOLDING You were discharged on:  April 6, 2018 Why you were hospitalized Your primary diagnosis was:  Not on File Follow-up Information None Your Scheduled Appointments Wednesday April 18, 2018 10:45 AM EDT PROCEDURE with [facility] ([address]) [address] [facility], [facility] 102 [facility] [address]  
[phone] Friday April 27, 2018  9:30 AM EDT Office Visit with Amadou Fraga MD  
[facility]) 178 [facility], [facility] 102 [facility] [address]  
[phone] Discharge Orders None A check maritza indicates which time of day the medication should be taken. My Medications ASK your doctor about these medications Instructions Each Dose to Equal  
 Morning Noon Evening Bedtime  
 cyclobenzaprine 5 mg tablet Commonly known as:  FLEXERIL Your last dose was: Your next dose is: Take 2 Tabs by mouth three (3) times daily. 10 mg  
    
   
   
   
  
 ibuprofen 600 mg tablet Commonly known as:  MOTRIN Your last dose was: Your next dose is: Take 1 Tab by mouth every six (6) hours as needed for Pain. 600 mg LORazepam 0.5 mg tablet Commonly known as:  ATIVAN Your last dose was: Your next dose is: Take 1-2 Tabs by mouth every eight (8) hours as needed (dizziness). Max Daily Amount: 3 mg. 0.5-1 mg  
    
   
   
   
  
 meclizine 12.5 mg tablet Commonly known as:  ANTIVERT Your last dose was: Your next dose is: Take  by mouth three (3) times daily as needed. ondansetron 4 mg disintegrating tablet Commonly known as:  ZOFRAN ODT Your last dose was: Your next dose is: Take 1 Tab by mouth every eight (8) hours as needed for Nausea. 4 mg  
    
   
   
   
  
 topiramate 25 mg tablet Commonly known as:  TOPAMAX Your last dose was: Your next dose is: Take 1 Tab by mouth two (2) times a day. Indications: MIGRAINE PREVENTION  
 25 mg Discharge Instructions DISCHARGE SUMMARY from Nurse PATIENT INSTRUCTIONS: 
 
 
F-face looks uneven A-arms unable to move or move unevenly S-speech slurred or non-existent T-time-call 911 as soon as signs and symptoms begin-DO NOT go Back to bed or wait to see if you get better-TIME IS BRAIN. Warning Signs of HEART ATTACK Call 911 if you have these symptoms: 
? Chest discomfort. Most heart attacks involve discomfort in the center of the chest that lasts more than a few minutes, or that goes away and comes back. It can feel like uncomfortable pressure, squeezing, fullness, or pain. ? Discomfort in other areas of the upper body. Symptoms can include pain or discomfort in one or both arms, the back, neck, jaw, or stomach. ? Shortness of breath with or without chest discomfort. ? Other signs may include breaking out in a cold sweat, nausea, or lightheadedness. Don't wait more than five minutes to call [address]! Fast action can save your life. Calling 911 is almost always the fastest way to get lifesaving treatment. Emergency Medical Services staff can begin treatment when they arrive  up to an hour sooner than if someone gets to the hospital by car. The discharge information has been reviewed with the patient.   The patient verbalized understanding. Discharge medications reviewed with the patient and appropriate educational materials and side effects teaching were provided. Patient armband removed and shredded MyChart Activation Thank you for requesting access to Kidos. Please follow the instructions below to securely access and download your online medical record. Kidos allows you to send messages to your doctor, view your test results, renew your prescriptions, schedule appointments, and more. How Do I Sign Up? 1. In your internet browser, go to [email or web address]. 2. Click on the First Time User? Click Here link in the Sign In box. You will see the New Member Sign Up page. 3. Enter your Kidos Access Code exactly as it appears below. You will not need to use this code after youve completed the sign-up process. If you do not sign up before the expiration date, you must request a new code. Kidos Access Code: Activation code not generated Current Kidos Status: Active (This is the date your Kidos access code will ) 4. Enter the last four digits of your Social Security Number (xxxx) and Date of Birth (mm/dd/yyyy) as indicated and click Submit. You will be taken to the next sign-up page. 5. Create a Kidos ID. This will be your Kidos login ID and cannot be changed, so think of one that is secure and easy to remember. 6. Create a Kidos password. You can change your password at any time. 7. Enter your Password Reset Question and Answer. This can be used at a later time if you forget your password. 8. Enter your e-mail address. You will receive e-mail notification when new information is available in [address] 9. Click Sign Up. You can now view and download portions of your medical record. 10. Click the Download Summary menu link to download a portable copy of your medical information. Additional Information If you have questions, please visit the Frequently Asked Questions section of the Revistronic website at [email or web address] Remember, MyChart is NOT to be used for urgent needs. For medical emergencies, dial 911. 
 
 
___________________________________________________________________________________________________________________________________ Introducing [facility]! Dear Jocy Cody: 
Thank you for requesting a Revistronic account. Our records indicate that you already have an active Revistronic account. You can access your account anytime at [email or web address] Did you know that you can access your hospital and ER discharge instructions at any time in Revistronic? You can also review all of your test results from your hospital stay or ER visit. Additional Information If you have questions, please visit the Frequently Asked Questions section of the Revistronic website at [email or web address] Remember, Revistronic is NOT to be used for urgent needs. For medical emergencies, dial 911. Now available from your iPhone and Android! Introducing Camilo Abdi As a [employer] patient, I wanted to make you aware of our electronic visit tool called Camilo Abdi. [employer] 24/7 allows you to connect within minutes with a medical provider 24 hours a day, seven days a week via a mobile device or tablet or logging into a secure website from your computer. You can access Camilo Abdi from anywhere in the [place]. A virtual visit might be right for you when you have a simple condition and feel like you just dont want to get out of bed, or cant get away from work for an appointment, when your regular [employer] provider is not available (evenings, weekends or holidays), or when youre out of town and need minor care.   Electronic visits cost only $49 and if the [facility] 24/7 provider determines a prescription is needed to treat your condition, one can be electronically transmitted to a nearby pharmacy*. Please take a moment to enroll today if you have not already done so. The enrollment process is free and takes just a few minutes. To enroll, please download the [employer] 24/7 carla to your tablet or phone, or visit [email or web address] to enroll on your computer. And, as an [address] [place] [address] patient with a [employer] account, the results of your visits will be scanned into your electronic medical record and your primary care provider will be able to view the scanned results. We urge you to continue to see your regular [employer] provider for your ongoing medical care. And while your primary care provider may not be the one available when you seek a Cavium virtual visit, the peace of mind you get from getting a real diagnosis real time can be priceless. For more information on Cavium, view our Frequently Asked Questions (FAQs) at [email or web address]. Sincerely, 
 
Nader Rodríguez MD 
Chief Medical Officer [facility] Dayami Rhodes *:  certain medications cannot be prescribed via Cavium Providers Seen During Your Hospitalization Provider Specialty Primary office phone Kanchan Kumari MD Cardiology [phone] Your Primary Care Physician (PCP) Primary Care Physician Office Phone Office Fax [address], [address] [facility] 1600 [phone] You are allergic to the following Allergen Reactions Ciprofloxacin (Bulk) Diarrhea Codeine Itching Dilaudid (Hydromorphone (Bulk)) Itching Lyrica (Pregabalin) Swelling Tramadol Other (comments) Unknown (comments) Gets headaches 
headache Codeine Phosphate Unknown (comments) Hydromorphone Unknown (comments) Recent Documentation Height Weight BMI OB Status Smoking Status 1.676 m 75.3 kg 26.79 kg/m2 Having regular periods Never Smoker Emergency Contacts Name Discharge Info Relation Home Work Mobile [address] HollingsworthChandrakant CAREGIVER [3] Daughter [21] [phone] CAREGIVER [3] Mother [14] [phone] CervantesCarey torres (Aunt) DISCHARGE CAREGIVER [3] Other Relative [6] [phone] Patient Belongings The following personal items are in your possession at time of discharge: 
                             
 
  
  
 Please provide this summary of care documentation to your next provider. Signatures-by signing, you are acknowledging that this After Visit Summary has been reviewed with you and you have received a copy. Patient Signature:  ____________________________________________________________ Date:  ____________________________________________________________  
  
[facility] Provider Signature:  ____________________________________________________________ Date:  ____________________________________________________________

## 2018-04-13 NOTE — PROGRESS NOTES
Tilt table test  was performed with NTG. No symptoms or syncope was noted. No hemodynamic changes noted.     Impression:  Negative tilt table test.

## 2018-04-13 NOTE — H&P
H&p update    No new symptoms'  Risks, benefits and alternatives of tilt table test explained to patient

## 2018-04-18 ENCOUNTER — CLINICAL SUPPORT (OUTPATIENT)
Dept: CARDIOLOGY CLINIC | Age: 43
End: 2018-04-18

## 2018-04-18 DIAGNOSIS — R55 SYNCOPE, UNSPECIFIED SYNCOPE TYPE: ICD-10-CM

## 2018-04-27 ENCOUNTER — OFFICE VISIT (OUTPATIENT)
Dept: CARDIOLOGY CLINIC | Age: 43
End: 2018-04-27

## 2018-04-27 VITALS
SYSTOLIC BLOOD PRESSURE: 100 MMHG | WEIGHT: 166 LBS | BODY MASS INDEX: 26.68 KG/M2 | DIASTOLIC BLOOD PRESSURE: 55 MMHG | HEIGHT: 66 IN | HEART RATE: 64 BPM

## 2018-04-27 DIAGNOSIS — R55 SYNCOPE, UNSPECIFIED SYNCOPE TYPE: Primary | ICD-10-CM

## 2018-04-27 DIAGNOSIS — G43.009 MIGRAINE WITHOUT AURA AND WITHOUT STATUS MIGRAINOSUS, NOT INTRACTABLE: ICD-10-CM

## 2018-04-27 NOTE — PROGRESS NOTES
Patient didn't bring medications, verbally reviewed    1. Have you been to the ER, urgent care clinic since your last visit? Hospitalized since your last visit? No    2. Have you seen or consulted any other health care providers outside of the [address] [place] [address] since your last visit? Include any pap smears or colon screening.  No

## 2018-04-27 NOTE — LETTER
Gayle Benavidez 1975 4/27/2018 Dear Baldo Fischer MD 
 
I had the pleasure of evaluating  Ms. Nunes in office today. Below are the relevant portions of my assessment and plan of care. {No Diagnosis Found} Current Outpatient Prescriptions Medication Sig Dispense Refill  cyclobenzaprine (FLEXERIL) 5 mg tablet Take 2 Tabs by mouth three (3) times daily. (Patient taking differently: Take 5 mg by mouth as needed.) 9 Tab 0  ibuprofen (MOTRIN) 600 mg tablet Take 1 Tab by mouth every six (6) hours as needed for Pain. 20 Tab 0  
 ondansetron (ZOFRAN ODT) 4 mg disintegrating tablet Take 1 Tab by mouth every eight (8) hours as needed for Nausea. 10 Tab 0  
 LORazepam (ATIVAN) 0.5 mg tablet Take 1-2 Tabs by mouth every eight (8) hours as needed (dizziness). Max Daily Amount: 3 mg. 9 Tab 0  
 meclizine (ANTIVERT) 12.5 mg tablet Take  by mouth three (3) times daily as needed.  topiramate (TOPAMAX) 25 mg tablet Take 1 Tab by mouth two (2) times a day. Indications: MIGRAINE PREVENTION 60 Tab 2 No orders of the defined types were placed in this encounter. If you have questions, please do not hesitate to call me. I look forward to following Ms. Nunes along with you.  
 
Sincerely, 
Mya Moya MD

## 2018-04-27 NOTE — MR AVS SNAPSHOT
[address] [facility] [address] 
 
 
 [address] [facility], [address] [facility] [address] 
[phone] Patient: Khalida Marcelo MRN: [id number] Visit Information Date & Time Provider Department Dept. Phone Encounter #  
 4/27/2018  9:30 AM Jinny Woo MD Cardiology [facility] [address] ADRIAN GONZALES [phone] [id number] Follow-up Instructions Return in about 3 months (around 7/27/2018). Your Appointments 7/31/2018  8:45 AM  
Office Visit with Jinny Woo MD  
[facility]) Appt Note: 3 months [address] [facility], [address] [facility] [address]  
[address], 9352 [facility] 83 Nasra Grainger Upcoming Health Maintenance Date Due  
 PAP AKA CERVICAL CYTOLOGY 12/20/2016 Influenza Age 5 to Adult 8/1/2018 DTaP/Tdap/Td series (2 - Td) 12/13/2022 Allergies as of 4/27/2018  Review Complete On: 4/27/2018 By: Milena Plume Severity Noted Reaction Type Reactions Ciprofloxacin (Bulk) High 06/13/2012   Side Effect Diarrhea Codeine High 12/12/2011    Itching Dilaudid [Hydromorphone (Bulk)] High 12/23/2011    Itching Lyrica [Pregabalin] High 12/28/2015    Swelling Tramadol Medium 06/13/2012    Other (comments), Unknown (comments) Gets headaches 
headache Codeine Phosphate  10/15/2015    Unknown (comments) Hydromorphone  10/15/2015    Unknown (comments) Current Immunizations  Never Reviewed Name Date Influenza Vaccine Split 12/13/2012 Tdap 12/13/2012 Not reviewed this visit Vitals BP Pulse Height(growth percentile) Weight(growth percentile) BMI OB Status 100/55 64 5' 6\" (1.676 m) 166 lb (75.3 kg) 26.79 kg/m2 Having regular periods Smoking Status Never Smoker Vitals History BMI and BSA Data Body Mass Index Body Surface Area  
 26.79 kg/m 2 1.87 m 2 Preferred Pharmacy Pharmacy Name Phone [facility] [phone] [facility] [phone] Your Updated Medication List  
  
   
This list is accurate as of 4/27/18 10:30 AM.  Always use your most recent med list.  
  
  
  
  
 cyclobenzaprine 5 mg tablet Commonly known as:  FLEXERIL Take 2 Tabs by mouth three (3) times daily. ibuprofen 600 mg tablet Commonly known as:  MOTRIN Take 1 Tab by mouth every six (6) hours as needed for Pain. LORazepam 0.5 mg tablet Commonly known as:  ATIVAN Take 1-2 Tabs by mouth every eight (8) hours as needed (dizziness). Max Daily Amount: 3 mg.  
  
 meclizine 12.5 mg tablet Commonly known as:  ANTIVERT Take  by mouth three (3) times daily as needed. ondansetron 4 mg disintegrating tablet Commonly known as:  ZOFRAN ODT Take 1 Tab by mouth every eight (8) hours as needed for Nausea. topiramate 25 mg tablet Commonly known as:  TOPAMAX Take 1 Tab by mouth two (2) times a day. Indications: MIGRAINE PREVENTION Follow-up Instructions Return in about 3 months (around 7/27/2018). Introducing [facility] & HEALTH SERVICES! Dear Allen Gentile: 
Thank you for requesting a Solar Notion account. Our records indicate that you already have an active Solar Notion account. You can access your account anytime at [email or web address] Did you know that you can access your hospital and ER discharge instructions at any time in Solar Notion? You can also review all of your test results from your hospital stay or ER visit. Additional Information If you have questions, please visit the Frequently Asked Questions section of the Solar Notion website at [email or web address] Remember, Solar Notion is NOT to be used for urgent needs. For medical emergencies, dial 911. Now available from your iPhone and Android! Please provide this summary of care documentation to your next provider. Your primary care clinician is listed as Julienne Zhao. If you have any questions after today's visit, please call [phone].

## 2018-05-08 NOTE — PROGRESS NOTES
HISTORY OF PRESENT ILLNESS  Reyes Rosas is a 38 y.o. female. Dizziness   The history is provided by the patient. This is a chronic problem. The problem has been gradually improving. Pertinent negatives include no chest pain and no shortness of breath. Review of Systems   Constitutional: Negative for chills and weight loss. HENT: Negative for congestion, ear discharge, ear pain, hearing loss, nosebleeds and tinnitus. Eyes: Negative for blurred vision. Respiratory: Negative for shortness of breath, wheezing and stridor. Cardiovascular: Negative for chest pain and leg swelling. Gastrointestinal: Negative for heartburn. Musculoskeletal: Negative for myalgias and neck pain. Skin: Negative for itching and rash. Neurological: Negative for tingling, tremors, focal weakness and loss of consciousness. Psychiatric/Behavioral: Negative for depression and suicidal ideas.      Family History   Problem Relation Age of Onset    Heart Disease Mother     Hypertension Mother    [employer] MS Mother     Other Mother 54     Lupus    Stroke Mother      x3    Asthma Sister     Diabetes Paternal Grandmother     Hypertension Paternal Grandmother        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   [employer] SHOULDER SURG [address] UNLISTED  2011    left shoulder       Social History   Substance Use Topics    Smoking status: Never Smoker    Smokeless tobacco: Never Used    Alcohol use Yes      Comment: rare       Allergies   Allergen Reactions    Ciprofloxacin (Bulk) Diarrhea    Codeine Itching    Dilaudid [Hydromorphone (Bulk)] Itching    Lyrica [Pregabalin] Swelling    Tramadol Other (comments) and Unknown (comments)     Gets headaches  headache    Codeine Phosphate Unknown (comments)    Hydromorphone Unknown (comments)       Outpatient Prescriptions Marked as Taking for the 4/27/18 encounter (Office Visit) with Marline Welch MD   Medication Sig Dispense Refill    cyclobenzaprine (FLEXERIL) 5 mg tablet Take 2 Tabs by mouth three (3) times daily. (Patient taking differently: Take 5 mg by mouth as needed.) 9 Tab 0    ibuprofen (MOTRIN) 600 mg tablet Take 1 Tab by mouth every six (6) hours as needed for Pain. 20 Tab 0    ondansetron (ZOFRAN ODT) 4 mg disintegrating tablet Take 1 Tab by mouth every eight (8) hours as needed for Nausea. 10 Tab 0    LORazepam (ATIVAN) 0.5 mg tablet Take 1-2 Tabs by mouth every eight (8) hours as needed (dizziness). Max Daily Amount: 3 mg. 9 Tab 0    meclizine (ANTIVERT) 12.5 mg tablet Take  by mouth three (3) times daily as needed. Visit Vitals    /55    Pulse 64    Ht 5' 6\" (1.676 m)    Wt 75.3 kg (166 lb)    BMI 26.79 kg/m2     Physical Exam   Constitutional: She is oriented to person, place, and time. She appears well-developed and well-nourished. No distress. HENT:   Head: Atraumatic. Mouth/Throat: No oropharyngeal exudate. Eyes: Conjunctivae are normal. Right eye exhibits no discharge. Left eye exhibits no discharge. No scleral icterus. Neck: Normal range of motion. Neck supple. No JVD present. No tracheal deviation present. No thyromegaly present. Cardiovascular: Normal rate and regular rhythm. Exam reveals no gallop. No murmur heard. Pulmonary/Chest: Effort normal and breath sounds normal. No stridor. She has no wheezes. She has no rales. Abdominal: Soft. There is no tenderness. There is no rebound and no guarding. Musculoskeletal: Normal range of motion. She exhibits no edema or tenderness.    Lymphadenopathy:     She has no cervical adenopathy. Neurological: She is alert and oriented to person, place, and time. She exhibits normal muscle tone. Skin: Skin is warm. She is not diaphoretic. Psychiatric: She has a normal mood and affect. Her behavior is normal.     ekg sinus rhythm with no acute st-t changes  Echo 04/2018:  SUMMARY:  Left ventricle: Systolic function was normal. Ejection fraction was  estimated in the range of 55 % to 60 %. There were no regional wall motion  abnormalities. Mitral valve: There was mild regurgitation. Tricuspid valve: There was mild regurgitation. Pulmonic valve: There was mild regurgitation. ASSESSMENT and PLAN    ICD-10-CM ICD-9-CM    1. Syncope, unspecified syncope type R55 780.2    2. Migraine without aura and without status migrainosus, not intractable G43.009 346.10      No orders of the defined types were placed in this encounter. Follow-up Disposition:  Return in about 3 months (around 7/27/2018). current treatment plan is effective, no change in therapy. Patient with h/o recurrent syncope. No significant VHD by exam.  No preexcitation on ekg  Echo report reviewed with patient. Tilt table test negative. Patient advised to remain well hydrated and follow up with PCP.

## 2018-05-30 ENCOUNTER — OFFICE VISIT (OUTPATIENT)
Dept: ORTHOPEDIC SURGERY | Age: 43
End: 2018-05-30

## 2018-05-30 VITALS
HEIGHT: 66 IN | BODY MASS INDEX: 26.84 KG/M2 | DIASTOLIC BLOOD PRESSURE: 70 MMHG | SYSTOLIC BLOOD PRESSURE: 104 MMHG | WEIGHT: 167 LBS | OXYGEN SATURATION: 100 % | HEART RATE: 68 BPM

## 2018-05-30 DIAGNOSIS — M50.30 DDD (DEGENERATIVE DISC DISEASE), CERVICAL: ICD-10-CM

## 2018-05-30 DIAGNOSIS — M79.18 CERVICAL MYOFASCIAL PAIN SYNDROME: ICD-10-CM

## 2018-05-30 DIAGNOSIS — Z98.1 HX OF FUSION OF CERVICAL SPINE: Primary | ICD-10-CM

## 2018-05-30 DIAGNOSIS — M54.2 NECK PAIN: ICD-10-CM

## 2018-05-30 RX ORDER — METHYLPREDNISOLONE 4 MG/1
TABLET ORAL
Qty: 1 DOSE PACK | Refills: 0 | Status: SHIPPED | OUTPATIENT
Start: 2018-05-30 | End: 2018-07-20 | Stop reason: ALTCHOICE

## 2018-05-30 RX ORDER — GABAPENTIN 300 MG/1
CAPSULE ORAL
Qty: 90 CAP | Refills: 1 | Status: SHIPPED | OUTPATIENT
Start: 2018-05-30 | End: 2018-12-11

## 2018-05-30 RX ORDER — KETOROLAC TROMETHAMINE 30 MG/ML
60 INJECTION, SOLUTION INTRAMUSCULAR; INTRAVENOUS ONCE
Qty: 2 ML | Refills: 0
Start: 2018-05-30 | End: 2018-06-22 | Stop reason: CLARIF
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
## 2018-05-30 NOTE — PATIENT INSTRUCTIONS

## 2018-06-04 ENCOUNTER — OFFICE VISIT (OUTPATIENT)
Dept: NEUROLOGY | Age: 43
End: 2018-06-04

## 2018-06-04 VITALS
HEART RATE: 68 BPM | WEIGHT: 171.4 LBS | BODY MASS INDEX: 27.55 KG/M2 | OXYGEN SATURATION: 100 % | DIASTOLIC BLOOD PRESSURE: 68 MMHG | TEMPERATURE: 97.9 F | HEIGHT: 66 IN | RESPIRATION RATE: 16 BRPM | SYSTOLIC BLOOD PRESSURE: 128 MMHG

## 2018-06-04 DIAGNOSIS — M54.2 NECK PAIN: ICD-10-CM

## 2018-06-04 DIAGNOSIS — R42 VERTIGO: ICD-10-CM

## 2018-06-04 DIAGNOSIS — G43.009 MIGRAINE WITHOUT AURA AND WITHOUT STATUS MIGRAINOSUS, NOT INTRACTABLE: Primary | ICD-10-CM

## 2018-06-04 DIAGNOSIS — R55 SYNCOPE, UNSPECIFIED SYNCOPE TYPE: ICD-10-CM

## 2018-06-04 RX ORDER — TOPIRAMATE 50 MG/1
50 TABLET, FILM COATED ORAL 2 TIMES DAILY
Qty: 60 TAB | Refills: 6 | Status: SHIPPED | OUTPATIENT
Start: 2018-06-04 | End: 2018-12-11
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
## 2018-06-04 NOTE — PROGRESS NOTES
Re:  Anabelle Setting up visit     6/4/2018 8:50 AM    SSN: [id number]    Subjective:   Norberta Boas returns for follow up. The headaches are much better, she's now getting only 2 headache maximum per week and the pain is much less intense. She's still getting vertigo spells 2-3 times per week which are clearly independent of her headaches. She hasn't lost consciousness with the vertigo spells, but needs to sit down. She gets a warning where she feels weak and then the spell starts. Medications:    Current Outpatient Prescriptions   Medication Sig Dispense Refill    methylPREDNISolone (MEDROL, ZACH,) 4 mg tablet Per dose pack instructions 1 Dose Pack 0    gabapentin (NEURONTIN) 300 mg capsule Take 1 Tab QHS x1 week, then BID x1 week, then TID  Indications: NEUROPATHIC PAIN 90 Cap 1    ibuprofen (MOTRIN) 600 mg tablet Take 1 Tab by mouth every six (6) hours as needed for Pain. 20 Tab 0    ondansetron (ZOFRAN ODT) 4 mg disintegrating tablet Take 1 Tab by mouth every eight (8) hours as needed for Nausea. 10 Tab 0    cyclobenzaprine (FLEXERIL) 5 mg tablet Take 2 Tabs by mouth three (3) times daily. (Patient taking differently: Take 5 mg by mouth as needed.) 9 Tab 0    LORazepam (ATIVAN) 0.5 mg tablet Take 1-2 Tabs by mouth every eight (8) hours as needed (dizziness). Max Daily Amount: 3 mg. 9 Tab 0    meclizine (ANTIVERT) 12.5 mg tablet Take  by mouth three (3) times daily as needed.  topiramate (TOPAMAX) 25 mg tablet Take 1 Tab by mouth two (2) times a day.  Indications: MIGRAINE PREVENTION 60 Tab 2       Vital signs:    Visit Vitals    /68 (BP 1 Location: Left arm, BP Patient Position: Sitting)    Pulse 68    Temp 97.9 °F (36.6 °C) (Oral)    Resp 16    Ht 5' 6\" (1.676 m)    Wt 77.7 kg (171 lb 6.4 oz)    LMP 05/14/2018    SpO2 100%    BMI 27.66 kg/m2       Review of Systems:   As above otherwise 11 point review of systems negative including; Constitutional no fever or chills  Skin denies rash or itching  HEENT  Denies tinnitus, hearing lose  Eyes denies diplopia vision lose  Respiratory denies sortness of breath  Cardiovascular denies chest pain, dyspnea on exertion  Gastrointestinal denies nausea, vomiting, diarrhea, constipation  Genitourinary denies incontinence  Musculoskeletal denies joint pain or swelling  Endocrine denies weight change  Hematology denies easy bruising or bleeding   Neurological as above in HPI      Patient Active Problem List   Diagnosis Code    Cystitis N30.90    Ovarian mass N83.9    Family history of blood dyscrasia Z83.2    Family history of cardiovascular disease Z82.49    Abnormal cervical Papanicolaou smear with positive human papilloma virus (HPV) DNA test [id number]    Myofascial muscle pain M79.1    Muscle spasms of neck M62.838    Cervical spondylosis M47.812    DDD (degenerative disc disease), cervical M50.30    Cervical myofascial pain syndrome M79.1    HNP (herniated nucleus pulposus), cervical M50.20    Muscle spasm M62.838    Hx of fusion of cervical spine Z98.1    Migraine without aura and without status migrainosus, not intractable G43.009    Syncope R55    Neck pain M54.2         Objective: The patient is awake, alert, and oriented x 4. Fund of knowledge is adequate. Speech is fluent and memory is intact. Cranial Nerves: II - Visual fields are full to confrontation. III, IV, VI - Extraocular movements are intact. There is no nystagmus. V - Facial sensation is intact to pinprick. VII - Face is symmetrical.  VIII - Hearing is present. IX, X, XII - Palate is symmetrical.   XI - Shoulder shrugging and head turning intact  Motor: The patient moves all four limbs fairly well and symmetrically. Tone is normal. Reflexes are 2+ and symmetrical. Plantars are down going.  Gait is normal.    CBC:   Lab Results   Component Value Date/Time    WBC 5.8 03/13/2018 02:45 PM    RBC 4.16 (L) 03/13/2018 02:45 PM    HGB 12.2 03/13/2018 02:45 PM    HCT 36.5 03/13/2018 02:45 PM    PLATELET 968 56/73/5152 02:45 PM     BMP:   Lab Results   Component Value Date/Time    Glucose 74 03/13/2018 02:45 PM    Sodium 134 (L) 03/13/2018 02:45 PM    Potassium 4.2 03/13/2018 02:45 PM    Chloride 104 03/13/2018 02:45 PM    CO2 23 03/13/2018 02:45 PM    BUN 11 03/13/2018 02:45 PM    Creatinine 0.72 03/13/2018 02:45 PM    Calcium 9.0 03/13/2018 02:45 PM     CMP:   Lab Results   Component Value Date/Time    Glucose 74 03/13/2018 02:45 PM    Sodium 134 (L) 03/13/2018 02:45 PM    Potassium 4.2 03/13/2018 02:45 PM    Chloride 104 03/13/2018 02:45 PM    CO2 23 03/13/2018 02:45 PM    BUN 11 03/13/2018 02:45 PM    Creatinine 0.72 03/13/2018 02:45 PM    Calcium 9.0 03/13/2018 02:45 PM    Anion gap 7 03/13/2018 02:45 PM    BUN/Creatinine ratio 15 03/13/2018 02:45 PM    Alk. phosphatase 59 03/05/2018 04:00 PM    Protein, total 7.7 03/05/2018 04:00 PM    Albumin 3.9 03/05/2018 04:00 PM    Globulin 3.8 03/05/2018 04:00 PM    A-G Ratio 1.0 03/05/2018 04:00 PM     Coagulation: No results found for: PTP, INR, APTT, PTTT  Cardiac markers:   Lab Results   Component Value Date/Time    CK 49 03/02/2018 10:00 PM    CK-MB Index  03/02/2018 10:00 PM     CALCULATION NOT PERFORMED WHEN RESULT IS BELOW LINEAR LIMIT       Assessment:  Migraines better on Topamax. Still with persistent vertigo in spite of improvement in migraine with low dose Topamax. Are these vertiginous migraines? Atypical seizures? Plan: Will acquire an EEG and MRI brain. Increase the Topamax and see her back in about 4 weeks. Sincerely,        Yessy Dang.  Melissa Rosales M.D.

## 2018-06-04 NOTE — COMMUNICATION BODY
Re:  Sin Jeanine up visit     6/4/2018 8:50 AM    SSN: [id number]    Subjective:   Nickie Shipley returns for follow up. The headaches are much better, she's now getting only 2 headache maximum per week and the pain is much less intense. She's still getting vertigo spells 2-3 times per week which are clearly independent of her headaches. She hasn't lost consciousness with the vertigo spells, but needs to sit down. She gets a warning where she feels weak and then the spell starts. Medications:    Current Outpatient Prescriptions   Medication Sig Dispense Refill    methylPREDNISolone (MEDROL, ZACH,) 4 mg tablet Per dose pack instructions 1 Dose Pack 0    gabapentin (NEURONTIN) 300 mg capsule Take 1 Tab QHS x1 week, then BID x1 week, then TID  Indications: NEUROPATHIC PAIN 90 Cap 1    ibuprofen (MOTRIN) 600 mg tablet Take 1 Tab by mouth every six (6) hours as needed for Pain. 20 Tab 0    ondansetron (ZOFRAN ODT) 4 mg disintegrating tablet Take 1 Tab by mouth every eight (8) hours as needed for Nausea. 10 Tab 0    cyclobenzaprine (FLEXERIL) 5 mg tablet Take 2 Tabs by mouth three (3) times daily. (Patient taking differently: Take 5 mg by mouth as needed.) 9 Tab 0    LORazepam (ATIVAN) 0.5 mg tablet Take 1-2 Tabs by mouth every eight (8) hours as needed (dizziness). Max Daily Amount: 3 mg. 9 Tab 0    meclizine (ANTIVERT) 12.5 mg tablet Take  by mouth three (3) times daily as needed.  topiramate (TOPAMAX) 25 mg tablet Take 1 Tab by mouth two (2) times a day.  Indications: MIGRAINE PREVENTION 60 Tab 2       Vital signs:    Visit Vitals    /68 (BP 1 Location: Left arm, BP Patient Position: Sitting)    Pulse 68    Temp 97.9 °F (36.6 °C) (Oral)    Resp 16    Ht 5' 6\" (1.676 m)    Wt 77.7 kg (171 lb 6.4 oz)    LMP 05/14/2018    SpO2 100%    BMI 27.66 kg/m2       Review of Systems:   As above otherwise 11 point review of systems negative including; Constitutional no fever or chills  Skin denies rash or itching  HEENT  Denies tinnitus, hearing lose  Eyes denies diplopia vision lose  Respiratory denies sortness of breath  Cardiovascular denies chest pain, dyspnea on exertion  Gastrointestinal denies nausea, vomiting, diarrhea, constipation  Genitourinary denies incontinence  Musculoskeletal denies joint pain or swelling  Endocrine denies weight change  Hematology denies easy bruising or bleeding   Neurological as above in HPI      Patient Active Problem List   Diagnosis Code    Cystitis N30.90    Ovarian mass N83.9    Family history of blood dyscrasia Z83.2    Family history of cardiovascular disease Z82.49    Abnormal cervical Papanicolaou smear with positive human papilloma virus (HPV) DNA test [id number]    Myofascial muscle pain M79.1    Muscle spasms of neck M62.838    Cervical spondylosis M47.812    DDD (degenerative disc disease), cervical M50.30    Cervical myofascial pain syndrome M79.1    HNP (herniated nucleus pulposus), cervical M50.20    Muscle spasm M62.838    Hx of fusion of cervical spine Z98.1    Migraine without aura and without status migrainosus, not intractable G43.009    Syncope R55    Neck pain M54.2         Objective: The patient is awake, alert, and oriented x 4. Fund of knowledge is adequate. Speech is fluent and memory is intact. Cranial Nerves: II - Visual fields are full to confrontation. III, IV, VI - Extraocular movements are intact. There is no nystagmus. V - Facial sensation is intact to pinprick. VII - Face is symmetrical.  VIII - Hearing is present. IX, X, XII - Palate is symmetrical.   XI - Shoulder shrugging and head turning intact  Motor: The patient moves all four limbs fairly well and symmetrically. Tone is normal. Reflexes are 2+ and symmetrical. Plantars are down going.  Gait is normal.    CBC:   Lab Results   Component Value Date/Time    WBC 5.8 03/13/2018 02:45 PM    RBC 4.16 (L) 03/13/2018 02:45 PM    HGB 12.2 03/13/2018 02:45 PM    HCT 36.5 03/13/2018 02:45 PM    PLATELET 336 90/27/5801 02:45 PM     BMP:   Lab Results   Component Value Date/Time    Glucose 74 03/13/2018 02:45 PM    Sodium 134 (L) 03/13/2018 02:45 PM    Potassium 4.2 03/13/2018 02:45 PM    Chloride 104 03/13/2018 02:45 PM    CO2 23 03/13/2018 02:45 PM    BUN 11 03/13/2018 02:45 PM    Creatinine 0.72 03/13/2018 02:45 PM    Calcium 9.0 03/13/2018 02:45 PM     CMP:   Lab Results   Component Value Date/Time    Glucose 74 03/13/2018 02:45 PM    Sodium 134 (L) 03/13/2018 02:45 PM    Potassium 4.2 03/13/2018 02:45 PM    Chloride 104 03/13/2018 02:45 PM    CO2 23 03/13/2018 02:45 PM    BUN 11 03/13/2018 02:45 PM    Creatinine 0.72 03/13/2018 02:45 PM    Calcium 9.0 03/13/2018 02:45 PM    Anion gap 7 03/13/2018 02:45 PM    BUN/Creatinine ratio 15 03/13/2018 02:45 PM    Alk. phosphatase 59 03/05/2018 04:00 PM    Protein, total 7.7 03/05/2018 04:00 PM    Albumin 3.9 03/05/2018 04:00 PM    Globulin 3.8 03/05/2018 04:00 PM    A-G Ratio 1.0 03/05/2018 04:00 PM     Coagulation: No results found for: PTP, INR, APTT, PTTT  Cardiac markers:   Lab Results   Component Value Date/Time    CK 49 03/02/2018 10:00 PM    CK-MB Index  03/02/2018 10:00 PM     CALCULATION NOT PERFORMED WHEN RESULT IS BELOW LINEAR LIMIT       Assessment:  Migraines better on Topamax. Still with persistent vertigo in spite of improvement in migraine with low dose Topamax. Are these vertiginous migraines? Atypical seizures? Plan: Will acquire an EEG and MRI brain. Increase the Topamax and see her back in about 4 weeks. Sincerely,        Veronica Jay.  Bill Fajardo M.D.

## 2018-06-04 NOTE — MR AVS SNAPSHOT
[address] [facility] [address] 
 
 
 [address] [facility] [address] [facility] [id number] [phone] Patient: Daquan Zamora MRN: [id number] OLJ:4[id number] Visit Information Date & Time Provider Department Dept. Phone Encounter #  
 6/4/2018  8:00 AM Marleni Parks, [address] [phone] [id number] Follow-up Instructions Return in about 4 weeks (around 7/2/2018). Follow-up and Disposition History Your Appointments 6/12/2018  8:00 AM  
Follow Up with Saim Mccullough MD  
[facility] [address] [facility]) Appt Note: neck fu  
 [address] [facility] [address]  
[phone]  
  
   
 [address]  
  
    
 7/31/2018  8:45 AM  
Office Visit with Lisa Franz MD  
[facility]) Appt Note: 3 months [address] [facility], [address] [facility] [address]  
[address], [address] Upcoming Health Maintenance Date Due  
 PAP AKA CERVICAL CYTOLOGY 12/20/2016 Influenza Age 5 to Adult 8/1/2018 DTaP/Tdap/Td series (2 - Td) 12/13/2022 Allergies as of 6/4/2018  Review Complete On: 6/4/2018 By: Eliseo Garcia LPN Severity Noted Reaction Type Reactions Ciprofloxacin (Bulk) High 06/13/2012   Side Effect Diarrhea Codeine High 12/12/2011    Itching Dilaudid [Hydromorphone (Bulk)] High 12/23/2011    Itching Lyrica [Pregabalin] High 12/28/2015    Swelling Tramadol Medium 06/13/2012    Other (comments), Unknown (comments) Gets headaches 
headache Codeine Phosphate  10/15/2015    Unknown (comments) Hydromorphone  10/15/2015    Unknown (comments) Current Immunizations  Never Reviewed Name Date Influenza Vaccine Split 12/13/2012 Tdap 12/13/2012 Not reviewed this visit You Were Diagnosed With   
  
 Codes Comments Migraine without aura and without status migrainosus, not intractable    -  Primary ICD-10-CM: G43.009 ICD-9-CM: 346.10 Neck pain     ICD-10-CM: M54.2 ICD-9-CM: 723.1 Syncope, unspecified syncope type     ICD-10-CM: R55 
ICD-9-CM: 780. 2 Vertigo     ICD-10-CM: Z27 ICD-9-CM: 780.4 Vitals BP Pulse Temp Resp Height(growth percentile) Weight(growth percentile) 128/68 (BP 1 Location: Left arm, BP Patient Position: Sitting) 68 97.9 °F (36.6 °C) (Oral) 16 5' 6\" (1.676 m) 171 lb 6.4 oz (77.7 kg) LMP SpO2 BMI OB Status Smoking Status 05/14/2018 100% 27.66 kg/m2 Having regular periods Never Smoker BMI and BSA Data Body Mass Index Body Surface Area  
 27.66 kg/m 2 1.9 m 2 Preferred Pharmacy Pharmacy Name Phone [facility] [address] [place] [phone] Your Updated Medication List  
  
   
This list is accurate as of 6/4/18  9:01 AM.  Always use your most recent med list.  
  
  
  
  
 cyclobenzaprine 5 mg tablet Commonly known as:  FLEXERIL Take 2 Tabs by mouth three (3) times daily. gabapentin 300 mg capsule Commonly known as:  NEURONTIN Take 1 Tab QHS x1 week, then BID x1 week, then TID  Indications: NEUROPATHIC PAIN  
  
 ibuprofen 600 mg tablet Commonly known as:  MOTRIN Take 1 Tab by mouth every six (6) hours as needed for Pain. LORazepam 0.5 mg tablet Commonly known as:  ATIVAN Take 1-2 Tabs by mouth every eight (8) hours as needed (dizziness). Max Daily Amount: 3 mg.  
  
 meclizine 12.5 mg tablet Commonly known as:  ANTIVERT Take  by mouth three (3) times daily as needed. methylPREDNISolone 4 mg tablet Commonly known as:  MEDROL (ZACH) Per dose pack instructions  
  
 ondansetron 4 mg disintegrating tablet Commonly known as:  ZOFRAN ODT Take 1 Tab by mouth every eight (8) hours as needed for Nausea. topiramate 50 mg tablet Commonly known as:  TOPAMAX Take 1 Tab by mouth two (2) times a day. Indications: MIGRAINE PREVENTION Prescriptions Sent to Pharmacy Refills  
 topiramate (TOPAMAX) 50 mg tablet 6 Sig: Take 1 Tab by mouth two (2) times a day. Indications: MIGRAINE PREVENTION Class: Normal  
 Pharmacy: [email or web address] [facility] Simon Richards 16 [address] [facility] #: [phone] Route: Oral  
  
Follow-up Instructions Return in about 4 weeks (around 7/2/2018). To-Do List   
 06/04/2018 Neurology:  EEG AWAKE AND ASLEEP   
  
 06/04/2018 Imaging:  MRI BRAIN WO CONT   
  
 06/06/2018 11:00 AM  
  Appointment with Margarito Maradiaga PT at [facility]  [facility] ([phone]) Introducing [facility] & HEALTH SERVICES! Dear Bijal [facility]: 
Thank you for requesting a [employer] account. Our records indicate that you already have an active [employer] account. You can access your account anytime at [email or web address] Did you know that you can access your hospital and ER discharge instructions at any time in [employer]? You can also review all of your test results from your hospital stay or ER visit. Additional Information If you have questions, please visit the Frequently Asked Questions section of the [employer] website at [email or web address] Remember, [employer] is NOT to be used for urgent needs. For medical emergencies, dial 911. Now available from your iPhone and Android! Please provide this summary of care documentation to your next provider. Your primary care clinician is listed as José Manuel Alamo. If you have any questions after today's visit, please call [phone].

## 2018-06-04 NOTE — LETTER
6/4/2018 8:57 AM 
 
Patient:  Anne Marie Hernandez YOB: 1975 Date of Visit: 6/4/2018 Dear MD Arron [address] [address] [address] [address] VIA In Basket 
 : Thank you for referring Ms. Tomasa Tolliver to me for evaluation/treatment. Below are the relevant portions of my assessment and plan of care. Re:  Dietra Moder up visit     6/4/2018 8:50 AM 
 
SSN: [id number] Subjective:   Anne Marie Hernandez returns for follow up. The headaches are much better, she's now getting only 2 headache maximum per week and the pain is much less intense. She's still getting vertigo spells 2-3 times per week which are clearly independent of her headaches. She hasn't lost consciousness with the vertigo spells, but needs to sit down. She gets a warning where she feels weak and then the spell starts. Medications:   
Current Outpatient Prescriptions Medication Sig Dispense Refill  methylPREDNISolone (MEDROL, ZACH,) 4 mg tablet Per dose pack instructions 1 Dose Pack 0  
 gabapentin (NEURONTIN) 300 mg capsule Take 1 Tab QHS x1 week, then BID x1 week, then TID  Indications: NEUROPATHIC PAIN 90 Cap 1  ibuprofen (MOTRIN) 600 mg tablet Take 1 Tab by mouth every six (6) hours as needed for Pain. 20 Tab 0  
 ondansetron (ZOFRAN ODT) 4 mg disintegrating tablet Take 1 Tab by mouth every eight (8) hours as needed for Nausea. 10 Tab 0  cyclobenzaprine (FLEXERIL) 5 mg tablet Take 2 Tabs by mouth three (3) times daily. (Patient taking differently: Take 5 mg by mouth as needed.) 9 Tab 0  
 LORazepam (ATIVAN) 0.5 mg tablet Take 1-2 Tabs by mouth every eight (8) hours as needed (dizziness). Max Daily Amount: 3 mg. 9 Tab 0  
 meclizine (ANTIVERT) 12.5 mg tablet Take  by mouth three (3) times daily as needed.  topiramate (TOPAMAX) 25 mg tablet Take 1 Tab by mouth two (2) times a day. Indications: MIGRAINE PREVENTION 60 Tab 2 Vital signs:   
Visit Vitals  /68 (BP 1 Location: Left arm, BP Patient Position: Sitting)  Pulse 68  Temp 97.9 °F (36.6 °C) (Oral)  Resp 16  
 Ht 5' 6\" (1.676 m)  Wt 77.7 kg (171 lb 6.4 oz)  LMP 05/14/2018  SpO2 100%  BMI 27.66 kg/m2 Review of Systems: As above otherwise 11 point review of systems negative including;  
Constitutional no fever or chills Skin denies rash or itching HEENT  Denies tinnitus, hearing lose Eyes denies diplopia vision lose Respiratory denies sortness of breath Cardiovascular denies chest pain, dyspnea on exertion Gastrointestinal denies nausea, vomiting, diarrhea, constipation Genitourinary denies incontinence Musculoskeletal denies joint pain or swelling Endocrine denies weight change Hematology denies easy bruising or bleeding Neurological as above in HPI Patient Active Problem List  
Diagnosis Code  Cystitis N30.90  
 Ovarian mass N83.9  Family history of blood dyscrasia Z83.2  Family history of cardiovascular disease Z82.49  Abnormal cervical Papanicolaou smear with positive human papilloma virus (HPV) DNA test [id number]  Myofascial muscle pain M79.1  Muscle spasms of neck M62.838  
 Cervical spondylosis M47.812  
 DDD (degenerative disc disease), cervical M50.30  Cervical myofascial pain syndrome M79.1  HNP (herniated nucleus pulposus), cervical M50.20  Muscle spasm M62.838  
 Hx of fusion of cervical spine Z98.1  Migraine without aura and without status migrainosus, not intractable G43.009  Syncope R55  Neck pain M54.2 Objective: The patient is awake, alert, and oriented x 4. Fund of knowledge is adequate. Speech is fluent and memory is intact. Cranial Nerves: II  Visual fields are full to confrontation. III, IV, VI  Extraocular movements are intact. There is no nystagmus. V  Facial sensation is intact to pinprick.   VII  Face is symmetrical.  VIII - Hearing is present. IX, X, XII  Palate is symmetrical.   XI - Shoulder shrugging and head turning intact Motor: The patient moves all four limbs fairly well and symmetrically. Tone is normal. Reflexes are 2+ and symmetrical. Plantars are down going. Gait is normal. 
 
CBC:  
Lab Results Component Value Date/Time WBC 5.8 03/13/2018 02:45 PM  
 RBC 4.16 (L) 03/13/2018 02:45 PM  
 HGB 12.2 03/13/2018 02:45 PM  
 HCT 36.5 03/13/2018 02:45 PM  
 PLATELET 545 27/90/2835 02:45 PM  
 
BMP:  
Lab Results Component Value Date/Time Glucose 74 03/13/2018 02:45 PM  
 Sodium 134 (L) 03/13/2018 02:45 PM  
 Potassium 4.2 03/13/2018 02:45 PM  
 Chloride 104 03/13/2018 02:45 PM  
 CO2 23 03/13/2018 02:45 PM  
 BUN 11 03/13/2018 02:45 PM  
 Creatinine 0.72 03/13/2018 02:45 PM  
 Calcium 9.0 03/13/2018 02:45 PM  
 
CMP:  
Lab Results Component Value Date/Time Glucose 74 03/13/2018 02:45 PM  
 Sodium 134 (L) 03/13/2018 02:45 PM  
 Potassium 4.2 03/13/2018 02:45 PM  
 Chloride 104 03/13/2018 02:45 PM  
 CO2 23 03/13/2018 02:45 PM  
 BUN 11 03/13/2018 02:45 PM  
 Creatinine 0.72 03/13/2018 02:45 PM  
 Calcium 9.0 03/13/2018 02:45 PM  
 Anion gap 7 03/13/2018 02:45 PM  
 BUN/Creatinine ratio 15 03/13/2018 02:45 PM  
 Alk. phosphatase 59 03/05/2018 04:00 PM  
 Protein, total 7.7 03/05/2018 04:00 PM  
 Albumin 3.9 03/05/2018 04:00 PM  
 Globulin 3.8 03/05/2018 04:00 PM  
 A-G Ratio 1.0 03/05/2018 04:00 PM  
 
Coagulation: No results found for: PTP, INR, APTT, PTTT Cardiac markers:  
Lab Results Component Value Date/Time CK 49 03/02/2018 10:00 PM  
 CK-MB Index  03/02/2018 10:00 PM  
  CALCULATION NOT PERFORMED WHEN RESULT IS BELOW LINEAR LIMIT Assessment:  Migraines better on Topamax. Still with persistent vertigo in spite of improvement in migraine with low dose Topamax. Are these vertiginous migraines? Atypical seizures? Plan: Will acquire an EEG and MRI brain.   Increase the Topamax and see her back in about 4 weeks. Sincerely, 
 
 
 
Laverne Palacio. Nury Al M.D. If you have questions, please do not hesitate to call me. I look forward to following Ms. Nunes along with you.  
 
 
 
Sincerely, 
 
 
Liana Cedeno MD

## 2018-06-06 ENCOUNTER — HOSPITAL ENCOUNTER (OUTPATIENT)
Dept: PHYSICAL THERAPY | Age: 43
Discharge: HOME | End: 2018-06-06
Payer: MEDICAID

## 2018-06-06 PROCEDURE — 97110 THERAPEUTIC EXERCISES: CPT

## 2018-06-06 PROCEDURE — 97161 PT EVAL LOW COMPLEX 20 MIN: CPT

## 2018-06-06 NOTE — PROGRESS NOTES
PT DAILY TREATMENT NOTE 8-14    Patient Name: Ana Gaona  Date:2018  : 1975  [x]  Patient  Verified  Payor: Fortino Aranda / Plan: [place] [employer] / Product Type: Managed Care Medicaid /    In time:1040  Out time:1120  Total Treatment Time (min): 40  Visit #: 1 of 8    Treatment Area: Cervicalgia [M54.2]    SUBJECTIVE  Pain Level (0-10 scale): 6  Any medication changes, allergies to medications, adverse drug reactions, diagnosis change, or new procedure performed?: [x] No    [] Yes (see summary sheet for update)  Subjective functional status/changes:   [x] See Eval form in paper chart     OBJECTIVE      32 min [x]Eval                  []Re-Eval         8 min Therapeutic Exercise:  [x] See flow sheet :HEP   Rationale: increase ROM, increase strength, improve coordination, improve balance and increase proprioception to improve the patients ability to perform ADls. With   [] TE   [] TA   [] neuro   [] other: Patient Education: [x] Review HEP    [] Progressed/Changed HEP based on:   [] positioning   [] body mechanics   [] transfers   [] heat/ice application    [] other:           Pain Level (0-10 scale) post treatment: 6    ASSESSMENT:   [x]  See Evaluation          Goals:  Short Term Goals: To be accomplished in 1 weeks:  1. Establish HEP for ROM & Strengthening. Long Term Goals: To be accomplished in 4 weeks:  1. Patient will be independent with HEP for ROM & Strengthening. Eval Status:n/a  2. Pt will increase cervical rotation and SB to the left by 10 degrees to increase ease with ADLs. Eval Status:rotation: 53 deg, SB: 20 deg  3. Pt will increase FOTO score to 50 points to demostrate improved function. Eval Status: FOTO: 42  4. Pt will report 50% improvement with functional mobility & positional tolerance to improve quality of life.      Eval Status:n/a    PLAN      [x]  Continue plan of care           Luis Branch, PT 2018  11:30 AM

## 2018-06-06 NOTE — PROGRESS NOTES
[facility]  [address] [facility] [address], [address]  [phone] fax    Plan of Care/ Statement of Necessity for Physical Therapy Services           Patient name: Marcella Delvalle Start of Care: 2018   Referral source: Abdulkadir Conway MD : 1975    Medical Diagnosis: Cervicalgia [M54.2]   Onset Date:2017    Treatment Diagnosis: myofascial neck pain   Prior Hospitalization: see medical history Provider#: [id number]   Medications: Verified on Patient summary List    Comorbidities: hx of ACDF in 2017, syncopal episodes   Prior Level of Function: works as  at [employer] and following information is based on the information from the initial evaluation. Assessment/ key information: Patient is a 38 y. o.female presenting with Cervicalgia [M54.2]. Ms. Brayan Machado presents to initial PT evaluation with c/o worsening neck pain following ACDF in 2017. She reports musclar pain previously, but has seen significant exacerbation in soft tissue restriction since surgery. Pain and muscle tension is localized to B UT & cervical paraspinals. She reports having dry needing performed in the past with good relief. Discussed that we offer dry needling at our [address] [place] [address] location, and we will transfer to this location following initial evaluation. Cervical ROm is limited L>R. Patient will benefit from skilled PT services to address deficits and facilitate return to premorbid activity level and promote improved quality of life. Evaluation Complexity History MEDIUM  Complexity : 1-2 comorbidities / personal factors will impact the outcome/ POC ; Examination LOW Complexity : 1-2 Standardized tests and measures addressing body structure, function, activity limitation and / or participation in recreation  ;Presentation MEDIUM Complexity : Evolving with changing characteristics  ; Clinical Decision Making MEDIUM Complexity : FOTO score of 26-74  Overall Complexity Rating: LOW   Problem List: pain affecting function, decrease ROM, decrease strength, edema affecting function, impaired gait/ balance, decrease ADL/ functional abilitiies, decrease activity tolerance, decrease flexibility/ joint mobility and decrease transfer abilities   Treatment Plan may include any combination of the following: Therapeutic exercise, Therapeutic activities, Neuromuscular re-education, Physical agent/modality, Manual therapy, Aquatic therapy, Patient education, Self Care training, Functional mobility training and Home safety training  Patient / Family readiness to learn indicated by: asking questions, trying to perform skills and interest  Persons(s) to be included in education: patient (P)  Barriers to Learning/Limitations: None  Patient Goal (s): relief.   Patient Self Reported Health Status: good  Rehabilitation Potential: good  Short Term Goals: To be accomplished in 1 weeks:  1. Establish HEP for ROM & Strengthening. Long Term Goals: To be accomplished in 4 weeks:  1. Patient will be independent with HEP for ROM & Strengthening. Eval Status:n/a  2. Pt will increase cervical rotation and SB to the left by 10 degrees to increase ease with ADLs. Eval Status:rotation: 53 deg, SB: 20 deg  3. Pt will increase FOTO score to 50 points to demostrate improved function. Eval Status: FOTO: 42  4. Pt will report 50% improvement with functional mobility & positional tolerance to improve quality of life. Eval Status:n/a    Frequency / Duration: Patient to be seen 2 times per week for 4 weeks.     Patient/ Caregiver education and instruction: Diagnosis, prognosis, self care, activity modification and exercises   [x]  Plan of care has been reviewed with MARIKA Christensen PT 6/6/2018 11:19 AM    ________________________________________________________________________    I certify that the above Therapy Services are being furnished while the patient is under my care. I agree with the treatment plan and certify that this therapy is necessary.     Physician's Signature:____________________  Date:____________Time: _________    Please sign and return to [facility] [place] [address]  [address] [place], [address] [phone] fax

## 2018-06-15 ENCOUNTER — HOSPITAL ENCOUNTER (OUTPATIENT)
Dept: PHYSICAL THERAPY | Age: 43
Discharge: HOME | End: 2018-06-15
Payer: MEDICAID

## 2018-06-15 PROCEDURE — 97140 MANUAL THERAPY 1/> REGIONS: CPT

## 2018-06-15 NOTE — PROGRESS NOTES
PT DAILY TREATMENT NOTE - [facility]     Patient Name: Marcia Peterson  Date:6/15/2018  : 1975  [x]  Patient  Verified  Payor: Yulisa Overall / [email or web address] / Product Type: Managed Care Medicaid /    In time: 9:00  Out time: 9:39  Total Treatment Time (min): 39  Total Timed Codes (min): 29     Visit #: 2 of 8    Treatment Area: Cervicalgia [M54.2]    SUBJECTIVE  Pain Level (0-10 scale): 7-8/10  Any medication changes, allergies to medications, adverse drug reactions, diagnosis change, or new procedure performed?: [x] No    [] Yes (see summary sheet for update)  Subjective functional status/changes:   [] No changes reported  Pt. Reports she has been doing her HEP.  She has most pain on left side of her neck/upper back    OBJECTIVE    Modality rationale: decrease pain to improve the patients ability to increase ease of ADLs   Min Type Additional Details    [] Estim:  []Unatt       []IFC  []Premod                        []Other:  []w/ice   []w/heat  Position:  Location:    [] Estim: []Att    []TENS instruct  []NMES                    []Other:  []w/US   []w/ice   []w/heat  Position:  Location:    []  Traction: [] Cervical       []Lumbar                       [] Prone          []Supine                       []Intermittent   []Continuous Lbs:  [] before manual  [] after manual    []  Ultrasound: []Continuous   [] Pulsed                           []1MHz   []3MHz W/cm2:  Location:    []  Iontophoresis with dexamethasone         Location: [] Take home patch   [] In clinic   10 [x]  Ice     []  heat  []  Ice massage  []  Laser   []  Anodyne Position: seated  Location: L UT    []  Laser with stim  []  Other:  Position:  Location:    []  Vasopneumatic Device Pressure:       [] lo [] med [] hi   Temperature: [] lo [] med [] hi   [x] Skin assessment post-treatment:  [x]intact []redness- no adverse reaction    []redness - adverse reaction:     5 min Therapeutic Exercise:  [x] See flow sheet : Rationale: increase ROM and increase strength to improve the patients ability to increase ease of ADLs    24 min Manual Therapy:  See attached note, STM to UT and thoracic paraspinals    Rationale: decrease pain, increase ROM and increase tissue extensibility to increase ease of ADLs    Dry Needling Procedure Note    Procedure: An intramuscular manual therapy (dry needling) and a neuro-muscular re-education treatment was done to deactivate myofascial trigger points with a 30 gauge filament needle under aseptic technique. Indications:  [x] Myofascial pain and dysfunction [] Muscled spasms  [x] Myalgia/myositis   [] Muscle cramps  [] Muscle imbalances  [] Temporomandibular Dysfunction  [] Other:    Chart reviewed for the following:  Robinson LEDEZMA PT, have reviewed the medical history, summary list and precautions/contraindications for Nickie Shipley.   TIME OUT performed immediately prior to start of procedure:  Robinson LEDEZMA PT, have performed the following reviews on Nickie Shipley prior to the start of the session:      [x] Verified patient identification by name and date of birth    [x] Agreement on all muscles being treated was verified   [x] Purpose of dry needling, side effects, possible complications, risks and benefits were explained to the patient   [x] Procedure site(s) verified  [x] Patient was positioned for comfort and draped for privacy  [x] Informed Consent was signed (initial visit) and verified verbally (subsequent visits)  [x] Patient was instructed on the need to report the use of blood thinners and/or immunosuppressant medications  [x] How to respond to possible adverse effects of treatment  [x] Self treatment of post needling soreness: ice, heat (moist heat, heat wraps) and stretching  [x] Opportunity was given to ask any questions, all questions were answered            Time:  9:00-9:15  Date of procedure: 6/15/2018  Treatment: The following muscles were treated today with intramuscular dry needling  [] Left [] Right Masster  [] Left [] Right Temporalis  [] Left [] Right Zygomaticus Major / Minor  [] Left [] Right Lateral Pterygoid  [] Left [] Right Medial Pterygoid  [] Left [] Right Digastric Post / Anterior Belly  [] Left [] Right Sternocleidomastoid  [] Left [] Right Scalene Anterior / Medial / Posterior  [] Left [] Right Extra Laryngeal Muscles  [x] Left [] Right Upper Trapezius  [] Left [] Right Middle Trapezius  [] Left [] Right Lower Trapezius  [] Left [] Right Oblique Capitis Inferior  [] Left [] Right Splenius Capitis / Cervicis  [] Left [] Right Semispinalis: Capitis / Cervicis  [x] Left [] Right Multifidi / Rotatores Cervicis / Thoracic  [] Left [] Right Longissimus Thoracis / Illiocostalis  [x] Left [] Right Levator Scapulae  [] Left [] Right Supraspinatus / Infraspinatus  [] Left [] Right Teres Major / Minor  [] Left [] Right Rhomboids / Serratus posterior superior  [] Left [] Right Pectoralis Major / Minor  [] Left [] Right Serratus Anterior  [] Left [] Right Latissimus Dorsi  [] Left [] Right Subscapularis  [] Left [] Right Coracobrachialis  [] Left [] Right Biceps Brachii  [] Left [] Right Deltoid: Anterior / Medial / Posterior  [] Left [] Right Brachialis  [] Left [] Right Triceps  [] Left [] Right Brachioradialis  [] Left [] Right Extensor Carpi Radialis Brevis / Extensor Carpi Radialis Longus    [] Left [] Right  Extensor digitorum  [] Left [] Right Supinator / Pronator Teres  [] Left [] Right Flexor Carpi Radialis/ Flexor Carpi Ulnaris   [] Left [] Right  Flexor Digitorum Superficialis/ Flexor Digitorum Profundus  [] Left [] Right Flexor Pollicis Longus / Flexor Pollicis Brevis / Palmaris Longus  [] Left [] Right Abductor Pollicis Longus / Abductor Pollicis Brevis  [] Left [] Right Opponens Pollicis / Adductor Pollicis  [] Left [] Right Dorsal / Palmar Interossei / Lumbricalis  [] Left [] Right Abductor Digiti Minimi / Opponens Digiti Minimi    Patient's response to today's treatment:  [] Latent Twitch Response     [x] Muscle relaxation [] Pain Relief  [x] Post needling soreness    [] without complications  [] Increased Range of Motion   [] Decreased headaches    [] Decreased Tinnitus  [] Other:     Performed by: Rory José, PT            With   [x] TE   [] TA   [] neuro   [] other: Patient Education: [x] Review HEP    [] Progressed/Changed HEP based on:   [] positioning   [] body mechanics   [] transfers   [] heat/ice application    [] other:      Other Objective/Functional Measures:   Pt. Was educated on importance of exercise in conjunction with dry needling  She has pain with cervical AROM  Significant tightness along left UT and thoracic paraspinals    Pt. Was educated on ice/heat and exercise to reduce post needling soreness    Pain Level (0-10 scale) post treatment: 8/10    ASSESSMENT/Changes in Function:  Pt. Initiated PT and is compliant with her HEP    Patient will continue to benefit from skilled PT services to modify and progress therapeutic interventions, address functional mobility deficits, address ROM deficits, address strength deficits, analyze and address soft tissue restrictions, analyze and cue movement patterns, analyze and modify body mechanics/ergonomics and assess and modify postural abnormalities to attain remaining goals. Progress towards goals / Updated goals:  Short Term Goals: To be accomplished in 1 weeks:  1. Establish HEP for ROM & Strengthening. Met (6/15/18)     Long Term Goals: To be accomplished in 4 weeks:  1. Patient will be independent with HEP for ROM & Strengthening. Eval Status:n/a  2. Pt will increase cervical rotation and SB to the left by 10 degrees to increase ease with ADLs. Eval Status:rotation: 53 deg, SB: 20 deg  3. Pt will increase FOTO score to 50 points to demostrate improved function. Eval Status: FOTO: 42  4.  Pt will report 50% improvement with functional mobility & positional tolerance to improve quality of life.                           Eval Status:n/a    PLAN  []  Upgrade activities as tolerated     [x]  Continue plan of care  []  Update interventions per flow sheet       []  Discharge due to:_  []  Other:_      Anastasia Arango, PT 6/15/2018  9:47 AM    Future Appointments  Date Time Provider Pablo Anne Marie   6/18/2018 8:00 AM Anastasia Nba, PT [facility]   6/21/2018 7:00 AM [facility] MRI RM 1 [facility]   6/21/2018 8:00 AM [facility] EEG RM 1 [facility]   6/21/2018 4:00 PM Anastasia Nba, PT [facility]   6/25/2018 8:00 AM Vic Shay, PTA [facility]   6/27/2018 4:30 PM Anastasia Nba, PT [facility]   7/2/2018 8:00 AM Anastasia Nba, PT [facility]   7/5/2018 4:00 PM Anastasia Nba, PT [facility]   7/9/2018 8:00 AM Anastasia Nba, PT [facility]   7/12/2018 4:00 PM Anastasia Nba, PT [facility]   7/16/2018 8:00 AM Anastasia Nba, PT [facility]   7/19/2018 4:00 PM Anastasia Nba, PT [facility]   7/20/2018 9:30 AM Jesus Qureshi MD [facility]   7/23/2018 8:00 AM Anastasia Nba, PT [facility]   7/26/2018 4:00 PM Anastasia Arango, PT [facility]   7/30/2018 9:00 AM Antonio Powell  [address]   8/3/2018 1:00 PM Rajiv Balderas  [facility]

## 2018-06-18 ENCOUNTER — HOSPITAL ENCOUNTER (OUTPATIENT)
Dept: PHYSICAL THERAPY | Age: 43
Discharge: HOME | End: 2018-06-18
Payer: MEDICAID

## 2018-06-18 PROCEDURE — 97110 THERAPEUTIC EXERCISES: CPT

## 2018-06-18 NOTE — PROGRESS NOTES
PT DAILY TREATMENT NOTE - [facility]     Patient Name: Magen Damon  Date:2018  : 1975  [x]  Patient  Verified  Payor: Yana Nab / Plan: [facility] [employer] [employer] [facility] / Product Type: Managed Care Medicaid /    In time: 8:03  Out time:8:34  Total Treatment Time (min): 31  Total Timed Codes (min): 31     Visit #: 3 of 8    Treatment Area: Cervicalgia [M54.2]    SUBJECTIVE  Pain Level (0-10 scale): 5/10  Any medication changes, allergies to medications, adverse drug reactions, diagnosis change, or new procedure performed?: [x] No    [] Yes (see summary sheet for update)  Subjective functional status/changes:   [] No changes reported  Pt. Reports she is doing a little better today. OBJECTIVE    31 min Therapeutic Exercise:  [x] See flow sheet :   Rationale: increase ROM and increase strength to improve the patients ability to increase ease of ADLs          With   [x] TE   [] TA   [] neuro   [] other: Patient Education: [x] Review HEP    [] Progressed/Changed HEP based on:   [] positioning   [] body mechanics   [] transfers   [] heat/ice application    [] other:      Other Objective/Functional Measures:   Cervical rotation AROM right:80 left: 66 degrees   Pt. Has pain with AROM at end ranges  She has pain with lifting head back up after UT stretch  Pain with doorway stretch and wall wipes    Pain Level (0-10 scale) post treatment: 5/10    ASSESSMENT/Changes in Function:  Pt. Is progressing slowly towards goals. She demonstrates improving cervical mobility and compliance with her HEP. She continues to have significant cervical weakness.      Patient will continue to benefit from skilled PT services to modify and progress therapeutic interventions, address functional mobility deficits, address ROM deficits, address strength deficits, analyze and address soft tissue restrictions, analyze and cue movement patterns, analyze and modify body mechanics/ergonomics and assess and modify postural abnormalities to attain remaining goals. Progress towards goals / Updated goals:  Short Term Goals: To be accomplished in 1 weeks:  1. Establish HEP for ROM & Strengthening. Met (6/15/18)      Long Term Goals: To be accomplished in 4 weeks:  1. Patient will be independent with HEP for ROM & Strengthening. Met (6/18/18)                        Eval Status:n/a  2. Pt will increase cervical rotation and SB to the left by 10 degrees to increase ease with ADLs. Progressing: rotation right: 80 left: 66 degrees (6/18/18)                        Eval Status:rotation: 53 deg, SB: 20 deg  3. Pt will increase FOTO score to 50 points to demostrate improved function.                        Eval Status: FOTO: 42  4.  Pt will report 50% improvement with functional mobility & positional tolerance to improve quality of life.                          Eval Status:n/a    PLAN  []  Upgrade activities as tolerated     [x]  Continue plan of care  []  Update interventions per flow sheet       []  Discharge due to:_  []  Other:_      Randy Osler, PT 6/18/2018  8:06 AM    Future Appointments  Date Time Provider Pablo Kenney   6/21/2018 7:00 AM [facility] MRI RM 1 [facility]   6/21/2018 8:00 AM [facility] EEG RM 1 [facility]   6/21/2018 4:00 PM Randy Osler, PT [facility]   6/25/2018 8:00 AM Gela Taveras PTA [facility]   6/27/2018 4:30 PM Randy Osler, PT [facility]   7/2/2018 8:00 AM Randy Osler, PT [facility]   7/5/2018 4:00 PM Randy Osler, PT [facility]   7/9/2018 8:00 AM Randy Osler, PT [facility]   7/12/2018 4:00 PM Randy Osler, PT [facility]   7/16/2018 8:00 AM Randy Osler, PT [facility]   7/19/2018 4:00 PM Randy Osler, PT [facility]   7/20/2018 9:30 AM Tiffany Shen MD [facility]   7/23/2018 8:00 AM Randy Osler, PT [facility]   7/26/2018 4:00 PM Randy Osler, PT [facility]   7/30/2018 9:00 AM Major Perez NP [facility] JONN HILARIO   8/3/2018 1:00 PM Taj Castellano  [facility]

## 2018-06-21 ENCOUNTER — HOSPITAL ENCOUNTER (OUTPATIENT)
Dept: PHYSICAL THERAPY | Age: 43
Discharge: HOME | End: 2018-06-21
Payer: MEDICAID

## 2018-06-21 ENCOUNTER — HOSPITAL ENCOUNTER (OUTPATIENT)
Dept: MRI IMAGING | Age: 43
Discharge: HOME | End: 2018-06-21
Attending: PSYCHIATRY & NEUROLOGY
Payer: MEDICAID

## 2018-06-21 ENCOUNTER — HOSPITAL ENCOUNTER (OUTPATIENT)
Dept: NEUROLOGY | Age: 43
Discharge: HOME | End: 2018-06-21
Attending: PSYCHIATRY & NEUROLOGY
Payer: MEDICAID

## 2018-06-21 DIAGNOSIS — G43.009 MIGRAINE WITHOUT AURA AND WITHOUT STATUS MIGRAINOSUS, NOT INTRACTABLE: ICD-10-CM

## 2018-06-21 DIAGNOSIS — R42 VERTIGO: ICD-10-CM

## 2018-06-21 DIAGNOSIS — R55 SYNCOPE, UNSPECIFIED SYNCOPE TYPE: ICD-10-CM

## 2018-06-21 DIAGNOSIS — M54.2 NECK PAIN: ICD-10-CM

## 2018-06-21 PROCEDURE — 95819 EEG AWAKE AND ASLEEP: CPT

## 2018-06-21 PROCEDURE — 97140 MANUAL THERAPY 1/> REGIONS: CPT

## 2018-06-21 PROCEDURE — 97110 THERAPEUTIC EXERCISES: CPT

## 2018-06-21 PROCEDURE — 70551 MRI BRAIN STEM W/O DYE: CPT

## 2018-06-21 NOTE — PROGRESS NOTES
PT DAILY TREATMENT NOTE - [facility]     Patient Name: Corinne Halsted  Date:2018  : 1975  [x]  Patient  Verified  Payor: Jerica Raw / Plan: [employer] / Product Type: Managed Care Medicaid /    In time: 4:00  Out time: 4:45  Total Treatment Time (min): 45  Total Timed Codes (min): 35     Visit #: 4 of 8    Treatment Area: Cervicalgia [M54.2]    SUBJECTIVE  Pain Level (0-10 scale): 3/10  Any medication changes, allergies to medications, adverse drug reactions, diagnosis change, or new procedure performed?: [x] No    [] Yes (see summary sheet for update)  Subjective functional status/changes:   [] No changes reported  Pt.  Reports she is doing pretty good today but the previous couple days had a lot of nerve pain down her arm    OBJECTIVE    Modality rationale: decrease pain to improve the patients ability to increase ease of ADLs   Min Type Additional Details    [] Estim:  []Unatt       []IFC  []Premod                        []Other:  []w/ice   []w/heat  Position:  Location:    [] Estim: []Att    []TENS instruct  []NMES                    []Other:  []w/US   []w/ice   []w/heat  Position:  Location:    []  Traction: [] Cervical       []Lumbar                       [] Prone          []Supine                       []Intermittent   []Continuous Lbs:  [] before manual  [] after manual    []  Ultrasound: []Continuous   [] Pulsed                           []1MHz   []3MHz W/cm2:  Location:    []  Iontophoresis with dexamethasone         Location: [] Take home patch   [] In clinic   10 [x]  Ice     []  heat  []  Ice massage  []  Laser   []  Anodyne Position: seated  Location: B UT    []  Laser with stim  []  Other:  Position:  Location:    []  Vasopneumatic Device Pressure:       [] lo [] med [] hi   Temperature: [] lo [] med [] hi   [x] Skin assessment post-treatment:  [x]intact []redness- no adverse reaction    []redness - adverse reaction:     8 min Therapeutic Exercise:  [x] See flow sheet :   Rationale: increase ROM and increase strength to improve the patients ability to increase ease of ADLs    27 min Manual Therapy:   See attached note, STM to B UT and cervical paraspinals   Rationale: decrease pain, increase ROM and increase tissue extensibility to increase ease of ADL    Dry Needling Procedure Note    Procedure: An intramuscular manual therapy (dry needling) and a neuro-muscular re-education treatment was done to deactivate myofascial trigger points with a 30 gauge filament needle under aseptic technique. Indications:  [x] Myofascial pain and dysfunction [] Muscled spasms  [x] Myalgia/myositis   [] Muscle cramps  [] Muscle imbalances  [] Temporomandibular Dysfunction  [] Other:    Chart reviewed for the following:  Pradeep LEDEZMA PT, have reviewed the medical history, summary list and precautions/contraindications for Zula Phoenix.   TIME OUT performed immediately prior to start of procedure:  Pradeep LEDEZMA PT, have performed the following reviews on Zula Phoenix prior to the start of the session:      [x] Verified patient identification by name and date of birth    [x] Agreement on all muscles being treated was verified   [x] Purpose of dry needling, side effects, possible complications, risks and benefits were explained to the patient   [x] Procedure site(s) verified  [x] Patient was positioned for comfort and draped for privacy  [x] Informed Consent was signed (initial visit) and verified verbally (subsequent visits)  [x] Patient was instructed on the need to report the use of blood thinners and/or immunosuppressant medications  [x] How to respond to possible adverse effects of treatment  [x] Self treatment of post needling soreness: ice, heat (moist heat, heat wraps) and stretching  [x] Opportunity was given to ask any questions, all questions were answered            Time: 4:05-4:20  Date of procedure: 6/21/2018  Treatment: The following muscles were treated today with intramuscular dry needling  [] Left [] Right Masster  [] Left [] Right Temporalis  [] Left [] Right Zygomaticus Major / Minor  [] Left [] Right Lateral Pterygoid  [] Left [] Right Medial Pterygoid  [] Left [] Right Digastric Post / Anterior Belly  [] Left [] Right Sternocleidomastoid  [] Left [] Right Scalene Anterior / Medial / Posterior  [] Left [] Right Extra Laryngeal Muscles  [x] Left [x] Right Upper Trapezius  [] Left [] Right Middle Trapezius  [] Left [] Right Lower Trapezius  [] Left [] Right Oblique Capitis Inferior  [] Left [] Right Splenius Capitis / Cervicis  [] Left [] Right Semispinalis: Capitis / Cervicis  [x] Left [x] Right Multifidi / Rotatores Cervicis / Thoracic  [] Left [] Right Longissimus Thoracis / Illiocostalis  [x] Left [] Right Levator Scapulae  [] Left [] Right Supraspinatus / Infraspinatus  [] Left [] Right Teres Major / Minor  [] Left [] Right Rhomboids / Serratus posterior superior  [] Left [] Right Pectoralis Major / Minor  [] Left [] Right Serratus Anterior  [] Left [] Right Latissimus Dorsi  [] Left [] Right Subscapularis  [] Left [] Right Coracobrachialis  [] Left [] Right Biceps Brachii  [] Left [] Right Deltoid: Anterior / Medial / Posterior  [] Left [] Right Brachialis  [] Left [] Right Triceps  [] Left [] Right Brachioradialis  [] Left [] Right Extensor Carpi Radialis Brevis / Extensor Carpi Radialis Longus    [] Left [] Right  Extensor digitorum  [] Left [] Right Supinator / Pronator Teres  [] Left [] Right Flexor Carpi Radialis/ Flexor Carpi Ulnaris   [] Left [] Right  Flexor Digitorum Superficialis/ Flexor Digitorum Profundus  [] Left [] Right Flexor Pollicis Longus / Flexor Pollicis Brevis / Palmaris Longus  [] Left [] Right Abductor Pollicis Longus / Abductor Pollicis Brevis  [] Left [] Right Opponens Pollicis / Adductor Pollicis  [] Left [] Right Dorsal / Palmar Interossei / Lumbricalis  [] Left [] Right Abductor Digiti Minimi / Opponens Digiti Minimi    Patient's response to today's treatment:  [] Latent Twitch Response     [x] Muscle relaxation [] Pain Relief  [] Post needling soreness    [] without complications  [] Increased Range of Motion   [] Decreased headaches    [] Decreased Tinnitus  [] Other:     Performed by: Josie Mahan, PT            With   [x] TE   [] TA   [] neuro   [] other: Patient Education: [x] Review HEP    [] Progressed/Changed HEP based on:   [] positioning   [] body mechanics   [] transfers   [] heat/ice application    [] other:      Other Objective/Functional Measures:   Pt. Has decreased muscle tightness after manual but increased soreness  Significant twitching noted in B UT  She was educated on self massage techniques for home     Pain Level (0-10 scale) post treatment: 7/10    ASSESSMENT/Changes in Function:  Pt. Is progressing slowly towards goals. She is having less pain overall, but it continues to fluctuate. She continues to have pain down her left arm at times and this pain hasn't changed since [facility]. Patient will continue to benefit from skilled PT services to modify and progress therapeutic interventions, address functional mobility deficits, address ROM deficits, address strength deficits, analyze and address soft tissue restrictions, analyze and cue movement patterns, analyze and modify body mechanics/ergonomics and assess and modify postural abnormalities to attain remaining goals. []  See Plan of Care  []  See progress note/recertification  []  See Discharge Summary         Progress towards goals / Updated goals:  Short Term Goals: To be accomplished in 1 weeks:  1. Establish HEP for ROM & Strengthening. Met (6/15/18)      Long Term Goals: To be accomplished in 4 weeks:  1. Patient will be independent with HEP for ROM & Strengthening. Met (6/18/18)                        Eval Status:n/a  2. Pt will increase cervical rotation and SB to the left by 10 degrees to increase ease with ADLs.    Progressing: rotation right: 80 left: 66 degrees (6/18/18)                        Eval Status:rotation: 53 deg, SB: 20 deg  3. Pt will increase FOTO score to 50 points to demostrate improved function.                        Eval Status: FOTO: 42  4.  Pt will report 50% improvement with functional mobility & positional tolerance to improve quality of life.                          Eval Status:n/a    PLAN  []  Upgrade activities as tolerated     [x]  Continue plan of care  []  Update interventions per flow sheet       []  Discharge due to:_  []  Other:_      Opal Mooring, PT 6/21/2018  4:03 PM    Future Appointments  Date Time Provider Pablo Kenney   6/25/2018 8:00 AM Republic Buchanan, PTA [facility]   6/27/2018 4:30 PM Opal Mooring, PT [facility]   7/2/2018 8:00 AM Opal Mooring, PT [facility]   7/5/2018 4:00 PM Opal Mooring, PT [facility]   7/9/2018 8:00 AM Opal Mooring, PT [facility]   7/12/2018 4:00 PM Opal Mooring, PT [facility]   7/16/2018 8:00 AM Opal Mooring, PT [facility]   7/19/2018 4:00 PM Opal Mooring, PT [facility]   7/20/2018 9:30 [facility]   7/23/2018 8:00 AM Opal Mooring, PT [facility]   7/26/2018 4:00 PM Opal Mooring, PT [facility]   7/30/2018 9:00 AM Juan Felix  [address]   8/3/2018 1:00 PM Toyin Oliver  [facility]

## 2018-06-22 RX ORDER — KETOROLAC TROMETHAMINE 30 MG/ML
60 INJECTION, SOLUTION INTRAMUSCULAR; INTRAVENOUS ONCE
Qty: 2 ML | Refills: 0
Start: 2018-06-22 | End: 2018-06-22

## 2018-06-25 ENCOUNTER — APPOINTMENT (OUTPATIENT)
Dept: PHYSICAL THERAPY | Age: 43
End: 2018-06-25
Payer: MEDICAID

## 2018-06-25 ENCOUNTER — TELEPHONE (OUTPATIENT)
Dept: NEUROLOGY | Age: 43
End: 2018-06-25

## 2018-06-25 NOTE — TELEPHONE ENCOUNTER
Spoke with patient regarding MRI results; she also asked about EEG results. Informed her that I do not have those yet, will call her back when I do.

## 2018-06-26 NOTE — PROCEDURES
[facility]  EEG    Tarik Razo  MR#: [id number]  : 1975  ACCOUNT #: [de-identified]   DATE OF SERVICE: 2018    REFERRING PHYSICIAN:  Farhana Castro MD    EEG NUMBER:  Contreras . CLINICAL:  This is an apparently wakeful, drowsy  EEG on this 38year-old patient being evaluated for spells that began in January that involved vertigo and syncopal episodes. MEDICATIONS:  Include gabapentin, Zofran, Ativan, Flexeril. EEG REPORT:  The predominant apparently wakeful background consists of 20-30 microvolt, sinusoidal and symmetrical, 10-11 Hz waves which attenuate well with eye opening. Bifrontal 3-5 microvolt, 16-20 Hz activity is seen which is symmetrical in its occurrence. During what appears to be drowsiness, the posterior rhythm consists of 5-15 microvolt, 8-9 Hz waves mixed with frequently occurring 20-30 microvolt, 4-5 Hz waves. Central vertex sharp waves are identified which are symmetrical.    Step flash photic stimulation caused driving between 3 and 18 flashes per second. Hyperventilation did not change the record. IMPRESSION:  Normal wakeful, drowsy EEG. No epileptiform or focal abnormality was identified.       MD Filemon He / Codie Thomas  D: 2018 16:13     T: 2018 17:10  JOB #: [id number]

## 2018-06-27 ENCOUNTER — HOSPITAL ENCOUNTER (OUTPATIENT)
Dept: PHYSICAL THERAPY | Age: 43
Discharge: HOME | End: 2018-06-27
Payer: MEDICAID

## 2018-06-27 PROCEDURE — 97140 MANUAL THERAPY 1/> REGIONS: CPT

## 2018-06-27 PROCEDURE — 97110 THERAPEUTIC EXERCISES: CPT

## 2018-06-27 NOTE — PROGRESS NOTES
PT DAILY TREATMENT NOTE - [facility]     Patient Name: Dongl Poll  Date:2018  : 1975  [x]  Patient  Verified  Payor: [employer] / Plan: [place] [employer] / Product Type: Managed Care Medicaid /    In time: 4:30  Out time: 4:58  Total Treatment Time (min): 28  Total Timed Codes (min): 28     Visit #: 5 of 8    Treatment Area: Cervicalgia [M54.2]    SUBJECTIVE  Pain Level (0-10 scale): 4/10  Any medication changes, allergies to medications, adverse drug reactions, diagnosis change, or new procedure performed?: [x] No    [] Yes (see summary sheet for update)  Subjective functional status/changes:   [] No changes reported  Pt. Reports she is feeling about the same. OBJECTIVE    13 min Therapeutic Exercise:  [x] See flow sheet :   Rationale: increase ROM and increase strength to improve the patients ability to increase ease of ADLs    15 min Manual Therapy:  See attached note, STM to B cervical paraspinals    Rationale: decrease pain, increase ROM and increase tissue extensibility to increase ease of ADLs    Dry Needling Procedure Note    Procedure: An intramuscular manual therapy (dry needling) and a neuro-muscular re-education treatment was done to deactivate myofascial trigger points with a 30 gauge filament needle under aseptic technique. Indications:  [x] Myofascial pain and dysfunction [] Muscled spasms  [x] Myalgia/myositis   [] Muscle cramps  [] Muscle imbalances  [] Temporomandibular Dysfunction  [] Other:    Chart reviewed for the following:  Les LEDEZMA, PT, have reviewed the medical history, summary list and precautions/contraindications for Derl Poll.   TIME OUT performed immediately prior to start of procedure:  Les LEDEZMA, PT, have performed the following reviews on Derl Poll prior to the start of the session:      [x] Verified patient identification by name and date of birth    [x] Agreement on all muscles being treated was verified   [x] Purpose of dry needling, side effects, possible complications, risks and benefits were explained to the patient   [x] Procedure site(s) verified  [x] Patient was positioned for comfort and draped for privacy  [x] Informed Consent was signed (initial visit) and verified verbally (subsequent visits)  [x] Patient was instructed on the need to report the use of blood thinners and/or immunosuppressant medications  [x] How to respond to possible adverse effects of treatment  [x] Self treatment of post needling soreness: ice, heat (moist heat, heat wraps) and stretching  [x] Opportunity was given to ask any questions, all questions were answered            Time: 4:35-4:45  Date of procedure: 6/27/2018  Treatment: The following muscles were treated today with intramuscular dry needling  [] Left [] Right Masster  [] Left [] Right Temporalis  [] Left [] Right Zygomaticus Major / Minor  [] Left [] Right Lateral Pterygoid  [] Left [] Right Medial Pterygoid  [] Left [] Right Digastric Post / Anterior Belly  [] Left [] Right Sternocleidomastoid  [] Left [] Right Scalene Anterior / Medial / Posterior  [] Left [] Right Extra Laryngeal Muscles  [x] Left [x] Right Upper Trapezius  [] Left [] Right Middle Trapezius  [] Left [] Right Lower Trapezius  [] Left [] Right Oblique Capitis Inferior  [] Left [] Right Splenius Capitis / Cervicis  [] Left [] Right Semispinalis: Capitis / Cervicis  [x] Left [x] Right Multifidi / Rotatores Cervicis / Thoracic  [] Left [] Right Longissimus Thoracis / Illiocostalis  [] Left [] Right Levator Scapulae  [] Left [] Right Supraspinatus / Infraspinatus  [] Left [] Right Teres Major / Minor  [] Left [] Right Rhomboids / Serratus posterior superior  [] Left [] Right Pectoralis Major / Minor  [] Left [] Right Serratus Anterior  [] Left [] Right Latissimus Dorsi  [] Left [] Right Subscapularis  [] Left [] Right Coracobrachialis  [] Left [] Right Biceps Brachii  [] Left [] Right Deltoid: Anterior / Medial / Posterior  [] Left [] Right Brachialis  [] Left [] Right Triceps  [] Left [] Right Brachioradialis  [] Left [] Right Extensor Carpi Radialis Brevis / Extensor Carpi Radialis Longus    [] Left [] Right  Extensor digitorum  [] Left [] Right Supinator / Pronator Teres  [] Left [] Right Flexor Carpi Radialis/ Flexor Carpi Ulnaris   [] Left [] Right  Flexor Digitorum Superficialis/ Flexor Digitorum Profundus  [] Left [] Right Flexor Pollicis Longus / Flexor Pollicis Brevis / Palmaris Longus  [] Left [] Right Abductor Pollicis Longus / Abductor Pollicis Brevis  [] Left [] Right Opponens Pollicis / Adductor Pollicis  [] Left [] Right Dorsal / Palmar Interossei / Lumbricalis  [] Left [] Right Abductor Digiti Minimi / Opponens Digiti Minimi    Patient's response to today's treatment:  [] Latent Twitch Response     [x] Muscle relaxation [] Pain Relief  [] Post needling soreness    [] without complications  [] Increased Range of Motion   [] Decreased headaches    [] Decreased Tinnitus  [] Other:     Performed by: Estevan Nageotte, PT            With   [x] TE   [] TA   [] neuro   [] other: Patient Education: [x] Review HEP    [] Progressed/Changed HEP based on:   [] positioning   [] body mechanics   [] transfers   [] heat/ice application    [] other:      Other Objective/Functional Measures:   Pt. Continues to have poor tolerance to movement  She has pain with end ranges of AROM  She has pain with levator scap stretching     Pain Level (0-10 scale) post treatment: 8/10    ASSESSMENT/Changes in Function:  Pt. Is progressing slowly towards goals. She continues to have decreased cervical AROM and decreased cervical strength. She continues to have significant pain.      Patient will continue to benefit from skilled PT services to modify and progress therapeutic interventions, address functional mobility deficits, address ROM deficits, address strength deficits, analyze and address soft tissue restrictions, analyze and cue movement patterns, analyze and modify body mechanics/ergonomics and assess and modify postural abnormalities to attain remaining goals. Progress towards goals / Updated goals:  Short Term Goals: To be accomplished in 1 weeks:  1. Establish HEP for ROM & Strengthening. Met (6/15/18)      Long Term Goals: To be accomplished in 4 weeks:  1. Patient will be independent with HEP for ROM & Strengthening. Met (6/18/18)                        Eval Status:n/a  2. Pt will increase cervical rotation and SB to the left by 10 degrees to increase ease with ADLs. Progressing: rotation right: 80 left: 66 degrees (6/18/18)                        Eval Status:rotation: 53 deg, SB: 20 deg  3. Pt will increase FOTO score to 50 points to demostrate improved function.                        Eval Status: FOTO: 42  4.  Pt will report 50% improvement with functional mobility & positional tolerance to improve quality of life.                          Eval Status:n/a    PLAN  []  Upgrade activities as tolerated     [x]  Continue plan of care  []  Update interventions per flow sheet       []  Discharge due to:_  []  Other:_      Marco A York PT 6/27/2018  4:59 PM    Future Appointments  Date Time Provider Pablo Anne Marie   7/2/2018 8:00 AM Marco A York PT [facility]   7/5/2018 4:00 PM Marco A York PT [facility]   7/9/2018 8:00 AM Marco A York PT [facility]   7/12/2018 4:00 PM Marco A York PT [facility]   7/16/2018 8:00 AM Marco A York PT [facility]   7/19/2018 4:00 PM Marco A York PT [facility]   [facility]20[facility]2018 9:30 [facility]   7/23/2018 8:00 AM Marco A York PT [facility]   7/26/2018 4:00 PM Marco A York, PT [facility]   7/30/2018 9:00 AM Faustino Loja  [address]   8/3/2018 1:00 PM Gosia Plaza  [facility]

## 2018-07-02 ENCOUNTER — HOSPITAL ENCOUNTER (OUTPATIENT)
Dept: PHYSICAL THERAPY | Age: 43
End: 2018-07-02

## 2018-07-03 ENCOUNTER — HOSPITAL ENCOUNTER (EMERGENCY)
Age: 43
Discharge: HOME | End: 2018-07-03
Attending: EMERGENCY MEDICINE
Payer: MEDICAID

## 2018-07-03 VITALS
WEIGHT: 169 LBS | HEIGHT: 65 IN | SYSTOLIC BLOOD PRESSURE: 109 MMHG | OXYGEN SATURATION: 100 % | RESPIRATION RATE: 12 BRPM | DIASTOLIC BLOOD PRESSURE: 74 MMHG | BODY MASS INDEX: 28.16 KG/M2 | HEART RATE: 73 BPM | TEMPERATURE: 98.6 F

## 2018-07-03 DIAGNOSIS — R42 DIZZINESS: Primary | ICD-10-CM

## 2018-07-03 LAB
ATRIAL RATE: 67 BPM
CALCULATED P AXIS, ECG09: 31 DEGREES
CALCULATED R AXIS, ECG10: 57 DEGREES
CALCULATED T AXIS, ECG11: 58 DEGREES
DIAGNOSIS, 93000: NORMAL
GLUCOSE BLD STRIP.AUTO-MCNC: 91 MG/DL (ref 70–110)
P-R INTERVAL, ECG05: 156 MS
Q-T INTERVAL, ECG07: 370 MS
QRS DURATION, ECG06: 84 MS
QTC CALCULATION (BEZET), ECG08: 390 MS
VENTRICULAR RATE, ECG03: 67 BPM

## 2018-07-03 PROCEDURE — 82962 GLUCOSE BLOOD TEST: CPT

## 2018-07-03 PROCEDURE — 93005 ELECTROCARDIOGRAM TRACING: CPT

## 2018-07-03 PROCEDURE — 99284 EMERGENCY DEPT VISIT MOD MDM: CPT

## 2018-07-03 PROCEDURE — 74011250637 HC RX REV CODE- 250/637: Performed by: STUDENT IN AN ORGANIZED HEALTH CARE EDUCATION/TRAINING PROGRAM

## 2018-07-03 RX ORDER — DIAZEPAM 2 MG/1
2 TABLET ORAL
Qty: 4 TAB | Refills: 0 | Status: SHIPPED | OUTPATIENT
Start: 2018-07-03 | End: 2018-12-11

## 2018-07-03 RX ORDER — DIAZEPAM 5 MG/1
5 TABLET ORAL
Status: COMPLETED | OUTPATIENT
Start: 2018-07-03 | End: 2018-07-03

## 2018-07-03 RX ADMIN — DIAZEPAM 5 MG: 5 TABLET ORAL at 11:59

## 2018-07-03 NOTE — ED NOTES
Patient keeps eyes closed. To the bathroom with a wheelchair. Patient was assisted to wheelchair with assist of one. Kept eyes closed, but followed commands on how to transfer to wheelchair.

## 2018-07-03 NOTE — DISCHARGE INSTRUCTIONS
Dizziness: Care Instructions  Your Care Instructions  Dizziness is the feeling of unsteadiness or fuzziness in your head. It is different than having vertigo, which is a feeling that the room is spinning or that you are moving or falling. It is also different from lightheadedness, which is the feeling that you are about to faint. It can be hard to know what causes dizziness. Some people feel dizzy when they have migraine headaches. Sometimes bouts of flu can make you feel dizzy. Some medical conditions, such as heart problems or high blood pressure, can make you feel dizzy. Many medicines can cause dizziness, including medicines for high blood pressure, pain, or anxiety. If a medicine causes your symptoms, your doctor may recommend that you stop or change the medicine. If it is a problem with your heart, you may need medicine to help your heart work better. If there is no clear reason for your symptoms, your doctor may suggest watching and waiting for a while to see if the dizziness goes away on its own. Follow-up care is a key part of your treatment and safety. Be sure to make and go to all appointments, and call your doctor if you are having problems. It's also a good idea to know your test results and keep a list of the medicines you take. How can you care for yourself at home? · If your doctor recommends or prescribes medicine, take it exactly as directed. Call your doctor if you think you are having a problem with your medicine. · Do not drive while you feel dizzy. · Try to prevent falls. Steps you can take include:  ¨ Using nonskid mats, adding grab bars near the tub, and using night-lights. ¨ Clearing your home so that walkways are free of anything you might trip on. ¨ Letting family and friends know that you have been feeling dizzy. This will help them know how to help you. When should you call for help? Call 911 anytime you think you may need emergency care.  For example, call if:  ? · You passed out (lost consciousness). ? · You have dizziness along with symptoms of a heart attack. These may include:  ¨ Chest pain or pressure, or a strange feeling in the chest.  ¨ Sweating. ¨ Shortness of breath. ¨ Nausea or vomiting. ¨ Pain, pressure, or a strange feeling in the back, neck, jaw, or upper belly or in one or both shoulders or arms. ¨ Lightheadedness or sudden weakness. ¨ A fast or irregular heartbeat. ? · You have symptoms of a stroke. These may include:  ¨ Sudden numbness, tingling, weakness, or loss of movement in your face, arm, or leg, especially on only one side of your body. ¨ Sudden vision changes. ¨ Sudden trouble speaking. ¨ Sudden confusion or trouble understanding simple statements. ¨ Sudden problems with walking or balance. ¨ A sudden, severe headache that is different from past headaches. ?Call your doctor now or seek immediate medical care if:  ? · You feel dizzy and have a fever, headache, or ringing in your ears. ? · You have new or increased nausea and vomiting. ? · Your dizziness does not go away or comes back. ? Watch closely for changes in your health, and be sure to contact your doctor if:  ? · You do not get better as expected. Where can you learn more? Go to http://delores-scotty.info/. Enter B911 in the search box to learn more about \"Dizziness: Care Instructions. \"  Current as of: March 20, 2017  Content Version: 11.4  © 4154-0429 [employer]. Care instructions adapted under license by [employer] (which disclaims liability or warranty for this information). If you have questions about a medical condition or this instruction, always ask your healthcare professional. [place] [address] any warranty or liability for your use of this information.

## 2018-07-03 NOTE — ED PROVIDER NOTES
EMERGENCY DEPARTMENT HISTORY AND PHYSICAL EXAM    11:12 AM      Date: 7/3/2018  Patient Name: Shari Merlos    History of Presenting Illness     Chief Complaint   Patient presents with    Dizziness       History Provided By: Patient    Chief Complaint: Dizziness, resolved  Duration:  Minutes  Timing:  Acute  Location: generalized  Quality: like the room is spinning   Severity: Severe  Modifying Factors: nothing makes this better or worse   Associated Symptoms: denies any other associated signs or symptoms      Additional History (Context): Shari Merlos is a 38 y.o. female with cystitis, chronic pain syndrome, and unexplained dizziness who presents with another episode of her dizziness. States she had ~15-20 minutes of the sensation of the room spinning which was already resolved by the time she arrived to the ED, now she just feels weak. Denies any other acute issues, pains, or problems at this time. Is currently in the midsts of an extensive workup between neurology and cardiology for these symptoms, and had a negative normal MRI this week. PCP: Eyad Rader MD    Current Outpatient Prescriptions   Medication Sig Dispense Refill    diazePAM (VALIUM) 2 mg tablet Take 1 Tab by mouth every eight (8) hours as needed for Anxiety (dizziness) for up to 4 doses. Max Daily Amount: 6 mg. 4 Tab 0    topiramate (TOPAMAX) 50 mg tablet Take 1 Tab by mouth two (2) times a day. Indications: MIGRAINE PREVENTION 60 Tab 6    methylPREDNISolone (MEDROL, ZACH,) 4 mg tablet Per dose pack instructions 1 Dose Pack 0    gabapentin (NEURONTIN) 300 mg capsule Take 1 Tab QHS x1 week, then BID x1 week, then TID  Indications: NEUROPATHIC PAIN 90 Cap 1    cyclobenzaprine (FLEXERIL) 5 mg tablet Take 2 Tabs by mouth three (3) times daily. (Patient taking differently: Take 5 mg by mouth as needed.) 9 Tab 0    ibuprofen (MOTRIN) 600 mg tablet Take 1 Tab by mouth every six (6) hours as needed for Pain.  20 Tab 0    ondansetron (ZOFRAN ODT) 4 mg disintegrating tablet Take 1 Tab by mouth every eight (8) hours as needed for Nausea. 10 Tab 0    LORazepam (ATIVAN) 0.5 mg tablet Take 1-2 Tabs by mouth every eight (8) hours as needed (dizziness). Max Daily Amount: 3 mg. 9 Tab 0    meclizine (ANTIVERT) 12.5 mg tablet Take  by mouth three (3) times daily as needed. 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   [de-identified] SHOULDER SURG [address] UNLISTED  2011    left shoulder       Family History:  Family History   Problem Relation Age of Onset    Heart Disease Mother     Hypertension Mother    [de-identified] MS Mother     Other Mother 54     Lupus    Stroke Mother      x3    Asthma Sister     Diabetes Paternal Grandmother     Hypertension Paternal Grandmother        Social History:  Social History   Substance Use Topics    Smoking status: Never Smoker    Smokeless tobacco: Never Used    Alcohol use Yes      Comment: rare       Allergies: Allergies   Allergen Reactions    Ciprofloxacin (Bulk) Diarrhea    Codeine Itching    Dilaudid [Hydromorphone (Bulk)] Itching    Lyrica [Pregabalin] Swelling    Tramadol Other (comments) and Unknown (comments)     Gets headaches  headache    Codeine Phosphate Unknown (comments)    Hydromorphone Unknown (comments)         Review of Systems       Review of Systems   Constitutional: Negative for activity change and appetite change. HENT: Negative for congestion and dental problem. Eyes: Negative for photophobia and visual disturbance. Respiratory: Negative for apnea, cough, shortness of breath and stridor. Cardiovascular: Negative for chest pain, palpitations and leg swelling. Gastrointestinal: Negative for abdominal distention and abdominal pain. Endocrine: Negative for polydipsia and polyphagia. Genitourinary: Negative for difficulty urinating, dyspareunia and pelvic pain. Musculoskeletal: Negative for arthralgias and back pain. Allergic/Immunologic: Negative for immunocompromised state. Neurological: Positive for dizziness. Negative for syncope, facial asymmetry, numbness and headaches. Hematological: Negative for adenopathy. Psychiatric/Behavioral: Negative for agitation. Physical Exam     Visit Vitals    /74 (BP 1 Location: Left arm, BP Patient Position: At rest)    Pulse 73    Temp 98.6 °F (37 °C)    Resp 12    Ht 5' 5\" (1.651 m)    Wt 76.7 kg (169 lb)    SpO2 100%    BMI 28.12 kg/m2         Physical Exam   Constitutional: She is oriented to person, place, and time. She appears well-developed and well-nourished. No distress. HENT:   Head: Normocephalic and atraumatic. Mouth/Throat: Oropharynx is clear and moist. No oropharyngeal exudate. Eyes: Pupils are equal, round, and reactive to light. Right eye exhibits no discharge. Left eye exhibits no discharge. Neck: Normal range of motion. No JVD present. No tracheal deviation present. Cardiovascular: Normal rate, regular rhythm, normal heart sounds and intact distal pulses. Exam reveals no gallop. No murmur heard. Pulmonary/Chest: Effort normal and breath sounds normal. No stridor. No respiratory distress. She has no wheezes. She has no rales. She exhibits no tenderness. Abdominal: Soft. Bowel sounds are normal. She exhibits no distension. There is no tenderness. There is no guarding. Musculoskeletal: Normal range of motion. She exhibits no edema or tenderness. Neurological: She is alert and oriented to person, place, and time.  She has normal reflexes. She displays normal reflexes. No cranial nerve deficit. She exhibits normal muscle tone. Coordination normal.   Skin: Skin is warm and dry. No rash noted. She is not diaphoretic. No erythema. No pallor. Psychiatric: She has a normal mood and affect. Nursing note and vitals reviewed. Diagnostic Study Results     Labs -  Recent Results (from the past 12 hour(s))   EKG, 12 LEAD, INITIAL    Collection Time: 07/03/18 11:45 AM   Result Value Ref Range    Ventricular Rate 67 BPM    Atrial Rate 67 BPM    P-R Interval 156 ms    QRS Duration 84 ms    Q-T Interval 370 ms    QTC Calculation (Bezet) 390 ms    Calculated P Axis 31 degrees    Calculated R Axis 57 degrees    Calculated T Axis 58 degrees    Diagnosis       Normal sinus rhythm  Normal ECG  When compared with ECG of 13-MAR-2018 13:28,  No significant change was found     GLUCOSE, POC    Collection Time: 07/03/18 11:57 AM   Result Value Ref Range    Glucose (POC) 91 70 - 110 mg/dL       Radiologic Studies -   No orders to display         Medical Decision Making   I am the first provider for this patient. I reviewed the vital signs, available nursing notes, past medical history, past surgical history, family history and social history. Vital Signs-Reviewed the patient's vital signs. Pulse Oximetry Analysis -  99 on room air (Interpretation) Normal     Cardiac Monitor:  Rate: 73  Rhythm:  Normal Sinus Rhythm     EKG: Interpreted by the EP. Time Interpreted: 1145   Rate: 67   Rhythm: Normal Sinus Rhythm    Interpretation: normal ECG   Comparison: 3/13/18    Records Reviewed: Nursing Notes, Old Medical Records, Previous electrocardiograms, Previous Radiology Studies and Previous Laboratory Studies (Time of Review: 11:12 AM)    ED Course: Progress Notes, Reevaluation, and Consults:    Provider Notes (Medical Decision Making): 44yo F with dizziness, now resolved. Asymptomatic and nonfocal exam in ED. Normal POC glucose.  Tolerated PO and ambulated without issues in the ED when not being actively observed by healthcare professional.     ECG reassuring. No CP, SOB, abdominal pain, headaches, or other symptoms associated with dizziness today. MRI normal earlier this week. Patient counseled on results and states she feels comfortable returning home. She already has follow up established. Do not suspect acute life threatening etiology of symptoms today. Diagnosis     Clinical Impression:   1. Dizziness        Disposition: Discharged home     Follow-up Information     Follow up With Details Comments Contact Radha Wen MD Call today To discuss this ED visit  [address] [place] [address]  [phone]             Discharge Medication List as of 7/3/2018  1:18 PM      START taking these medications    Details   diazePAM (VALIUM) 2 mg tablet Take 1 Tab by mouth every eight (8) hours as needed for Anxiety (dizziness) for up to 4 doses. Max Daily Amount: 6 mg., Print, Disp-4 Tab, R-0         CONTINUE these medications which have NOT CHANGED    Details   topiramate (TOPAMAX) 50 mg tablet Take 1 Tab by mouth two (2) times a day. Indications: MIGRAINE PREVENTION, Normal, Disp-60 Tab, R-6      methylPREDNISolone (MEDROL, ZACH,) 4 mg tablet Per dose pack instructions, Normal, Disp-1 Dose Pack, R-0      gabapentin (NEURONTIN) 300 mg capsule Take 1 Tab QHS x1 week, then BID x1 week, then TID  Indications: NEUROPATHIC PAIN, Normal, Disp-90 Cap, R-1      cyclobenzaprine (FLEXERIL) 5 mg tablet Take 2 Tabs by mouth three (3) times daily. , Print, Disp-9 Tab, R-0      ibuprofen (MOTRIN) 600 mg tablet Take 1 Tab by mouth every six (6) hours as needed for Pain., Normal, Disp-20 Tab, R-0      ondansetron (ZOFRAN ODT) 4 mg disintegrating tablet Take 1 Tab by mouth every eight (8) hours as needed for Nausea., Normal, Disp-10 Tab, R-0      LORazepam (ATIVAN) 0.5 mg tablet Take 1-2 Tabs by mouth every eight (8) hours as needed (dizziness).  Max Daily Amount: 3 mg., Print, Disp-9 Tab, R-0      meclizine (ANTIVERT) 12.5 mg tablet Take  by mouth three (3) times daily as needed., Historical Med           _______________________________

## 2018-07-03 NOTE — ED TRIAGE NOTES
The patient presents for evaluation of dizziness that began just PTA. States, \"it feels like the room is spinning. \"  Denies nausea or photophobia.

## 2018-07-05 ENCOUNTER — HOSPITAL ENCOUNTER (OUTPATIENT)
Dept: PHYSICAL THERAPY | Age: 43
End: 2018-07-05

## 2018-07-12 ENCOUNTER — APPOINTMENT (OUTPATIENT)
Dept: PHYSICAL THERAPY | Age: 43
End: 2018-07-12

## 2018-07-16 ENCOUNTER — APPOINTMENT (OUTPATIENT)
Dept: PHYSICAL THERAPY | Age: 43
End: 2018-07-16

## 2018-07-19 ENCOUNTER — APPOINTMENT (OUTPATIENT)
Dept: PHYSICAL THERAPY | Age: 43
End: 2018-07-19

## 2018-07-20 ENCOUNTER — OFFICE VISIT (OUTPATIENT)
Dept: CARDIOLOGY CLINIC | Age: 43
End: 2018-07-20

## 2018-07-20 VITALS
SYSTOLIC BLOOD PRESSURE: 101 MMHG | DIASTOLIC BLOOD PRESSURE: 68 MMHG | BODY MASS INDEX: 27.99 KG/M2 | HEIGHT: 65 IN | WEIGHT: 168 LBS | HEART RATE: 73 BPM

## 2018-07-20 DIAGNOSIS — G43.009 MIGRAINE WITHOUT AURA AND WITHOUT STATUS MIGRAINOSUS, NOT INTRACTABLE: ICD-10-CM

## 2018-07-20 DIAGNOSIS — R55 SYNCOPE, UNSPECIFIED SYNCOPE TYPE: Primary | ICD-10-CM

## 2018-07-20 DIAGNOSIS — I95.1 ORTHOSTATIC HYPOTENSION: ICD-10-CM

## 2018-07-20 RX ORDER — MIDODRINE HYDROCHLORIDE 5 MG/1
5 TABLET ORAL 2 TIMES DAILY WITH MEALS
Qty: 60 TAB | Refills: 4 | Status: SHIPPED | OUTPATIENT
Start: 2018-07-20 | End: 2018-08-19

## 2018-07-20 NOTE — PROGRESS NOTES
HISTORY OF PRESENT ILLNESS  Taylor Blank is a 38 y.o. female. Dizziness   The history is provided by the patient. This is a chronic problem. The problem has not changed since onset. Pertinent negatives include no chest pain and no shortness of breath. Review of Systems   Constitutional: Negative for chills and weight loss. HENT: Negative for congestion, ear discharge, ear pain, hearing loss, nosebleeds and tinnitus. Eyes: Negative for blurred vision. Respiratory: Negative for shortness of breath, wheezing and stridor. Cardiovascular: Negative for chest pain and leg swelling. Gastrointestinal: Negative for heartburn. Musculoskeletal: Negative for myalgias and neck pain. Skin: Negative for itching and rash. Neurological: Positive for dizziness. Negative for tingling, tremors, focal weakness and loss of consciousness. Psychiatric/Behavioral: Negative for depression and suicidal ideas.      Family History   Problem Relation Age of Onset    Heart Disease Mother     Hypertension Mother    [facility] MS Mother     Other Mother 54     Lupus    Stroke Mother      x3    Asthma Sister     Diabetes Paternal Grandmother     Hypertension Paternal Grandmother        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   [facility] SHOULDER SURG [address] UNLISTED  2011    left shoulder       Social History   Substance Use Topics    Smoking status: Never Smoker    Smokeless tobacco: Never Used    Alcohol use Yes      Comment: rare       Allergies   Allergen Reactions    Ciprofloxacin (Bulk) Diarrhea    Codeine Itching    Dilaudid [Hydromorphone (Bulk)] Itching    Lyrica [Pregabalin] Swelling    Tramadol Other (comments) and Unknown (comments)     Gets headaches  headache    Codeine Phosphate Unknown (comments)    Hydromorphone Unknown (comments)       Outpatient Prescriptions Marked as Taking for the 7/20/18 encounter (Office Visit) with Ruslan Ruggiero MD   Medication Sig Dispense Refill    midodrine (PROAMITINE) 5 mg tablet Take 1 Tab by mouth two (2) times daily (with meals) for 30 days. 60 Tab 4    gabapentin (NEURONTIN) 300 mg capsule Take 1 Tab QHS x1 week, then BID x1 week, then TID  Indications: NEUROPATHIC PAIN 90 Cap 1    ondansetron (ZOFRAN ODT) 4 mg disintegrating tablet Take 1 Tab by mouth every eight (8) hours as needed for Nausea. 10 Tab 0    LORazepam (ATIVAN) 0.5 mg tablet Take 1-2 Tabs by mouth every eight (8) hours as needed (dizziness). Max Daily Amount: 3 mg. 9 Tab 0        Visit Vitals    /68    Pulse 73    Ht 5' 5\" (1.651 m)    Wt 76.2 kg (168 lb)    BMI 27.96 kg/m2     Physical Exam   Constitutional: She is oriented to person, place, and time. She appears well-developed and well-nourished. No distress. HENT:   Head: Atraumatic. Mouth/Throat: No oropharyngeal exudate. Eyes: Conjunctivae are normal. Right eye exhibits no discharge. Left eye exhibits no discharge. No scleral icterus. Neck: Normal range of motion. Neck supple. No JVD present. No tracheal deviation present. No thyromegaly present. Cardiovascular: Normal rate and regular rhythm. Exam reveals no gallop. No murmur heard. Pulmonary/Chest: Effort normal and breath sounds normal. No stridor. She has no wheezes. She has no rales. Abdominal: Soft. There is no tenderness. There is no rebound and no guarding. Musculoskeletal: Normal range of motion. She exhibits no edema or tenderness. Lymphadenopathy:     She has no cervical adenopathy.    Neurological: She is alert and oriented to person, place, and time. She exhibits normal muscle tone. Skin: Skin is warm. She is not diaphoretic. Psychiatric: She has a normal mood and affect. Her behavior is normal.     ekg sinus rhythm with no acute st-t changes  Echo 04/2018:  SUMMARY:  Left ventricle: Systolic function was normal. Ejection fraction was  estimated in the range of 55 % to 60 %. There were no regional wall motion  abnormalities. Mitral valve: There was mild regurgitation. Tricuspid valve: There was mild regurgitation. Pulmonic valve: There was mild regurgitation. ASSESSMENT and PLAN    ICD-10-CM ICD-9-CM    1. Syncope, unspecified syncope type R55 780.2    2. Migraine without aura and without status migrainosus, not intractable G43.009 346.10    3. Orthostatic hypotension I95.1 458.0      Orders Placed This Encounter    midodrine (PROAMITINE) 5 mg tablet     Sig: Take 1 Tab by mouth two (2) times daily (with meals) for 30 days. Dispense:  60 Tab     Refill:  4     Follow-up Disposition:  Return in about 1 month (around 8/20/2018). current treatment plan is effective, no change in therapy. Patient with h/o recurrent syncope-- has recurrent dizziness  No significant VHD by exam.  No preexcitation on ekg  Echo report reviewed with patient. Tilt table test negative.   Will start midodrine as her ENT w/u was negative  Follow up in 1 month  Meanwhile advised adequate hydration

## 2018-07-20 NOTE — PROGRESS NOTES
1. Have you been to the ER, urgent care clinic since your last visit? Hospitalized since your last visit? Yes, Stephanie for dizziness    2. Have you seen or consulted any other health care providers outside of the [address] [place] [address] since your last visit? Include any pap smears or colon screening.  no

## 2018-07-20 NOTE — MR AVS SNAPSHOT
[address] 
 
 
 [address], [address] [facility] [address] 
[phone] Patient: Fernando Salazar MRN: [id number] FAT:[id number] Visit Information Date & Time Provider Department Dept. Phone Encounter #  
 7/20/2018  9:30 AM Antonio Jaime MD Cardiology [facility] [address] ADRIAN GONZALES [phone] [id number] Follow-up Instructions Return in about 1 month (around 8/20/2018). Follow-up and Disposition History Your Appointments 7/30/2018  9:00 AM  
Follow Up with Pete Sapp NP  
[facility] Appt Note: neck fu; 2 MO FU PER STANLEY HUI  
 [address] [facility] [address]  
[phone]  
  
   
 [address] [facility] [address] [facility]  
  
    
 8/3/2018  1:00 PM  
Follow Up with Adilene Russ MD  
[address]) [address] [facility] [id number] [phone]  
  
   
 [facility] [id number]  
  
    
 8/21/2018  3:15 PM  
Office Visit with Antonio Jaime MD  
[facility]) Appt Note: 1 month [address], [address] [facility] [address]  
[address], [address] Upcoming Health Maintenance Date Due  
 PAP AKA CERVICAL CYTOLOGY 12/20/2016 Influenza Age 5 to Adult 8/1/2018 DTaP/Tdap/Td series (2 - Td) 12/13/2022 Allergies as of 7/20/2018  Review Complete On: 7/20/2018 By: Antonio Jaime MD  
  
 Severity Noted Reaction Type Reactions Ciprofloxacin (Bulk) High 06/13/2012   Side Effect Diarrhea Codeine High 12/12/2011    Itching Dilaudid [Hydromorphone (Bulk)] High 12/23/2011    Itching Lyrica [Pregabalin] High 12/28/2015    Swelling Tramadol Medium 06/13/2012    Other (comments), Unknown (comments) Gets headaches 
headache Codeine Phosphate  10/15/2015    Unknown (comments) Hydromorphone  10/15/2015    Unknown (comments) Current Immunizations  Never Reviewed Name Date Influenza Vaccine Split 12/13/2012 Tdap 12/13/2012 Not reviewed this visit You Were Diagnosed With   
  
 Codes Comments Syncope, unspecified syncope type    -  Primary ICD-10-CM: R55 
ICD-9-CM: 780.2 Migraine without aura and without status migrainosus, not intractable     ICD-10-CM: C99.268 ICD-9-CM: 346.10 Orthostatic hypotension     ICD-10-CM: I95.1 ICD-9-CM: 458.0 Vitals BP Pulse Height(growth percentile) Weight(growth percentile) BMI OB Status 101/68 73 5' 5\" (1.651 m) 168 lb (76.2 kg) 27.96 kg/m2 Having regular periods Smoking Status Never Smoker Vitals History BMI and BSA Data Body Mass Index Body Surface Area  
 27.96 kg/m 2 1.87 m 2 Preferred Pharmacy Pharmacy Name Phone [facility] [address] [place] [phone] Your Updated Medication List  
  
   
This list is accurate as of 7/20/18 10:43 AM.  Always use your most recent med list.  
  
  
  
  
 cyclobenzaprine 5 mg tablet Commonly known as:  FLEXERIL Take 2 Tabs by mouth three (3) times daily. diazePAM 2 mg tablet Commonly known as:  VALIUM Take 1 Tab by mouth every eight (8) hours as needed for Anxiety (dizziness) for up to 4 doses. Max Daily Amount: 6 mg.  
  
 gabapentin 300 mg capsule Commonly known as:  NEURONTIN Take 1 Tab QHS x1 week, then BID x1 week, then TID  Indications: NEUROPATHIC PAIN  
  
 ibuprofen 600 mg tablet Commonly known as:  MOTRIN Take 1 Tab by mouth every six (6) hours as needed for Pain. LORazepam 0.5 mg tablet Commonly known as:  ATIVAN Take 1-2 Tabs by mouth every eight (8) hours as needed (dizziness). Max Daily Amount: 3 mg.  
  
 meclizine 12.5 mg tablet Commonly known as:  ANTIVERT Take  by mouth three (3) times daily as needed. midodrine 5 mg tablet Commonly known as:  Magali Lona Take 1 Tab by mouth two (2) times daily (with meals) for 30 days. ondansetron 4 mg disintegrating tablet Commonly known as:  ZOFRAN ODT Take 1 Tab by mouth every eight (8) hours as needed for Nausea. topiramate 50 mg tablet Commonly known as:  TOPAMAX Take 1 Tab by mouth two (2) times a day. Indications: MIGRAINE PREVENTION Prescriptions Sent to Pharmacy Refills  
 midodrine (PROAMITINE) 5 mg tablet 4 Sig: Take 1 Tab by mouth two (2) times daily (with meals) for 30 days. Class: Normal  
 Pharmacy: [facility] [address] [place], [place] [address] [place] [address] #: [phone] Route: Oral  
  
Follow-up Instructions Return in about 1 month (around 8/20/2018). Introducing [facility]! Dear Negrito Diallo: 
Thank you for requesting a [facility] account. Our records indicate that you already have an active [facility] account. You can access your account anytime at [email or web address] Did you know that you can access your hospital and ER discharge instructions at any time in [facility]? You can also review all of your test results from your hospital stay or ER visit. Additional Information If you have questions, please visit the Frequently Asked Questions section of the [employer] website at [email or web address] Remember, [facility] is NOT to be used for urgent needs. For medical emergencies, dial 911. Now available from your iPhone and Android! Please provide this summary of care documentation to your next provider. Your primary care clinician is listed as Mariano Sifuentes. If you have any questions after today's visit, please call [phone].

## 2018-07-23 ENCOUNTER — APPOINTMENT (OUTPATIENT)
Dept: PHYSICAL THERAPY | Age: 43
End: 2018-07-23

## 2018-07-26 ENCOUNTER — APPOINTMENT (OUTPATIENT)
Dept: PHYSICAL THERAPY | Age: 43
End: 2018-07-26

## 2018-08-03 ENCOUNTER — OFFICE VISIT (OUTPATIENT)
Dept: NEUROLOGY | Age: 43
End: 2018-08-03

## 2018-08-03 VITALS
DIASTOLIC BLOOD PRESSURE: 72 MMHG | RESPIRATION RATE: 16 BRPM | HEART RATE: 67 BPM | WEIGHT: 172 LBS | HEIGHT: 65 IN | BODY MASS INDEX: 28.66 KG/M2 | OXYGEN SATURATION: 99 % | TEMPERATURE: 98.9 F | SYSTOLIC BLOOD PRESSURE: 122 MMHG

## 2018-08-03 DIAGNOSIS — M54.2 NECK PAIN: ICD-10-CM

## 2018-08-03 DIAGNOSIS — R42 VERTIGO: ICD-10-CM

## 2018-08-03 DIAGNOSIS — G43.009 MIGRAINE WITHOUT AURA AND WITHOUT STATUS MIGRAINOSUS, NOT INTRACTABLE: Primary | ICD-10-CM

## 2018-08-03 NOTE — PROGRESS NOTES
Chief Complaint   Patient presents with    Follow-up    Neurologic Problem     neck pain, vertigo, syncope    Migraine     1. Have you been to the ER, urgent care clinic since your last visit? Hospitalized since your last visit? Yes- Er visit- in chart     2. Have you seen or consulted any other health care providers outside of the [address] [place] [address] since your last visit? Include any pap smears or colon screening.  No

## 2018-08-03 NOTE — PROGRESS NOTES
Re:  Katie Gonzalez up visit     8/3/2018 1:33 PM    SSN: [id number]    Subjective:   Lela Stevens returns for follow up. The headaches are much better, they have resolved completely at this time. She's still getting vertigo spells 2-3 times per week which are clearly independent of her headaches. She hasn't lost consciousness with the vertigo spells, but needs to sit down. She gets a warning where she feels weak and then the spell starts. This hasn't changed, in spite of the headaches going away or being dosed with Midodrine by her primary. Nothing in particular brings it on. Position changes or sitting still can trigger a spell. She's also started to experience a tightness in her chest.  This started about 2 days ago. She's not had any trauma nor has she been exercising. There's no shortness of breath. Medications:    Current Outpatient Prescriptions   Medication Sig Dispense Refill    midodrine (PROAMITINE) 5 mg tablet Take 1 Tab by mouth two (2) times daily (with meals) for 30 days. 60 Tab 4    gabapentin (NEURONTIN) 300 mg capsule Take 1 Tab QHS x1 week, then BID x1 week, then TID  Indications: NEUROPATHIC PAIN 90 Cap 1    ondansetron (ZOFRAN ODT) 4 mg disintegrating tablet Take 1 Tab by mouth every eight (8) hours as needed for Nausea. 10 Tab 0    meclizine (ANTIVERT) 12.5 mg tablet Take  by mouth three (3) times daily as needed.  diazePAM (VALIUM) 2 mg tablet Take 1 Tab by mouth every eight (8) hours as needed for Anxiety (dizziness) for up to 4 doses. Max Daily Amount: 6 mg. 4 Tab 0    topiramate (TOPAMAX) 50 mg tablet Take 1 Tab by mouth two (2) times a day. Indications: MIGRAINE PREVENTION 60 Tab 6    cyclobenzaprine (FLEXERIL) 5 mg tablet Take 2 Tabs by mouth three (3) times daily.  (Patient taking differently: Take 5 mg by mouth as needed.) 9 Tab 0    ibuprofen (MOTRIN) 600 mg tablet Take 1 Tab by mouth every six (6) hours as needed for Pain. 20 Tab 0    LORazepam (ATIVAN) 0.5 mg tablet Take 1-2 Tabs by mouth every eight (8) hours as needed (dizziness). Max Daily Amount: 3 mg. 9 Tab 0       Vital signs:    Visit Vitals    /72 (BP 1 Location: Right arm, BP Patient Position: Sitting)    Pulse 67    Temp 98.9 °F (37.2 °C) (Oral)    Resp 16    Ht 5' 5\" (1.651 m)    Wt 78 kg (172 lb)    LMP 07/26/2018 (Exact Date)    SpO2 99%    BMI 28.62 kg/m2       Review of Systems:   As above otherwise 11 point review of systems negative including;   Constitutional no fever or chills  Skin denies rash or itching  HEENT  Denies tinnitus, hearing lose  Eyes denies diplopia vision lose  Respiratory denies sortness of breath  Cardiovascular denies chest pain, dyspnea on exertion  Gastrointestinal denies nausea, vomiting, diarrhea, constipation  Genitourinary denies incontinence  Musculoskeletal denies joint pain or swelling  Endocrine denies weight change  Hematology denies easy bruising or bleeding   Neurological as above in HPI      Patient Active Problem List   Diagnosis Code    Cystitis N30.90    Ovarian mass N83.9    Family history of blood dyscrasia Z83.2    Family history of cardiovascular disease Z82.49    Abnormal cervical Papanicolaou smear with positive human papilloma virus (HPV) DNA test [id number]    Myofascial muscle pain M79.1    Muscle spasms of neck M62.838    Cervical spondylosis M47.812    DDD (degenerative disc disease), cervical M50.30    Cervical myofascial pain syndrome M79.1    HNP (herniated nucleus pulposus), cervical M50.20    Muscle spasm M62.838    Hx of fusion of cervical spine Z98.1    Migraine without aura and without status migrainosus, not intractable G43.009    Syncope R55    Neck pain M54.2    Vertigo R42    Orthostatic hypotension I95.1         Objective: The patient is awake, alert, and oriented x 4. Fund of knowledge is adequate. Speech is fluent and memory is intact.   Cranial Nerves: II - Visual fields are full to confrontation. III, IV, VI - Extraocular movements are intact. There is no nystagmus. V - Facial sensation is intact to pinprick. VII - Face is symmetrical.  VIII - Hearing is present. IX, X, XII - Palate is symmetrical.   XI - Shoulder shrugging and head turning intact  Motor: The patient moves all four limbs fairly well and symmetrically. Tone is normal. Reflexes are 2+ and symmetrical. Plantars are down going. Gait is normal.    CBC:   Lab Results   Component Value Date/Time    WBC 5.8 03/13/2018 02:45 PM    RBC 4.16 (L) 03/13/2018 02:45 PM    HGB 12.2 03/13/2018 02:45 PM    HCT 36.5 03/13/2018 02:45 PM    PLATELET 403 25/01/1427 02:45 PM     BMP:   Lab Results   Component Value Date/Time    Glucose 74 03/13/2018 02:45 PM    Sodium 134 (L) 03/13/2018 02:45 PM    Potassium 4.2 03/13/2018 02:45 PM    Chloride 104 03/13/2018 02:45 PM    CO2 23 03/13/2018 02:45 PM    BUN 11 03/13/2018 02:45 PM    Creatinine 0.72 03/13/2018 02:45 PM    Calcium 9.0 03/13/2018 02:45 PM     CMP:   Lab Results   Component Value Date/Time    Glucose 74 03/13/2018 02:45 PM    Sodium 134 (L) 03/13/2018 02:45 PM    Potassium 4.2 03/13/2018 02:45 PM    Chloride 104 03/13/2018 02:45 PM    CO2 23 03/13/2018 02:45 PM    BUN 11 03/13/2018 02:45 PM    Creatinine 0.72 03/13/2018 02:45 PM    Calcium 9.0 03/13/2018 02:45 PM    Anion gap 7 03/13/2018 02:45 PM    BUN/Creatinine ratio 15 03/13/2018 02:45 PM    Alk. phosphatase 59 03/05/2018 04:00 PM    Protein, total 7.7 03/05/2018 04:00 PM    Albumin 3.9 03/05/2018 04:00 PM    Globulin 3.8 03/05/2018 04:00 PM    A-G Ratio 1.0 03/05/2018 04:00 PM     Coagulation: No results found for: PTP, INR, APTT, PTTT  Cardiac markers:   Lab Results   Component Value Date/Time    CK 49 03/02/2018 10:00 PM    CK-MB Index  03/02/2018 10:00 PM     CALCULATION NOT PERFORMED WHEN RESULT IS BELOW LINEAR LIMIT     Brain MR Without Contrast  HISTORY: Intermittent vertigo.  Patient describes \"blacking out\" also reports  frequent and intense headaches in the past, getting better. . Clinical  considerations include vertiginous migraines and atypical seizures  COMPARISON:   -CT brain 2018.  -Cervical spine radiographs 3/13/2018. Preoperative cervical spine MR 2017  TECHNIQUE: Brain scanned with sagittal T1W scans, axial T2W scans, axial  diffusion weighted images. Axial SWI. Coronal T1  FINDINGS:   Sagittal T1 scan demonstrates some poorly defined low signal projected C5, upper  C6 vertebra. This is consistent with artifact from the intravertebral fusion  device seen on the radiographs at this level 3/13/2018. No acute infarction  identified. Small amount of benign and chronic appearing mucosal disease  paranasal sinuses. Flow void present major vessels base of brain. CSF spaces  normal size for age. No hemorrhage identified. No mass lesion identified. The  brain looks normal without masses, mass effect, or signal abnormality.        IMPRESSION  IMPRESSION:     1. The brain looks normal    [facility]  EEG     Alessia Dura  MR#: [id number]  : 1975  ACCOUNT #: [de-identified]   DATE OF SERVICE: 2018     REFERRING PHYSICIAN:  Maria Guadalupe Loyd MD     EEG NUMBER:  [id number] .     CLINICAL:  This is an apparently wakeful, drowsy  EEG on this 38year-old patient being evaluated for spells that began in January that involved vertigo and syncopal episodes.     MEDICATIONS:  Include gabapentin, Zofran, Ativan, Flexeril.     EEG REPORT:  The predominant apparently wakeful background consists of 20-30 microvolt, sinusoidal and symmetrical, 10-11 Hz waves which attenuate well with eye opening. Bifrontal 3-5 microvolt, 16-20 Hz activity is seen which is symmetrical in its occurrence.     During what appears to be drowsiness, the posterior rhythm consists of 5-15 microvolt, 8-9 Hz waves mixed with frequently occurring 20-30 microvolt, 4-5 Hz waves.   Central vertex sharp waves are identified which are symmetrical.     Step flash photic stimulation caused driving between 3 and 18 flashes per second. Hyperventilation did not change the record.     IMPRESSION:  Normal wakeful, drowsy EEG. No epileptiform or focal abnormality was identified.          Assessment:  Migraines better on Topamax. Still with persistent vertigo in spite of improvement in migraine with low dose Topamax. Plan:  Continue Topamax for migraine prevention. Recommend seeing primary for this chest tightness. Return here in 3 months. Sincerely,        Masood Winter.  Teena Weaver M.D.

## 2018-08-10 NOTE — PROGRESS NOTES
[facility] [employer] [facility]  [address] [place] [address]  [phone] fax    Physical Therapy Discharge Summary    Patient name: Dione Webb Start of Care:  18   Referral source: Daniel Regan MD : 1975   Medical/Treatment Diagnosis: Cervicalgia [M54.2] Onset Date: 2017     Prior Hospitalization: see medical history Provider#: [id number]   Medications: Verified on Patient Summary List    Comorbidities: hx of ACDF in 2017, syncopal episodes   Prior Level of Function: works as  at [employer] general    Visits from Lane of Care:  5    Missed Visits: 4    Reporting Period :  18 to  18    Summary of Care:  Goal: Patient will be independent with HEP for ROM & Strengthening. Status at last note/certification: n/a  Status at discharge: met    Goal: Pt will increase cervical rotation and SB to the left by 10 degrees to increase ease with ADLs. Status at last note/certification: decreased mobility  Status at discharge: not met    Goal: Pt will increase FOTO score to 50 points to demostrate improved function. Status at last note/certification: 42  Status at discharge: not met    Goal: Pt will report 50% improvement with functional mobility & positional tolerance to improve quality of life. Status at last note/certification: n/a  Status at discharge: not met    Pt. Was seen for 5 visits and then did not return to PT so goals were unable to be re-assessed. She was educated on a HEP at time of evaluation. She also demonstrates some improvement in cervical rotation AROM right: 80 degrees left: 66 degrees. She will be D/C at this time.        ASSESSMENT/RECOMMENDATIONS:  [x]Discontinue therapy: []Patient has reached or is progressing toward set goals      [x]Patient is non-compliant or has abdicated      []Due to lack of appreciable progress towards set goals    Ashley Rosen, PT 8/10/2018 1:52 PM

## 2018-08-21 ENCOUNTER — OFFICE VISIT (OUTPATIENT)
Dept: CARDIOLOGY CLINIC | Age: 43
End: 2018-08-21

## 2018-08-21 VITALS
HEIGHT: 65 IN | WEIGHT: 174 LBS | SYSTOLIC BLOOD PRESSURE: 104 MMHG | HEART RATE: 75 BPM | BODY MASS INDEX: 28.99 KG/M2 | DIASTOLIC BLOOD PRESSURE: 63 MMHG

## 2018-08-21 DIAGNOSIS — R55 SYNCOPE, UNSPECIFIED SYNCOPE TYPE: ICD-10-CM

## 2018-08-21 DIAGNOSIS — I95.1 ORTHOSTATIC HYPOTENSION: Primary | ICD-10-CM

## 2018-08-21 RX ORDER — MIDODRINE HYDROCHLORIDE 5 MG/1
5 TABLET ORAL
Qty: 90 TAB | Refills: 4 | Status: SHIPPED | OUTPATIENT
Start: 2018-08-21 | End: 2018-09-20

## 2018-08-21 NOTE — PROGRESS NOTES
1. Have you been to the ER, urgent care clinic since your last visit? Hospitalized since your last visit? No    2. Have you seen or consulted any other health care providers outside of the [address] [place] [address] since your last visit? Include any pap smears or colon screening.  Yes Where: Neurology Routine

## 2018-08-21 NOTE — PROGRESS NOTES
HISTORY OF PRESENT ILLNESS  Shari Merlos is a 41 y.o. female. Dizziness   The history is provided by the patient. This is a chronic problem. The problem has been gradually improving. Pertinent negatives include no chest pain and no shortness of breath. Review of Systems   Constitutional: Negative for chills and weight loss. HENT: Negative for congestion, ear discharge, ear pain, hearing loss, nosebleeds and tinnitus. Eyes: Negative for blurred vision. Respiratory: Negative for shortness of breath, wheezing and stridor. Cardiovascular: Negative for chest pain and leg swelling. Gastrointestinal: Negative for heartburn. Musculoskeletal: Negative for myalgias and neck pain. Skin: Negative for itching and rash. Neurological: Positive for dizziness. Negative for tingling, tremors, focal weakness and loss of consciousness. Psychiatric/Behavioral: Negative for depression and suicidal ideas.      Family History   Problem Relation Age of Onset    Heart Disease Mother     Hypertension Mother    Rebecca MS Mother     Other Mother 54     Lupus    Stroke Mother      x3    Asthma Sister     Diabetes Paternal Grandmother     Hypertension Paternal Grandmother        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   [facility] SHOULD[facility] SURG [address] UNLISTED  2011    left shoulder       Social History   Substance Use Topics    Smoking status: Never Smoker    Smokeless tobacco: Never Used    Alcohol use Yes      Comment: rare       Allergies   Allergen Reactions    Ciprofloxacin (Bulk) Diarrhea    Codeine Itching    Dilaudid [Hydromorphone (Bulk)] Itching    Lyrica [Pregabalin] Swelling    Tramadol Other (comments) and Unknown (comments)     Gets headaches  headache    Codeine Phosphate Unknown (comments)    Hydromorphone Unknown (comments)       Outpatient Prescriptions Marked as Taking for the 8/21/18 encounter (Office Visit) with Antonio Jaime MD   Medication Sig Dispense Refill    midodrine (PROAMITINE) 5 mg tablet Take 1 Tab by mouth three (3) times daily (with meals) for 30 days. 90 Tab 4    diazePAM (VALIUM) 2 mg tablet Take 1 Tab by mouth every eight (8) hours as needed for Anxiety (dizziness) for up to 4 doses. Max Daily Amount: 6 mg. 4 Tab 0    ondansetron (ZOFRAN ODT) 4 mg disintegrating tablet Take 1 Tab by mouth every eight (8) hours as needed for Nausea. 10 Tab 0        Visit Vitals    /63    Pulse 75    Ht 5' 5\" (1.651 m)    Wt 78.9 kg (174 lb)    LMP 07/26/2018 (Exact Date)    BMI 28.96 kg/m2     Physical Exam   Constitutional: She is oriented to person, place, and time. She appears well-developed and well-nourished. No distress. HENT:   Head: Atraumatic. Mouth/Throat: No oropharyngeal exudate. Eyes: Conjunctivae are normal. Right eye exhibits no discharge. Left eye exhibits no discharge. No scleral icterus. Neck: Normal range of motion. Neck supple. No JVD present. No tracheal deviation present. No thyromegaly present. Cardiovascular: Normal rate and regular rhythm. Exam reveals no gallop. No murmur heard. Pulmonary/Chest: Effort normal and breath sounds normal. No stridor. She has no wheezes. She has no rales. Abdominal: Soft. There is no tenderness. There is no rebound and no guarding. Musculoskeletal: Normal range of motion. She exhibits no edema or tenderness. Lymphadenopathy:     She has no cervical adenopathy. Neurological: She is alert and oriented to person, place, and time. She exhibits normal muscle tone. Skin: Skin is warm.  She is not diaphoretic. Psychiatric: She has a normal mood and affect. Her behavior is normal.     ekg sinus rhythm with no acute st-t changes  Echo 04/2018:  SUMMARY:  Left ventricle: Systolic function was normal. Ejection fraction was  estimated in the range of 55 % to 60 %. There were no regional wall motion  abnormalities. Mitral valve: There was mild regurgitation. Tricuspid valve: There was mild regurgitation. Pulmonic valve: There was mild regurgitation. ASSESSMENT and PLAN    ICD-10-CM ICD-9-CM    1. Orthostatic hypotension I95.1 458.0    2. Syncope, unspecified syncope type R55 780.2      Orders Placed This Encounter    midodrine (PROAMITINE) 5 mg tablet     Sig: Take 1 Tab by mouth three (3) times daily (with meals) for 30 days. Dispense:  90 Tab     Refill:  4     Follow-up Disposition:  Return in about 3 months (around 11/21/2018). current treatment plan is effective, no change in therapy. Patient with h/o recurrent syncope-- has recurrent dizziness- slowly improving on midodrine  No significant VHD by exam.  No preexcitation on ekg    Tilt table test negative.   Will increase dose to 5mg tid  Follow up in 3 months  Meanwhile advised adequate hydration

## 2018-08-21 NOTE — LETTER
[facility] 1975 8/21/2018 Dear Amie Anderson MD 
 
I had the pleasure of evaluating  Ms. Nunes in office today. Below are the relevant portions of my assessment and plan of care. {No Diagnosis Found} Current Outpatient Prescriptions Medication Sig Dispense Refill  diazePAM (VALIUM) 2 mg tablet Take 1 Tab by mouth every eight (8) hours as needed for Anxiety (dizziness) for up to 4 doses. Max Daily Amount: 6 mg. 4 Tab 0  
 ondansetron (ZOFRAN ODT) 4 mg disintegrating tablet Take 1 Tab by mouth every eight (8) hours as needed for Nausea. 10 Tab 0  
 topiramate (TOPAMAX) 50 mg tablet Take 1 Tab by mouth two (2) times a day. Indications: MIGRAINE PREVENTION 60 Tab 6  
 gabapentin (NEURONTIN) 300 mg capsule Take 1 Tab QHS x1 week, then BID x1 week, then TID  Indications: NEUROPATHIC PAIN 90 Cap 1  cyclobenzaprine (FLEXERIL) 5 mg tablet Take 2 Tabs by mouth three (3) times daily. (Patient taking differently: Take 5 mg by mouth as needed.) 9 Tab 0  ibuprofen (MOTRIN) 600 mg tablet Take 1 Tab by mouth every six (6) hours as needed for Pain. 20 Tab 0  
 LORazepam (ATIVAN) 0.5 mg tablet Take 1-2 Tabs by mouth every eight (8) hours as needed (dizziness). Max Daily Amount: 3 mg. 9 Tab 0  
 meclizine (ANTIVERT) 12.5 mg tablet Take  by mouth three (3) times daily as needed. No orders of the defined types were placed in this encounter. If you have questions, please do not hesitate to call me. I look forward to following Ms. Nunes along with you.  
 
Sincerely, 
Bethany Anthony MD

## 2018-12-03 ENCOUNTER — HOSPITAL ENCOUNTER (EMERGENCY)
Age: 43
Discharge: HOME | End: 2018-12-03
Attending: EMERGENCY MEDICINE
Payer: SELF-PAY

## 2018-12-03 ENCOUNTER — APPOINTMENT (OUTPATIENT)
Dept: GENERAL RADIOLOGY | Age: 43
End: 2018-12-03
Attending: PHYSICIAN ASSISTANT
Payer: SELF-PAY

## 2018-12-03 VITALS
TEMPERATURE: 98.3 F | WEIGHT: 175 LBS | OXYGEN SATURATION: 99 % | SYSTOLIC BLOOD PRESSURE: 123 MMHG | DIASTOLIC BLOOD PRESSURE: 83 MMHG | RESPIRATION RATE: 18 BRPM | HEART RATE: 93 BPM | BODY MASS INDEX: 28.12 KG/M2 | HEIGHT: 66 IN

## 2018-12-03 DIAGNOSIS — M54.41 ACUTE RIGHT-SIDED LOW BACK PAIN WITH RIGHT-SIDED SCIATICA: Primary | ICD-10-CM

## 2018-12-03 PROCEDURE — 74011250636 HC RX REV CODE- 250/636: Performed by: PHYSICIAN ASSISTANT

## 2018-12-03 PROCEDURE — 72110 X-RAY EXAM L-2 SPINE 4/>VWS: CPT

## 2018-12-03 PROCEDURE — 99283 EMERGENCY DEPT VISIT LOW MDM: CPT

## 2018-12-03 PROCEDURE — 74011636637 HC RX REV CODE- 636/637: Performed by: PHYSICIAN ASSISTANT

## 2018-12-03 PROCEDURE — 96372 THER/PROPH/DIAG INJ SC/IM: CPT

## 2018-12-03 RX ORDER — PREDNISONE 20 MG/1
60 TABLET ORAL
Status: COMPLETED | OUTPATIENT
Start: 2018-12-03 | End: 2018-12-03

## 2018-12-03 RX ORDER — PREDNISONE 10 MG/1
TABLET ORAL
Qty: 21 TAB | Refills: 0 | Status: SHIPPED | OUTPATIENT
Start: 2018-12-04 | End: 2018-12-07

## 2018-12-03 RX ORDER — KETOROLAC TROMETHAMINE 30 MG/ML
60 INJECTION, SOLUTION INTRAMUSCULAR; INTRAVENOUS
Status: COMPLETED | OUTPATIENT
Start: 2018-12-03 | End: 2018-12-03

## 2018-12-03 RX ORDER — NAPROXEN 500 MG/1
500 TABLET ORAL 2 TIMES DAILY WITH MEALS
Qty: 20 TAB | Refills: 0 | Status: SHIPPED | OUTPATIENT
Start: 2018-12-03 | End: 2018-12-11

## 2018-12-03 RX ORDER — METHOCARBAMOL 500 MG/1
500 TABLET, FILM COATED ORAL 4 TIMES DAILY
Qty: 30 TAB | Refills: 0 | Status: SHIPPED | OUTPATIENT
Start: 2018-12-03 | End: 2018-12-07

## 2018-12-03 RX ADMIN — PREDNISONE 60 MG: 20 TABLET ORAL at 11:47

## 2018-12-03 RX ADMIN — KETOROLAC TROMETHAMINE 60 MG: 30 INJECTION, SOLUTION INTRAMUSCULAR at 11:47

## 2018-12-03 NOTE — ED PROVIDER NOTES
EMERGENCY DEPARTMENT HISTORY AND PHYSICAL EXAM    11:27 AM      Date: 12/3/2018  Patient Name: Nithin Sawant    History of Presenting Illness     Chief Complaint   Patient presents with    Back Pain         History Provided By: Patient    Chief Complaint: Back pain  Duration:  Hours  Timing:  Acute  Location: Right side, lower back  Quality: Sharp, radiating  Severity: 9 out of 10  Modifying Factors: exacerbated with sitting  Associated Symptoms: denies any other associated signs or symptoms      Additional History (Context): Nithin Sawant is a 41 y.o. female with No significant past medical history who presents with acute, 9 out of 10, right-sided lower back pain onset hours ago when she woke up. She describes the pain as stabbing and radiating into her right leg. She reports the pain is exacerbated with sitting and states she has not taken pain medication. She states she works as a . She denies heavy lifting, injury, fever, diaphoresis, blurred vision, dysuria, hx of arthritis in her back, and other symptoms or complaints. PCP: Rock Caitlin MD    Current Outpatient Medications   Medication Sig Dispense Refill    [START ON 12/4/2018] predniSONE (STERAPRED DS) 10 mg dose pack Per pack directions. 21 Tab 0    methocarbamol (ROBAXIN) 500 mg tablet Take 1 Tab by mouth four (4) times daily. 30 Tab 0    naproxen (NAPROSYN) 500 mg tablet Take 1 Tab by mouth two (2) times daily (with meals) for 10 days. 20 Tab 0    diazePAM (VALIUM) 2 mg tablet Take 1 Tab by mouth every eight (8) hours as needed for Anxiety (dizziness) for up to 4 doses. Max Daily Amount: 6 mg. 4 Tab 0    topiramate (TOPAMAX) 50 mg tablet Take 1 Tab by mouth two (2) times a day.  Indications: MIGRAINE PREVENTION 60 Tab 6    gabapentin (NEURONTIN) 300 mg capsule Take 1 Tab QHS x1 week, then BID x1 week, then TID  Indications: NEUROPATHIC PAIN 90 Cap 1    cyclobenzaprine (FLEXERIL) 5 mg tablet Take 2 Tabs by mouth three (3) times daily. (Patient taking differently: Take 5 mg by mouth as needed.) 9 Tab 0    ibuprofen (MOTRIN) 600 mg tablet Take 1 Tab by mouth every six (6) hours as needed for Pain. 20 Tab 0    ondansetron (ZOFRAN ODT) 4 mg disintegrating tablet Take 1 Tab by mouth every eight (8) hours as needed for Nausea. 10 Tab 0    LORazepam (ATIVAN) 0.5 mg tablet Take 1-2 Tabs by mouth every eight (8) hours as needed (dizziness). Max Daily Amount: 3 mg. 9 Tab 0    meclizine (ANTIVERT) 12.5 mg tablet Take  by mouth three (3) times daily as needed. 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   [facility] [address] UNLISTED  2011    left shoulder       Family History:  Family History   Problem Relation Age of Onset    Heart Disease Mother     Hypertension Mother    Bernie MS Mother     Other Mother 54        Lupus    Stroke Mother         x3    Asthma Sister     Diabetes Paternal Grandmother     Hypertension Paternal Grandmother        Social History:  Social History     Tobacco Use    Smoking status: Never Smoker    Smokeless tobacco: Never Used   Substance Use Topics    Alcohol use: Yes     Comment: rare    Drug use: No       Allergies:   Allergies   Allergen Reactions    Ciprofloxacin (Bulk) Diarrhea    Codeine Itching    Dilaudid [Hydromorphone (Bulk)] Itching    Lyrica [Pregabalin] Swelling    Tramadol Other (comments) and Unknown (comments)     Gets headaches  headache    Codeine Phosphate Unknown (comments)    Hydromorphone Unknown (comments)         Review of Systems       Review of Systems   Constitutional: Negative for activity change, diaphoresis, fatigue and fever. HENT: Negative for congestion and rhinorrhea. Eyes: Negative for visual disturbance. Respiratory: Negative for shortness of breath. Cardiovascular: Negative for chest pain and palpitations. Gastrointestinal: Negative for abdominal pain, diarrhea, nausea and vomiting. Genitourinary: Negative for dysuria and hematuria. Musculoskeletal: Positive for back pain. Skin: Negative for rash. Neurological: Negative for dizziness, weakness and light-headedness. All other systems reviewed and are negative. Physical Exam     Visit Vitals  /83 (BP 1 Location: Left arm, BP Patient Position: Standing)   Pulse 93   Temp 98.3 °F (36.8 °C)   Resp 18   Ht 5' 6\" (1.676 m)   Wt 79.4 kg (175 lb)   SpO2 99%   BMI 28.25 kg/m²         Physical Exam   Constitutional: She appears well-developed and well-nourished. No distress. HENT:   Head: Normocephalic and atraumatic. Right Ear: External ear normal.   Left Ear: External ear normal.   Mouth/Throat: Oropharynx is clear and moist.   Eyes: Conjunctivae are normal.   Neck: Normal range of motion. Neck supple. Cardiovascular: Normal rate and regular rhythm. Pulmonary/Chest: Effort normal.   Abdominal: Soft. Musculoskeletal:        Cervical back: Normal.        Thoracic back: Normal.        Lumbar back: She exhibits decreased range of motion (due to pain), tenderness (right paralumbar muscles), pain and spasm. She exhibits no bony tenderness, no swelling, no edema and no deformity. +SLR on the right   Lymphadenopathy:     She has no cervical adenopathy. Neurological: She has normal strength. No cranial nerve deficit or sensory deficit. Coordination normal.   Slowed gait due to pain   Skin: Skin is warm and dry. She is not diaphoretic. Psychiatric: She has a normal mood and affect.    Nursing note reviewed. Diagnostic Study Results     Labs -  No results found for this or any previous visit (from the past 12 hour(s)). Radiologic Studies -   XR SPINE LUMB MIN 4 V    (Results Pending)         Medical Decision Making   I am the first provider for this patient. I reviewed the vital signs, available nursing notes, past medical history, past surgical history, family history and social history. Vital Signs-Reviewed the patient's vital signs. Pulse Oximetry Analysis -  100% on room air, stable    Cardiac Monitor:  Rate: 93 BPM      Records Reviewed: Nursing Notes and Old Medical Records (Time of Review: 11:27 AM)    ED Course: Progress Notes, Reevaluation, and Consults:      Provider Notes (Medical Decision Making): pt c/o right sided low back pain radiating down right leg onset this morning. No injuries or hx of same. Exam consistent with sciatica. Xray neg for acute abnormalities. Will rx for pred, robaxin, nsaids. Discussed exercises for pt to do at home. Ortho f/u. Rest at home. Pt agrees with plan. Diagnosis     Clinical Impression:   1. Acute right-sided low back pain with right-sided sciatica        Disposition:     Follow-up Information     Follow up With Specialties Details Why Contact Info    Danny Reaves MD Family Practice   [address] [place] [id number]  [address] [place] [address] [address] [place] [address]  [phone]              Medication List      START taking these medications    methocarbamol 500 mg tablet  Commonly known as:  ROBAXIN  Take 1 Tab by mouth four (4) times daily. naproxen 500 mg tablet  Commonly known as:  NAPROSYN  Take 1 Tab by mouth two (2) times daily (with meals) for 10 days. predniSONE 10 mg dose pack  Commonly known as:  STERAPRED DS  Per pack directions.   Start taking on:  12/4/2018        CHANGE how you take these medications    cyclobenzaprine 5 mg tablet  Commonly known as:  FLEXERIL  Take 2 Tabs by mouth three (3) times daily. What changed:    · how much to take  · when to take this  · reasons to take this        CONTINUE taking these medications    diazePAM 2 mg tablet  Commonly known as:  VALIUM  Take 1 Tab by mouth every eight (8) hours as needed for Anxiety (dizziness) for up to 4 doses. Max Daily Amount: 6 mg.     gabapentin 300 mg capsule  Commonly known as:  NEURONTIN  Take 1 Tab QHS x1 week, then BID x1 week, then TID  Indications: NEUROPATHIC PAIN     ibuprofen 600 mg tablet  Commonly known as:  MOTRIN  Take 1 Tab by mouth every six (6) hours as needed for Pain. LORazepam 0.5 mg tablet  Commonly known as:  ATIVAN  Take 1-2 Tabs by mouth every eight (8) hours as needed (dizziness). Max Daily Amount: 3 mg.     meclizine 12.5 mg tablet  Commonly known as:  ANTIVERT     ondansetron 4 mg disintegrating tablet  Commonly known as:  ZOFRAN ODT  Take 1 Tab by mouth every eight (8) hours as needed for Nausea. topiramate 50 mg tablet  Commonly known as:  TOPAMAX  Take 1 Tab by mouth two (2) times a day. Indications: MIGRAINE PREVENTION           Where to Get Your Medications      These medications were sent to [address] [place] [id number]    Phone:  [phone]   · methocarbamol 500 mg tablet  · naproxen 500 mg tablet  · predniSONE 10 mg dose pack       _______________________________       Scribe Arianna Reyes acting as a scribe for and in the presence of Jane Henderson  December 03, 2018 at 11:27 AM       Provider Attestation:      I personally performed the services described in the documentation, reviewed the documentation, as recorded by the scribe in my presence, and it accurately and completely records my words and actions.  December 03, 2018 at 11:27 AM - Gian Thomas PA-C      _______________________________

## 2018-12-03 NOTE — DISCHARGE INSTRUCTIONS

## 2018-12-03 NOTE — ED TRIAGE NOTES
Patient arrives to the ED for evaluation of lower back pain. Patient states, \"I woke up and my lower back felt like a stabbing pain that goes down my right leg. \"

## 2018-12-03 NOTE — LETTER
[address] [place] [address] [facility] EMERGENCY DEPT 
[address] [facility] [id number] 
[phone] Work/School Note Date: 12/3/2018 To Whom It May concern: 
 
Scott Pacheco was seen and treated today in the emergency room by the following provider(s): 
Attending Provider: Patrice Noonan MD 
Physician Assistant: Feliz Watters. Scott Pacheco may be excused from work on 12/3, 12/4, 12/5. Further clearance to be made by primary care doctor or orthopedist.  
 
Sincerely, RONNELL Ballard

## 2018-12-07 ENCOUNTER — APPOINTMENT (OUTPATIENT)
Dept: CT IMAGING | Age: 43
End: 2018-12-07
Attending: NURSE PRACTITIONER
Payer: SELF-PAY

## 2018-12-07 ENCOUNTER — HOSPITAL ENCOUNTER (EMERGENCY)
Age: 43
Discharge: HOME | End: 2018-12-07
Attending: EMERGENCY MEDICINE
Payer: SELF-PAY

## 2018-12-07 VITALS
HEART RATE: 83 BPM | DIASTOLIC BLOOD PRESSURE: 75 MMHG | OXYGEN SATURATION: 100 % | RESPIRATION RATE: 18 BRPM | TEMPERATURE: 98 F | SYSTOLIC BLOOD PRESSURE: 124 MMHG

## 2018-12-07 DIAGNOSIS — G89.29 CHRONIC BILATERAL LOW BACK PAIN WITH RIGHT-SIDED SCIATICA: Primary | ICD-10-CM

## 2018-12-07 DIAGNOSIS — M54.41 CHRONIC BILATERAL LOW BACK PAIN WITH RIGHT-SIDED SCIATICA: Primary | ICD-10-CM

## 2018-12-07 DIAGNOSIS — K59.00 CONSTIPATION, UNSPECIFIED CONSTIPATION TYPE: ICD-10-CM

## 2018-12-07 LAB
ALBUMIN SERPL-MCNC: 3.7 G/DL (ref 3.4–5)
ALBUMIN/GLOB SERPL: 1 {RATIO} (ref 0.8–1.7)
ALP SERPL-CCNC: 51 U/L (ref 45–117)
ALT SERPL-CCNC: 41 U/L (ref 13–56)
ANION GAP SERPL CALC-SCNC: 7 MMOL/L (ref 3–18)
APPEARANCE UR: CLEAR
AST SERPL-CCNC: 42 U/L (ref 15–37)
BASOPHILS # BLD: 0 K/UL (ref 0–0.1)
BASOPHILS NFR BLD: 0 % (ref 0–2)
BILIRUB SERPL-MCNC: 0.6 MG/DL (ref 0.2–1)
BILIRUB UR QL: NEGATIVE
BUN SERPL-MCNC: 18 MG/DL (ref 7–18)
BUN/CREAT SERPL: 20 (ref 12–20)
CALCIUM SERPL-MCNC: 9 MG/DL (ref 8.5–10.1)
CHLORIDE SERPL-SCNC: 107 MMOL/L (ref 100–108)
CO2 SERPL-SCNC: 27 MMOL/L (ref 21–32)
COLOR UR: YELLOW
CREAT SERPL-MCNC: 0.91 MG/DL (ref 0.6–1.3)
DIFFERENTIAL METHOD BLD: ABNORMAL
EOSINOPHIL # BLD: 0 K/UL (ref 0–0.4)
EOSINOPHIL NFR BLD: 0 % (ref 0–5)
ERYTHROCYTE [DISTWIDTH] IN BLOOD BY AUTOMATED COUNT: 12.4 % (ref 11.6–14.5)
GLOBULIN SER CALC-MCNC: 3.8 G/DL (ref 2–4)
GLUCOSE SERPL-MCNC: 107 MG/DL (ref 74–99)
GLUCOSE UR STRIP.AUTO-MCNC: NEGATIVE MG/DL
HCG UR QL: NEGATIVE
HCT VFR BLD AUTO: 34.5 % (ref 35–45)
HGB BLD-MCNC: 10.9 G/DL (ref 12–16)
HGB UR QL STRIP: NEGATIVE
KETONES UR QL STRIP.AUTO: NEGATIVE MG/DL
LEUKOCYTE ESTERASE UR QL STRIP.AUTO: NEGATIVE
LYMPHOCYTES # BLD: 1.8 K/UL (ref 0.9–3.6)
LYMPHOCYTES NFR BLD: 24 % (ref 21–52)
MCH RBC QN AUTO: 28.1 PG (ref 24–34)
MCHC RBC AUTO-ENTMCNC: 31.6 G/DL (ref 31–37)
MCV RBC AUTO: 88.9 FL (ref 74–97)
MONOCYTES # BLD: 0.6 K/UL (ref 0.05–1.2)
MONOCYTES NFR BLD: 8 % (ref 3–10)
NEUTS SEG # BLD: 5.2 K/UL (ref 1.8–8)
NEUTS SEG NFR BLD: 68 % (ref 40–73)
NITRITE UR QL STRIP.AUTO: NEGATIVE
PH UR STRIP: 6 [PH] (ref 5–8)
PLATELET # BLD AUTO: 303 K/UL (ref 135–420)
PMV BLD AUTO: 10.6 FL (ref 9.2–11.8)
POTASSIUM SERPL-SCNC: 4.4 MMOL/L (ref 3.5–5.5)
PROT SERPL-MCNC: 7.5 G/DL (ref 6.4–8.2)
PROT UR STRIP-MCNC: NEGATIVE MG/DL
RBC # BLD AUTO: 3.88 M/UL (ref 4.2–5.3)
SODIUM SERPL-SCNC: 141 MMOL/L (ref 136–145)
SP GR UR REFRACTOMETRY: 1.03 (ref 1–1.03)
UROBILINOGEN UR QL STRIP.AUTO: 2 EU/DL (ref 0.2–1)
WBC # BLD AUTO: 7.7 K/UL (ref 4.6–13.2)

## 2018-12-07 PROCEDURE — 96374 THER/PROPH/DIAG INJ IV PUSH: CPT

## 2018-12-07 PROCEDURE — 96361 HYDRATE IV INFUSION ADD-ON: CPT

## 2018-12-07 PROCEDURE — 81025 URINE PREGNANCY TEST: CPT

## 2018-12-07 PROCEDURE — 74011250636 HC RX REV CODE- 250/636: Performed by: NURSE PRACTITIONER

## 2018-12-07 PROCEDURE — 74011250637 HC RX REV CODE- 250/637: Performed by: NURSE PRACTITIONER

## 2018-12-07 PROCEDURE — 96375 TX/PRO/DX INJ NEW DRUG ADDON: CPT

## 2018-12-07 PROCEDURE — 81003 URINALYSIS AUTO W/O SCOPE: CPT

## 2018-12-07 PROCEDURE — 85025 COMPLETE CBC W/AUTO DIFF WBC: CPT

## 2018-12-07 PROCEDURE — 74011636320 HC RX REV CODE- 636/320: Performed by: EMERGENCY MEDICINE

## 2018-12-07 PROCEDURE — 99283 EMERGENCY DEPT VISIT LOW MDM: CPT

## 2018-12-07 PROCEDURE — 80053 COMPREHEN METABOLIC PANEL: CPT

## 2018-12-07 PROCEDURE — 74177 CT ABD & PELVIS W/CONTRAST: CPT

## 2018-12-07 RX ORDER — DIPHENHYDRAMINE HYDROCHLORIDE 50 MG/ML
25 INJECTION, SOLUTION INTRAMUSCULAR; INTRAVENOUS ONCE
Status: COMPLETED | OUTPATIENT
Start: 2018-12-07 | End: 2018-12-07

## 2018-12-07 RX ORDER — OXYCODONE AND ACETAMINOPHEN 5; 325 MG/1; MG/1
1 TABLET ORAL
Qty: 15 TAB | Refills: 0 | Status: SHIPPED | OUTPATIENT
Start: 2018-12-07 | End: 2018-12-11

## 2018-12-07 RX ORDER — PREDNISONE 10 MG/1
TABLET ORAL
Qty: 1 PACKAGE | Refills: 0 | Status: SHIPPED | OUTPATIENT
Start: 2018-12-07 | End: 2018-12-14

## 2018-12-07 RX ORDER — METHOCARBAMOL 500 MG/1
500 TABLET, FILM COATED ORAL 4 TIMES DAILY
Qty: 20 TAB | Refills: 0 | Status: SHIPPED | OUTPATIENT
Start: 2018-12-07 | End: 2018-12-14

## 2018-12-07 RX ORDER — MORPHINE SULFATE 2 MG/ML
4 INJECTION, SOLUTION INTRAMUSCULAR; INTRAVENOUS
Status: COMPLETED | OUTPATIENT
Start: 2018-12-07 | End: 2018-12-07

## 2018-12-07 RX ORDER — LACTULOSE 10 G/15ML
20 SOLUTION ORAL; RECTAL 2 TIMES DAILY
Qty: 300 ML | Refills: 0 | Status: SHIPPED | OUTPATIENT
Start: 2018-12-07 | End: 2018-12-12

## 2018-12-07 RX ORDER — ONDANSETRON 2 MG/ML
8 INJECTION INTRAMUSCULAR; INTRAVENOUS
Status: COMPLETED | OUTPATIENT
Start: 2018-12-07 | End: 2018-12-07

## 2018-12-07 RX ADMIN — LACTULOSE 20 G: 10 SOLUTION ORAL at 23:56

## 2018-12-07 RX ADMIN — MORPHINE SULFATE 4 MG: 2 INJECTION, SOLUTION INTRAMUSCULAR; INTRAVENOUS at 21:07

## 2018-12-07 RX ADMIN — DIPHENHYDRAMINE HYDROCHLORIDE 25 MG: 50 INJECTION INTRAMUSCULAR; INTRAVENOUS at 21:07

## 2018-12-07 RX ADMIN — SODIUM CHLORIDE 1000 ML: 900 INJECTION, SOLUTION INTRAVENOUS at 21:08

## 2018-12-07 RX ADMIN — METHYLPREDNISOLONE SODIUM SUCCINATE 125 MG: 125 INJECTION, POWDER, FOR SOLUTION INTRAMUSCULAR; INTRAVENOUS at 21:07

## 2018-12-07 RX ADMIN — IOPAMIDOL 80 ML: 612 INJECTION, SOLUTION INTRAVENOUS at 21:35

## 2018-12-07 RX ADMIN — ONDANSETRON 8 MG: 2 INJECTION INTRAMUSCULAR; INTRAVENOUS at 21:07

## 2018-12-07 NOTE — ED PROVIDER NOTES
Abdi Bragg is a 41 year of AAF who presents with severe worsening right sided sciatic pain with onset 5 days ago with associated symptoms of constipation. She just \"woke up with the pain. \" She reports that she was seen in the ED 3 days ago, given Steroids but no Norco or Percocets. She is followed by Dr. Lakshmi Craven for her spinal fusion 1.5 years ago for DDD. She reports no problems with her pain until  this week and does not see pain management. Denies any new injury, trauma, chance of pregnancy. Also denies incontinence. Says that she has not had a BM in 5 days. Moving/ standing up exacerbates the pain. No other concerns or symptoms at this time. Upon chart review, patient takes Neurotin. The history is provided by the patient. Back Pain    This is a recurrent problem. The current episode started 12 to 24 hours ago. The problem has not changed since onset. The problem occurs constantly. The pain is associated with no known injury. The pain is present in the lumbar spine. The quality of the pain is described as aching and shooting. The pain radiates to the right foot. The pain is at a severity of 7/10. The pain is moderate. The symptoms are aggravated by bending and twisting. Associated symptoms include leg pain. Pertinent negatives include no fever, no numbness, no abdominal pain, no bowel incontinence, no perianal numbness, no bladder incontinence, no dysuria, no paresthesias, no paresis, no tingling and no weakness. She has tried nothing for the symptoms. The patient's surgical history includes spinal fusion.        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   [facility] SHOULDER SURG [address] UNLISTED  9/08/2011    left shoulder         Family History:   Problem Relation Age of Onset    Heart Disease Mother     Hypertension Mother    Elmer.[facility] MS Mother     Other Mother 54        Lupus    Stroke Mother         x3    Asthma Sister     Diabetes Paternal Grandmother     Hypertension Paternal Grandmother        Social History     Socioeconomic History    Marital status: SINGLE     Spouse name: Not on file    Number of children: Not on file    Years of education: Not on file    Highest education level: Not on file   Social Needs    Financial resource strain: Not on file    Food insecurity - worry: Not on file    Food insecurity - inability: Not on file   [employer] needs - medical: Not on file   [employer] needs - non-medical: Not on file   Occupational History    Not on file   Tobacco Use    Smoking status: Never Smoker    Smokeless tobacco: Never Used   Substance and Sexual Activity    Alcohol use: Yes     Comment: rare    Drug use: No    Sexual activity: Yes     Partners: Male     Birth control/protection: Implant     Comment: pregnancy test negative   Other Topics Concern    Not on file   Social History Narrative    Not on file       ALLERGIES: Ciprofloxacin (bulk); Codeine; Dilaudid [hydromorphone (bulk)]; Lyrica [pregabalin]; Tramadol; Codeine phosphate; and Hydromorphone    Review of Systems   Constitutional: Negative for fever. Gastrointestinal: Positive for constipation. Negative for abdominal pain and bowel incontinence. Genitourinary: Negative for bladder incontinence and dysuria. Musculoskeletal: Positive for back pain. Negative for joint swelling and neck pain. Neurological: Negative for dizziness, tingling, weakness, numbness and paresthesias. All other systems reviewed and are negative.       Vitals:    12/07/18 1820   BP: 124/75   Pulse: 83   Resp: 18   Temp: 98 °F (36.7 °C)   SpO2: 100%            Physical Exam   Constitutional: She is oriented to person, place, and time. She appears well-developed and well-nourished. HENT:   Head: Normocephalic and atraumatic. Eyes: Conjunctivae and EOM are normal. Pupils are equal, round, and reactive to light. Neck: Normal range of motion. Neck supple. Cardiovascular: Normal rate and regular rhythm. Pulmonary/Chest: Effort normal and breath sounds normal.   Abdominal: Soft. Bowel sounds are normal. There is tenderness. Mild generalized abd tenderness   Musculoskeletal: Normal range of motion. She exhibits tenderness. She exhibits no edema or deformity. Back:    Bilateral soft tissue low back tenderness and along sciatic nerve in right buttock. No bruising no swelling no erythema noted. Neurological: She is alert and oriented to person, place, and time. She has normal reflexes. Skin: Skin is warm and dry. Psychiatric: She has a normal mood and affect. Her behavior is normal. Judgment and thought content normal.   Nursing note and vitals reviewed. MDM  Number of Diagnoses or Management Options  Chronic bilateral low back pain with right-sided sciatica: established and improving  Constipation, unspecified constipation type: established and improving  Diagnosis management comments: Suspect a acute on chronic flare of pts back pain. Pt states resolution of pain after medication. Pt with excess stool noted on ct, I will give a dose of lactulose in ed and rx for home. I will rx a small rx of percocet and robaxin, pred pack and rest. Pt to keep her appt with her surgeon this coming week.   I do not suspect any other acute illness        Amount and/or Complexity of Data Reviewed  Clinical lab tests: ordered and reviewed  Tests in the radiology section of CPT®: ordered and reviewed  Review and summarize past medical records: yes  Independent visualization of images, tracings, or specimens: yes    Risk of Complications, Morbidity, and/or Mortality  Presenting problems: moderate  Diagnostic procedures: moderate  Management options: moderate    Patient Progress  Patient progress: improved         Procedures            Vitals:  Patient Vitals for the past 12 hrs:   Temp Pulse Resp BP SpO2   12/07/18 1820 98 °F (36.7 °C) 83 18 124/75 100 %       Medications ordered:   Medications   sodium chloride 0.9 % bolus infusion 1,000 mL (0 mL IntraVENous IV Completed 12/7/18 2345)   morphine injection 4 mg (4 mg IntraVENous Given 12/7/18 2107)   methylPREDNISolone (PF) (Solu-MEDROL) injection 125 mg (125 mg IntraVENous Given 12/7/18 2107)   ondansetron (ZOFRAN) injection 8 mg (8 mg IntraVENous Given 12/7/18 2107)   diphenhydrAMINE (BENADRYL) injection 25 mg (25 mg IntraVENous Given 12/7/18 2107)   iopamidol (ISOVUE 300) 61 % contrast injection  mL (80 mL IntraVENous Given 12/7/18 2135)   lactulose (CHRONULAC) solution 20 g (20 g Oral Given 12/7/18 2356)         Lab findings:  Recent Results (from the past 12 hour(s))   CBC WITH AUTOMATED DIFF    Collection Time: 12/07/18  7:25 PM   Result Value Ref Range    WBC 7.7 4.6 - 13.2 K/uL    RBC 3.88 (L) 4.20 - 5.30 M/uL    HGB 10.9 (L) 12.0 - 16.0 g/dL    HCT 34.5 (L) 35.0 - 45.0 %    MCV 88.9 74.0 - 97.0 FL    MCH 28.1 24.0 - 34.0 PG    MCHC 31.6 31.0 - 37.0 g/dL    RDW 12.4 11.6 - 14.5 %    PLATELET 408 967 - 370 K/uL    MPV 10.6 9.2 - 11.8 FL    NEUTROPHILS 68 40 - 73 %    LYMPHOCYTES 24 21 - 52 %    MONOCYTES 8 3 - 10 %    EOSINOPHILS 0 0 - 5 %    BASOPHILS 0 0 - 2 %    ABS. NEUTROPHILS 5.2 1.8 - 8.0 K/UL    ABS. LYMPHOCYTES 1.8 0.9 - 3.6 K/UL    ABS. MONOCYTES 0.6 0.05 - 1.2 K/UL    ABS. EOSINOPHILS 0.0 0.0 - 0.4 K/UL    ABS.  BASOPHILS 0.0 0.0 - 0.1 K/UL    DF AUTOMATED     METABOLIC PANEL, COMPREHENSIVE    Collection Time: 12/07/18  7:25 PM   Result Value Ref Range    Sodium 141 136 - 145 mmol/L    Potassium 4.4 3.5 - 5.5 mmol/L    Chloride 107 100 - 108 mmol/L    CO2 27 21 - 32 mmol/L    Anion gap 7 3.0 - 18 mmol/L    Glucose 107 (H) 74 - 99 mg/dL    BUN 18 7.0 - 18 MG/DL    Creatinine 0.91 0.6 - 1.3 MG/DL    BUN/Creatinine ratio 20 12 - 20      GFR est AA >60 >60 ml/min/1.73m2    GFR est non-AA >60 >60 ml/min/1.73m2    Calcium 9.0 8.5 - 10.1 MG/DL    Bilirubin, total 0.6 0.2 - 1.0 MG/DL    ALT (SGPT) 41 13 - 56 U/L    AST (SGOT) 42 (H) 15 - 37 U/L    Alk. phosphatase 51 45 - 117 U/L    Protein, total 7.5 6.4 - 8.2 g/dL    Albumin 3.7 3.4 - 5.0 g/dL    Globulin 3.8 2.0 - 4.0 g/dL    A-G Ratio 1.0 0.8 - 1.7     URINALYSIS W/ RFLX MICROSCOPIC    Collection Time: 12/07/18  9:10 PM   Result Value Ref Range    Color YELLOW      Appearance CLEAR      Specific gravity 1.026 1.005 - 1.030      pH (UA) 6.0 5.0 - 8.0      Protein NEGATIVE  NEG mg/dL    Glucose NEGATIVE  NEG mg/dL    Ketone NEGATIVE  NEG mg/dL    Bilirubin NEGATIVE  NEG      Blood NEGATIVE  NEG      Urobilinogen 2.0 (H) 0.2 - 1.0 EU/dL    Nitrites NEGATIVE  NEG      Leukocyte Esterase NEGATIVE  NEG     HCG URINE, QL    Collection Time: 12/07/18  9:10 PM   Result Value Ref Range    HCG urine, QL NEGATIVE  NEG              X-Ray, CT or other radiology findings or impressions:  CT ABD PELV W CONT   Final Result   Impression:      No acute findings in abdomen or pelvis. Incidentals as fully described above. CT Abdomen And Pelvis with Intravenous Contrast     INDICATION: Constipation and back pain.     TECHNIQUE: 5 mm collimation axial images obtained from the diaphragm to the  level of the pubic symphysis following the uneventful administration of  100 cc  of low osmolar, nonionic intravenous contrast.     Dose reduction techniques used: Automated exposure control, adjustment of the  mAs and/or kVp according to patient size, standardized low-dose protocol, and/or  iterative reconstruction technique.     COMPARISON: June 23, 2012. ABDOMEN FINDINGS:     Lung Bases: Mild bibasilar atelectasis.  No pleural effusion.     Liver: Normal attenuation. No evidence for mass.     Gallbladder: Present and appears normal. No biliary ductal dilatation.     Pancreas: Normal attenuation without mass or ductal dilatation.     Spleen: Normal in size. No evidence of mass. .     Adrenal Glands: No evidence for mass.     Kidneys:      Right: Normal enhancement. No cortical mass. No hydronephrosis.     Left:  Normal enhancement. No cortical mass. No hydronephrosis.     Lymph Nodes: No lymphadenopathy.     Aorta:  Normal in caliber.     PELVIS FINDINGS:      Bowel: Small Bowel: Normal in caliber with normal wall thickness. Large Bowel: Underdistended sigmoid colon. Mild right-sided colonic stool  burden. Questionable few scattered diverticula in the sigmoid colon. Licha Carline Appendix: No secondary signs of acute appendicitis.     Bladder: Underdistended urinary bladder.     Uterus is mildly heterogeneous with bilateral tubal ligation clips noted. There  is no suspicious adnexal mass. Possible small physiologic follicle in the right  adnexa.       No significant free fluid. No free air.       Tiny fat-containing umbilical hernia.     Bones: No acute finding.     IMPRESSION  Impression:     No acute findings in abdomen or pelvis.     Incidentals as fully described above. Disposition:    Diagnosis:   1. Chronic bilateral low back pain with right-sided sciatica    2. Constipation, unspecified constipation type        Disposition: to Home        Follow-up Information     Follow up With Specialties Details Why Contact Info    Christelle Zheng MD Family Practice In 2 days  [address] [place] [id number] [phone]                Medication List      START taking these medications    lactulose 10 gram/15 mL solution  Commonly known as:  CHRONULAC  Take 30 mL by mouth two (2) times a day for 5 days.      oxyCODONE-acetaminophen 5-325 mg per tablet  Commonly known as:  PERCOCET  Take 1 Tab by mouth every four (4) hours as needed for Pain for up to 20 doses. Max Daily Amount: 6 Tabs. CHANGE how you take these medications    predniSONE 10 mg dose pack  Commonly known as:  STERAPRED DS  6 day dose pack per package instructions  What changed:  additional instructions        CONTINUE taking these medications    methocarbamol 500 mg tablet  Commonly known as:  ROBAXIN  Take 1 Tab by mouth four (4) times daily. ASK your doctor about these medications    cyclobenzaprine 5 mg tablet  Commonly known as:  FLEXERIL  Take 2 Tabs by mouth three (3) times daily. diazePAM 2 mg tablet  Commonly known as:  VALIUM  Take 1 Tab by mouth every eight (8) hours as needed for Anxiety (dizziness) for up to 4 doses. Max Daily Amount: 6 mg.     gabapentin 300 mg capsule  Commonly known as:  NEURONTIN  Take 1 Tab QHS x1 week, then BID x1 week, then TID  Indications: NEUROPATHIC PAIN     ibuprofen 600 mg tablet  Commonly known as:  MOTRIN  Take 1 Tab by mouth every six (6) hours as needed for Pain. LORazepam 0.5 mg tablet  Commonly known as:  ATIVAN  Take 1-2 Tabs by mouth every eight (8) hours as needed (dizziness). Max Daily Amount: 3 mg.     meclizine 12.5 mg tablet  Commonly known as:  ANTIVERT     naproxen 500 mg tablet  Commonly known as:  NAPROSYN  Take 1 Tab by mouth two (2) times daily (with meals) for 10 days. ondansetron 4 mg disintegrating tablet  Commonly known as:  ZOFRAN ODT  Take 1 Tab by mouth every eight (8) hours as needed for Nausea. topiramate 50 mg tablet  Commonly known as:  TOPAMAX  Take 1 Tab by mouth two (2) times a day.  Indications: MIGRAINE PREVENTION           Where to Get Your Medications      Information about where to get these medications is not yet available    Ask your nurse or doctor about these medications  · lactulose 10 gram/15 mL solution  · methocarbamol 500 mg tablet  · oxyCODONE-acetaminophen 5-325 mg per tablet  · predniSONE 10 mg dose pack         Return to the ER if you are unable to obtain referral as directed. Earlene Rock  results have been reviewed with her. She has been counseled regarding her diagnosis, treatment, and plan. She verbally conveys understanding and agreement of the signs, symptoms, diagnosis, treatment and prognosis and additionally agrees to follow up as discussed. She also agrees with the care-plan and conveys that all of her questions have been answered. I have also provided discharge instructions for her that include: educational information regarding their diagnosis and treatment, and list of reasons why they would want to return to the ED prior to their follow-up appointment, should her condition change. TORRI Alexis-JOELLE           Scribe Attestation     Marvie Sandifer, NP acting as a scribe for and in the presence of SHIVANI Manley        December 08, 2018 at 12:01 AM       Provider Attestation:      I personally performed the services described in the documentation, reviewed the documentation, as recorded by the scribe in my presence, and it accurately and completely records my words and actions. December 08, 2018 at [address] TORRI COBB-JOELLE          11:10 PM  Haresh Nunes's  results have been reviewed with her. She has been counseled regarding her diagnosis, treatment, and plan. She verbally conveys understanding and agreement of the signs, symptoms, diagnosis, treatment and prognosis and additionally agrees to follow up as discussed. She also agrees with the care-plan and conveys that all of her questions have been answered. I have also provided discharge instructions for her that include: educational information regarding their diagnosis and treatment, and list of reasons why they would want to return to the ED prior to their follow-up appointment, should her condition change.

## 2018-12-08 NOTE — ED NOTES
I have reviewed discharge instructions with the patient. The patient verbalized understanding. Patient armband removed and shredded. Pt d/c'd to home awake, alert and in NAD. Multiple rx given. All questions answered.

## 2018-12-08 NOTE — DISCHARGE INSTRUCTIONS
Back Pain: Care Instructions  Your Care Instructions    Back pain has many possible causes. It is often related to problems with muscles and ligaments of the back. It may also be related to problems with the nerves, discs, or bones of the back. Moving, lifting, standing, sitting, or sleeping in an awkward way can strain the back. Sometimes you don't notice the injury until later. Arthritis is another common cause of back pain. Although it may hurt a lot, back pain usually improves on its own within several weeks. Most people recover in 12 weeks or less. Using good home treatment and being careful not to stress your back can help you feel better sooner. Follow-up care is a key part of your treatment and safety. Be sure to make and go to all appointments, and call your doctor if you are having problems. It's also a good idea to know your test results and keep a list of the medicines you take. How can you care for yourself at home? · Sit or lie in positions that are most comfortable and reduce your pain. Try one of these positions when you lie down:  ? Lie on your back with your knees bent and supported by large pillows. ? Lie on the floor with your legs on the seat of a sofa or chair. ? Lie on your side with your knees and hips bent and a pillow between your legs. ? Lie on your stomach if it does not make pain worse. · Do not sit up in bed, and avoid soft couches and twisted positions. Bed rest can help relieve pain at first, but it delays healing. Avoid bed rest after the first day of back pain. · Change positions every 30 minutes. If you must sit for long periods of time, take breaks from sitting. Get up and walk around, or lie in a comfortable position. · Try using a heating pad on a low or medium setting for 15 to 20 minutes every 2 or 3 hours. Try a warm shower in place of one session with the heating pad. · You can also try an ice pack for 10 to 15 minutes every 2 to 3 hours.  Put a thin cloth between the ice pack and your skin. · Take pain medicines exactly as directed. ? If the doctor gave you a prescription medicine for pain, take it as prescribed. ? If you are not taking a prescription pain medicine, ask your doctor if you can take an over-the-counter medicine. · Take short walks several times a day. You can start with 5 to 10 minutes, 3 or 4 times a day, and work up to longer walks. Walk on level surfaces and avoid hills and stairs until your back is better. · Return to work and other activities as soon as you can. Continued rest without activity is usually not good for your back. · To prevent future back pain, do exercises to stretch and strengthen your back and stomach. Learn how to use good posture, safe lifting techniques, and proper body mechanics. When should you call for help? Call your doctor now or seek immediate medical care if:    · You have new or worsening numbness in your legs.     · You have new or worsening weakness in your legs. (This could make it hard to stand up.)     · You lose control of your bladder or bowels.    Watch closely for changes in your health, and be sure to contact your doctor if:    · You have a fever, lose weight, or don't feel well.     · You do not get better as expected. Where can you learn more? Go to http://delores-scotty.info/. Enter V605 in the search box to learn more about \"Back Pain: Care Instructions. \"  Current as of: November 29, 2017  Content Version: 11.8  © 1126-8442 [employer]. Care instructions adapted under license by [employer] (which disclaims liability or warranty for this information). If you have questions about a medical condition or this instruction, always ask your healthcare professional. [place] [address] any warranty or liability for your use of this information.            Constipation: Care Instructions  Your Care Instructions    Constipation means that you have a hard time passing stools (bowel movements). People pass stools from 3 times a day to once every 3 days. What is normal for you may be different. Constipation may occur with pain in the rectum and cramping. The pain may get worse when you try to pass stools. Sometimes there are small amounts of bright red blood on toilet paper or the surface of stools. This is because of enlarged veins near the rectum (hemorrhoids). A few changes in your diet and lifestyle may help you avoid ongoing constipation. Your doctor may also prescribe medicine to help loosen your stool. Some medicines can cause constipation. These include pain medicines and antidepressants. Tell your doctor about all the medicines you take. Your doctor may want to make a medicine change to ease your symptoms. Follow-up care is a key part of your treatment and safety. Be sure to make and go to all appointments, and call your doctor if you are having problems. It's also a good idea to know your test results and keep a list of the medicines you take. How can you care for yourself at home? · Drink plenty of fluids, enough so that your urine is light yellow or clear like water. If you have kidney, heart, or liver disease and have to limit fluids, talk with your doctor before you increase the amount of fluids you drink. · Include high-fiber foods in your diet each day. These include fruits, vegetables, beans, and whole grains. · Get at least 30 minutes of exercise on most days of the week. Walking is a good choice. You also may want to do other activities, such as running, swimming, cycling, or playing tennis or team sports. · Take a fiber supplement, such as Citrucel or Metamucil, every day. Read and follow all instructions on the label. · Schedule time each day for a bowel movement. A daily routine may help. Take your time having your bowel movement. · Support your feet with a small step stool when you sit on the toilet.  This helps flex your hips and places your pelvis in a squatting position. · Your doctor may recommend an over-the-counter laxative to relieve your constipation. Examples are Milk of Magnesia and MiraLax. Read and follow all instructions on the label. Do not use laxatives on a long-term basis. When should you call for help? Call your doctor now or seek immediate medical care if:    · You have new or worse belly pain.     · You have new or worse nausea or vomiting.     · You have blood in your stools.    Watch closely for changes in your health, and be sure to contact your doctor if:    · Your constipation is getting worse.     · You do not get better as expected. Where can you learn more? Go to http://delores-scotty.info/. Enter 21 [id number] in the search box to learn more about \"Constipation: Care Instructions. \"  Current as of: November 20, 2017  Content Version: 11.8  © 0767-0246 [employer]. Care instructions adapted under license by [employer] (which disclaims liability or warranty for this information). If you have questions about a medical condition or this instruction, always ask your healthcare professional. [place] [address] any warranty or liability for your use of this information.

## 2018-12-09 ENCOUNTER — HOSPITAL ENCOUNTER (EMERGENCY)
Age: 43
Discharge: HOME | End: 2018-12-09
Attending: EMERGENCY MEDICINE | Admitting: EMERGENCY MEDICINE
Payer: SELF-PAY

## 2018-12-09 ENCOUNTER — APPOINTMENT (OUTPATIENT)
Dept: GENERAL RADIOLOGY | Age: 43
End: 2018-12-09
Attending: EMERGENCY MEDICINE
Payer: SELF-PAY

## 2018-12-09 VITALS
TEMPERATURE: 97.9 F | SYSTOLIC BLOOD PRESSURE: 145 MMHG | DIASTOLIC BLOOD PRESSURE: 83 MMHG | OXYGEN SATURATION: 100 % | HEART RATE: 45 BPM | RESPIRATION RATE: 20 BRPM

## 2018-12-09 DIAGNOSIS — N39.0 ACUTE UTI: ICD-10-CM

## 2018-12-09 DIAGNOSIS — R10.84 ABDOMINAL PAIN, GENERALIZED: Primary | ICD-10-CM

## 2018-12-09 LAB
ALBUMIN SERPL-MCNC: 3.5 G/DL (ref 3.4–5)
ALBUMIN/GLOB SERPL: 1.1 {RATIO} (ref 0.8–1.7)
ALP SERPL-CCNC: 51 U/L (ref 45–117)
ALT SERPL-CCNC: 32 U/L (ref 13–56)
AMORPH CRY URNS QL MICRO: ABNORMAL
AMPHET UR QL SCN: NEGATIVE
ANION GAP SERPL CALC-SCNC: 6 MMOL/L (ref 3–18)
APPEARANCE UR: ABNORMAL
AST SERPL-CCNC: 30 U/L (ref 15–37)
BACTERIA URNS QL MICRO: ABNORMAL /HPF
BARBITURATES UR QL SCN: NEGATIVE
BASOPHILS # BLD: 0 K/UL (ref 0–0.1)
BASOPHILS NFR BLD: 0 % (ref 0–2)
BENZODIAZ UR QL: NEGATIVE
BILIRUB SERPL-MCNC: 0.6 MG/DL (ref 0.2–1)
BILIRUB UR QL: NEGATIVE
BUN SERPL-MCNC: 13 MG/DL (ref 7–18)
BUN/CREAT SERPL: 16 (ref 12–20)
CALCIUM SERPL-MCNC: 8.6 MG/DL (ref 8.5–10.1)
CANNABINOIDS UR QL SCN: NEGATIVE
CHLORIDE SERPL-SCNC: 107 MMOL/L (ref 100–108)
CO2 SERPL-SCNC: 24 MMOL/L (ref 21–32)
COCAINE UR QL SCN: NEGATIVE
COLOR UR: YELLOW
CREAT SERPL-MCNC: 0.82 MG/DL (ref 0.6–1.3)
DIFFERENTIAL METHOD BLD: ABNORMAL
EOSINOPHIL # BLD: 0 K/UL (ref 0–0.4)
EOSINOPHIL NFR BLD: 0 % (ref 0–5)
EPITH CASTS URNS QL MICRO: ABNORMAL /LPF (ref 0–5)
ERYTHROCYTE [DISTWIDTH] IN BLOOD BY AUTOMATED COUNT: 12.3 % (ref 11.6–14.5)
GLOBULIN SER CALC-MCNC: 3.3 G/DL (ref 2–4)
GLUCOSE SERPL-MCNC: 93 MG/DL (ref 74–99)
GLUCOSE UR STRIP.AUTO-MCNC: NEGATIVE MG/DL
HCG UR QL: NEGATIVE
HCT VFR BLD AUTO: 36.4 % (ref 35–45)
HDSCOM,HDSCOM: ABNORMAL
HGB BLD-MCNC: 11.9 G/DL (ref 12–16)
HGB UR QL STRIP: NEGATIVE
KETONES UR QL STRIP.AUTO: ABNORMAL MG/DL
LEUKOCYTE ESTERASE UR QL STRIP.AUTO: ABNORMAL
LIPASE SERPL-CCNC: 40 U/L (ref 73–393)
LYMPHOCYTES # BLD: 2.6 K/UL (ref 0.9–3.6)
LYMPHOCYTES NFR BLD: 30 % (ref 21–52)
MCH RBC QN AUTO: 29.3 PG (ref 24–34)
MCHC RBC AUTO-ENTMCNC: 32.7 G/DL (ref 31–37)
MCV RBC AUTO: 89.7 FL (ref 74–97)
METHADONE UR QL: NEGATIVE
MONOCYTES # BLD: 0.8 K/UL (ref 0.05–1.2)
MONOCYTES NFR BLD: 9 % (ref 3–10)
NEUTS SEG # BLD: 5.3 K/UL (ref 1.8–8)
NEUTS SEG NFR BLD: 61 % (ref 40–73)
NITRITE UR QL STRIP.AUTO: NEGATIVE
OPIATES UR QL: POSITIVE
PCP UR QL: NEGATIVE
PH UR STRIP: 8 [PH] (ref 5–8)
PLATELET # BLD AUTO: 192 K/UL (ref 135–420)
PMV BLD AUTO: 10.6 FL (ref 9.2–11.8)
POTASSIUM SERPL-SCNC: 3.6 MMOL/L (ref 3.5–5.5)
PROT SERPL-MCNC: 6.8 G/DL (ref 6.4–8.2)
PROT UR STRIP-MCNC: ABNORMAL MG/DL
RBC # BLD AUTO: 4.06 M/UL (ref 4.2–5.3)
RBC #/AREA URNS HPF: ABNORMAL /HPF (ref 0–5)
SODIUM SERPL-SCNC: 137 MMOL/L (ref 136–145)
SP GR UR REFRACTOMETRY: 1.02 (ref 1–1.03)
UROBILINOGEN UR QL STRIP.AUTO: 1 EU/DL (ref 0.2–1)
WBC # BLD AUTO: 8.7 K/UL (ref 4.6–13.2)
WBC URNS QL MICRO: ABNORMAL /HPF (ref 0–5)

## 2018-12-09 PROCEDURE — 96374 THER/PROPH/DIAG INJ IV PUSH: CPT

## 2018-12-09 PROCEDURE — 96375 TX/PRO/DX INJ NEW DRUG ADDON: CPT

## 2018-12-09 PROCEDURE — 80053 COMPREHEN METABOLIC PANEL: CPT

## 2018-12-09 PROCEDURE — 96361 HYDRATE IV INFUSION ADD-ON: CPT

## 2018-12-09 PROCEDURE — 74011250637 HC RX REV CODE- 250/637: Performed by: EMERGENCY MEDICINE

## 2018-12-09 PROCEDURE — 74011000250 HC RX REV CODE- 250: Performed by: EMERGENCY MEDICINE

## 2018-12-09 PROCEDURE — 83690 ASSAY OF LIPASE: CPT

## 2018-12-09 PROCEDURE — 81001 URINALYSIS AUTO W/SCOPE: CPT

## 2018-12-09 PROCEDURE — 85025 COMPLETE CBC W/AUTO DIFF WBC: CPT

## 2018-12-09 PROCEDURE — 80307 DRUG TEST PRSMV CHEM ANLYZR: CPT

## 2018-12-09 PROCEDURE — 99284 EMERGENCY DEPT VISIT MOD MDM: CPT

## 2018-12-09 PROCEDURE — 74011250636 HC RX REV CODE- 250/636: Performed by: EMERGENCY MEDICINE

## 2018-12-09 PROCEDURE — 74022 RADEX COMPL AQT ABD SERIES: CPT

## 2018-12-09 PROCEDURE — 81025 URINE PREGNANCY TEST: CPT

## 2018-12-09 RX ORDER — ONDANSETRON 4 MG/1
8 TABLET, FILM COATED ORAL
Status: COMPLETED | OUTPATIENT
Start: 2018-12-09 | End: 2018-12-09

## 2018-12-09 RX ORDER — MAGNESIUM CITRATE
296 SOLUTION, ORAL ORAL
Status: DISCONTINUED | OUTPATIENT
Start: 2018-12-09 | End: 2018-12-09 | Stop reason: SDUPTHER

## 2018-12-09 RX ORDER — MAGNESIUM CITRATE
296 SOLUTION, ORAL ORAL
Status: COMPLETED | OUTPATIENT
Start: 2018-12-09 | End: 2018-12-09

## 2018-12-09 RX ORDER — DICYCLOMINE HYDROCHLORIDE 10 MG/1
10 CAPSULE ORAL
Status: COMPLETED | OUTPATIENT
Start: 2018-12-09 | End: 2018-12-09

## 2018-12-09 RX ORDER — GLYCERIN ADULT
1 SUPPOSITORY, RECTAL RECTAL
Qty: 10 SUPPOSITORY | Refills: 0 | Status: SHIPPED | OUTPATIENT
Start: 2018-12-09 | End: 2019-01-02

## 2018-12-09 RX ORDER — CEPHALEXIN 500 MG/1
500 CAPSULE ORAL 4 TIMES DAILY
Qty: 28 CAP | Refills: 0 | Status: SHIPPED | OUTPATIENT
Start: 2018-12-09 | End: 2018-12-14

## 2018-12-09 RX ORDER — PANTOPRAZOLE SODIUM 40 MG/1
40 TABLET, DELAYED RELEASE ORAL
Status: COMPLETED | OUTPATIENT
Start: 2018-12-09 | End: 2018-12-09

## 2018-12-09 RX ORDER — KETOROLAC TROMETHAMINE 30 MG/ML
30 INJECTION, SOLUTION INTRAMUSCULAR; INTRAVENOUS
Status: COMPLETED | OUTPATIENT
Start: 2018-12-09 | End: 2018-12-09

## 2018-12-09 RX ADMIN — Medication 296 ML: at 14:12

## 2018-12-09 RX ADMIN — SODIUM CHLORIDE 1000 ML: 900 INJECTION, SOLUTION INTRAVENOUS at 13:02

## 2018-12-09 RX ADMIN — PANTOPRAZOLE SODIUM 40 MG: 40 TABLET, DELAYED RELEASE ORAL at 12:14

## 2018-12-09 RX ADMIN — ONDANSETRON HYDROCHLORIDE 8 MG: 4 TABLET, FILM COATED ORAL at 12:13

## 2018-12-09 RX ADMIN — CEFTRIAXONE SODIUM 1 G: 1 INJECTION, POWDER, FOR SOLUTION INTRAMUSCULAR; INTRAVENOUS at 14:12

## 2018-12-09 RX ADMIN — DICYCLOMINE HYDROCHLORIDE 10 MG: 10 CAPSULE ORAL at 12:13

## 2018-12-09 RX ADMIN — KETOROLAC TROMETHAMINE 30 MG: 30 INJECTION, SOLUTION INTRAMUSCULAR at 13:02

## 2018-12-09 NOTE — ED PROVIDER NOTES
EMERGENCY DEPARTMENT HISTORY AND PHYSICAL EXAM    11:48 AM      Date: 12/9/2018  Patient Name: Jurgen Esparza    History of Presenting Illness     Chief Complaint   Patient presents with    Abdominal Pain    Vomiting    Nausea         History Provided By: Patient    Chief Complaint: Abd pain  Duration: 2 Days  Timing:  Worsening  Location: Periumbilical abd   Quality: Not obtained  Severity: Severe  Modifying Factors: No modifying or aggravating factors were reported. Associated Symptoms: vomiting, diarrhea      Additional History (Context): 11:55 AM Jurgen Esparza is a 41 y.o. female with h/o bowel obstruction who presents to ED complaining of severe, worsening, periumbilical abd pain onset 2 days, with associated vomiting and diarrhea. The patient reports that she came to [address] [place] [address] on 12/07 for the same abd pain, but claims that it has worsened today. She notes that she had loose stool at 3:30 AM this morning. The patient reports multiple episodes of vomiting. She denies fever and the presence of blood in her vomit. No other concerns or symptoms at this time. PCP: Daniela Mattson MD        Current Outpatient Medications   Medication Sig Dispense Refill    cephALEXin (KEFLEX) 500 mg capsule Take 1 Cap by mouth four (4) times daily for 7 days. 28 Cap 0    sodium phosphate (FLEET'S) 19-7 gram/118 mL enema Insert 1 Enema into rectum now for 1 dose. 133 mL 0    glycerin, adult, suppository Insert 1 Suppository into rectum two (2) times daily as needed. 10 Suppository 0    lactulose (CHRONULAC) 10 gram/15 mL solution Take 30 mL by mouth two (2) times a day for 5 days. 300 mL 0    oxyCODONE-acetaminophen (PERCOCET) 5-325 mg per tablet Take 1 Tab by mouth every four (4) hours as needed for Pain for up to 20 doses. Max Daily Amount: 6 Tabs.  15 Tab 0    predniSONE (STERAPRED DS) 10 mg dose pack 6 day dose pack per package instructions 1 Package 0    methocarbamol (ROBAXIN) 500 mg tablet Take 1 Tab by mouth four (4) times daily. 20 Tab 0    naproxen (NAPROSYN) 500 mg tablet Take 1 Tab by mouth two (2) times daily (with meals) for 10 days. 20 Tab 0    diazePAM (VALIUM) 2 mg tablet Take 1 Tab by mouth every eight (8) hours as needed for Anxiety (dizziness) for up to 4 doses. Max Daily Amount: 6 mg. 4 Tab 0    topiramate (TOPAMAX) 50 mg tablet Take 1 Tab by mouth two (2) times a day. Indications: MIGRAINE PREVENTION 60 Tab 6    gabapentin (NEURONTIN) 300 mg capsule Take 1 Tab QHS x1 week, then BID x1 week, then TID  Indications: NEUROPATHIC PAIN 90 Cap 1    cyclobenzaprine (FLEXERIL) 5 mg tablet Take 2 Tabs by mouth three (3) times daily. (Patient taking differently: Take 5 mg by mouth as needed.) 9 Tab 0    ibuprofen (MOTRIN) 600 mg tablet Take 1 Tab by mouth every six (6) hours as needed for Pain. 20 Tab 0    ondansetron (ZOFRAN ODT) 4 mg disintegrating tablet Take 1 Tab by mouth every eight (8) hours as needed for Nausea. 10 Tab 0    LORazepam (ATIVAN) 0.5 mg tablet Take 1-2 Tabs by mouth every eight (8) hours as needed (dizziness). Max Daily Amount: 3 mg. 9 Tab 0    meclizine (ANTIVERT) 12.5 mg tablet Take  by mouth three (3) times daily as needed.          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   [facility] SHOULDER SURG [address] UNLISTED  2011    left shoulder       Family History:  Family History   Problem Relation Age of Onset    Heart Disease Mother     Hypertension Mother    [facility] MS Mother    [facility] Other Mother 54        Lupus    Stroke Mother x3    Asthma Sister     Diabetes Paternal Grandmother     Hypertension Paternal Grandmother        Social History:  Social History     Tobacco Use    Smoking status: Never Smoker    Smokeless tobacco: Never Used   Substance Use Topics    Alcohol use: Yes     Comment: rare    Drug use: No       Allergies: Allergies   Allergen Reactions    Ciprofloxacin (Bulk) Diarrhea    Codeine Itching    Dilaudid [Hydromorphone (Bulk)] Itching    Lyrica [Pregabalin] Swelling    Tramadol Other (comments) and Unknown (comments)     Gets headaches  headache    Codeine Phosphate Unknown (comments)    Hydromorphone Unknown (comments)         Review of Systems       Review of Systems   Constitutional: Negative for fever. Gastrointestinal: Positive for abdominal pain, diarrhea and vomiting. Negative for blood in vomit. All other systems reviewed and are negative. Physical Exam     Visit Vitals  /83 (BP 1 Location: Left arm, BP Patient Position: At rest)   Pulse (!) 45   Temp 97.9 °F (36.6 °C)   Resp 20   SpO2 100%         Physical Exam   Constitutional: She is oriented to person, place, and time. She appears well-developed. HENT:   Head: Normocephalic and atraumatic. Eyes: Pupils are equal, round, and reactive to light. Neck: No JVD present. No tracheal deviation present. No thyromegaly present. Cardiovascular: Normal rate, regular rhythm and normal heart sounds. Exam reveals no gallop and no friction rub. No murmur heard. Pulmonary/Chest: Effort normal and breath sounds normal. No stridor. No respiratory distress. She has no wheezes. She has no rales. She exhibits no tenderness. Abdominal: Soft. She exhibits no distension and no mass. There is tenderness. There is no rebound and no guarding. Generalized but more epigastric   Musculoskeletal: She exhibits no edema or tenderness. Lymphadenopathy:     She has no cervical adenopathy.    Neurological: She is alert and oriented to person, place, and time. Skin: Skin is warm and dry. No rash noted. No erythema. No pallor. Psychiatric: She has a normal mood and affect. Her behavior is normal. Thought content normal.   Nursing note and vitals reviewed.         Diagnostic Study Results     Labs -  Recent Results (from the past 12 hour(s))   URINALYSIS W/ RFLX MICROSCOPIC    Collection Time: 12/09/18 11:15 AM   Result Value Ref Range    Color YELLOW      Appearance TURBID      Specific gravity 1.020 1.005 - 1.030      pH (UA) 8.0 5.0 - 8.0      Protein TRACE (A) NEG mg/dL    Glucose NEGATIVE  NEG mg/dL    Ketone TRACE (A) NEG mg/dL    Bilirubin NEGATIVE  NEG      Blood NEGATIVE  NEG      Urobilinogen 1.0 0.2 - 1.0 EU/dL    Nitrites NEGATIVE  NEG      Leukocyte Esterase MODERATE (A) NEG     HCG URINE, QL    Collection Time: 12/09/18 11:15 AM   Result Value Ref Range    HCG urine, QL NEGATIVE  NEG     DRUG SCREEN, URINE    Collection Time: 12/09/18 11:15 AM   Result Value Ref Range    BENZODIAZEPINES NEGATIVE  NEG      BARBITURATES NEGATIVE  NEG      THC (TH-CANNABINOL) NEGATIVE  NEG      OPIATES POSITIVE (A) NEG      PCP(PHENCYCLIDINE) NEGATIVE  NEG      COCAINE NEGATIVE  NEG      AMPHETAMINES NEGATIVE  NEG      METHADONE NEGATIVE  NEG      HDSCOM (NOTE)    URINE MICROSCOPIC ONLY    Collection Time: 12/09/18 11:15 AM   Result Value Ref Range    WBC 4 to 10 0 - 5 /hpf    RBC 0 to 1 0 - 5 /hpf    Epithelial cells 2+ 0 - 5 /lpf    Bacteria 2+ (A) NEG /hpf    Amorphous Crystals 3+ (A) NEG   CBC WITH AUTOMATED DIFF    Collection Time: 12/09/18 12:14 PM   Result Value Ref Range    WBC 8.7 4.6 - 13.2 K/uL    RBC 4.06 (L) 4.20 - 5.30 M/uL    HGB 11.9 (L) 12.0 - 16.0 g/dL    HCT 36.4 35.0 - 45.0 %    MCV 89.7 74.0 - 97.0 FL    MCH 29.3 24.0 - 34.0 PG    MCHC 32.7 31.0 - 37.0 g/dL    RDW 12.3 11.6 - 14.5 %    PLATELET 258 742 - 481 K/uL    MPV 10.6 9.2 - 11.8 FL    NEUTROPHILS 61 40 - 73 %    LYMPHOCYTES 30 21 - 52 %    MONOCYTES 9 3 - 10 %    EOSINOPHILS 0 0 - 5 %    BASOPHILS 0 0 - 2 %    ABS. NEUTROPHILS 5.3 1.8 - 8.0 K/UL    ABS. LYMPHOCYTES 2.6 0.9 - 3.6 K/UL    ABS. MONOCYTES 0.8 0.05 - 1.2 K/UL    ABS. EOSINOPHILS 0.0 0.0 - 0.4 K/UL    ABS. BASOPHILS 0.0 0.0 - 0.1 K/UL    DF AUTOMATED     METABOLIC PANEL, COMPREHENSIVE    Collection Time: 12/09/18 12:14 PM   Result Value Ref Range    Sodium 137 136 - 145 mmol/L    Potassium 3.6 3.5 - 5.5 mmol/L    Chloride 107 100 - 108 mmol/L    CO2 24 21 - 32 mmol/L    Anion gap 6 3.0 - 18 mmol/L    Glucose 93 74 - 99 mg/dL    BUN 13 7.0 - 18 MG/DL    Creatinine 0.82 0.6 - 1.3 MG/DL    BUN/Creatinine ratio 16 12 - 20      GFR est AA >60 >60 ml/min/1.73m2    GFR est non-AA >60 >60 ml/min/1.73m2    Calcium 8.6 8.5 - 10.1 MG/DL    Bilirubin, total 0.6 0.2 - 1.0 MG/DL    ALT (SGPT) 32 13 - 56 U/L    AST (SGOT) 30 15 - 37 U/L    Alk. phosphatase 51 45 - 117 U/L    Protein, total 6.8 6.4 - 8.2 g/dL    Albumin 3.5 3.4 - 5.0 g/dL    Globulin 3.3 2.0 - 4.0 g/dL    A-G Ratio 1.1 0.8 - 1.7     LIPASE    Collection Time: 12/09/18 12:14 PM   Result Value Ref Range    Lipase 40 (L) 73 - 393 U/L       Radiologic Studies -   XR ABD ACUTE W 1 V CHEST   Final Result   IMPRESSION:      1. Normal bowel gas pattern. 2. No acute cardiopulmonary process. Medical Decision Making   I am the first provider for this patient. I reviewed the vital signs, available nursing notes, past medical history, past surgical history, family history and social history. Vital Signs-Reviewed the patient's vital signs. Records Reviewed: Nursing Notes (Time of Review: 11:48 AM)    ED Course: Progress Notes, Reevaluation, and Consults:      Provider Notes (Medical Decision Making): MDM     Pt see in ED two days ago; labs still stable and her CT scan from that visit showed nothing acute. Did have h/o obstruction as infant per patient so AAS ordered but no obstructive pattern noted. Feels as if she needs to have a bowel movement.   Gave magnesium citrate, Rx for fleets, glycerin suppository. Has friend w/her who says he will administer at home. Gave dose of IV Rocephin now. Diagnosis     Clinical Impression:   1. Abdominal pain, generalized    2. Acute UTI        Disposition: home    Follow-up Information     Follow up With Specialties Details Why Contact Info    Erwin Mendosa MD Family Practice Schedule an appointment as soon as possible for a visit in 1 day  [address] [place] [address]      [facility] EMERGENCY DEPT Emergency Medicine  If symptoms worsen return immediately [address] [id number]  [phone]              Medication List      START taking these medications    cephALEXin 500 mg capsule  Commonly known as:  KEFLEX  Take 1 Cap by mouth four (4) times daily for 7 days. glycerin (adult) suppository  Insert 1 Suppository into rectum two (2) times daily as needed. sodium phosphate 19-7 gram/118 mL enema  Commonly known as:  FLEET'S  Insert 1 Enema into rectum now for 1 dose. ASK your doctor about these medications    cyclobenzaprine 5 mg tablet  Commonly known as:  FLEXERIL  Take 2 Tabs by mouth three (3) times daily. diazePAM 2 mg tablet  Commonly known as:  VALIUM  Take 1 Tab by mouth every eight (8) hours as needed for Anxiety (dizziness) for up to 4 doses. Max Daily Amount: 6 mg.     gabapentin 300 mg capsule  Commonly known as:  NEURONTIN  Take 1 Tab QHS x1 week, then BID x1 week, then TID  Indications: NEUROPATHIC PAIN     ibuprofen 600 mg tablet  Commonly known as:  MOTRIN  Take 1 Tab by mouth every six (6) hours as needed for Pain. lactulose 10 gram/15 mL solution  Commonly known as:  CHRONULAC  Take 30 mL by mouth two (2) times a day for 5 days. LORazepam 0.5 mg tablet  Commonly known as:  ATIVAN  Take 1-2 Tabs by mouth every eight (8) hours as needed (dizziness).  Max Daily Amount: 3 mg.     meclizine 12.5 mg tablet  Commonly known as:  ANTIVERT     methocarbamol 500 mg tablet  Commonly known as:  ROBAXIN  Take 1 Tab by mouth four (4) times daily. naproxen 500 mg tablet  Commonly known as:  NAPROSYN  Take 1 Tab by mouth two (2) times daily (with meals) for 10 days. ondansetron 4 mg disintegrating tablet  Commonly known as:  ZOFRAN ODT  Take 1 Tab by mouth every eight (8) hours as needed for Nausea. oxyCODONE-acetaminophen 5-325 mg per tablet  Commonly known as:  PERCOCET  Take 1 Tab by mouth every four (4) hours as needed for Pain for up to 20 doses. Max Daily Amount: 6 Tabs. predniSONE 10 mg dose pack  Commonly known as:  STERAPRED DS  6 day dose pack per package instructions     topiramate 50 mg tablet  Commonly known as:  TOPAMAX  Take 1 Tab by mouth two (2) times a day. Indications: MIGRAINE PREVENTION           Where to Get Your Medications      Information about where to get these medications is not yet available    Ask your nurse or doctor about these medications  · cephALEXin 500 mg capsule  · glycerin (adult) suppository  · sodium phosphate 19-7 gram/118 mL enema       _______________________________    Attestations:  Scribe Attestation     Jeff Many acting as a scribe for and in the presence of Mikael Marino18 Ambrosio Fletcher     December 09, 2018 at 11:48 AM       Provider Attestation:      I personally performed the services described in the documentation, reviewed the documentation, as recorded by the scribe in my presence, and it accurately and completely records my words and actions.  December 09, 2018 at 11:48 AM - RONNELL Marino  _______________________________

## 2018-12-09 NOTE — ED TRIAGE NOTES
Patient complains of abd pain , patient was seen in the ED on Friday for the same thing. Patient states that she had a bm but still is in pain.

## 2018-12-09 NOTE — DISCHARGE INSTRUCTIONS
Urinary Tract Infection in Women: Care Instructions  Your Care Instructions    A urinary tract infection, or UTI, is a general term for an infection anywhere between the kidneys and the urethra (where urine comes out). Most UTIs are bladder infections. They often cause pain or burning when you urinate. UTIs are caused by bacteria and can be cured with antibiotics. Be sure to complete your treatment so that the infection goes away. Follow-up care is a key part of your treatment and safety. Be sure to make and go to all appointments, and call your doctor if you are having problems. It's also a good idea to know your test results and keep a list of the medicines you take. How can you care for yourself at home? · Take your antibiotics as directed. Do not stop taking them just because you feel better. You need to take the full course of antibiotics. · Drink extra water and other fluids for the next day or two. This may help wash out the bacteria that are causing the infection. (If you have kidney, heart, or liver disease and have to limit fluids, talk with your doctor before you increase your fluid intake.)  · Avoid drinks that are carbonated or have caffeine. They can irritate the bladder. · Urinate often. Try to empty your bladder each time. · To relieve pain, take a hot bath or lay a heating pad set on low over your lower belly or genital area. Never go to sleep with a heating pad in place. To prevent UTIs  · Drink plenty of water each day. This helps you urinate often, which clears bacteria from your system. (If you have kidney, heart, or liver disease and have to limit fluids, talk with your doctor before you increase your fluid intake.)  · Urinate when you need to. · Urinate right after you have sex. · Change sanitary pads often. · Avoid douches, bubble baths, feminine hygiene sprays, and other feminine hygiene products that have deodorants.   · After going to the bathroom, wipe from front to back.  When should you call for help? Call your doctor now or seek immediate medical care if:    · Symptoms such as fever, chills, nausea, or vomiting get worse or appear for the first time.     · You have new pain in your back just below your rib cage. This is called flank pain.     · There is new blood or pus in your urine.     · You have any problems with your antibiotic medicine.    Watch closely for changes in your health, and be sure to contact your doctor if:    · You are not getting better after taking an antibiotic for 2 days.     · Your symptoms go away but then come back. Where can you learn more? Go to http://delores-scotty.info/. Enter X969 in the search box to learn more about \"Urinary Tract Infection in Women: Care Instructions. \"  Current as of: March 21, 2018  Content Version: 11.8  © 2164-7860 [employer]. Care instructions adapted under license by [employer] (which disclaims liability or warranty for this information). If you have questions about a medical condition or this instruction, always ask your healthcare professional. [address] any warranty or liability for your use of this information.

## 2018-12-11 ENCOUNTER — APPOINTMENT (OUTPATIENT)
Dept: GENERAL RADIOLOGY | Age: 43
End: 2018-12-11
Attending: EMERGENCY MEDICINE
Payer: SELF-PAY

## 2018-12-11 ENCOUNTER — OFFICE VISIT (OUTPATIENT)
Dept: FAMILY MEDICINE CLINIC | Age: 43
End: 2018-12-11

## 2018-12-11 ENCOUNTER — OFFICE VISIT (OUTPATIENT)
Dept: ORTHOPEDIC SURGERY | Age: 43
End: 2018-12-11

## 2018-12-11 ENCOUNTER — HOSPITAL ENCOUNTER (OUTPATIENT)
Dept: LAB | Age: 43
Discharge: HOME | End: 2018-12-11
Payer: SELF-PAY

## 2018-12-11 ENCOUNTER — HOSPITAL ENCOUNTER (EMERGENCY)
Age: 43
Discharge: HOME | End: 2018-12-11
Attending: EMERGENCY MEDICINE
Payer: SELF-PAY

## 2018-12-11 VITALS
TEMPERATURE: 98.6 F | WEIGHT: 175 LBS | SYSTOLIC BLOOD PRESSURE: 146 MMHG | RESPIRATION RATE: 18 BRPM | BODY MASS INDEX: 28.12 KG/M2 | HEIGHT: 66 IN | DIASTOLIC BLOOD PRESSURE: 92 MMHG | HEART RATE: 48 BPM

## 2018-12-11 VITALS
DIASTOLIC BLOOD PRESSURE: 94 MMHG | SYSTOLIC BLOOD PRESSURE: 159 MMHG | BODY MASS INDEX: 28.25 KG/M2 | HEIGHT: 66 IN | HEART RATE: 48 BPM

## 2018-12-11 VITALS
TEMPERATURE: 98.3 F | DIASTOLIC BLOOD PRESSURE: 94 MMHG | OXYGEN SATURATION: 100 % | BODY MASS INDEX: 28.12 KG/M2 | SYSTOLIC BLOOD PRESSURE: 163 MMHG | HEIGHT: 66 IN | HEART RATE: 53 BPM | WEIGHT: 175 LBS | RESPIRATION RATE: 20 BRPM

## 2018-12-11 DIAGNOSIS — R10.13 EPIGASTRIC PAIN: Primary | ICD-10-CM

## 2018-12-11 DIAGNOSIS — R10.13 EPIGASTRIC PAIN: ICD-10-CM

## 2018-12-11 DIAGNOSIS — R11.2 INTRACTABLE VOMITING WITH NAUSEA, UNSPECIFIED VOMITING TYPE: ICD-10-CM

## 2018-12-11 DIAGNOSIS — K92.0 HEMATEMESIS WITH NAUSEA: ICD-10-CM

## 2018-12-11 DIAGNOSIS — R11.14 BILIOUS VOMITING WITH NAUSEA: ICD-10-CM

## 2018-12-11 DIAGNOSIS — R11.2 NON-INTRACTABLE VOMITING WITH NAUSEA, UNSPECIFIED VOMITING TYPE: ICD-10-CM

## 2018-12-11 DIAGNOSIS — K59.00 CONSTIPATION, UNSPECIFIED CONSTIPATION TYPE: Primary | ICD-10-CM

## 2018-12-11 DIAGNOSIS — M54.16 LUMBAR RADICULOPATHY: ICD-10-CM

## 2018-12-11 DIAGNOSIS — K59.00 CONSTIPATION, UNSPECIFIED CONSTIPATION TYPE: ICD-10-CM

## 2018-12-11 DIAGNOSIS — R10.10 PAIN OF UPPER ABDOMEN: Primary | ICD-10-CM

## 2018-12-11 DIAGNOSIS — M54.50 LUMBAR PAIN: ICD-10-CM

## 2018-12-11 LAB
BILIRUB UR QL STRIP: NEGATIVE
GLUCOSE UR-MCNC: NEGATIVE MG/DL
HCG URINE, QL. (POC): NEGATIVE
KETONES P FAST UR STRIP-MCNC: NORMAL MG/DL
PH UR STRIP: 8 [PH] (ref 4.6–8)
PROT UR QL STRIP: NEGATIVE
SP GR UR STRIP: 1.01 (ref 1–1.03)
UA UROBILINOGEN AMB POC: NORMAL (ref 0.2–1)
URINALYSIS CLARITY POC: NORMAL
URINALYSIS COLOR POC: YELLOW
URINE BLOOD POC: NEGATIVE
URINE LEUKOCYTES POC: NORMAL
URINE NITRITES POC: NEGATIVE
VALID INTERNAL CONTROL?: YES

## 2018-12-11 PROCEDURE — 87086 URINE CULTURE/COLONY COUNT: CPT

## 2018-12-11 PROCEDURE — 99284 EMERGENCY DEPT VISIT MOD MDM: CPT

## 2018-12-11 PROCEDURE — 74019 RADEX ABDOMEN 2 VIEWS: CPT

## 2018-12-11 RX ORDER — POLYETHYLENE GLYCOL 3350 17 G/17G
17 POWDER, FOR SOLUTION ORAL DAILY
Qty: 289 G | Refills: 0 | Status: SHIPPED | OUTPATIENT
Start: 2018-12-11 | End: 2021-10-31

## 2018-12-11 RX ORDER — DICYCLOMINE HYDROCHLORIDE 20 MG/1
20 TABLET ORAL
COMMUNITY
Start: 2018-12-09 | End: 2018-12-14

## 2018-12-11 NOTE — ED TRIAGE NOTES
Pt presents via ems for abdominal pain, constipation and nausea. Pt reports abdominal pain and nausea x 6 days, constipation for \"Its been a while. \" Pt states seen multiple times at multiple ED's for same within last 6 days for same.

## 2018-12-11 NOTE — PROGRESS NOTES
Progress Note    Patient: Juan Henderson MRN: [id number]  SSN: [id number]    YOB: 1975  Age: 41 y.o. Sex: female          Subjective:   Juan Henderson is a 41 y.o. female who is here for an acute care visit. Juan Henderson states that she started having abd pain and nausea and vomiting  She went to [facility] for this issue on 12/9/18. There she had labwork done- Urine HCG negative. CMP was normal. CBC showed a stable mild anemia with Hgb=11.9. UA showed Mod LE, 2+ bacteria, 3+ crystals, trace ketones and trace protein. CT of abd/pelvis done in the ER showed incidental diverticulosis and some constipation and retained stool but was essentially normal. She was discharged on Keflex, glycerin suppository and Fleet's enema. She went to see her spine specialist and still was noted to be in significant abd pain so sent here. She has had abd pain for 5 days. She states she has been vomiting daily in the past 5 days. Vomiting 2-3 times per day. At times her vomit looks dark green. She has not had a BM since Thursday and is constipated. The Fleet's enema did not work, the lactulose made her sick (made her vomit more), the glycerin suppository did not work. Water is the only thing that does not upset her stomach. She has not had anything to eat/drink in the past 5 days besides water. She is drinking 3-5 glasses of water per day. She feels nauseous all the time and zofran is not helping her. + chills. No fevers. She has chronic sciatic issues which she was seeing the spine specialist about so not sure if she is having flank pain - her R flank area hurts more than her left flank area. Pain is in the epigastrium radiating to the umbilicus - feels like something stuck in the epigastric area. She has been taking the keflex but denies urinary symptoms. She denies dysuria. Denies blood in the urine. Denies urinary frequency or urgency. She is taking bentyl as prescribed In the ER QID without relief. She is states pain is constant 10/10 in intensity. This is the worst abd pain she has ever experienced. She had an intestinal obstruction as a baby. She states at her appt with Dr. Jackie Suarez, her spine specialist today she started vomiting up blood - it was red blood - today was the first time she ever vomited up blood. Objective: There were no vitals filed for this visit. Visit Vitals  BP (!) 146/92 (BP 1 Location: Left arm, BP Patient Position: Sitting)   Pulse (!) 48   Temp 98.6 °F (37 °C) (Oral)   Resp 18   Ht 5' 6\" (1.676 m)   Wt 175 lb (79.4 kg)   BMI 28.25 kg/m²     Review of Systems:  A comprehensive review of systems was negative except for that written in the History of Present Illness. General: + chills, no Fevers  HEENT: NEGATIVE  CV: NEGATIVE  PULM: NEG  GI: + nausea, vomiting, constipation, hematemsis as above  : NEGATIVE  MSK: Chronic back pain    Physical Exam:   GENERAL: alert, cooperative, moderate distress, appears stated age, in obvious pain, arrives in a wheelchair - cannot walk due to extent of abd pain. Moaning in pain throughout the visit  HEENT: PERRLA, anicteric, TMs pearly gray B/L, Nares patent and normal, OP clear/normal, Neck Supple, No meningismus, no photophobia  CV: RRR, no M, R, G. NL S1 and S2  PULM: CTAB, no wheezing, ronchi or rales, equal BS B/L  ABD: Soft, TTP with guarding In epigastrium and RUQ. No rebound.  Non-tender in the LUQ, LLQ, RLQ, suprapubic and periumbilical areas  MSK: No CVA tenderness B/L    Lab Results   Component Value Date/Time    Sodium 137 12/09/2018 12:14 PM    Potassium 3.6 12/09/2018 12:14 PM    Chloride 107 12/09/2018 12:14 PM    CO2 24 12/09/2018 12:14 PM    Anion gap 6 12/09/2018 12:14 PM    Glucose 93 12/09/2018 12:14 PM    BUN 13 12/09/2018 12:14 PM    Creatinine 0.82 12/09/2018 12:14 PM    BUN/Creatinine ratio 16 12/09/2018 12:14 PM    GFR est AA >60 12/09/2018 12:14 PM    GFR est non-AA >60 12/09/2018 12:14 PM    Calcium 8.6 12/09/2018 12:14 PM    Bilirubin, total 0.6 12/09/2018 12:14 PM    AST (SGOT) 30 12/09/2018 12:14 PM    Alk. phosphatase 51 12/09/2018 12:14 PM    Protein, total 6.8 12/09/2018 12:14 PM    Albumin 3.5 12/09/2018 12:14 PM    Globulin 3.3 12/09/2018 12:14 PM    A-G Ratio 1.1 12/09/2018 12:14 PM    ALT (SGPT) 32 12/09/2018 12:14 PM       Lab Results   Component Value Date/Time    WBC 8.7 12/09/2018 12:14 PM    HGB 11.9 (L) 12/09/2018 12:14 PM    HCT 36.4 12/09/2018 12:14 PM    PLATELET 095 12/69/6996 12:14 PM    MCV 89.7 12/09/2018 12:14 PM               Assessment:       ICD-10-CM ICD-9-CM    1. Epigastric pain R10.13 789.06    2. Hematemesis with nausea K92.0 578.0      787.02    3. Intractable vomiting with nausea, unspecified vomiting type R11.2 536.2    4. Constipation, unspecified constipation type K59.00 564.00          Plan:     1. EPIGASTRIC PAIN  40 y/o with h/o intestinal obstruction repaired as a baby, now with 5 days of constipation, severe 10/10 epigastric abd pain and persistent vomiting with an episode of hematemesis this AM  On exam she has guarding and peritoneal signs and cannot ambulate and is in a wheelchair due to the abd pain  I have sent her back to the ER via ambulance for further eval of her hematemesis and severe abd pain - will need GI in the ER and will need IVFs, pain control, nausea control. Will likely need GI consult in ER as well to r/o bleeding gastric ulcer or intestinal obstruction or other cause of her severe pain, hematemesis and inability to stool    2. HEMATEMESIS  This may be due to local injury to the lining of the esophagus given the frequency of vomiting but need to r/o more severe pathology such as bleeding gastric ulcer or esophageal injury/perf  Send to the ER for further treatment as above    3.  INTRACTABLE VOMITING  She has been vomiting 2-3 times per day for the past 5 days and can't keep anything down  She vomited up blood today  Send to the ER to eval further - given the hematemesis will need GI consult in hospital    4. CONSTIPATION  She is constipated and no BM in 5 days but is passing gas and passed gas this AM  This goes against obstruction but her pain is worsening - so needs to go to the ER to r/o ileus or other potential dangerous cause of her pain and symptoms    F/U within 2-3 days of hospital discharge  Pt was transported to ER via EMS in stable condition.      More than 50% of 45 minute visit spent counseling and coordinating care with patient face to face on 12/11/18      Signed By: Maty Kingston MD     December 11, 2018

## 2018-12-11 NOTE — PROGRESS NOTES
Pt informed of result at today's visit - sent to ER for severe abd pain, hematemesis, intractable vomiting

## 2018-12-11 NOTE — ED PROVIDER NOTES
EMERGENCY DEPARTMENT HISTORY AND PHYSICAL EXAM    2:25 PM      Date: 12/11/2018  Patient Name: Isrrael Barksdale    History of Presenting Illness     Chief Complaint   Patient presents with    Abdominal Pain    Constipation    Nausea         History Provided By: Patient    Chief Complaint: abd pain  Duration:  Weeks  Timing:  Acute and Constant  Location: diffuse abdomen  Quality: Cramping  Severity: Moderate  Modifying Factors: none  Associated Symptoms: N/V, constipation    Additional History (Context): Isrrael Barksdale is a 41 y.o. female with anemia, bowel obstruction (resolved) who presents with acute and constant diffuse abd pain beginning x1 week PTA. She confirms associated sx of N/V and constipation but no note of emesis at previous ED visits (states \"bloody vomit\"). Pt has had no normal BM for x5 days and was seen at [facility] ED for same issue x2 days PTA with clear radiology results. Pt was seen for same issue on 3 occasions in last 6 days. She was given magnesium citrate and home enemas and states has taken them with no relief. Pt denies any fever or use of opioids other than percocet prescribed for pain at last ED visit. She states was at PCP office hours PTA and had an episode of emesis and was sent to ED. No other concerns or symptoms at this time. PCP: Paris Hooker MD    Current Outpatient Medications   Medication Sig Dispense Refill    dicyclomine (BENTYL) 20 mg tablet 20 mg.  polyethylene glycol (MIRALAX) 17 gram/dose powder Take 17 g by mouth daily. 1 tablespoon with 8 oz of water daily 289 g 0    cephALEXin (KEFLEX) 500 mg capsule Take 1 Cap by mouth four (4) times daily for 7 days. 28 Cap 0    glycerin, adult, suppository Insert 1 Suppository into rectum two (2) times daily as needed.  10 Suppository 0    predniSONE (STERAPRED DS) 10 mg dose pack 6 day dose pack per package instructions 1 Package 0    ondansetron (ZOFRAN ODT) 4 mg disintegrating tablet Take 1 Tab by mouth every eight (8) hours as needed for Nausea. 10 Tab 0    lactulose (CHRONULAC) 10 gram/15 mL solution Take 30 mL by mouth two (2) times a day for 5 days. 300 mL 0    methocarbamol (ROBAXIN) 500 mg tablet Take 1 Tab by mouth four (4) times daily. 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   [facility] SHOULDER SURG [address] UNLISTED  2011    left shoulder       Family History:  Family History   Problem Relation Age of Onset    Heart Disease Mother     Hypertension Mother    [facility] MS Mother     Other Mother 54        Lupus    Stroke Mother         x3    Asthma Sister     Diabetes Paternal Grandmother     Hypertension Paternal Grandmother        Social History:  Social History     Tobacco Use    Smoking status: Never Smoker    Smokeless tobacco: Never Used   Substance Use Topics    Alcohol use: Yes     Comment: rare    Drug use: No       Allergies: Allergies   Allergen Reactions    Ciprofloxacin (Bulk) Diarrhea    Codeine Itching    Dilaudid [Hydromorphone (Bulk)] Itching    Lyrica [Pregabalin] Swelling    Tramadol Other (comments) and Unknown (comments)     Gets headaches  headache    Codeine Phosphate Unknown (comments)    Hydromorphone Unknown (comments)         Review of Systems       Review of Systems   Gastrointestinal: Positive for abdominal pain, constipation, nausea and vomiting. Musculoskeletal: Positive for back pain. All other systems reviewed and are negative.         Physical Exam     Visit Vitals  BP (!) 163/94 (BP 1 Location: Left arm, BP Patient Position: Supine; Head of bed elevated (Comment degrees))   Pulse (!) 53   Temp 98.3 °F (36.8 °C)   Resp 20   Ht 5' 6\" (1.676 m)   Wt 79.4 kg (174 lb 16 oz)   LMP 11/20/2018 (Exact Date)   SpO2 100%   BMI 28.25 kg/m²         Physical Exam   Constitutional: She is oriented to person, place, and time. She appears well-developed and well-nourished. She appears distressed (acute pain in abdomen). HENT:   Head: Normocephalic and atraumatic. Eyes: No scleral icterus. Cardiovascular: Normal rate, regular rhythm and normal heart sounds. No murmur heard. Pulmonary/Chest: Effort normal and breath sounds normal. No respiratory distress. She has no wheezes. Abdominal: Soft. Bowel sounds are normal. She exhibits no distension (mild diffuse). There is tenderness. There is no rebound and no guarding. Musculoskeletal:   No midline point tenderness. Neurological: She is alert and oriented to person, place, and time. Skin: Skin is warm and dry. Psychiatric: She has a normal mood and affect. Nursing note and vitals reviewed.         Diagnostic Study Results     Labs -  Recent Results (from the past 12 hour(s))   AMB POC URINALYSIS DIP STICK AUTO W/O MICRO    Collection Time: 12/11/18  1:36 PM   Result Value Ref Range    Color (UA POC) Yellow     Clarity (UA POC) Cloudy     Glucose (UA POC) Negative Negative    Bilirubin (UA POC) Negative Negative    Ketones (UA POC) 1+ Negative    Specific gravity (UA POC) 1.015 1.001 - 1.035    Blood (UA POC) Negative Negative    pH (UA POC) 8.0 4.6 - 8.0    Protein (UA POC) Negative Negative    Urobilinogen (UA POC) 0.2 mg/dL 0.2 - 1    Nitrites (UA POC) Negative Negative    Leukocyte esterase (UA POC) 1+ Negative   AMB POC URINE PREGNANCY TEST, VISUAL COLOR COMPARISON    Collection Time: 12/11/18  1:36 PM   Result Value Ref Range    VALID INTERNAL CONTROL POC Yes     HCG urine, Ql. (POC) Negative Negative       Radiologic Studies -   XR ABD FLAT/ ERECT   Final Result   IMPRESSION:      As above. Medical Decision Making   I am the first provider for this patient. I reviewed the vital signs, available nursing notes, past medical history, past surgical history, family history and social history. Vital Signs-Reviewed the patient's vital signs. Records Reviewed: Old Medical Records (Time of Review: 2:25 PM)    ED Course: Progress Notes, Reevaluation, and Consults:  Abdominal pain, constipation. Reports scant stool out about 5 days ago but last normal BM closer to 10 days ago. No also reportedly having vomiting. Initially seen for recurrent LBP w/ sciatic component in ED on 12/3. Seen subsequently on 12/7, then also c/o abdominal pain and constipation, then seen again on 12/9 both at [facility] and [place]. CT scan, labs, and abd x-rays all unremarkable except for mod stool burden. Pt reports no stool out despite bottle of MgCitrate and Fleets x 2 at home. Enema administered here w/ some stool output. Pt subjectively improved. Abd series w/o obstruction, stool and gas noted throughout the colon w/o AFL. Diagnosis     Clinical Impression:   1. Constipation, unspecified constipation type    2. Non-intractable vomiting with nausea, unspecified vomiting type      1) Plenty of fluids  2) Regular exercise (walking, jogging, cycling--the bowel gets passive benefit from staying active)  3) High fiber diet (plenty of fruits and vegetables, less processed meats and cheese)  4) Supplemental fiber like Metamucil, Fibercon etc.  5) Stool softeners daily (Colace, over-the-counter)  6) Miralax as directed  7) If that fails try additional bottle of Mag Citrate  8) If that fails use enemas like Fleets.   9) Return for high fever, repetitive vomiting, severe pain  10) Follow up with your PCP or local clinic for recheck in 5-7 days if not improved     Disposition: home    Follow-up Information    None             Medication List      START taking these medications    polyethylene glycol 17 gram/dose powder  Commonly known as:  MIRALAX  Take 17 g by mouth daily. 1 tablespoon with 8 oz of water daily        STOP taking these medications    oxyCODONE-acetaminophen 5-325 mg per tablet  Commonly known as:  PERCOCET        ASK your doctor about these medications    cephALEXin 500 mg capsule  Commonly known as:  KEFLEX  Take 1 Cap by mouth four (4) times daily for 7 days. dicyclomine 20 mg tablet  Commonly known as:  BENTYL     glycerin (adult) suppository  Insert 1 Suppository into rectum two (2) times daily as needed. lactulose 10 gram/15 mL solution  Commonly known as:  CHRONULAC  Take 30 mL by mouth two (2) times a day for 5 days. methocarbamol 500 mg tablet  Commonly known as:  ROBAXIN  Take 1 Tab by mouth four (4) times daily. ondansetron 4 mg disintegrating tablet  Commonly known as:  ZOFRAN ODT  Take 1 Tab by mouth every eight (8) hours as needed for Nausea. predniSONE 10 mg dose pack  Commonly known as:  STERAPRED DS  6 day dose pack per package instructions           Where to Get Your Medications      These medications were sent to [address] [place] [id number]    Phone:  [phone]   · polyethylene glycol 17 gram/dose powder       _______________________________       Scribe Ji Gilsum acting as a scribe for and in the presence of No att. providers found      December 11, 2018 at 2:25 PM       Provider Attestation:      I personally performed the services described in the documentation, reviewed the documentation, as recorded by the scribe in my presence, and it accurately and completely records my words and actions.  December 11, 2018 at 2:25 PM - No att. providers found        _______________________________

## 2018-12-11 NOTE — PATIENT INSTRUCTIONS
High Blood Pressure: Care Instructions  Your Care Instructions    If your blood pressure is usually above 130/80, you have high blood pressure, or hypertension. That means the top number is 130 or higher or the bottom number is 80 or higher, or both. Despite what a lot of people think, high blood pressure usually doesn't cause headaches or make you feel dizzy or lightheaded. It usually has no symptoms. But it does increase your risk for heart attack, stroke, and kidney or eye damage. The higher your blood pressure, the more your risk increases. Your doctor will give you a goal for your blood pressure. Your goal will be based on your health and your age. Lifestyle changes, such as eating healthy and being active, are always important to help lower blood pressure. You might also take medicine to reach your blood pressure goal.  Follow-up care is a key part of your treatment and safety. Be sure to make and go to all appointments, and call your doctor if you are having problems. It's also a good idea to know your test results and keep a list of the medicines you take. How can you care for yourself at home? Medical treatment  · If you stop taking your medicine, your blood pressure will go back up. You may take one or more types of medicine to lower your blood pressure. Be safe with medicines. Take your medicine exactly as prescribed. Call your doctor if you think you are having a problem with your medicine. · Talk to your doctor before you start taking aspirin every day. Aspirin can help certain people lower their risk of a heart attack or stroke. But taking aspirin isn't right for everyone, because it can cause serious bleeding. · See your doctor regularly. You may need to see the doctor more often at first or until your blood pressure comes down. · If you are taking blood pressure medicine, talk to your doctor before you take decongestants or anti-inflammatory medicine, such as ibuprofen.  Some of these medicines can raise blood pressure. · Learn how to check your blood pressure at home. Lifestyle changes  · Stay at a healthy weight. This is especially important if you put on weight around the waist. Losing even 10 pounds can help you lower your blood pressure. · If your doctor recommends it, get more exercise. Walking is a good choice. Bit by bit, increase the amount you walk every day. Try for at least 30 minutes on most days of the week. You also may want to swim, bike, or do other activities. · Avoid or limit alcohol. Talk to your doctor about whether you can drink any alcohol. · Try to limit how much sodium you eat to less than 2,300 milligrams (mg) a day. Your doctor may ask you to try to eat less than 1,500 mg a day. · Eat plenty of fruits (such as bananas and oranges), vegetables, legumes, whole grains, and low-fat dairy products. · Lower the amount of saturated fat in your diet. Saturated fat is found in animal products such as milk, cheese, and meat. Limiting these foods may help you lose weight and also lower your risk for heart disease. · Do not smoke. Smoking increases your risk for heart attack and stroke. If you need help quitting, talk to your doctor about stop-smoking programs and medicines. These can increase your chances of quitting for good. When should you call for help? Call 911 anytime you think you may need emergency care. This may mean having symptoms that suggest that your blood pressure is causing a serious heart or blood vessel problem. Your blood pressure may be over 180/120.   For example, call 911 if:    · You have symptoms of a heart attack. These may include:  ? Chest pain or pressure, or a strange feeling in the chest.  ? Sweating. ? Shortness of breath. ? Nausea or vomiting. ? Pain, pressure, or a strange feeling in the back, neck, jaw, or upper belly or in one or both shoulders or arms. ? Lightheadedness or sudden weakness.   ? A fast or irregular heartbeat.     · You have symptoms of a stroke. These may include:  ? Sudden numbness, tingling, weakness, or loss of movement in your face, arm, or leg, especially on only one side of your body. ? Sudden vision changes. ? Sudden trouble speaking. ? Sudden confusion or trouble understanding simple statements. ? Sudden problems with walking or balance. ? A sudden, severe headache that is different from past headaches.     · You have severe back or belly pain.    Do not wait until your blood pressure comes down on its own. Get help right away.   Call your doctor now or seek immediate care if:    · Your blood pressure is much higher than normal (such as 180/120 or higher), but you don't have symptoms.     · You think high blood pressure is causing symptoms, such as:  ? Severe headache.  ? Blurry vision.    Watch closely for changes in your health, and be sure to contact your doctor if:    · Your blood pressure measures higher than your doctor recommends at least 2 times. That means the top number is higher or the bottom number is higher, or both.     · You think you may be having side effects from your blood pressure medicine. Where can you learn more? Go to http://delores-scotty.info/. Enter G252 in the search box to learn more about \"High Blood Pressure: Care Instructions. \"  Current as of: December 6, 2017  Content Version: 11.8  © 2333-3356 [employer]. Care instructions adapted under license by [employer] (which disclaims liability or warranty for this information). If you have questions about a medical condition or this instruction, always ask your healthcare professional. [place] [address] any warranty or liability for your use of this information. Abdominal Pain: Care Instructions  Your Care Instructions    Abdominal pain has many possible causes. Some aren't serious and get better on their own in a few days. Others need more testing and treatment. If your pain continues or gets worse, you need to be rechecked and may need more tests to find out what is wrong. You may need surgery to correct the problem. Don't ignore new symptoms, such as fever, nausea and vomiting, urination problems, pain that gets worse, and dizziness. These may be signs of a more serious problem. Your doctor may have recommended a follow-up visit in the next 8 to 12 hours. If you are not getting better, you may need more tests or treatment. The doctor has checked you carefully, but problems can develop later. If you notice any problems or new symptoms, get medical treatment right away. Follow-up care is a key part of your treatment and safety. Be sure to make and go to all appointments, and call your doctor if you are having problems. It's also a good idea to know your test results and keep a list of the medicines you take. How can you care for yourself at home? · Rest until you feel better. · To prevent dehydration, drink plenty of fluids, enough so that your urine is light yellow or clear like water. Choose water and other caffeine-free clear liquids until you feel better. If you have kidney, heart, or liver disease and have to limit fluids, talk with your doctor before you increase the amount of fluids you drink. · If your stomach is upset, eat mild foods, such as rice, dry toast or crackers, bananas, and applesauce. Try eating several small meals instead of two or three large ones. · Wait until 48 hours after all symptoms have gone away before you have spicy foods, alcohol, and drinks that contain caffeine. · Do not eat foods that are high in fat. · Avoid anti-inflammatory medicines such as aspirin, ibuprofen (Advil, Motrin), and naproxen (Aleve). These can cause stomach upset. Talk to your doctor if you take daily aspirin for another health problem. When should you call for help? Call 911 anytime you think you may need emergency care.  For example, call if:    · You passed out (lost consciousness).     · You pass maroon or very bloody stools.     · You vomit blood or what looks like coffee grounds.     · You have new, severe belly pain.    Call your doctor now or seek immediate medical care if:    · Your pain gets worse, especially if it becomes focused in one area of your belly.     · You have a new or higher fever.     · Your stools are black and look like tar, or they have streaks of blood.     · You have unexpected vaginal bleeding.     · You have symptoms of a urinary tract infection. These may include:  ? Pain when you urinate. ? Urinating more often than usual.  ? Blood in your urine.     · You are dizzy or lightheaded, or you feel like you may faint.    Watch closely for changes in your health, and be sure to contact your doctor if:    · You are not getting better after 1 day (24 hours). Where can you learn more? Go to http://delores-scotty.info/. Enter Q700 in the search box to learn more about \"Abdominal Pain: Care Instructions. \"  Current as of: November 20, 2017  Content Version: 11.8  © 4467-1071 [employer]. Care instructions adapted under license by [employer] (which disclaims liability or warranty for this information). If you have questions about a medical condition or this instruction, always ask your healthcare professional. [address] any warranty or liability for your use of this information.

## 2018-12-11 NOTE — PROGRESS NOTES
Severino Knows presents today for   Chief Complaint   Patient presents with    Abdominal Pain     Patient stated that she been having some abdominal pain and can not hold anything down x 1 week. Patient stated that her pain is 9/10. Severino Akins preferred language for health care discussion is english/other. Is someone accompanying this pt? Yes close Friend    Is the patient using any DME equipment during OV? no    Depression Screening:  PHQ over the last two weeks 12/11/2018   Little interest or pleasure in doing things Not at all   Feeling down, depressed, irritable, or hopeless Not at all   Total Score PHQ 2 0       Learning Assessment:  Learning Assessment 12/11/2018   PRIMARY LEARNER Patient   HIGHEST LEVEL OF EDUCATION - PRIMARY LEARNER  2 YEARS [facility] PRIMARY LEARNER NONE   CO-LEARNER CAREGIVER No   PRIMARY LANGUAGE ENGLISH    NEED -   LEARNER PREFERENCE PRIMARY LISTENING     -     -     -     -   ANSWERED BY self   RELATIONSHIP SELF       Abuse Screening:  Abuse Screening Questionnaire 12/11/2018   Do you ever feel afraid of your partner? N   Are you in a relationship with someone who physically or mentally threatens you? N   Is it safe for you to go home? Y           Coordination of Care:  1. Have you been to the ER, urgent care clinic since your last visit? Hospitalized since your last visit? Yes [facility] 12/9/18 abdominal pain    2. Have you seen or consulted any other health care providers outside of the [address] [place] [address] since your last visit? Include any pap smears or colon screening. no    Per-Dr. Franny Stauffer ok medication not taking to be deleted. Advance Directive:  1. Do you have an advance directive in place? Patient Reply:no    2. If not, would you like material regarding how to put one in place?  Patient Reply: no

## 2018-12-11 NOTE — PROGRESS NOTES
[facility]  [address] [place], [facility] [address]  Phone: [phone]  Fax: [phone]    Pt's YOB: 1975    ASSESSMENT   Diagnoses and all orders for this visit:    1. Pain of upper abdomen    2. Bilious vomiting with nausea    3. Hematemesis with nausea    4. Lumbar pain    5. Lumbar radiculopathy         IMPRESSION AND PLAN:  Sandeep Mahmood is a 41 y.o. female with history of cervical pain. Pt presents to the office today complaining of pain in the right lower back that radiates down the right leg x 8 days. She also complains of pain and pressure in the abdomen with nausea and vomiting. She has not been able to keep food down since Friday and reports minimal relief with Zofran. 1) I contacted her PCP, Chen Johnson MD office and spoke with the triage nurse and related the patient's current situation;  and the patient will follow up at the office today at 1 pm for further evaluation and treatment. 2) Ms. Herbetr Leal has a reminder for a \"due or due soon\" health maintenance. I have asked that she contact her primary care provider, Chen Johnson MD, for follow-up on this health maintenance. 3)  demonstrated consistency with prescribing. 4) Pt will follow-up as needed after her abdominal pain has resolved. HISTORY OF PRESENT ILLNESS:  Sandeep Mahmood is a 41 y.o. female with history of cervical pain and presents to the office today for follow up with her significant other. She last followed up with Lizandro Chávez NP on 05/20/2018 for neck pain. Pt presents to the office today complaining of pain in the right lower back that radiates down the right leg x 8 days. She also complains of pain and pressure in the abdomen. She states that this now has become her primary concern and she has had significant worsening of her abdominal symptoms since her ER visit recently.   Pt denies any inciting injuries and notes that she woke up on Monday, 12/03/2018 with severe pain in the lower back and was unable to move her right leg. She went to the ER at [facility] and at [place] and was told her back symptoms were related to \"sciatica\" and her abdominal pain was related to constipation. Of note, she had previous cervical surgery with Dr. Sonja Lau in 05/2017. She did not tolerate neuropathic medications in the past. Pt states that she has not been able to keep food down since Friday and admits to significant nausea and vomiting. She reports minimal improvement with Zofran and notes that she has not yet scheduled an appointment with her PCP, Talon Castro MD. She states that she has also tried enemas without relief.  Pt at this time desires to follow up with her PCP, Talon Castro MD.    Pain Scale: 7/10    PCP: Talon Castro MD     Past Medical History:   Diagnosis Date    Anemia 1997    during pregnancy 2nd child    Migraine headache 1997    Ovarian cyst     Vertigo         Social History     Socioeconomic History    Marital status: SINGLE     Spouse name: Not on file    Number of children: Not on file    Years of education: Not on file    Highest education level: Not on file   Social Needs    Financial resource strain: Not on file    Food insecurity - worry: Not on file    Food insecurity - inability: Not on file   [employer] needs - medical: Not on file   [employer] needs - non-medical: Not on file   Occupational History    Not on file   Tobacco Use    Smoking status: Never Smoker    Smokeless tobacco: Never Used   Substance and Sexual Activity    Alcohol use: Yes     Comment: rare    Drug use: No    Sexual activity: Yes     Partners: Male     Birth control/protection: Implant     Comment: pregnancy test negative   Other Topics Concern     Service Not Asked    Blood Transfusions Not Asked    Caffeine Concern Not Asked    Occupational Exposure Not Asked    Hobby Hazards Not Asked    Sleep Concern Not Asked    Stress Concern Not Asked    Weight Concern Not Asked    Special Diet Not Asked    Back Care Not Asked    Exercise Not Asked    Bike Helmet Not Asked   [address] Not Asked    Self-Exams Not Asked   Social History Narrative    Not on file       Current Outpatient Medications   Medication Sig Dispense Refill    cephALEXin (KEFLEX) 500 mg capsule Take 1 Cap by mouth four (4) times daily for 7 days. 28 Cap 0    glycerin, adult, suppository Insert 1 Suppository into rectum two (2) times daily as needed. 10 Suppository 0    predniSONE (STERAPRED DS) 10 mg dose pack 6 day dose pack per package instructions 1 Package 0    methocarbamol (ROBAXIN) 500 mg tablet Take 1 Tab by mouth four (4) times daily. 20 Tab 0    ondansetron (ZOFRAN ODT) 4 mg disintegrating tablet Take 1 Tab by mouth every eight (8) hours as needed for Nausea. 10 Tab 0    dicyclomine (BENTYL) 20 mg tablet 20 mg.  polyethylene glycol (MIRALAX) 17 gram/dose powder Take 17 g by mouth daily. 1 tablespoon with 8 oz of water daily 289 g 0    lactulose (CHRONULAC) 10 gram/15 mL solution Take 30 mL by mouth two (2) times a day for 5 days. 300 mL 0       Allergies   Allergen Reactions    Ciprofloxacin (Bulk) Diarrhea    Codeine Itching    Dilaudid [Hydromorphone (Bulk)] Itching    Lyrica [Pregabalin] Swelling    Tramadol Other (comments) and Unknown (comments)     Gets headaches  headache    Codeine Phosphate Unknown (comments)    Hydromorphone Unknown (comments)         REVIEW OF SYSTEMS    Constitutional: Negative for fever, chills  Respiratory: Negative for cough or shortness of breath. Cardiovascular: Negative for chest pain or palpitations. Gastrointestinal:  Positive for constant nausea, recurrent vomiting, abdominal pain and constipation  Genitourinary: Negative for dysuria and flank pain. Musculoskeletal: Positive for lumbar pain with radiation down her right leg  Skin: Negative for rash.    Neurological: Negative for headaches, dizziness, or numbness. Endo/Heme/Allergies: Negative for increased bruising. Psychiatric/Behavioral: Positive for difficulty with sleep. PHYSICAL EXAMINATION  Visit Vitals  BP (!) 159/94   Pulse (!) 48   Ht 5' 6\" (1.676 m)   LMP 11/20/2018 (Exact Date)   BMI 28.25 kg/m²       Constitutional: Awake, alert, and in moderate distress with abdominal pain  Neurological: 1+ symmetrical DTRs in the upper extremities. 1+ symmetrical DTRs in the lower extremities. Skin: warm, dry, and intact. Abdomen:  Hypoactive BS present; pain with palpation of upper quadrants; pt with recurrent hiccups/occas belching; had 2 episodes of vomiting while in the office -- 1 of bilious vomiting and 1 episode of blood tinged emesis   Musculoskeletal: Tenderness to palpation in the lower lumbar region. Limited examination secondary to abdominal pain. She presents to the office today using a wheelchair. No pain with internal or external rotation of her hips. Negative straight leg raise bilaterally. Biceps  Triceps Deltoids Wrist Ext Wrist Flex Hand Intrin   Right +4/5 +4/5 +4/5 +4/5 +4/5 +4/5   Left +4/5 +4/5 +4/5 +4/5 +4/5 +4/5      Hip Flex  Quads Hamstrings Ankle DF EHL Ankle PF   Right +4/5 +4/5 +4/5 +4/5 +4/5 +4/5   Left +4/5 +4/5 +4/5 +4/5 +4/5 +4/5     IMAGING:    Abdominal CT from 12/07/2018 was personally reviewed with the patient and demonstrated:  Results from [place] encounter on 12/07/18   CT ABD PELV W CONT    Narrative CT Abdomen And Pelvis with Intravenous Contrast    INDICATION: Constipation and back pain. TECHNIQUE: 5 mm collimation axial images obtained from the diaphragm to the  level of the pubic symphysis following the uneventful administration of  100 cc  of low osmolar, nonionic intravenous contrast.    Dose reduction techniques used:  Automated exposure control, adjustment of the  mAs and/or kVp according to patient size, standardized low-dose protocol, and/or  iterative reconstruction technique. COMPARISON: June 23, 2012. ABDOMEN FINDINGS:    Lung Bases: Mild bibasilar atelectasis. No pleural effusion. Liver: Normal attenuation. No evidence for mass. Gallbladder: Present and appears normal. No biliary ductal dilatation. Pancreas: Normal attenuation without mass or ductal dilatation. Spleen: Normal in size. No evidence of mass. .    Adrenal Glands: No evidence for mass. Kidneys:     Right: Normal enhancement. No cortical mass. No hydronephrosis. Left:  Normal enhancement. No cortical mass. No hydronephrosis. Lymph Nodes: No lymphadenopathy. Aorta:  Normal in caliber. PELVIS FINDINGS:     Bowel: Small Bowel: Normal in caliber with normal wall thickness. Large Bowel: Underdistended sigmoid colon. Mild right-sided colonic stool  burden. Questionable few scattered diverticula in the sigmoid colon. Mordecai Tivoli Appendix: No secondary signs of acute appendicitis. Bladder: Underdistended urinary bladder. Uterus is mildly heterogeneous with bilateral tubal ligation clips noted. There  is no suspicious adnexal mass. Possible small physiologic follicle in the right  adnexa. No significant free fluid. No free air. Tiny fat-containing umbilical hernia. Bones: No acute finding. Impression Impression:    No acute findings in abdomen or pelvis. Incidentals as fully described above. Cervical spine x-rays from 03/13/2018 were personally reviewed with the patient and demonstrated:  FINDINGS: The cervical spine from the craniocervical junction through T1 is well  visualized on the lateral view. The facets are appropriately aligned. No  prevertebral edema appreciated. The lateral masses of C1 sit appropriately on  C2. No evidence for an acute fracture or dislocation. Intervertebral disc  hardware is noted at C5-C6. No hardware complication appreciated.     IMPRESSION:  1.   No acute pathology appreciated in the cervical spine.    Written by Dubizzle, as dictated by Maury Duncan MD.  I, Dr. Maury Duncan confirm that all documentation is accurate.

## 2018-12-11 NOTE — PROGRESS NOTES
Discussed with patient at today's visit - 1+ LE and ketones which may suggest urinary tract infection. Patient sent to the ER today for further evaluation of her abd pain, vomiting, hematemesis, instructed to follow up after discharge from hosp.

## 2018-12-11 NOTE — DISCHARGE INSTRUCTIONS
1) Plenty of fluids  2) Regular exercise (walking, jogging, cycling--the bowel gets passive benefit from staying active)  3) High fiber       Constipation: Care Instructions  Your Care Instructions    Constipation means that you have a hard time passing stools (bowel movements). People pass stools from 3 times a day to once every 3 days. What is normal for you may be different. Constipation may occur with pain in the rectum and cramping. The pain may get worse when you try to pass stools. Sometimes there are small amounts of bright red blood on toilet paper or the surface of stools. This is because of enlarged veins near the rectum (hemorrhoids). A few changes in your diet and lifestyle may help you avoid ongoing constipation. Your doctor may also prescribe medicine to help loosen your stool. Some medicines can cause constipation. These include pain medicines and antidepressants. Tell your doctor about all the medicines you take. Your doctor may want to make a medicine change to ease your symptoms. Follow-up care is a key part of your treatment and safety. Be sure to make and go to all appointments, and call your doctor if you are having problems. It's also a good idea to know your test results and keep a list of the medicines you take. How can you care for yourself at home? · Drink plenty of fluids, enough so that your urine is light yellow or clear like water. If you have kidney, heart, or liver disease and have to limit fluids, talk with your doctor before you increase the amount of fluids you drink. · Include high-fiber foods in your diet each day. These include fruits, vegetables, beans, and whole grains. · Get at least 30 minutes of exercise on most days of the week. Walking is a good choice. You also may want to do other activities, such as running, swimming, cycling, or playing tennis or team sports. · Take a fiber supplement, such as Citrucel or Metamucil, every day.  Read and follow all instructions on the label. · Schedule time each day for a bowel movement. A daily routine may help. Take your time having your bowel movement. · Support your feet with a small step stool when you sit on the toilet. This helps flex your hips and places your pelvis in a squatting position. · Your doctor may recommend an over-the-counter laxative to relieve your constipation. Examples are Milk of Magnesia and MiraLax. Read and follow all instructions on the label. Do not use laxatives on a long-term basis. When should you call for help? Call your doctor now or seek immediate medical care if:    · You have new or worse belly pain.     · You have new or worse nausea or vomiting.     · You have blood in your stools.    Watch closely for changes in your health, and be sure to contact your doctor if:    · Your constipation is getting worse.     · You do not get better as expected. Where can you learn more? Go to http://delores-scotty.info/. Enter 21 [phone] in the search box to learn more about \"Constipation: Care Instructions. \"  Current as of: November 20, 2017  Content Version: 11.8  © 8244-6473 [employer] Care instructions adapted under license by [employer] (which disclaims liability or warranty for this information). If you have questions about a medical condition or this instruction, always ask your healthcare professional. [address] any warranty or liability for your use of this information. diet (plenty of fruits and vegetables, less processed meats and cheese)  4) Supplemental fiber like Metamucil, Fibercon etc.  5) Stool softeners daily (Colace, over-the-counter)  6) Miralax as directed  7) If that fails try additional bottle of Mag Citrate  8) If that fails use enemas like Fleets.   9) Return for high fever, repetitive vomiting, severe pain  10) Follow up with your PCP or local clinic for recheck in 5-7 days if not improved

## 2018-12-12 NOTE — ED NOTES
Patient armband removed and shreddedI have reviewed discharge instructions with the patient. The patient verbalized understanding.  rx x 1 sent

## 2018-12-13 LAB
BACTERIA SPEC CULT: NORMAL
SERVICE CMNT-IMP: NORMAL

## 2018-12-13 NOTE — PROGRESS NOTES
UCx result reviewed - skin contaminant, no UTI.  Pt coming for follow up tomorrow - will inform her at that time

## 2018-12-14 ENCOUNTER — OFFICE VISIT (OUTPATIENT)
Dept: FAMILY MEDICINE CLINIC | Age: 43
End: 2018-12-14

## 2018-12-14 ENCOUNTER — HOSPITAL ENCOUNTER (OUTPATIENT)
Dept: LAB | Age: 43
Discharge: HOME | End: 2018-12-14
Payer: SELF-PAY

## 2018-12-14 VITALS
DIASTOLIC BLOOD PRESSURE: 76 MMHG | SYSTOLIC BLOOD PRESSURE: 111 MMHG | HEIGHT: 66 IN | HEART RATE: 83 BPM | BODY MASS INDEX: 28.25 KG/M2 | TEMPERATURE: 98.3 F | RESPIRATION RATE: 20 BRPM

## 2018-12-14 DIAGNOSIS — D64.9 ANEMIA, UNSPECIFIED TYPE: ICD-10-CM

## 2018-12-14 DIAGNOSIS — R11.0 NAUSEA: ICD-10-CM

## 2018-12-14 DIAGNOSIS — K59.00 CONSTIPATION, UNSPECIFIED CONSTIPATION TYPE: ICD-10-CM

## 2018-12-14 DIAGNOSIS — R10.13 EPIGASTRIC PAIN: ICD-10-CM

## 2018-12-14 DIAGNOSIS — K92.0 HEMATEMESIS WITH NAUSEA: ICD-10-CM

## 2018-12-14 DIAGNOSIS — R53.83 FATIGUE, UNSPECIFIED TYPE: ICD-10-CM

## 2018-12-14 DIAGNOSIS — R10.13 EPIGASTRIC PAIN: Primary | ICD-10-CM

## 2018-12-14 LAB
BASOPHILS # BLD: 0 K/UL (ref 0–0.1)
BASOPHILS NFR BLD: 0 % (ref 0–2)
DIFFERENTIAL METHOD BLD: ABNORMAL
EOSINOPHIL # BLD: 0.1 K/UL (ref 0–0.4)
EOSINOPHIL NFR BLD: 2 % (ref 0–5)
ERYTHROCYTE [DISTWIDTH] IN BLOOD BY AUTOMATED COUNT: 12.5 % (ref 11.6–14.5)
HCT VFR BLD AUTO: 40.7 % (ref 35–45)
HGB BLD-MCNC: 13.3 G/DL (ref 12–16)
LYMPHOCYTES # BLD: 2.6 K/UL (ref 0.9–3.6)
LYMPHOCYTES NFR BLD: 34 % (ref 21–52)
MCH RBC QN AUTO: 28.8 PG (ref 24–34)
MCHC RBC AUTO-ENTMCNC: 32.7 G/DL (ref 31–37)
MCV RBC AUTO: 88.1 FL (ref 74–97)
MONOCYTES # BLD: 0.9 K/UL (ref 0.05–1.2)
MONOCYTES NFR BLD: 12 % (ref 3–10)
NEUTS SEG # BLD: 4.1 K/UL (ref 1.8–8)
NEUTS SEG NFR BLD: 52 % (ref 40–73)
PLATELET # BLD AUTO: 287 K/UL (ref 135–420)
PMV BLD AUTO: 11.9 FL (ref 9.2–11.8)
RBC # BLD AUTO: 4.62 M/UL (ref 4.2–5.3)
RETICS/RBC NFR AUTO: 2.7 % (ref 0.5–2.3)
WBC # BLD AUTO: 7.8 K/UL (ref 4.6–13.2)

## 2018-12-14 PROCEDURE — 85045 AUTOMATED RETICULOCYTE COUNT: CPT

## 2018-12-14 PROCEDURE — 84702 CHORIONIC GONADOTROPIN TEST: CPT

## 2018-12-14 PROCEDURE — 83540 ASSAY OF IRON: CPT

## 2018-12-14 PROCEDURE — 84443 ASSAY THYROID STIM HORMONE: CPT

## 2018-12-14 PROCEDURE — 83516 IMMUNOASSAY NONANTIBODY: CPT

## 2018-12-14 PROCEDURE — 82746 ASSAY OF FOLIC ACID SERUM: CPT

## 2018-12-14 PROCEDURE — 36415 COLL VENOUS BLD VENIPUNCTURE: CPT

## 2018-12-14 PROCEDURE — 80053 COMPREHEN METABOLIC PANEL: CPT

## 2018-12-14 PROCEDURE — 86671 FUNGUS NES ANTIBODY: CPT

## 2018-12-14 PROCEDURE — 85025 COMPLETE CBC W/AUTO DIFF WBC: CPT

## 2018-12-14 PROCEDURE — 83690 ASSAY OF LIPASE: CPT

## 2018-12-14 PROCEDURE — 82607 VITAMIN B-12: CPT

## 2018-12-14 PROCEDURE — 86677 HELICOBACTER PYLORI ANTIBODY: CPT

## 2018-12-14 RX ORDER — PROMETHAZINE HYDROCHLORIDE 25 MG/1
25 TABLET ORAL
Qty: 60 TAB | Refills: 1 | Status: SHIPPED | OUTPATIENT
Start: 2018-12-14 | End: 2019-05-08

## 2018-12-14 RX ORDER — PANTOPRAZOLE SODIUM 40 MG/1
40 TABLET, DELAYED RELEASE ORAL EVERY 12 HOURS
Qty: 60 TAB | Refills: 1 | Status: SHIPPED | OUTPATIENT
Start: 2018-12-14 | End: 2018-12-19 | Stop reason: ALTCHOICE

## 2018-12-14 NOTE — PROGRESS NOTES
Chief Complaint   Patient presents with   [facility] Follow Up     seen at [facility] on 12/11/18 for abdominal pain. States constipation isn't an issue anymore, have able to move her bowels. Having a problem with nausea, not able to eat due to food not staying down and difficulty swallowing. Noticed that she is very sensitive to smells         1. Have you been to the ER, urgent care clinic since your last visit? Hospitalized since your last visit? Yes When: 12/18 Where: [facility] Reason for visit: abd pain    2. Have you seen or consulted any other health care providers outside of the [facility] since your last visit? Include any pap smears or colon screening.  No

## 2018-12-14 NOTE — PROGRESS NOTES
Progress Note    Patient: Keon Fernandez MRN: [id number]  SSN: [id number]    YOB: 1975  Age: 41 y.o. Sex: female          Subjective:   Keon Fernandez is a 41 y.o. female who is here for ER follow up. She was sent to [facility] (/) via ambulance directly from this office at her last visit 4 days ago. At that time she was complaining of severe constipation, nausea, vomiting, hematemesis and severe epigastric pain. At last visit she arrived in a wheelchair because she could not walk due to the significant abdominal pain. She had a CT abd/Pelvis that was unremarkable except for large stool burden but found no obstruction and no appendicitis, no pancreatitis and the abdominal organs apppeared normal. In [facility] ER, she had an abdominal xray series that showed no obstruction. She was given a Fleet's enema and had some stool output with that and felt subjectively better. She was discharged with instructions to increase water, increase fiber, add metumucil/fibercon to her diet. Take Miralax daily and Colace 100mg BID. If that fails that use an extra bottle of Magnesium citrate, if that fails then use an enema such as Fleet's. Ucx done on this office on 12/11/18 - showed just some colonizers, no UTI. Multiple Urine HCGs negative in this office and in multiple ER visits - most recent was 12/11/18. Not breastfeeding. No travel. Today she reports she feels better. She is now stooling. She had 6 soft stools yesterday. She takes 2 capfuls in 16oz bottle of clear liquid twice a day. She is still quite nauseous but the vomiting has stopped. She still feels fatigued and hot flushes and feels dizzy. She is not able to eat. She is able to drink water and some clear juices. She has not eaten for a week. UOP is normal. She is urinating 5-6 times per day which is her usual. No more episodes of hematemesis since the ER. Her BP is back to normal now that her pain is better.  Epigastric pain resolved when the BM re-started. Other than the intestinal obstruction she had age 1-4 years old, she had not had any further GI issues. Zofran does not help the nausea. No fevers. Objective:     Visit Vitals  /76 (BP 1 Location: Left arm, BP Patient Position: Sitting)   Pulse 83   Temp 98.3 °F (36.8 °C) (Oral)   Resp 20   Ht 5' 6\" (1.676 m)   BMI 28.25 kg/m²       Review of Systems:  A comprehensive review of systems was negative except for that written in the History of Present Illness. Review of Systems   Constitutional: + sweats, + fatigue, no fevers  HEENT: Negative. Respiratory: Negative. Cardiovascular: Negative. GI: + nausea, + poor appetite as mentioned above in HPI  Neuro: +Dizziness. Denies syncope/presyncope    All other systems reviewed and are negative. Physical Exam:     General:  Alert, cooperative, no distress, appears stated age. Appears much more comfortable than last visit. Head:  Normocephalic, without obvious abnormality, atraumatic. Eyes:  Conjunctivae/corneas clear. PERRL, EOMs intact. Fundi benign. Anicteric   Ears:  Normal TMs and external ear canals both ears. Nose: Nares normal. Septum midline. Mucosa normal. No drainage or sinus tenderness. Throat: Lips, mucosa, and tongue normal. Teeth and gums normal.   Neck: Supple, symmetrical, trachea midline, no adenopathy, thyroid: no enlargement/tenderness/nodules, no carotid bruit and no JVD. Back:   Symmetric, No CVA tenderness. Lungs:   Clear to auscultation bilaterally. No wheezing, ronchi or rales. Equal BS B/L   Chest wall:  No tenderness or deformity. Heart:  Regular rate and rhythm, S1, S2 normal, no murmur, click, rub or gallop. Abdomen:   Soft, minimal TTP in epigastrium. Bowel sounds normal. No masses,  No organomegaly. No peritoneal signs. No guarding. No rebound. Extremities: Extremities normal, atraumatic, no cyanosis or edema. Pulses: 2+ and symmetric all extremities.    Skin: Skin color, texture, turgor normal. No rashes or lesions. Lymph nodes: Cervical and supraclavicular nodes normal.   Neurologic: CNII-XII grossly intact. Normal reflexes throughout. Lab/Data Review: This SmartLink has not been configured with any valid records. CBC:  No results found for: WBC, HGB, HCT, PLT, HGBEXT, HCTEXT, PLTEXT   Lab Results   Component Value Date/Time    Sodium 137 12/09/2018 12:14 PM    Potassium 3.6 12/09/2018 12:14 PM    Chloride 107 12/09/2018 12:14 PM    CO2 24 12/09/2018 12:14 PM    Anion gap 6 12/09/2018 12:14 PM    Glucose 93 12/09/2018 12:14 PM    BUN 13 12/09/2018 12:14 PM    Creatinine 0.82 12/09/2018 12:14 PM    BUN/Creatinine ratio 16 12/09/2018 12:14 PM    GFR est AA >60 12/09/2018 12:14 PM    GFR est non-AA >60 12/09/2018 12:14 PM    Calcium 8.6 12/09/2018 12:14 PM    Bilirubin, total 0.6 12/09/2018 12:14 PM    AST (SGOT) 30 12/09/2018 12:14 PM    Alk. phosphatase 51 12/09/2018 12:14 PM    Protein, total 6.8 12/09/2018 12:14 PM    Albumin 3.5 12/09/2018 12:14 PM    Globulin 3.3 12/09/2018 12:14 PM    A-G Ratio 1.1 12/09/2018 12:14 PM    ALT (SGPT) 32 12/09/2018 12:14 PM     Lab Results   Component Value Date/Time    WBC 8.7 12/09/2018 12:14 PM    HGB 11.9 (L) 12/09/2018 12:14 PM    HCT 36.4 12/09/2018 12:14 PM    PLATELET 989 77/40/1000 12:14 PM    MCV 89.7 12/09/2018 12:14 PM     This SmartLink has not been configured with any valid records. Lab Results   Component Value Date/Time    Lipase 40 (L) 12/09/2018 12:14 PM     AXR: 12/11/18 - ABDOMEN FLAT/UPRIGHT     HISTORY: Pain, nausea, vomiting.     COMPARISON: 12/9/2018.     FINDINGS:      No pathologic small bowel obstruction. No free air. Osseous structures are  stable. Tubal ligation clips again noted.     IMPRESSION  IMPRESSION:     As above. AXR 12/9/18    Acute Abdominal Series      INDICATION: Abdominal pain.     COMPARISON: 12/3/2018.     FINDINGS:      Frontal view of the chest demonstrates the lungs to be clear.  There is no  effusion or pneumothorax. Cardiomediastinal silhouette is within normal limits.     Upright  and supine views of the abdomen demonstrate a normal bowel gas pattern. Stool and gas are seen throughout the colon to the level of the rectum. There  are no dilated small or large bowel loops. No air-fluid levels. No free  intraperitoneal air. No abnormal calcifications. Tubal ligation clips noted in  the pelvis.     IMPRESSION  IMPRESSION:     1. Normal bowel gas pattern. 2. No acute cardiopulmonary process. ABD/PELVIS CT W/CONTRAST 12/9/18  CT Abdomen And Pelvis with Intravenous Contrast     INDICATION: Constipation and back pain.     TECHNIQUE: 5 mm collimation axial images obtained from the diaphragm to the  level of the pubic symphysis following the uneventful administration of  100 cc  of low osmolar, nonionic intravenous contrast.     Dose reduction techniques used: Automated exposure control, adjustment of the  mAs and/or kVp according to patient size, standardized low-dose protocol, and/or  iterative reconstruction technique.     COMPARISON: June 23, 2012. ABDOMEN FINDINGS:     Lung Bases: Mild bibasilar atelectasis. No pleural effusion.     Liver: Normal attenuation. No evidence for mass.     Gallbladder: Present and appears normal. No biliary ductal dilatation.     Pancreas: Normal attenuation without mass or ductal dilatation.     Spleen: Normal in size. No evidence of mass. .     Adrenal Glands: No evidence for mass.     Kidneys:      Right: Normal enhancement. No cortical mass. No hydronephrosis.     Left:  Normal enhancement. No cortical mass. No hydronephrosis.     Lymph Nodes: No lymphadenopathy.     Aorta:  Normal in caliber.     PELVIS FINDINGS:      Bowel: Small Bowel: Normal in caliber with normal wall thickness. Large Bowel: Underdistended sigmoid colon. Mild right-sided colonic stool  burden. Questionable few scattered diverticula in the sigmoid colon. Angie Aburto     Appendix: No secondary signs of acute appendicitis.     Bladder: Underdistended urinary bladder.     Uterus is mildly heterogeneous with bilateral tubal ligation clips noted. There  is no suspicious adnexal mass. Possible small physiologic follicle in the right  adnexa.       No significant free fluid. No free air.       Tiny fat-containing umbilical hernia.     Bones: No acute finding.     IMPRESSION  Impression:     No acute findings in abdomen or pelvis.     Incidentals as fully described above. Assessment:       ICD-10-CM ICD-9-CM    1. Epigastric pain R10.13 789.06 REFERRAL TO GASTROENTEROLOGY      H PYLORI AB, IGM      US ABD COMP      CELIAC ANTIBODY PROFILE      INFLAM BOWEL DISEASE PANEL      METABOLIC PANEL, COMPREHENSIVE      LIPASE   2. Constipation, unspecified constipation type K59.00 564.00 REFERRAL TO GASTROENTEROLOGY      US ABD COMP      INFLAM BOWEL DISEASE PANEL      METABOLIC PANEL, COMPREHENSIVE      LIPASE      TSH 3RD GENERATION   3. Hematemesis with nausea K92.0 578.0 REFERRAL TO GASTROENTEROLOGY     787.02 H PYLORI AB, IGM      US ABD COMP      METABOLIC PANEL, COMPREHENSIVE      LIPASE   4. Anemia, unspecified type D64.9 285.9 CBC WITH AUTOMATED DIFF      IRON PROFILE      VITAMIN B12      FOLATE      RETICULOCYTE COUNT      CELIAC ANTIBODY PROFILE      INFLAM BOWEL DISEASE PANEL      METABOLIC PANEL, COMPREHENSIVE      LIPASE   5. Fatigue, unspecified type R53.83 780.79 CANCELED: CULTURE, BLOOD   6. Nausea R11.0 787.02 BETA HCG, QT       Plan:     Orders Placed This Encounter    US ABD COMP     Standing Status:   Future     Standing Expiration Date:   1/14/2020     Order Specific Question:   Is Patient Pregnant?      Answer:   No     Order Specific Question:   Reason for Exam     Answer:   Epigastric pain, nausea and vomiting and constipation - r/o GB etiology    H PYLORI AB, IGM     Standing Status:   Future     Standing Expiration Date:   12/15/2019    CBC WITH AUTOMATED DIFF     Standing Status:   Future Standing Expiration Date:   12/15/2019    IRON PROFILE     Standing Status:   Future     Standing Expiration Date:   12/15/2019    VITAMIN B12     Standing Status:   Future     Standing Expiration Date:   12/15/2019    FOLATE     Standing Status:   Future     Standing Expiration Date:   12/15/2019    RETICULOCYTE COUNT     Standing Status:   Future     Standing Expiration Date:   12/15/2019    CELIAC ANTIBODY PROFILE     Standing Status:   Future     Standing Expiration Date:   12/15/2019    INFLAM BOWEL DISEASE PANEL     Standing Status:   Future     Standing Expiration Date:   23/23/3136    METABOLIC PANEL, COMPREHENSIVE     Standing Status:   Future     Standing Expiration Date:   12/15/2019    LIPASE     Standing Status:   Future     Standing Expiration Date:   12/15/2019    TSH 3RD GENERATION     Standing Status:   Future     Standing Expiration Date:   12/15/2019    BETA HCG, QT     Standing Status:   Future     Standing Expiration Date:   12/15/2019    REFERRAL TO GASTROENTEROLOGY     Referral Priority:   Routine     Referral Type:   Consultation     Referral Reason:   Specialty Services Required     Referred to Provider:   Rosalinda Ann MD     Number of Visits Requested:   1    pantoprazole (PROTONIX) 40 mg tablet     Sig: Take 1 Tab by mouth every twelve (12) hours. Dispense:  60 Tab     Refill:  1    promethazine (PHENERGAN) 25 mg tablet     Sig: Take 1 Tab by mouth every six (6) hours as needed for Nausea. Dispense:  60 Tab     Refill:  1     1. EPIGASTRIC PAIN  Now resolved since she is moving her bowels  She still has significant nausea and poor appetite  Check H.  Pylori, celiac panel, IBD panel and labs as per lab orders  Check abd US to r/o GB etiology  Advised her to stay well hydrated with clear liquids, no reds, no acid foods, GERD diet discussed  Start Protonix 40mg PO BID - take 30 min prior to meal roughly 12 hours apart and take it BID regardless if you are having nausea or not  Rx for Phenergan 25mg given to take q6h PRN nausea before attempting to eat/drink since Zofran did not work for her - side effects including but not limited to drowsiness, dizziness discussed, advised do not drive after taking, do not take with benadryl, alcohol or other sedating meds  See GI for EGD/Colonoscopy  F/U 2 wks    2. CONSTIPATION  Resolved after Enema in the ER and with Miralax 2 capfuls in 16 oz of fluid BID  Advised that now that she is stooling 6 times a day, decrease mirialax to 1 capful dissolved in 8oz clear liquid BID and decrease to once a day as long as she is having 2-3 bowel movements per day. Check TSH as well as labs as above  See GI    3. HEMATEMESIS  Resolved  Vomiting has also resolved  Check H. Pylori, celiac and IBD panels and labs as per lab orders  Advised her to see GI for EGD +/- colonoscopy    4. ANEMIA  Her most recent CBC showed a mild anemia  She denies heavy menses  Check Anemia labs as per lab orders  See GI as above    5. FATIGUE  She still has significant fatigue but I suspect that is because she has not eaten anything in over a week due to her symptoms  Advised Pt to start with pushing fluids (no reds), advised combo of water, gatorade, bland soups, teas then slowly advance as symptoms improve first to jell-0, applesauce, bland toast, bananas and then if tolerating that may advance to soups with noodles and then if tolerating that then to bland rice and bland baked chicken without any spices and then advance to full diet as able - do this over the next 5-7 days  Push 10-12 glasses of fluids per day    6. NAUSEA  Treat with protonix and phenergan as above - side effects discussed for both  Check labs as per lab orders  See GI    Advised pt to return in 2 wks but be seen sooner (here or UC/ER) if she has severe abd pain, uncontrolled vomiting. Blood in stool or emesis, new fever over 100 or unable to keep fluids down.      Work note provided (see letters tab)    Signed By: [place], MD     December 14, 2018

## 2018-12-14 NOTE — LETTER
2018 11:43 AM 
 
Ms. Shannon Kingsley [address] [facility] 
[facility] :  1975 To Whom It May Concern: 
 
Shannon Kingsley was seen in the office of [address] [place] [address] Practice on 18 and 2018 by myself. She has also had recent hospital visits on 18 and 18. Given her current symptoms, I have recommended that she stay out of work 18-18. Please also excuse her from time missed from work 18-18. Sincerely, John Lockwood MD, [facility] 2018, 11:34 AM

## 2018-12-14 NOTE — LETTER
To Whom It May Concern: In regards to patient: 
Jay Escamilla [address] [facility] 
[address] [facility] :  1975 Jay Escamilla was seen in the office of [address] [place] [address] Practice on 18 and 2018 by myself. She has also had recent hospital visits on 18 and 18. Given her current symptoms, I have recommended that she stay out of work 18-18. Please also excuse her from time missed from work 18-18. Sincerely, Sherry Finch MD, [facility] [address] [facility] [address] [place] [address], [facility] [address] Phone [phone] Fax [phone] 
[employer]/primarycare 2018, 11:34 AM

## 2018-12-14 NOTE — PATIENT INSTRUCTIONS
Nausea and Vomiting: Care Instructions  Your Care Instructions    When you are nauseated, you may feel weak and sweaty and notice a lot of saliva in your mouth. Nausea often leads to vomiting. Most of the time you do not need to worry about nausea and vomiting, but they can be signs of other illnesses. Two common causes of nausea and vomiting are stomach flu and food poisoning. Nausea and vomiting from viral stomach flu will usually start to improve within 24 hours. Nausea and vomiting from food poisoning may last from 12 to 48 hours. The doctor has checked you carefully, but problems can develop later. If you notice any problems or new symptoms, get medical treatment right away. Follow-up care is a key part of your treatment and safety. Be sure to make and go to all appointments, and call your doctor if you are having problems. It's also a good idea to know your test results and keep a list of the medicines you take. How can you care for yourself at home? · To prevent dehydration, drink plenty of fluids, enough so that your urine is light yellow or clear like water. Choose water and other caffeine-free clear liquids until you feel better. If you have kidney, heart, or liver disease and have to limit fluids, talk with your doctor before you increase the amount of fluids you drink. · Rest in bed until you feel better. · When you are able to eat, try clear soups, mild foods, and liquids until all symptoms are gone for 12 to 48 hours. Other good choices include dry toast, crackers, cooked cereal, and gelatin dessert, such as Jell-O. When should you call for help? Call 911 anytime you think you may need emergency care. For example, call if:    · You passed out (lost consciousness).    Call your doctor now or seek immediate medical care if:    · You have symptoms of dehydration, such as:  ? Dry eyes and a dry mouth. ? Passing only a little dark urine. ?  Feeling thirstier than usual.     · You have new or worsening belly pain.     · You have a new or higher fever.     · You vomit blood or what looks like coffee grounds.    Watch closely for changes in your health, and be sure to contact your doctor if:    · You have ongoing nausea and vomiting.     · Your vomiting is getting worse.     · Your vomiting lasts longer than 2 days.     · You are not getting better as expected. Where can you learn more? Go to http://delores-scotty.info/. Enter 25 [id number] in the search box to learn more about \"Nausea and Vomiting: Care Instructions. \"  Current as of: November 20, 2017  Content Version: 11.8  © 9420-1431 [employer]. Care instructions adapted under license by [employer] (which disclaims liability or warranty for this information). If you have questions about a medical condition or this instruction, always ask your healthcare professional. [address] any warranty or liability for your use of this information. Constipation: Care Instructions  Your Care Instructions    Constipation means that you have a hard time passing stools (bowel movements). People pass stools from 3 times a day to once every 3 days. What is normal for you may be different. Constipation may occur with pain in the rectum and cramping. The pain may get worse when you try to pass stools. Sometimes there are small amounts of bright red blood on toilet paper or the surface of stools. This is because of enlarged veins near the rectum (hemorrhoids). A few changes in your diet and lifestyle may help you avoid ongoing constipation. Your doctor may also prescribe medicine to help loosen your stool. Some medicines can cause constipation. These include pain medicines and antidepressants. Tell your doctor about all the medicines you take. Your doctor may want to make a medicine change to ease your symptoms. Follow-up care is a key part of your treatment and safety.  Be sure to make and go to all appointments, and call your doctor if you are having problems. It's also a good idea to know your test results and keep a list of the medicines you take. How can you care for yourself at home? · Drink plenty of fluids, enough so that your urine is light yellow or clear like water. If you have kidney, heart, or liver disease and have to limit fluids, talk with your doctor before you increase the amount of fluids you drink. · Include high-fiber foods in your diet each day. These include fruits, vegetables, beans, and whole grains. · Get at least 30 minutes of exercise on most days of the week. Walking is a good choice. You also may want to do other activities, such as running, swimming, cycling, or playing tennis or team sports. · Take a fiber supplement, such as Citrucel or Metamucil, every day. Read and follow all instructions on the label. · Schedule time each day for a bowel movement. A daily routine may help. Take your time having your bowel movement. · Support your feet with a small step stool when you sit on the toilet. This helps flex your hips and places your pelvis in a squatting position. · Your doctor may recommend an over-the-counter laxative to relieve your constipation. Examples are Milk of Magnesia and MiraLax. Read and follow all instructions on the label. Do not use laxatives on a long-term basis. When should you call for help? Call your doctor now or seek immediate medical care if:    · You have new or worse belly pain.     · You have new or worse nausea or vomiting.     · You have blood in your stools.    Watch closely for changes in your health, and be sure to contact your doctor if:    · Your constipation is getting worse.     · You do not get better as expected. Where can you learn more? Go to http://delores-scotty.info/. Enter 21 958.990.2403 in the search box to learn more about \"Constipation: Care Instructions. \"  Current as of: November 20, 2017  Content Version: 11.8  © 3672-5928 [employer]. Care instructions adapted under license by [employer] (which disclaims liability or warranty for this information). If you have questions about a medical condition or this instruction, always ask your healthcare professional. [place] [address] any warranty or liability for your use of this information.

## 2018-12-15 LAB
ALBUMIN SERPL-MCNC: 3.9 G/DL (ref 3.4–5)
ALBUMIN/GLOB SERPL: 1.1 {RATIO} (ref 0.8–1.7)
ALP SERPL-CCNC: 63 U/L (ref 45–117)
ALT SERPL-CCNC: 47 U/L (ref 13–56)
ANION GAP SERPL CALC-SCNC: 7 MMOL/L (ref 3–18)
AST SERPL-CCNC: 15 U/L (ref 15–37)
BILIRUB SERPL-MCNC: 0.8 MG/DL (ref 0.2–1)
BUN SERPL-MCNC: 12 MG/DL (ref 7–18)
BUN/CREAT SERPL: 14 (ref 12–20)
CALCIUM SERPL-MCNC: 9.2 MG/DL (ref 8.5–10.1)
CHLORIDE SERPL-SCNC: 96 MMOL/L (ref 100–108)
CO2 SERPL-SCNC: 28 MMOL/L (ref 21–32)
CREAT SERPL-MCNC: 0.84 MG/DL (ref 0.6–1.3)
FOLATE SERPL-MCNC: >20 NG/ML (ref 3.1–17.5)
GLOBULIN SER CALC-MCNC: 3.6 G/DL (ref 2–4)
GLUCOSE SERPL-MCNC: 103 MG/DL (ref 74–99)
HCG SERPL-ACNC: <1 MIU/ML (ref 0–10)
IRON SATN MFR SERPL: 9 %
IRON SERPL-MCNC: 34 UG/DL (ref 50–175)
LIPASE SERPL-CCNC: 101 U/L (ref 73–393)
POTASSIUM SERPL-SCNC: 3.7 MMOL/L (ref 3.5–5.5)
PROT SERPL-MCNC: 7.5 G/DL (ref 6.4–8.2)
SODIUM SERPL-SCNC: 131 MMOL/L (ref 136–145)
TIBC SERPL-MCNC: 358 UG/DL (ref 250–450)
TSH SERPL DL<=0.05 MIU/L-ACNC: 1.2 UIU/ML (ref 0.36–3.74)
VIT B12 SERPL-MCNC: 396 PG/ML (ref 211–911)

## 2018-12-17 LAB
GLIADIN PEPTIDE IGA SER-ACNC: 6 UNITS (ref 0–19)
GLIADIN PEPTIDE IGG SER-ACNC: 5 UNITS (ref 0–19)
IGA SERPL-MCNC: 168 MG/DL (ref 87–352)
TTG IGA SER-ACNC: <2 U/ML (ref 0–3)
TTG IGG SER-ACNC: 5 U/ML (ref 0–5)

## 2018-12-17 NOTE — PROGRESS NOTES
The following msg was sent thru [facility] informing pt of these lab results:    Dear Ms. Nunes,    Your Lab results show that your iron level is mildly low. I recommend that you start Over-the-Counter Vitron-C iron supplement once a day. Iron supplements can cause black stools and constipation so I recommend that you get regular exercise and eat more fiber including fruits and vegetables. You may need to add Metamucil to your diet while taking the iron. Your vitamin B12 level is borderline low at 396 (normal is 400 or higher, borderline low is 200-399 and low is less than 200). I recommend that you start over-the-counter vitamin B12 sublingual tablets 1000mcg once a day (these dissolve under your tongue). You have a high folate but this is a good thing - you want a high folate level as it is a nutrient found in green leafy vegetables. Your sodium level is low. I recommend that you add more salt to your diet and reduce your intake of water to 1L of water per day. You can also drink more gatorade instead of water. This could be related to your recent vomiting so I recommend we  check it again at your next appointment in 2 weeks. Your liver and kidney function are normal. Your lipase (pancreas test) is normal. Your thyroid function test is normal. Complete Blood count is now normal. The Anemia that was seen in the hospital has resolved so we will continue to monitor. The test results for Celiac disease, Inflammatory bowel disease and H. Pylori are still pending and you will be notified when those results are received. Please contact the office if you do not hear these results in 2 weeks. Please keep your next follow up appointment as scheduled in 2 weeks. Happy Holidays!     Sincerely,  Dr. Beatriz Mora

## 2018-12-17 NOTE — PROGRESS NOTES
The following msg was sent to the patient thru [address]    Dear Ms. Manju,    The Celiac panel is negative which means that you do not have Celiac disease. The inflammatory bowel disease and H. Pylori tests are still pending. We will notify you when those results become available.      Sincerely,  Dr. Cristal Todd

## 2018-12-18 LAB — H PYLORI IGM SER-ACNC: 10.4 UNITS (ref 0–8.9)

## 2018-12-19 ENCOUNTER — HOSPITAL ENCOUNTER (OUTPATIENT)
Dept: ULTRASOUND IMAGING | Age: 43
Discharge: HOME | End: 2018-12-19
Attending: INTERNAL MEDICINE
Payer: SELF-PAY

## 2018-12-19 ENCOUNTER — OFFICE VISIT (OUTPATIENT)
Dept: FAMILY MEDICINE CLINIC | Age: 43
End: 2018-12-19

## 2018-12-19 VITALS
RESPIRATION RATE: 20 BRPM | TEMPERATURE: 98.7 F | HEART RATE: 80 BPM | BODY MASS INDEX: 27.03 KG/M2 | SYSTOLIC BLOOD PRESSURE: 108 MMHG | HEIGHT: 66 IN | WEIGHT: 168.2 LBS | DIASTOLIC BLOOD PRESSURE: 68 MMHG

## 2018-12-19 DIAGNOSIS — K81.1 CHOLECYSTITIS, CHRONIC: ICD-10-CM

## 2018-12-19 DIAGNOSIS — A04.8 H. PYLORI INFECTION: Primary | ICD-10-CM

## 2018-12-19 DIAGNOSIS — K92.0 HEMATEMESIS WITH NAUSEA: ICD-10-CM

## 2018-12-19 DIAGNOSIS — E53.8 B12 DEFICIENCY: ICD-10-CM

## 2018-12-19 DIAGNOSIS — E61.1 IRON DEFICIENCY: ICD-10-CM

## 2018-12-19 DIAGNOSIS — K59.00 CONSTIPATION, UNSPECIFIED CONSTIPATION TYPE: ICD-10-CM

## 2018-12-19 DIAGNOSIS — E87.1 HYPONATREMIA: ICD-10-CM

## 2018-12-19 DIAGNOSIS — R10.13 EPIGASTRIC PAIN: ICD-10-CM

## 2018-12-19 LAB
BAKER'S YEAST IGA QN: <20 UNITS (ref 0–24.9)
BAKER'S YEAST IGG QN: <20 UNITS (ref 0–24.9)
P-ANCA ATYPICAL TITR SER IF: NORMAL TITER

## 2018-12-19 PROCEDURE — 76705 ECHO EXAM OF ABDOMEN: CPT

## 2018-12-19 RX ORDER — CLARITHROMYCIN 500 MG/1
500 TABLET, FILM COATED ORAL 2 TIMES DAILY
Qty: 28 TAB | Refills: 0 | Status: SHIPPED | OUTPATIENT
Start: 2018-12-19 | End: 2019-01-02

## 2018-12-19 RX ORDER — AMOXICILLIN 500 MG/1
1000 CAPSULE ORAL 2 TIMES DAILY
Qty: 56 CAP | Refills: 0 | Status: SHIPPED | OUTPATIENT
Start: 2018-12-19 | End: 2019-01-02

## 2018-12-19 RX ORDER — LANSOPRAZOLE 30 MG/1
30 CAPSULE, DELAYED RELEASE ORAL EVERY 12 HOURS
Qty: 28 CAP | Refills: 0 | Status: SHIPPED | OUTPATIENT
Start: 2018-12-19 | End: 2019-01-02

## 2018-12-19 NOTE — PROGRESS NOTES
Pt informed of these results at today's visit - see ofc visit note. She is feeling much better - referred to general surgery to discuss elective cholecystectomy to treat the chronic cholecystitis - possibility of recurrent episodes in the future if not treated surgically discussed.  Possible side effects of surgery discussed

## 2018-12-19 NOTE — PROGRESS NOTES
F/U NOTE  DOS: 12/19/18    Patient: Catalino Patiño MRN: [id number]  SSN: [id number]    YOB: 1975  Age: 41 y.o. Sex: female          Subjective:     Pt is here to review lab results from last visit. Labs show that her blood test is positive for H. Pylori  She is here to discuss H. Pylori treatment. Labs also show low iron and borderline B12 deficiency. She also had hyponatremia with sodium level of 131. IBD panel and celiac panels negative  Liver and kidney function normal.   Lipase normal.     She does not have heavy menses. Her most recent menses was just spotting (serum HCG negative and had tubal ligation). She states once she had tubal ligation, her menses were much lighter (prior to that she used to have a menses continuously for 2 months and since then has regular menses lasting 3-5 days). No blood in the stool. Has not made appt with GI. She had her abd US done this AM - which shows chronic cholecystitis. She reports she is feeling a lot better since 4 days. The protonix helped her. She has appetite back. She has been eating normally now. No nausea. Vomiting stopped 3 days ago. Nausea also resolved but she had a bit of nausea recurrence while she was having the RUQ US done this AM. She did not have any further episodes of hematemesis. She is taking 1 capful BID of Miralax and having 1 BM per day. Objective:     Visit Vitals  /68 (BP 1 Location: Left arm, BP Patient Position: Sitting)   Pulse 80   Temp 98.7 °F (37.1 °C) (Oral)   Resp 20   Ht 5' 6\" (1.676 m)   Wt 168 lb 3.2 oz (76.3 kg)   BMI 27.15 kg/m²           Physical Exam:   General:  Alert, cooperative, no distress, appears stated age. Head:  Normocephalic, without obvious abnormality, atraumatic. Eyes:  Conjunctivae/corneas clear. PERRL, EOMs intact. Fundi benign. Ears:  Normal TMs and external ear canals both ears. Nose: Nares normal. Septum midline. Mucosa normal. No drainage or sinus tenderness. Throat: Lips, mucosa, and tongue normal. Teeth and gums normal.   Neck: Supple, symmetrical, trachea midline, no adenopathy, thyroid: no enlargement/tenderness/nodules, no carotid bruit and no JVD. Back:   Symmetric, no curvature. ROM normal. No CVA tenderness. Lungs:   Clear to auscultation bilaterally. Chest wall:  No tenderness or deformity. Heart:  Regular rate and rhythm, S1, S2 normal, no murmur, click, rub or gallop. Abdomen:   Soft, non-tender. Bowel sounds normal. No masses,  No organomegaly. Extremities: Extremities normal, atraumatic, no cyanosis or edema. Pulses: 2+ and symmetric all extremities. Skin: Skin color, texture, turgor normal. No rashes or lesions. Lymph nodes: Cervical and supraclavicular nodes normal.   Neurologic: CNII-XII grossly intact. Normal reflexes throughout. Lab/Data Review:  Lab Results   Component Value Date/Time    WBC 7.8 12/14/2018 11:26 AM    HGB 13.3 12/14/2018 11:26 AM    HCT 40.7 12/14/2018 11:26 AM    PLATELET 208 22/38/7249 11:26 AM    MCV 88.1 12/14/2018 11:26 AM     Lab Results   Component Value Date/Time    Sodium 131 (L) 12/14/2018 11:26 AM    Potassium 3.7 12/14/2018 11:26 AM    Chloride 96 (L) 12/14/2018 11:26 AM    CO2 28 12/14/2018 11:26 AM    Anion gap 7 12/14/2018 11:26 AM    Glucose 103 (H) 12/14/2018 11:26 AM    BUN 12 12/14/2018 11:26 AM    Creatinine 0.84 12/14/2018 11:26 AM    BUN/Creatinine ratio 14 12/14/2018 11:26 AM    GFR est AA >60 12/14/2018 11:26 AM    GFR est non-AA >60 12/14/2018 11:26 AM    Calcium 9.2 12/14/2018 11:26 AM    Bilirubin, total 0.8 12/14/2018 11:26 AM    AST (SGOT) 15 12/14/2018 11:26 AM    Alk.  phosphatase 63 12/14/2018 11:26 AM    Protein, total 7.5 12/14/2018 11:26 AM    Albumin 3.9 12/14/2018 11:26 AM    Globulin 3.6 12/14/2018 11:26 AM    A-G Ratio 1.1 12/14/2018 11:26 AM    ALT (SGPT) 47 12/14/2018 11:26 AM     Lab Results   Component Value Date/Time    Iron 34 (L) 12/14/2018 11:26 AM    TIBC 358 12/14/2018 11:26 AM    Iron % saturation 9 12/14/2018 11:26 AM     Lab Results   Component Value Date/Time    Vitamin B12 396 12/14/2018 11:26 AM    Folate >20.0 (H) 12/14/2018 11:26 AM       Assessment:       ICD-10-CM ICD-9-CM    1. H. pylori infection A04.8 041.86 amoxicillin (AMOXIL) 500 mg capsule      lansoprazole (PREVACID) 30 mg capsule      clarithromycin (BIAXIN) 500 mg tablet   2. B12 deficiency E53.8 266.2 INTRINSIC FACTOR AB      PARIETAL CELL AB      HOMOCYSTEINE, PLASMA      METHYLMALONIC ACID   3. Hyponatremia E87.1 276.1 SODIUM      SODIUM, UR, RANDOM      OSMOLALITY, SERUM/PLASMA      OSMOLALITY, UR      CREATININE, UR, RANDOM   4. Iron deficiency E61.1 280.9    5. Cholecystitis, chronic K81.1 575.11 REFERRAL TO SURGERY           Plan:     1. H. PYLORI INFECTION:  You have been given a treatment regimen to treat your H. Pylori infection  The Treatment regimen contains a strong antiacid medication (Lansoprazole) and 2 antibiotics (Amoxicillin and Clarithromycin). This regimen will treat the H. Pylori bacteria while also decreasing the high acid production caused by the infection and thereby decreasing your risk of a stomach ulcer in the future. Even if you are feeling better, please take all of the medication, twice a day for a total of 14 days. Instructions are as follows: Take Lansoprazole 30mg twice a day for 14 days  Take Amoxicillin 1000mg (1g which is two 500mg tabs) twice a day for 14 days  Take Clarithromycin 500mg twice a day for 14 days       Take all 3 of the above together, with food. The antibiotics can cause stomach upset so I recommend taking Over-the-counter Align (once a day) or Florastor (twice a day) probiotic while taking the above treatment regimen. Additionally, I recommend eating extra yogurt (2-3 times per day) to prevent stomach upset while completing the treatment course.  You will need to do a stool test 1 month after finishing the treatment regimen to test to make sure the H. Pylori has been effectively treated. 2. VITAMIN B12 DEF  Check labs as per lab orders to r/o pernicious anemia  Start OTC sublingual vitamin B12 1000mcg daily  Repeat B12 level in 3 months and if not improving on sublingual supplements can consider monthly injections    3. HYPONATREMIA  This may have been due to her recent IVFs in the hospital prior to last visit  Rpt serum sodium today  Check hyponatremia labs as per lab orders  Advised to increase salt in diet for now and limit free water intake to 1L per day or less. 4. IRON DEF  She has iron def but no anemia  Advised patient to take OTC VItron-C once a day with food  Discussed that it may cause constipation and/or black stools and she should drink 10-12 glasses of water per day and eat a high fiber diet (she may need to add metamucil to her diet in addition to eating more fruits and vegetables) to prevent these side effects      5. CHRONIC CHOLECYSTITIS  Referred to General Surgery to discuss cholecystectomy since she has symptoms of vomiting, poor appetite, abd pain which is c/w chronic cholecystitis.   I discussed the options of pursuing surgery now or waiting to see if she has further recurrence or episodes of pain, nausea and vomiting before pursuing surgery  I discussed the possibility of having chronic diarrhea after gallbladder removal  I told her that she may have recurrent episodes of pain, poor appetite and nausea and vomiting in the future given the chronic cholecystitis   Advised her to avoid heavy, spicy and fried foods and eat smaller, lighter, more frequent meals and try to avoid eating out to reduce the chances of further episodes    Signed By: Calli Black MD     December 19, 2018

## 2018-12-19 NOTE — PROGRESS NOTES
Call patient and have her schedule appt this week to discuss the fact that her blood test was positive for H.  Pylori which is the bacteria that can cause GI issues and eventually can lead to ulcers so I recommend an appointment to discuss treatment this week. [facility] as well informing as above.

## 2018-12-19 NOTE — PROGRESS NOTES
The following message was sent via [employer] - pt already has GI ref    Dear Ms. Chaudhary,    The ANCA test on your inflammatory bowel disease panel is positive (abnormal) - this may be a sign of an inflammatory disease of the bowel called ulcerative colitis. As we discussed at your visit today, please see the GI specialist. The blood test only suggests the diagnosis - the GI specialist will need to do an endoscopy and/or colonoscopy procedure to determine if you actually have Ulcerative colitis.      Sincerely,  Dr. Vazquez Greek

## 2018-12-19 NOTE — PROGRESS NOTES
Chief Complaint   Patient presents with    Follow-up    Epigastric Pain    Constipation    Vomiting    Anemia    Fatigue    Nausea    Results     discuss lab and ultrasound results         1. Have you been to the ER, urgent care clinic since your last visit? Hospitalized since your last visit? No    2. Have you seen or consulted any other health care providers outside of the [address] [place] [address] since your last visit? Include any pap smears or colon screening.  No

## 2018-12-19 NOTE — PATIENT INSTRUCTIONS
1. FOR THE H.PYLORI INFECTION  Stop Pantoprazole (Protonix)    You have been given a treatment regimen to treat your H. Pylori infection  The Treatment regimen contains a strong antiacid medication (Lansoprazole) and 2 antibiotics (Amoxicillin and Clarithromycin). This regimen will treat the H. Pylori bacteria while also decreasing the high acid production caused by the infection and thereby decreasing your risk of a stomach ulcer in the future. Even if you are feeling better, please take all of the medication, twice a day for a total of 14 days. Instructions are as follows: Take Lansoprazole 30mg twice a day for 14 days  Take Amoxicillin 1000mg (1g which is two 500mg tabs) twice a day for 14 days  Take Clarithromycin 500mg twice a day for 14 days       Take all 3 of the above together, with food. The antibiotics can cause stomach upset so I recommend taking Over-the-counter Align (once a day) or Florastor (twice a day) probiotic while taking the above treatment regimen. Additionally, I recommend eating extra yogurt (2-3 times per day) to prevent stomach upset while completing the treatment course. You will need to do a stool test 1 month after finishing the treatment regimen to test to make sure the H. Pylori has been effectively treated. Make an appointment to see the GI specialist    2. FOR THE CHRONIC GALLBLADDER INFLAMMATION (CHOLECYSTITIS)  See the surgeon Dr. Lexie Pratt to discuss gallbladder removal surgery    3. FOR THE LOW BLOOD SODIUM LEVEL  Return on 1/2/19 for non-fasting lab visit to check the sodium again  This may have been due to the fact that you were not eating at the time    4. FOR THE LOW B12 LEVEL  Take Over-the-counter Sublingual B12 1000mcg daily  When you return for labwork on 1/2/19, we will check other lab tests to determine the cause  See the GI specialist to determine if there is any issue with B12 absorption through the GI tract    5.  FOR THE LOW IRON LEVEL  Start Over-The-Counter Vitron-C iron supplement with food once a day. It may cause black stools or constipation so eat lots of fruits and veggies (you may need to add metamucil to make it a high fiber diet) and drink 10-12 glasses of water per day and exercise regularly  See the GI to determine if there is any ulcer or anything in the GI tract causing the low iron. Follow up in 6 weeks so we can determine if the H. Pylori infection has been adequately treated    H. Pylori Bacterial Infection: Care Instructions  Your Care Instructions    Your test shows the presence of Helicobacter pylori ( H. pylori), a kind of bacterium that lives in the lining of the stomach. Many people have H. pylori in their stomachs and do not develop problems. But sometimes H. pylori causes an upset stomach or a sore (ulcer) in the stomach lining. Most stomach ulcers are caused by H. pylori. Symptoms of an ulcer include gnawing or burning pain in the belly that can last minutes or hours. Eating food or taking antacids helps relieve the pain, but the symptoms may come back after a while. Antibiotic medicine can cure an H. pylori infection. Follow-up care is a key part of your treatment and safety. Be sure to make and go to all appointments, and call your doctor if you are having problems. It's also a good idea to know your test results and keep a list of the medicines you take. How can you care for yourself at home? · Take your antibiotics as directed. Do not stop taking them just because you feel better. You need to take the full course of antibiotics. · If your doctor prescribes other medicine, take it exactly as prescribed. Call your doctor if you think you are having a problem with your medicine. You will get more details on the specific medicine your doctor prescribes. · Eat a healthy, balanced diet. ? Eat smaller meals, and eat more often. Be sure to eat at least three meals a day. ? Avoid heavily spiced or greasy foods.   ? Do not drink beverages that have caffeine if they bother your stomach. These include coffee, tea, and soda. · Do not smoke. Smoking slows the healing of your ulcer and can make an ulcer come back. If you need help quitting, talk to your doctor about stop-smoking programs and medicines. These can increase your chances of quitting for good. · Limit how much alcohol you drink. Alcohol can slow healing of an ulcer and can make your symptoms worse. · Wash your hands after going to the bathroom. · Avoid aspirin, ibuprofen, or other anti-inflammatory medicines, because they can irritate the stomach. If you need pain medicine, try acetaminophen (Tylenol). When should you call for help? Call 911 anytime you think you may need emergency care. For example, call if:    · You vomit blood or what looks like coffee grounds.     · Your stools are maroon or very bloody.    Call your doctor now or seek immediate medical care if:    · You have new or worse belly pain.     · You are vomiting.     · Your stools are black and look like tar, or they have streaks of blood.    Watch closely for changes in your health, and be sure to contact your doctor if:    · You do not get better as expected. Where can you learn more? Go to http://delores-scotty.info/. Enter E285 in the search box to learn more about \"H. Pylori Bacterial Infection: Care Instructions. \"  Current as of: March 28, 2018  Content Version: 11.8  © 0125-6391 [employer]. Care instructions adapted under license by [employer] (which disclaims liability or warranty for this information). If you have questions about a medical condition or this instruction, always ask your healthcare professional. [address] any warranty or liability for your use of this information.

## 2018-12-28 ENCOUNTER — OFFICE VISIT (OUTPATIENT)
Dept: SURGERY | Age: 43
End: 2018-12-28

## 2018-12-28 ENCOUNTER — HOSPITAL ENCOUNTER (OUTPATIENT)
Dept: LAB | Age: 43
Discharge: HOME | End: 2018-12-28
Payer: SELF-PAY

## 2018-12-28 ENCOUNTER — HOSPITAL ENCOUNTER (OUTPATIENT)
Dept: GENERAL RADIOLOGY | Age: 43
Discharge: HOME | End: 2018-12-28
Payer: SELF-PAY

## 2018-12-28 VITALS
HEIGHT: 66 IN | WEIGHT: 165 LBS | TEMPERATURE: 98.3 F | DIASTOLIC BLOOD PRESSURE: 68 MMHG | HEART RATE: 80 BPM | BODY MASS INDEX: 26.52 KG/M2 | SYSTOLIC BLOOD PRESSURE: 102 MMHG | RESPIRATION RATE: 16 BRPM

## 2018-12-28 DIAGNOSIS — K81.1 CHRONIC CHOLECYSTITIS: Primary | ICD-10-CM

## 2018-12-28 DIAGNOSIS — K81.1 CHRONIC CHOLECYSTITIS: ICD-10-CM

## 2018-12-28 LAB
ANION GAP SERPL CALC-SCNC: 5 MMOL/L (ref 3–18)
ATRIAL RATE: 78 BPM
BASOPHILS # BLD: 0 K/UL (ref 0–0.1)
BASOPHILS NFR BLD: 1 % (ref 0–2)
BUN SERPL-MCNC: 9 MG/DL (ref 7–18)
BUN/CREAT SERPL: 13 (ref 12–20)
CALCIUM SERPL-MCNC: 9.5 MG/DL (ref 8.5–10.1)
CALCULATED P AXIS, ECG09: 56 DEGREES
CALCULATED R AXIS, ECG10: 63 DEGREES
CALCULATED T AXIS, ECG11: 47 DEGREES
CHLORIDE SERPL-SCNC: 105 MMOL/L (ref 100–108)
CO2 SERPL-SCNC: 29 MMOL/L (ref 21–32)
CREAT SERPL-MCNC: 0.71 MG/DL (ref 0.6–1.3)
DIAGNOSIS, 93000: NORMAL
DIFFERENTIAL METHOD BLD: ABNORMAL
EOSINOPHIL # BLD: 0.2 K/UL (ref 0–0.4)
EOSINOPHIL NFR BLD: 4 % (ref 0–5)
ERYTHROCYTE [DISTWIDTH] IN BLOOD BY AUTOMATED COUNT: 12.8 % (ref 11.6–14.5)
GLUCOSE SERPL-MCNC: 72 MG/DL (ref 74–99)
HCT VFR BLD AUTO: 37.6 % (ref 35–45)
HGB BLD-MCNC: 12.1 G/DL (ref 12–16)
LYMPHOCYTES # BLD: 2.5 K/UL (ref 0.9–3.6)
LYMPHOCYTES NFR BLD: 45 % (ref 21–52)
MCH RBC QN AUTO: 28.9 PG (ref 24–34)
MCHC RBC AUTO-ENTMCNC: 32.2 G/DL (ref 31–37)
MCV RBC AUTO: 90 FL (ref 74–97)
MONOCYTES # BLD: 0.5 K/UL (ref 0.05–1.2)
MONOCYTES NFR BLD: 8 % (ref 3–10)
NEUTS SEG # BLD: 2.3 K/UL (ref 1.8–8)
NEUTS SEG NFR BLD: 42 % (ref 40–73)
P-R INTERVAL, ECG05: 152 MS
PLATELET # BLD AUTO: 285 K/UL (ref 135–420)
PMV BLD AUTO: 11.5 FL (ref 9.2–11.8)
POTASSIUM SERPL-SCNC: 4.7 MMOL/L (ref 3.5–5.5)
Q-T INTERVAL, ECG07: 350 MS
QRS DURATION, ECG06: 80 MS
QTC CALCULATION (BEZET), ECG08: 399 MS
RBC # BLD AUTO: 4.18 M/UL (ref 4.2–5.3)
SODIUM SERPL-SCNC: 139 MMOL/L (ref 136–145)
VENTRICULAR RATE, ECG03: 78 BPM
WBC # BLD AUTO: 5.4 K/UL (ref 4.6–13.2)

## 2018-12-28 PROCEDURE — 93005 ELECTROCARDIOGRAM TRACING: CPT

## 2018-12-28 PROCEDURE — 85025 COMPLETE CBC W/AUTO DIFF WBC: CPT

## 2018-12-28 PROCEDURE — 80048 BASIC METABOLIC PNL TOTAL CA: CPT

## 2018-12-28 PROCEDURE — 71046 X-RAY EXAM CHEST 2 VIEWS: CPT

## 2018-12-28 PROCEDURE — 36415 COLL VENOUS BLD VENIPUNCTURE: CPT

## 2018-12-28 NOTE — PATIENT INSTRUCTIONS
Low-Fat Diet for Gallbladder Disease: After Your Visit  Your Care Instructions  When you eat, the gallbladder releases bile, which helps you digest the fat in food. If you have an inflamed gallbladder, this may cause pain. A low-fat diet may give your gallbladder a rest so you can start to heal. Your doctor and dietitian can help you make an eating plan that does not irritate your digestive system. Always talk with your doctor or dietitian before you make changes in your diet. Follow-up care is a key part of your treatment and safety. Be sure to make and go to all appointments, and call your doctor if you are having problems. Its also a good idea to know your test results and keep a list of the medicines you take. How can you care for yourself at home? · Eat many small meals and snacks each day instead of three large meals. · Choose lean meats. ¨ Eat no more than 5 to 6½ ounces of meat a day. ¨ Cut off all fat you can see. ¨ Eat chicken and turkey without the skin. ¨ Many types of fish, such as salmon, lake trout, tuna, and herring, provide healthy omega-3 fat. But, avoid fish canned in oil, such as sardines in olive oil. ¨ Bake, broil, or grill meats, fowl, or fish instead of frying them in butter or fat. · Drink or eat nonfat or low-fat milk, yogurt, cheese, or other milk products each day. ¨ Read the labels on cheeses, and choose those with less than 5 grams of fat an ounce. ¨ Try fat-free sour cream, cream cheese, or yogurt. ¨ Avoid cream soups and cream sauces on pasta. ¨ Eat low-fat ice cream, frozen yogurt, or sorbet. Avoid regular ice cream.  · Eat whole-grain cereals, breads, crackers, rice, or pasta. Avoid high-fat foods such as croissants, scones, biscuits, waffles, doughnuts, muffins, granola, and high-fat breads. · Flavor your foods with herbs and spices (such as basil, tarragon, or mint), fat-free sauces, or lemon juice instead of butter.  You can also use butter substitutes, fat-free mayonnaise, or fat-free dressing. · Try applesauce, prune puree, or mashed bananas to replace some or all of the fat when you bake. · Limit fats and oils, such as butter, margarine, mayonnaise, and salad dressing, to no more than 1 tablespoon a meal.  · Avoid high-fat foods, such as:  ¨ Chocolate, whole milk, ice cream, processed cheese, and egg yolks. ¨ Fried or buttered foods. ¨ Ham, salami, and bauer. ¨ Cinnamon rolls, cakes, pies, cookies, and other pastries. ¨ Prepared snack foods, such as potato chips, nut and granola bars, and mixed nuts. ¨ Coconut and avocado. · Learn how to read food labels for serving sizes and ingredients. Fast-food and convenience-food meals often have lots of fat. Where can you learn more? Go to [employer]  Enter M490 in the search box to learn more about \"Low-Fat Diet for Gallbladder Disease: After Your Visit. \"   © 9177-0030 [employer]. Care instructions adapted under license by [employer] (which disclaims liability or warranty for this information). This care instruction is for use with your licensed healthcare professional. If you have questions about a medical condition or this instruction, always ask your healthcare professional. [address] any warranty or liability for your use of this information. Content Version: 4.7.059238; Last Revised: August 31, 2011         Cholecystectomy: Before Your Surgery  What is cholecystectomy? Cholecystectomy (cs-lpn-hri-JACQUE-tuh-leandro) is a type of surgery. It removes a diseased gallbladder. This surgery is usually done as a laparoscopic surgery. The doctor puts a lighted tube and other surgical tools through small cuts (incisions) in your belly. The tube is called a scope. It lets your doctor see your organs so he or she can do the surgery. The incisions leave scars that fade with time. Most people go home the same day. You probably will feel better each day.  Most people have only a small amount of pain after 1 week. If you have a desk job, you can probably go back to work in 1 to 2 weeks. If you lift heavy objects or have a very active job, it may take up to 4 weeks. In some cases, open surgery is the best choice. Your doctor may choose open surgery in advance. Or he or she may choose it in the middle of laparoscopic surgery. In open surgery, the doctor makes a larger incision in your upper belly. If you have open surgery, you will probably stay in the hospital for 2 to 4 days. And it may take 4 to 6 weeks to get back to your normal routine. Follow-up care is a key part of your treatment and safety. Be sure to make and go to all appointments, and call your doctor if you are having problems. It's also a good idea to know your test results and keep a list of the medicines you take. What happens before surgery?   Surgery can be stressful. This information will help you understand what you can expect. And it will help you safely prepare for surgery.   Preparing for surgery    · Understand exactly what surgery is planned, along with the risks, benefits, and other options. · Tell your doctors ALL the medicines, vitamins, supplements, and herbal remedies you take. Some of these can increase the risk of bleeding or interact with anesthesia.     · If you take blood thinners, such as warfarin (Coumadin), clopidogrel (Plavix), or aspirin, be sure to talk to your doctor. He or she will tell you if you should stop taking these medicines before your surgery. Make sure that you understand exactly what your doctor wants you to do.     · Your doctor will tell you which medicines to take or stop before your surgery. You may need to stop taking certain medicines a week or more before surgery. So talk to your doctor as soon as you can.     · If you have an advance directive, let your doctor know. It may include a living will and a durable power of  for health care.  Bring a copy to the hospital. If you don't have one, you may want to prepare one. It lets your doctor and loved ones know your health care wishes. Doctors advise that everyone prepare these papers before any type of surgery or procedure.     · You may need to empty your colon with an enema or laxative. Your doctor will tell you how to do this. What happens on the day of surgery? · Follow the instructions exactly about when to stop eating and drinking. If you don't, your surgery may be canceled. If your doctor told you to take your medicines on the day of surgery, take them with only a sip of water.     · Take a bath or shower before you come in for your surgery. Do not apply lotions, perfumes, deodorants, or nail polish.     · Do not shave the surgical site yourself.     · Take off all jewelry and piercings. And take out contact lenses, if you wear them.    At the hospital or surgery center   · Bring a picture ID.     · The area for surgery is often marked to make sure there are no errors.     · You will be kept comfortable and safe by your anesthesia provider. You will be asleep during the surgery.     · The surgery usually takes 1 to 2 hours. Going home   · Be sure you have someone to drive you home. Anesthesia and pain medicine make it unsafe for you to drive.     · You will be given more specific instructions about recovering from your surgery. They will cover things like diet, wound care, follow-up care, driving, and getting back to your normal routine. When should you call your doctor? · You have questions or concerns.     · You don't understand how to prepare for your surgery.     · You become ill before the surgery (such as fever, flu, or a cold).     · You need to reschedule or have changed your mind about having the surgery. Where can you learn more? Go to http://delores-scotty.info/. Enter D468 in the search box to learn more about \"Cholecystectomy: Before Your Surgery. \"  Current as of: March 28, 2018  Content Version: 11.8  © 0840-7033 [employer]wise[employer] Incorporated. Care instructions adapted under license by [employer] (which disclaims liability or warranty for this information). If you have questions about a medical condition or this instruction, always ask your healthcare professional. [place] [address] any warranty or liability for your use of this information.

## 2018-12-28 NOTE — PROGRESS NOTES
[facility]  General Surgery    Subjective:      HPI: Patient is a very pleasant 33-year-old female with a past medical history remarkable for degenerative disc disease and bone spurs spine status post anterior cervical fusion. She is referred by Dr. Nguyễn Loco her primary medical physician for evaluation and management of epigastric abdominal pain and nausea vomiting. I reviewed the patient's ultrasound of the abdomen which was performed on December 19, 2018. The gallbladder is contracted without clear evidence of stones. The patient gives a history of developing acute onset epigastric abdominal pain accompanied by nausea vomiting after eating 2-3 weeks ago. The pain was sharp started approximately a hour after her meal.  She describes herself as a \"foodie\". She has taken the fat out of her diet. She is not eating any fried food. She has been experiencing nausea with everything that she tries to eat.     Patient Active Problem List    Diagnosis Date Noted    Orthostatic hypotension 07/20/2018    Vertigo 06/04/2018    Neck pain 05/30/2018    Syncope 04/02/2018    Migraine without aura and without status migrainosus, not intractable 01/22/2018    Hx of fusion of cervical spine 04/04/2017    Cervical myofascial pain syndrome 11/02/2016    HNP (herniated nucleus pulposus), cervical 11/02/2016    Muscle spasm 11/02/2016    Cervical spondylosis 01/07/2016    DDD (degenerative disc disease), cervical 01/07/2016    Myofascial muscle pain 10/15/2015    Muscle spasms of neck 10/15/2015    Ovarian mass 07/13/2012    Cystitis 06/06/2012    Family history of blood dyscrasia 03/25/2010    Family history of cardiovascular disease 03/25/2010    Abnormal cervical Papanicolaou smear with positive human papilloma virus (HPV) DNA test 03/25/2010     Past Medical History:   Diagnosis Date    Anemia 1997    during pregnancy 2nd child    Migraine headache 1997    Ovarian cyst     Vertigo Past Surgical History:   Procedure Laterality Date     DELIVERY ONLY      DELIVERY   14    1st child    HX CERVICAL FUSION      HX GYN  2017    tubal ligation     HX ORTHOPAEDIC  2005    RIGHT ELBOW    HX WISDOM TEETH EXTRACTION  1996    x4    INTESTINE SURG PROCEDURE UNLISTED      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   [facility] SHOULDER SURG [address] UNLISTED  2011    left shoulder      Family History   Problem Relation Age of Onset    Heart Disease Mother     Hypertension Mother    [facility] MS Mother     Other Mother 54        Lupus    Stroke Mother         x3    Asthma Sister     Diabetes Paternal Grandmother     Hypertension Paternal Grandmother       Social History     Tobacco Use    Smoking status: Never Smoker    Smokeless tobacco: Never Used   Substance Use Topics    Alcohol use: Yes     Comment: rare      Allergies   Allergen Reactions    Ciprofloxacin (Bulk) Diarrhea    Codeine Itching    Dilaudid [Hydromorphone (Bulk)] Itching    Lyrica [Pregabalin] Swelling    Tramadol Other (comments) and Unknown (comments)     Gets headaches  headache    Codeine Phosphate Unknown (comments)    Hydromorphone Unknown (comments)       Prior to Admission medications    Medication Sig Start Date End Date Taking? Authorizing Provider   amoxicillin (AMOXIL) 500 mg capsule Take 2 Caps by mouth two (2) times a day for 14 days. 18 Yes Koki Calzada MD   lansoprazole (PREVACID) 30 mg capsule Take 1 Cap by mouth every twelve (12) hours for 14 days. 18 Yes Koki Calzada MD   clarithromycin (BIAXIN) 500 mg tablet Take 1 Tab by mouth two (2) times a day for 14 days. 18 Yes Koki Calzada MD   promethazine (PHENERGAN) 25 mg tablet Take 1 Tab by mouth every six (6) hours as needed for Nausea. 18  Yes Koki Calzada MD   polyethylene glycol (MIRALAX) 17 gram/dose powder Take 17 g by mouth daily.  1 tablespoon with 8 oz of water daily 12/11/18  Yes Bandar Norton MD   glycerin, adult, suppository Insert 1 Suppository into rectum two (2) times daily as needed. 12/9/18  Yes RONNELL Ryan       Review of Systems:    14 systems were reviewed. The results are as above in the HPI and otherwise negative. Objective:     Vitals:    12/28/18 1044   BP: 102/68   Pulse: 80   Resp: 16   Temp: 98.3 °F (36.8 °C)   Weight: 74.8 kg (165 lb)   Height: 5' 6\" (1.676 m)       Physical Exam:  GENERAL: alert, cooperative, no distress, appears stated age,   EYE: conjunctivae/corneas clear. PERRL, EOM's intact. THROAT & NECK: normal and no erythema or exudates noted. ,    LYMPHATIC: Cervical, supraclavicular, and axillary nodes normal. ,   LUNG: clear to auscultation bilaterally,   HEART: regular rate and rhythm, S1, S2 normal, no murmur, click, rub or gallop,   ABDOMEN: soft, non-distended, tender epigastrium and right upper quadrant positive Darling sign. . Bowel sounds normal. No masses,  no organomegaly,   EXTREMITIES:  extremities normal, atraumatic, no cyanosis or edema,   SKIN: Normal.,   NEUROLOGIC: AOx3. Cranial nerves 2-12 and sensation grossly intact. ,     Data Review:  to be done    Ms. Valadez Has has a reminder for a \"due or due soon\" health maintenance. I have asked that she contact her primary care provider for follow-up on this health maintenance. Impression:     · Patient with acute on chronic cholecystitis.     Plan:     · Single site robot-assisted laparoscopic cholecystectomy  · Consent on chart  · Preoperative orders written    Signed By: Regine Ma MD     December 28, 2018

## 2018-12-28 NOTE — H&P (VIEW-ONLY)
[facility] General Surgery Subjective: HPI: Patient is a very pleasant 46-year-old female with a past medical history remarkable for degenerative disc disease and bone spurs spine status post anterior cervical fusion. She is referred by Dr. Hickman Due her primary medical physician for evaluation and management of epigastric abdominal pain and nausea vomiting. I reviewed the patient's ultrasound of the abdomen which was performed on December 19, 2018. The gallbladder is contracted without clear evidence of stones. The patient gives a history of developing acute onset epigastric abdominal pain accompanied by nausea vomiting after eating 2-3 weeks ago. The pain was sharp started approximately a hour after her meal.  She describes herself as a \"foodie\". She has taken the fat out of her diet. She is not eating any fried food. She has been experiencing nausea with everything that she tries to eat. Patient Active Problem List  
 Diagnosis Date Noted  Orthostatic hypotension 07/20/2018  Vertigo 06/04/2018  Neck pain 05/30/2018  Syncope 04/02/2018  Migraine without aura and without status migrainosus, not intractable 01/22/2018  Hx of fusion of cervical spine 04/04/2017  Cervical myofascial pain syndrome 11/02/2016  
 HNP (herniated nucleus pulposus), cervical 11/02/2016  Muscle spasm 11/02/2016  Cervical spondylosis 01/07/2016  DDD (degenerative disc disease), cervical 01/07/2016  Myofascial muscle pain 10/15/2015  Muscle spasms of neck 10/15/2015  Ovarian mass 07/13/2012  Cystitis 06/06/2012  Family history of blood dyscrasia 03/25/2010  Family history of cardiovascular disease 03/25/2010  Abnormal cervical Papanicolaou smear with positive human papilloma virus (HPV) DNA test 03/25/2010 Past Medical History:  
Diagnosis Date  Anemia 1997  
 during pregnancy 2nd child  Migraine headache 1997  
 Ovarian cyst   
 Vertigo Past Surgical History:  
Procedure Laterality Date   DELIVERY ONLY  DELIVERY     
 1st child  HX CERVICAL FUSION    
 HX GYN  2017  
 tubal ligation  HX ORTHOPAEDIC  2005 RIGHT ELBOW  
 HX WISDOM TEETH EXTRACTION  1996  
 x4  
 INTESTINE SURG PROCEDURE UNLISTED    
 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  
 left shoulder Family History Problem Relation Age of Onset  Heart Disease Mother  Hypertension Mother  MS Mother  Other Mother 54 Lupus  Stroke Mother x3  Asthma Sister  Diabetes Paternal Grandmother  Hypertension Paternal Grandmother Social History Tobacco Use  Smoking status: Never Smoker  Smokeless tobacco: Never Used Substance Use Topics  Alcohol use: Yes Comment: rare Allergies Allergen Reactions  Ciprofloxacin (Bulk) Diarrhea  Codeine Itching  Dilaudid [Hydromorphone (Bulk)] Itching  Lyrica [Pregabalin] Swelling  Tramadol Other (comments) and Unknown (comments) Gets headaches 
headache  Codeine Phosphate Unknown (comments)  Hydromorphone Unknown (comments) Prior to Admission medications Medication Sig Start Date End Date Taking? Authorizing Provider  
amoxicillin (AMOXIL) 500 mg capsule Take 2 Caps by mouth two (2) times a day for 14 days. 18 Yes Nick Harris MD  
lansoprazole (PREVACID) 30 mg capsule Take 1 Cap by mouth every twelve (12) hours for 14 days. 18 Yes Nick Harris MD  
clarithromycin (BIAXIN) 500 mg tablet Take 1 Tab by mouth two (2) times a day for 14 days. 18 Yes Nick Harris MD  
promethazine (PHENERGAN) 25 mg tablet Take 1 Tab by mouth every six (6) hours as needed for Nausea.  18  Yes Nick Harris MD  
polyethylene glycol (MIRALAX) 17 gram/dose powder Take 17 g by mouth daily. 1 tablespoon with 8 oz of water daily 12/11/18  Yes Elisha Middleton MD  
glycerin, adult, suppository Insert 1 Suppository into rectum two (2) times daily as needed. 12/9/18  Yes RONNELL Shah Review of Systems:   
14 systems were reviewed. The results are as above in the HPI and otherwise negative. Objective:  
 
Vitals:  
 12/28/18 1044 BP: 102/68 Pulse: 80 Resp: 16 Temp: 98.3 °F (36.8 °C) Weight: 74.8 kg (165 lb) Height: 5' 6\" (1.676 m) Physical Exam: 
GENERAL: alert, cooperative, no distress, appears stated age, EYE: conjunctivae/corneas clear. PERRL, EOM's intact. THROAT & NECK: normal and no erythema or exudates noted. ,   
LYMPHATIC: Cervical, supraclavicular, and axillary nodes normal. ,  
LUNG: clear to auscultation bilaterally, HEART: regular rate and rhythm, S1, S2 normal, no murmur, click, rub or gallop, ABDOMEN: soft, non-distended, tender epigastrium and right upper quadrant positive Darling sign. . Bowel sounds normal. No masses,  no organomegaly, EXTREMITIES:  extremities normal, atraumatic, no cyanosis or edema, SKIN: Normal., NEUROLOGIC: AOx3. Cranial nerves 2-12 and sensation grossly intact. ,  
 
Data Review:  to be done Ms. Yris Park has a reminder for a \"due or due soon\" health maintenance. I have asked that she contact her primary care provider for follow-up on this health maintenance. Impression: · Patient with acute on chronic cholecystitis. Plan:  
 
· Single site robot-assisted laparoscopic cholecystectomy · Consent on chart · Preoperative orders written Signed By: Silvio Casanova MD   
 December 28, 2018

## 2018-12-28 NOTE — PROGRESS NOTES
Review of Systems   Constitutional: Negative. HENT: Negative. Eyes: Negative. Respiratory: Negative. Cardiovascular: Negative. Gastrointestinal: Positive for abdominal pain and nausea. Negative for blood in stool, constipation, diarrhea, heartburn, melena and vomiting. Genitourinary: Negative. Musculoskeletal: Positive for back pain. Negative for falls, joint pain, myalgias and neck pain. Skin: Negative. Neurological: Negative. Endo/Heme/Allergies: Negative. Psychiatric/Behavioral: Negative.

## 2019-01-04 ENCOUNTER — ANESTHESIA EVENT (OUTPATIENT)
Dept: SURGERY | Age: 44
End: 2019-01-04
Payer: SELF-PAY

## 2019-01-07 ENCOUNTER — ANESTHESIA (OUTPATIENT)
Dept: SURGERY | Age: 44
End: 2019-01-07
Payer: SELF-PAY

## 2019-01-07 ENCOUNTER — HOSPITAL ENCOUNTER (OUTPATIENT)
Age: 44
Setting detail: OUTPATIENT SURGERY
Discharge: HOME | End: 2019-01-07
Attending: SURGERY | Admitting: SURGERY
Payer: SELF-PAY

## 2019-01-07 VITALS
HEART RATE: 82 BPM | TEMPERATURE: 97.6 F | WEIGHT: 166.5 LBS | BODY MASS INDEX: 27.74 KG/M2 | RESPIRATION RATE: 18 BRPM | HEIGHT: 65 IN | OXYGEN SATURATION: 100 % | SYSTOLIC BLOOD PRESSURE: 108 MMHG | DIASTOLIC BLOOD PRESSURE: 74 MMHG

## 2019-01-07 DIAGNOSIS — G89.18 POSTOPERATIVE PAIN: Primary | ICD-10-CM

## 2019-01-07 LAB — HCG UR QL: NEGATIVE

## 2019-01-07 PROCEDURE — 74011000254 HC RX REV CODE- 254: Performed by: SURGERY

## 2019-01-07 PROCEDURE — 77030008683 HC TU ET CUF COVD -A: Performed by: NURSE ANESTHETIST, CERTIFIED REGISTERED

## 2019-01-07 PROCEDURE — 77030035488 HC SEAL UNIV DISP INTU -C: Performed by: SURGERY

## 2019-01-07 PROCEDURE — 74011000250 HC RX REV CODE- 250: Performed by: SURGERY

## 2019-01-07 PROCEDURE — 77030018836 HC SOL IRR NACL ICUM -A: Performed by: SURGERY

## 2019-01-07 PROCEDURE — 77030019605: Performed by: SURGERY

## 2019-01-07 PROCEDURE — 74011250636 HC RX REV CODE- 250/636

## 2019-01-07 PROCEDURE — 77030029216 HC PRT SGL SITE DAVINCI INTU -C: Performed by: SURGERY

## 2019-01-07 PROCEDURE — 77030026438 HC STYL ET INTUB CARD -A: Performed by: NURSE ANESTHETIST, CERTIFIED REGISTERED

## 2019-01-07 PROCEDURE — 76210000023 HC REC RM PH II 2 TO 2.5 HR: Performed by: SURGERY

## 2019-01-07 PROCEDURE — 76060000034 HC ANESTHESIA 1.5 TO 2 HR: Performed by: SURGERY

## 2019-01-07 PROCEDURE — 77030002933 HC SUT MCRYL J&J -A: Performed by: SURGERY

## 2019-01-07 PROCEDURE — 74011250636 HC RX REV CODE- 250/636: Performed by: NURSE ANESTHETIST, CERTIFIED REGISTERED

## 2019-01-07 PROCEDURE — 74011250637 HC RX REV CODE- 250/637: Performed by: SURGERY

## 2019-01-07 PROCEDURE — 76010000875 HC OR TIME 1.5 TO 2HR INTENSV - TIER 2: Performed by: SURGERY

## 2019-01-07 PROCEDURE — 81025 URINE PREGNANCY TEST: CPT

## 2019-01-07 PROCEDURE — 77030032490 HC SLV COMPR SCD KNE COVD -B: Performed by: SURGERY

## 2019-01-07 PROCEDURE — 77030002966 HC SUT PDS J&J -A: Performed by: SURGERY

## 2019-01-07 PROCEDURE — 88304 TISSUE EXAM BY PATHOLOGIST: CPT

## 2019-01-07 PROCEDURE — 77030010939 HC CLP LIG TELE -B: Performed by: SURGERY

## 2019-01-07 PROCEDURE — 77030003028 HC SUT VCRL J&J -A: Performed by: SURGERY

## 2019-01-07 PROCEDURE — 74011000250 HC RX REV CODE- 250: Performed by: NURSE ANESTHETIST, CERTIFIED REGISTERED

## 2019-01-07 PROCEDURE — 77030031139 HC SUT VCRL2 J&J -A: Performed by: SURGERY

## 2019-01-07 PROCEDURE — 76210000016 HC OR PH I REC 1 TO 1.5 HR: Performed by: SURGERY

## 2019-01-07 PROCEDURE — 77030020782 HC GWN BAIR PAWS FLX 3M -B: Performed by: SURGERY

## 2019-01-07 PROCEDURE — 74011000250 HC RX REV CODE- 250

## 2019-01-07 RX ORDER — NEOSTIGMINE METHYLSULFATE 5 MG/5 ML
SYRINGE (ML) INTRAVENOUS AS NEEDED
Status: DISCONTINUED | OUTPATIENT
Start: 2019-01-07 | End: 2019-01-07 | Stop reason: HOSPADM

## 2019-01-07 RX ORDER — KETOROLAC TROMETHAMINE 30 MG/ML
INJECTION, SOLUTION INTRAMUSCULAR; INTRAVENOUS
Status: COMPLETED
Start: 2019-01-07 | End: 2019-01-07

## 2019-01-07 RX ORDER — OXYCODONE AND ACETAMINOPHEN 5; 325 MG/1; MG/1
1 TABLET ORAL
Status: COMPLETED | OUTPATIENT
Start: 2019-01-07 | End: 2019-01-07

## 2019-01-07 RX ORDER — SODIUM CHLORIDE, SODIUM LACTATE, POTASSIUM CHLORIDE, CALCIUM CHLORIDE 600; 310; 30; 20 MG/100ML; MG/100ML; MG/100ML; MG/100ML
75 INJECTION, SOLUTION INTRAVENOUS CONTINUOUS
Status: DISCONTINUED | OUTPATIENT
Start: 2019-01-07 | End: 2019-01-07 | Stop reason: HOSPADM

## 2019-01-07 RX ORDER — LIDOCAINE HYDROCHLORIDE 20 MG/ML
INJECTION, SOLUTION EPIDURAL; INFILTRATION; INTRACAUDAL; PERINEURAL AS NEEDED
Status: DISCONTINUED | OUTPATIENT
Start: 2019-01-07 | End: 2019-01-07 | Stop reason: HOSPADM

## 2019-01-07 RX ORDER — DEXAMETHASONE SODIUM PHOSPHATE 4 MG/ML
INJECTION, SOLUTION INTRA-ARTICULAR; INTRALESIONAL; INTRAMUSCULAR; INTRAVENOUS; SOFT TISSUE AS NEEDED
Status: DISCONTINUED | OUTPATIENT
Start: 2019-01-07 | End: 2019-01-07 | Stop reason: HOSPADM

## 2019-01-07 RX ORDER — ONDANSETRON 2 MG/ML
4 INJECTION INTRAMUSCULAR; INTRAVENOUS ONCE
Status: COMPLETED | OUTPATIENT
Start: 2019-01-07 | End: 2019-01-07

## 2019-01-07 RX ORDER — FENTANYL CITRATE 50 UG/ML
INJECTION, SOLUTION INTRAMUSCULAR; INTRAVENOUS AS NEEDED
Status: DISCONTINUED | OUTPATIENT
Start: 2019-01-07 | End: 2019-01-07 | Stop reason: HOSPADM

## 2019-01-07 RX ORDER — SUCCINYLCHOLINE CHLORIDE 20 MG/ML
INJECTION INTRAMUSCULAR; INTRAVENOUS AS NEEDED
Status: DISCONTINUED | OUTPATIENT
Start: 2019-01-07 | End: 2019-01-07 | Stop reason: HOSPADM

## 2019-01-07 RX ORDER — PROPOFOL 10 MG/ML
INJECTION, EMULSION INTRAVENOUS AS NEEDED
Status: DISCONTINUED | OUTPATIENT
Start: 2019-01-07 | End: 2019-01-07 | Stop reason: HOSPADM

## 2019-01-07 RX ORDER — PHENYLEPHRINE HCL IN 0.9% NACL 1 MG/10 ML
SYRINGE (ML) INTRAVENOUS AS NEEDED
Status: DISCONTINUED | OUTPATIENT
Start: 2019-01-07 | End: 2019-01-07 | Stop reason: HOSPADM

## 2019-01-07 RX ORDER — INDOCYANINE GREEN AND WATER 25 MG
3 KIT INJECTION
Status: COMPLETED | OUTPATIENT
Start: 2019-01-07 | End: 2019-01-07

## 2019-01-07 RX ORDER — ROCURONIUM BROMIDE 10 MG/ML
INJECTION, SOLUTION INTRAVENOUS AS NEEDED
Status: DISCONTINUED | OUTPATIENT
Start: 2019-01-07 | End: 2019-01-07 | Stop reason: HOSPADM

## 2019-01-07 RX ORDER — FENTANYL CITRATE 50 UG/ML
INJECTION, SOLUTION INTRAMUSCULAR; INTRAVENOUS
Status: COMPLETED
Start: 2019-01-07 | End: 2019-01-07

## 2019-01-07 RX ORDER — FENTANYL CITRATE 50 UG/ML
50 INJECTION, SOLUTION INTRAMUSCULAR; INTRAVENOUS
Status: COMPLETED | OUTPATIENT
Start: 2019-01-07 | End: 2019-01-07

## 2019-01-07 RX ORDER — ONDANSETRON 2 MG/ML
INJECTION INTRAMUSCULAR; INTRAVENOUS AS NEEDED
Status: DISCONTINUED | OUTPATIENT
Start: 2019-01-07 | End: 2019-01-07 | Stop reason: HOSPADM

## 2019-01-07 RX ORDER — IBUPROFEN 800 MG/1
800 TABLET ORAL
Qty: 40 TAB | Refills: 0 | Status: SHIPPED | OUTPATIENT
Start: 2019-01-07 | End: 2021-10-31

## 2019-01-07 RX ORDER — OXYCODONE AND ACETAMINOPHEN 5; 325 MG/1; MG/1
1 TABLET ORAL
Qty: 20 TAB | Refills: 0 | Status: SHIPPED | OUTPATIENT
Start: 2019-01-07 | End: 2019-05-08

## 2019-01-07 RX ORDER — KETOROLAC TROMETHAMINE 30 MG/ML
30 INJECTION, SOLUTION INTRAMUSCULAR; INTRAVENOUS AS NEEDED
Status: COMPLETED | OUTPATIENT
Start: 2019-01-07 | End: 2019-01-07

## 2019-01-07 RX ORDER — GLYCOPYRROLATE 0.2 MG/ML
INJECTION INTRAMUSCULAR; INTRAVENOUS AS NEEDED
Status: DISCONTINUED | OUTPATIENT
Start: 2019-01-07 | End: 2019-01-07 | Stop reason: HOSPADM

## 2019-01-07 RX ORDER — FAMOTIDINE 20 MG/1
20 TABLET, FILM COATED ORAL ONCE
Status: DISCONTINUED | OUTPATIENT
Start: 2019-01-07 | End: 2019-01-07 | Stop reason: HOSPADM

## 2019-01-07 RX ORDER — BUPIVACAINE HYDROCHLORIDE 2.5 MG/ML
INJECTION, SOLUTION EPIDURAL; INFILTRATION; INTRACAUDAL AS NEEDED
Status: DISCONTINUED | OUTPATIENT
Start: 2019-01-07 | End: 2019-01-07 | Stop reason: HOSPADM

## 2019-01-07 RX ORDER — MIDAZOLAM HYDROCHLORIDE 1 MG/ML
INJECTION, SOLUTION INTRAMUSCULAR; INTRAVENOUS AS NEEDED
Status: DISCONTINUED | OUTPATIENT
Start: 2019-01-07 | End: 2019-01-07 | Stop reason: HOSPADM

## 2019-01-07 RX ORDER — DOCUSATE SODIUM 100 MG/1
100 CAPSULE, LIQUID FILLED ORAL 2 TIMES DAILY
Qty: 60 CAP | Refills: 2 | Status: SHIPPED | OUTPATIENT
Start: 2019-01-07 | End: 2019-04-07

## 2019-01-07 RX ADMIN — OXYCODONE HYDROCHLORIDE AND ACETAMINOPHEN 1 TABLET: 5; 325 TABLET ORAL at 12:16

## 2019-01-07 RX ADMIN — FENTANYL CITRATE 50 MCG: 50 INJECTION, SOLUTION INTRAMUSCULAR; INTRAVENOUS at 09:21

## 2019-01-07 RX ADMIN — ROCURONIUM BROMIDE 30 MG: 10 INJECTION, SOLUTION INTRAVENOUS at 08:16

## 2019-01-07 RX ADMIN — MIDAZOLAM HYDROCHLORIDE 2 MG: 1 INJECTION, SOLUTION INTRAMUSCULAR; INTRAVENOUS at 08:01

## 2019-01-07 RX ADMIN — ONDANSETRON 4 MG: 2 INJECTION INTRAMUSCULAR; INTRAVENOUS at 09:27

## 2019-01-07 RX ADMIN — Medication 3 MG: at 09:40

## 2019-01-07 RX ADMIN — LIDOCAINE HYDROCHLORIDE 60 MG: 20 INJECTION, SOLUTION EPIDURAL; INFILTRATION; INTRACAUDAL; PERINEURAL at 08:07

## 2019-01-07 RX ADMIN — KETOROLAC TROMETHAMINE 30 MG: 30 INJECTION, SOLUTION INTRAMUSCULAR; INTRAVENOUS at 10:09

## 2019-01-07 RX ADMIN — FAMOTIDINE 20 MG: 10 INJECTION INTRAVENOUS at 07:05

## 2019-01-07 RX ADMIN — FENTANYL CITRATE 50 MCG: 50 INJECTION, SOLUTION INTRAMUSCULAR; INTRAVENOUS at 08:34

## 2019-01-07 RX ADMIN — SUCCINYLCHOLINE CHLORIDE 100 MG: 20 INJECTION INTRAMUSCULAR; INTRAVENOUS at 08:08

## 2019-01-07 RX ADMIN — FENTANYL CITRATE 50 MCG: 50 INJECTION INTRAMUSCULAR; INTRAVENOUS at 10:11

## 2019-01-07 RX ADMIN — KETOROLAC TROMETHAMINE 30 MG: 30 INJECTION, SOLUTION INTRAMUSCULAR at 10:09

## 2019-01-07 RX ADMIN — FENTANYL CITRATE 50 MCG: 50 INJECTION, SOLUTION INTRAMUSCULAR; INTRAVENOUS at 08:54

## 2019-01-07 RX ADMIN — GLYCOPYRROLATE 0.4 MG: 0.2 INJECTION INTRAMUSCULAR; INTRAVENOUS at 09:40

## 2019-01-07 RX ADMIN — FENTANYL CITRATE 50 MCG: 50 INJECTION, SOLUTION INTRAMUSCULAR; INTRAVENOUS at 10:11

## 2019-01-07 RX ADMIN — Medication 100 MCG: at 08:27

## 2019-01-07 RX ADMIN — FENTANYL CITRATE 50 MCG: 50 INJECTION, SOLUTION INTRAMUSCULAR; INTRAVENOUS at 10:54

## 2019-01-07 RX ADMIN — SODIUM CHLORIDE, SODIUM LACTATE, POTASSIUM CHLORIDE, AND CALCIUM CHLORIDE 75 ML/HR: 600; 310; 30; 20 INJECTION, SOLUTION INTRAVENOUS at 07:00

## 2019-01-07 RX ADMIN — PROPOFOL 150 MG: 10 INJECTION, EMULSION INTRAVENOUS at 08:08

## 2019-01-07 RX ADMIN — GLYCOPYRROLATE 0.2 MG: 0.2 INJECTION INTRAMUSCULAR; INTRAVENOUS at 08:01

## 2019-01-07 RX ADMIN — INDOCYANINE GREEN AND WATER 3 MG: KIT at 07:00

## 2019-01-07 RX ADMIN — CEFOTETAN DISODIUM 2 G: 2 INJECTION, POWDER, FOR SOLUTION INTRAMUSCULAR; INTRAVENOUS at 08:15

## 2019-01-07 RX ADMIN — FENTANYL CITRATE 100 MCG: 50 INJECTION, SOLUTION INTRAMUSCULAR; INTRAVENOUS at 08:07

## 2019-01-07 RX ADMIN — ONDANSETRON 4 MG: 2 INJECTION INTRAMUSCULAR; INTRAVENOUS at 10:32

## 2019-01-07 RX ADMIN — DEXAMETHASONE SODIUM PHOSPHATE 4 MG: 4 INJECTION, SOLUTION INTRA-ARTICULAR; INTRALESIONAL; INTRAMUSCULAR; INTRAVENOUS; SOFT TISSUE at 08:21

## 2019-01-07 NOTE — ANESTHESIA PREPROCEDURE EVALUATION
Anesthetic History   No history of anesthetic complications            Review of Systems / Medical History  Patient summary reviewed and pertinent labs reviewed    Pulmonary  Within defined limits                 Neuro/Psych   Within defined limits           Cardiovascular  Within defined limits                Exercise tolerance: >4 METS     GI/Hepatic/Renal  Within defined limits              Endo/Other        Arthritis     Other Findings              Physical Exam    Airway  Mallampati: II  TM Distance: 4 - 6 cm  Neck ROM: decreased range of motion   Mouth opening: Normal     Cardiovascular    Rhythm: regular  Rate: normal         Dental  No notable dental hx       Pulmonary  Breath sounds clear to auscultation               Abdominal  GI exam deferred       Other Findings            Anesthetic Plan    ASA: 2  Anesthesia type: general          Induction: Intravenous  Anesthetic plan and risks discussed with: Patient

## 2019-01-07 NOTE — INTERVAL H&P NOTE
H&P Update:  Neivlle Morel was seen and examined. History and physical has been reviewed. The patient has been examined.  There have been no significant clinical changes since the completion of the originally dated History and Physical.    Signed By: Enrico Morales MD     January 7, 2019 6:25 AM

## 2019-01-07 NOTE — DISCHARGE SUMMARY
[facility]  Emeli Guzmán MD, [facility]  General Surgery  Discharge Summary     Patient ID:  Raquel Milligan  [id number]  41 y.o.  1975    Admit Date: 1/7/2019    Discharge Date: 1/7/2019    Admission Diagnoses: Chronic cholecystitis [K81.1]    Discharge Diagnoses:    Problem List as of 1/7/2019 Date Reviewed: 12/14/2018          Codes Class Noted - Resolved    Orthostatic hypotension ICD-10-CM: I95.1  ICD-9-CM: 458.0  7/20/2018 - Present        Vertigo ICD-10-CM: Z96  ICD-9-CM: 780.4  6/4/2018 - Present        Neck pain ICD-10-CM: M54.2  ICD-9-CM: 723.1  5/30/2018 - Present        Syncope ICD-10-CM: R55  ICD-9-CM: 780.2  4/2/2018 - Present        Migraine without aura and without status migrainosus, not intractable ICD-10-CM: G43.009  ICD-9-CM: 346.10  1/22/2018 - Present        Hx of fusion of cervical spine ICD-10-CM: Z98.1  ICD-9-CM: V45.4  4/4/2017 - Present        Cervical myofascial pain syndrome ICD-10-CM: M79.18  ICD-9-CM: 729.1  11/2/2016 - Present        HNP (herniated nucleus pulposus), cervical ICD-10-CM: M50.20  ICD-9-CM: 722.0  11/2/2016 - Present        Muscle spasm ICD-10-CM: K35.292  ICD-9-CM: 728.85  11/2/2016 - Present        Cervical spondylosis ICD-10-CM: X03.331  ICD-9-CM: 721.0  1/7/2016 - Present        DDD (degenerative disc disease), cervical ICD-10-CM: M50.30  ICD-9-CM: 722.4  1/7/2016 - Present        Myofascial muscle pain ICD-10-CM: M79.18  ICD-9-CM: 729.1  10/15/2015 - Present        Muscle spasms of neck ICD-10-CM: W77.716  ICD-9-CM: 728.85  10/15/2015 - Present        Ovarian mass ICD-10-CM: N83.9  ICD-9-CM: 620.9  7/13/2012 - Present        Cystitis ICD-10-CM: N30.90  ICD-9-CM: 595.9  6/6/2012 - Present        Family history of blood dyscrasia ICD-10-CM: Z83.2  ICD-9-CM: V18.3  3/25/2010 - Present        Family history of cardiovascular disease ICD-10-CM: Z82.49  ICD-9-CM: V17.49  3/25/2010 - Present        Abnormal cervical Papanicolaou smear with positive human papilloma virus (HPV) DNA test ICD-10-CM: GES9739  ICD-9-CM: 795.09, 079.4  3/25/2010 - Present        RESOLVED: Previous  section ICD-10-CM: C43.093  ICD-9-CM: V45.89  3/29/2013 - 2013        RESOLVED: GBS (group B Streptococcus carrier), +RV culture, currently pregnant ICD-10-CM: O99.820  ICD-9-CM: V23.89, V02.51  3/20/2013 - 2013        RESOLVED: Normal pregnancy, repeat ICD-10-CM: Z34.80  ICD-9-CM: V22.1  3/8/2013 - 2013        RESOLVED: Breech presentation ICD-10-CM: O32. 1XX0  ICD-9-CM: 652.20  2013 - 3/8/2013               Admission Condition: Good    Discharge Condition: Good    Last Procedure: Procedure(s):  ROBOTIC ASSISTED SINGLE SITE LAPAROSCOPIC CHOLECYSTECTOMY WITH [facility] Course:   Normal hospital course for this procedure. Consults: None    Significant Diagnostic Studies: None    Disposition: home    Patient Instructions:   Current Discharge Medication List      START taking these medications    Details   oxyCODONE-acetaminophen (PERCOCET) 5-325 mg per tablet Take 1 Tab by mouth every four (4) hours as needed for Pain. Max Daily Amount: 6 Tabs. Qty: 20 Tab, Refills: 0    Associated Diagnoses: Postoperative pain      docusate sodium (COLACE) 100 mg capsule Take 1 Cap by mouth two (2) times a day for 90 days. Qty: 60 Cap, Refills: 2      ibuprofen (MOTRIN) 800 mg tablet Take 1 Tab by mouth three (3) times daily as needed for Pain. Qty: 40 Tab, Refills: 0         CONTINUE these medications which have NOT CHANGED    Details   polyethylene glycol (MIRALAX) 17 gram/dose powder Take 17 g by mouth daily. 1 tablespoon with 8 oz of water daily  Qty: 289 g, Refills: 0      promethazine (PHENERGAN) 25 mg tablet Take 1 Tab by mouth every six (6) hours as needed for Nausea. Qty: 60 Tab, Refills: 1           Activity: See surgical instructions  Diet: Low fat, Low cholesterol  Wound Care: As directed    Follow-up with Dr. Sheng Varela in 2 weeks.   Follow-up tests/labs none    Signed:  Jayda Juares MD  1/7/2019  10:14 AM

## 2019-01-07 NOTE — DISCHARGE INSTRUCTIONS
[place] [address] [facility]  Timoteo Kim MD, FACS  General Surgery    Pt may remove the dressing and shower in two days. Allow soap and water to run over the incision. No driving or operating heavy machinery while on narcotic pain medications. No strenuous activity or contact sports for two weeks. No lifting greater than 15 lbs for 2 weeks. Call MD for any redness, swelling, bleeding or pus at the incision. Also call for any nausea, vomiting, increased pain or pain uncontrolled by pain medicine. Cholecystectomy: What to Expect at [address] [place] [address]  After your surgery, it is normal to feel weak and tired for several days after you return home. Your belly may be swollen. If you had laparoscopic surgery, you may also have pain in your shoulder for about 24 hours. You may have gas or need to burp a lot at first, and a few people get diarrhea. The diarrhea usually goes away in 2 to 4 weeks, but it may last longer. How quickly you recover depends on whether you had a laparoscopic or open surgery. · For a laparoscopic surgery, most people can go back to work or their normal routine in 1 to 2 weeks, but it may take longer, depending on the type of work you do. · For an open surgery, it will probably take 4 to 6 weeks before you get back to your normal routine. This care sheet gives you a general idea about how long it will take for you to recover. However, each person recovers at a different pace. Follow the steps below to get better as quickly as possible. How can you care for yourself at home? Activity    · Rest when you feel tired. Getting enough sleep will help you recover.     · Try to walk each day. Start out by walking a little more than you did the day before. Gradually increase the amount you walk. Walking boosts blood flow and helps prevent pneumonia and constipation.     · For about 2 to 4 weeks, avoid lifting anything that would make you strain.  This may include a child, heavy grocery bags and milk containers, a heavy briefcase or backpack, cat litter or dog food bags, or a vacuum .     · Avoid strenuous activities, such as biking, jogging, weightlifting, and aerobic exercise, until your doctor says it is okay.     · You may shower 24 to 48 hours after surgery, if your doctor okays it. Pat the cut (incision) dry. Do not take a bath for the first 2 weeks, or until your doctor tells you it is okay.     · You may drive when you are no longer taking pain medicine and can quickly move your foot from the gas pedal to the brake. You must also be able to sit comfortably for a long period of time, even if you do not plan to go far. You might get caught in traffic.     · For a laparoscopic surgery, most people can go back to work or their normal routine in 1 to 2 weeks, but it may take longer. For an open surgery, it will probably take 4 to 6 weeks before you get back to your normal routine.     · Your doctor will tell you when you can have sex again.    Diet    · Eat smaller meals more often instead of fewer larger meals. You can eat a normal diet, but avoid eating fatty foods for about 1 month. Fatty foods include hamburger, whole milk, cheese, and many snack foods. If your stomach is upset, try bland, low-fat foods like plain rice, broiled chicken, toast, and yogurt.     · Drink plenty of fluids (unless your doctor tells you not to).   · If you have diarrhea, try avoiding spicy foods, dairy products, fatty foods, and alcohol. You can also watch to see if specific foods cause it, and stop eating them. If the diarrhea continues for more than 2 weeks, talk to your doctor.     · You may notice that your bowel movements are not regular right after your surgery. This is common. Try to avoid constipation and straining with bowel movements. You may want to take a fiber supplement every day.  If you have not had a bowel movement after a couple of days, ask your doctor about taking a mild laxative. Medicines    · Your doctor will tell you if and when you can restart your medicines. He or she will also give you instructions about taking any new medicines.     · If you take blood thinners, such as warfarin (Coumadin), clopidogrel (Plavix), or aspirin, be sure to talk to your doctor. He or she will tell you if and when to start taking those medicines again. Make sure that you understand exactly what your doctor wants you to do.     · Take pain medicines exactly as directed. ? If the doctor gave you a prescription medicine for pain, take it as prescribed. ? If you are not taking a prescription pain medicine, take an over-the-counter medicine such as acetaminophen (Tylenol), ibuprofen (Advil, Motrin), or naproxen (Aleve). Read and follow all instructions on the label. ? Do not take two or more pain medicines at the same time unless the doctor told you to. Many pain medicines contain acetaminophen, which is Tylenol. Too much Tylenol can be harmful.     · If you think your pain medicine is making you sick to your stomach:  ? Take your medicine after meals (unless your doctor tells you not to). ? Ask your doctor for a different pain medicine.     · If your doctor prescribed antibiotics, take them as directed. Do not stop taking them just because you feel better. You need to take the full course of antibiotics. Incision care    · If you have strips of tape on the incision, or cut, leave the tape on for a week or until it falls off.     · After 24 to 48 hours, wash the area daily with warm, soapy water, and pat it dry.     · You may have staples to hold the cut together. Keep them dry until your doctor takes them out. This is usually in 7 to 10 days.     · Keep the area clean and dry. You may cover it with a gauze bandage if it weeps or rubs against clothing. Change the bandage every day.    Ice    · To reduce swelling and pain, put ice or a cold pack on your belly for 10 to 20 minutes at a time.  Do this every 1 to 2 hours. Put a thin cloth between the ice and your skin. Follow-up care is a key part of your treatment and safety. Be sure to make and go to all appointments, and call your doctor if you are having problems. It's also a good idea to know your test results and keep a list of the medicines you take. When should you call for help? Call 911 anytime you think you may need emergency care. For example, call if:    · You passed out (lost consciousness).     · You are short of breath. Marlee Haggis your doctor now or seek immediate medical care if:    · You are sick to your stomach and cannot drink fluids.     · You have pain that does not get better when you take your pain medicine.     · You cannot pass stools or gas.     · You have signs of infection, such as:  ? Increased pain, swelling, warmth, or redness. ? Red streaks leading from the incision. ? Pus draining from the incision. ? A fever.     · Bright red blood has soaked through the bandage over your incision.     · You have loose stitches, or your incision comes open.     · You have signs of a blood clot in your leg (called a deep vein thrombosis), such as:  ? Pain in your calf, back of knee, thigh, or groin. ? Redness and swelling in your leg or groin.    Watch closely for any changes in your health, and be sure to contact your doctor if you have any problems. Where can you learn more? Go to http://delores-scotty.info/. Enter 851 03 076 in the search box to learn more about \"Cholecystectomy: What to Expect at Home. \"  Current as of: March 28, 2018  Content Version: 11.8  © 9995-5424 [employer]. Care instructions adapted under license by [employer] (which disclaims liability or warranty for this information).  If you have questions about a medical condition or this instruction, always ask your healthcare professional. [address] any warranty or liability for your use of this information. Low-Fat Diet for Gallbladder Disease: Care Instructions  Your Care Instructions    When you eat, the gallbladder releases bile, which helps you digest the fat in food. If you have an inflamed gallbladder, this may cause pain. A low-fat diet may give your gallbladder a rest so you can start to heal. Your doctor and dietitian can help you make an eating plan that does not irritate your digestive system. Always talk with your doctor or dietitian before you make changes in your diet. Follow-up care is a key part of your treatment and safety. Be sure to make and go to all appointments, and call your doctor if you are having problems. It's also a good idea to know your test results and keep a list of the medicines you take. How can you care for yourself at home? · Eat many small meals and snacks each day instead of three large meals. · Choose lean meats. ? Eat no more than 5 to 6½ ounces of meat a day. ? Cut off all fat you can see. ? Eat chicken and turkey without the skin. ? Many types of fish, such as salmon, lake trout, tuna, and herring, provide healthy omega-3 fat. But, avoid fish canned in oil, such as sardines in olive oil. ? Bake, broil, or grill meats, poultry, or fish instead of frying them in butter or fat. · Drink or eat nonfat or low-fat milk, yogurt, cheese, or other milk products each day. ? Read the labels on cheeses, and choose those with less than 5 grams of fat an ounce. ? Try fat-free sour cream, cream cheese, or yogurt. ? Avoid cream soups and cream sauces on pasta. ? Eat low-fat ice cream, frozen yogurt, or sorbet. Avoid regular ice cream.  · Eat whole-grain cereals, breads, crackers, rice, or pasta. Avoid high-fat foods such as croissants, scones, biscuits, waffles, doughnuts, muffins, granola, and high-fat breads. · Flavor your foods with herbs and spices (such as basil, tarragon, or mint), fat-free sauces, or lemon juice instead of butter.  You can also use butter substitutes, fat-free mayonnaise, or fat-free dressing. · Try applesauce, prune puree, or mashed bananas to replace some or all of the fat when you bake. · Limit fats and oils, such as butter, margarine, mayonnaise, and salad dressing, to no more than 1 tablespoon a meal.  · Avoid high-fat foods, such as:  ? Chocolate, whole milk, ice cream, and processed cheese. ? Fried or buttered foods. ? Sausage, salami, and bauer. ? Cinnamon rolls, cakes, pies, cookies, and other pastries. ? Prepared snack foods, such as potato chips, nut and granola bars, and mixed nuts. ? Coconut and avocado. · Learn how to read food labels for serving sizes and ingredients. Fast-food and convenience-food meals often have lots of fat. Where can you learn more? Go to http://delores-scotty.info/. Enter I471 in the search box to learn more about \"Low-Fat Diet for Gallbladder Disease: Care Instructions. \"  Current as of: March 29, 2018  Content Version: 11.8  © 1822-3191 [employer]. Care instructions adapted under license by [employer] (which disclaims liability or warranty for this information). If you have questions about a medical condition or this instruction, always ask your healthcare professional. [place] [address] any warranty or liability for your use of this information. Constipation: Care Instructions  Your Care Instructions    Constipation means that you have a hard time passing stools (bowel movements). People pass stools from 3 times a day to once every 3 days. What is normal for you may be different. Constipation may occur with pain in the rectum and cramping. The pain may get worse when you try to pass stools. Sometimes there are small amounts of bright red blood on toilet paper or the surface of stools. This is because of enlarged veins near the rectum (hemorrhoids). A few changes in your diet and lifestyle may help you avoid ongoing constipation.  Your doctor may also prescribe medicine to help loosen your stool. Some medicines can cause constipation. These include pain medicines and antidepressants. Tell your doctor about all the medicines you take. Your doctor may want to make a medicine change to ease your symptoms. Follow-up care is a key part of your treatment and safety. Be sure to make and go to all appointments, and call your doctor if you are having problems. It's also a good idea to know your test results and keep a list of the medicines you take. How can you care for yourself at home? · Drink plenty of fluids, enough so that your urine is light yellow or clear like water. If you have kidney, heart, or liver disease and have to limit fluids, talk with your doctor before you increase the amount of fluids you drink. · Include high-fiber foods in your diet each day. These include fruits, vegetables, beans, and whole grains. · Get at least 30 minutes of exercise on most days of the week. Walking is a good choice. You also may want to do other activities, such as running, swimming, cycling, or playing tennis or team sports. · Take a fiber supplement, such as Citrucel or Metamucil, every day. Read and follow all instructions on the label. · Schedule time each day for a bowel movement. A daily routine may help. Take your time having your bowel movement. · Support your feet with a small step stool when you sit on the toilet. This helps flex your hips and places your pelvis in a squatting position. · Your doctor may recommend an over-the-counter laxative to relieve your constipation. Examples are Milk of Magnesia and MiraLax. Read and follow all instructions on the label. Do not use laxatives on a long-term basis. When should you call for help?   Call your doctor now or seek immediate medical care if:    · You have new or worse belly pain.     · You have new or worse nausea or vomiting.     · You have blood in your stools.    Watch closely for changes in your health, and be sure to contact your doctor if:    · Your constipation is getting worse.     · You do not get better as expected. Where can you learn more? Go to http://delores-scotty.info/. Enter 21  in the search box to learn more about \"Constipation: Care Instructions. \"  Current as of: November 20, 2017  Content Version: 11.8  © 9097-7756 [employer]. Care instructions adapted under license by [employer] (which disclaims liability or warranty for this information). If you have questions about a medical condition or this instruction, always ask your healthcare professional. [place] [address] any warranty or liability for your use of this information. Narcotic-Analgesic/Acetaminophen (Percocet, Norco, Lorcet HD, Lortab 10/325) - (By mouth)   Why this medicine is used:   Relieves pain. Contact a nurse or doctor right away if you have:  · Extreme weakness, shallow breathing, slow heartbeat  · Severe confusion, lightheadedness, dizziness, fainting  · Yellow skin or eyes, dark urine or pale stools  · Severe constipation, severe stomach pain, nausea, vomiting, loss of appetite  · Sweating or cold, clammy skin     Common side effects:  · Mild constipation, nausea, vomiting  · Sleepiness, tiredness  · Itching, rash  © 2017 [facility] [place][note 2] Information is for End User's use only and may not be sold, redistributed or otherwise used for commercial purposes. DISCHARGE SUMMARY from Nurse    PATIENT INSTRUCTIONS:    After general anesthesia or intravenous sedation, for 24 hours or while taking prescription Narcotics:  · Limit your activities  · Do not drive and operate hazardous machinery  · Do not make important personal or business decisions  · Do  not drink alcoholic beverages  · If you have not urinated within 8 hours after discharge, please contact your surgeon on call.     Report the following to your surgeon:  · Excessive pain, swelling, redness or odor of or around the surgical area  · Temperature over 100.5  · Nausea and vomiting lasting longer than 4 hours or if unable to take medications  · Any signs of decreased circulation or nerve impairment to extremity: change in color, persistent  numbness, tingling, coldness or increase pain  · Any questions      *  Please give a list of your current medications to your Primary Care Provider. *  Please update this list whenever your medications are discontinued, doses are      changed, or new medications (including over-the-counter products) are added. *  Please carry medication information at all times in case of emergency situations. These are general instructions for a healthy lifestyle:    No smoking/ No tobacco products/ Avoid exposure to second hand smoke  Surgeon General's Warning:  Quitting smoking now greatly reduces serious risk to your health. Obesity, smoking, and sedentary lifestyle greatly increases your risk for illness    A healthy diet, regular physical exercise & weight monitoring are important for maintaining a healthy lifestyle    You may be retaining fluid if you have a history of heart failure or if you experience any of the following symptoms:  Weight gain of 3 pounds or more overnight or 5 pounds in a week, increased swelling in our hands or feet or shortness of breath while lying flat in bed. Please call your doctor as soon as you notice any of these symptoms; do not wait until your next office visit. Recognize signs and symptoms of STROKE:    F-face looks uneven    A-arms unable to move or move unevenly    S-speech slurred or non-existent    T-time-call 911 as soon as signs and symptoms begin-DO NOT go       Back to bed or wait to see if you get better-TIME IS BRAIN. Warning Signs of HEART ATTACK     Call 911 if you have these symptoms:   Chest discomfort.  Most heart attacks involve discomfort in the center of the chest that lasts more than a few minutes, or that goes away and comes back. It can feel like uncomfortable pressure, squeezing, fullness, or pain.  Discomfort in other areas of the upper body. Symptoms can include pain or discomfort in one or both arms, the back, neck, jaw, or stomach.  Shortness of breath with or without chest discomfort.  Other signs may include breaking out in a cold sweat, nausea, or lightheadedness. Don't wait more than five minutes to call 911 - MINUTES MATTER! Fast action can save your life. Calling 911 is almost always the fastest way to get lifesaving treatment. Emergency Medical Services staff can begin treatment when they arrive -- up to an hour sooner than if someone gets to the hospital by car. The discharge information has been reviewed with the patient/friend. The patient/friend verbalized understanding. Discharge medications reviewed with the patient/friend and appropriate educational materials and side effects teaching were provided.   ___________________________________________________________________________________________________________________________________

## 2019-01-07 NOTE — PROGRESS NOTES
conducted a pre-op visit with Nba Jacob, who is a 41 y.o.,female. The  provided the following Interventions:  Initiated a relationship of care and support. Offered prayer and assurance of continued prayers on patient's behalf. Plan:  Chaplains will continue to follow and will provide pastoral care on an as needed/requested basis.  recommends bedside caregivers page  on duty if patient shows signs of acute spiritual or emotional distress.     [address] Certified [address] [place] [address]   [phone]

## 2019-01-07 NOTE — PERIOP NOTES
Patient confirmed by two identifiers with discharge instructions prior too being provided to patient and friend. Further instructed via written and verbal instructions when to take next dose of percocet. Patient armband removed and shredded.

## 2019-01-07 NOTE — OP NOTES
[address] [place] [address] Dr [facility], [address]                                 OPERATIVE REPORT    PATIENT:    Neville Morel  MRN:           [id number]   DATE:   2019  BILLIN  ROOM:       PACU/  ATTENDING:   Enrico Morales MD  DICTATING:   Enrico Morales MD      PREOPERATIVE DIAGNOSIS:  Acute calculus cholecystitis    POSTOPERATIVE DIAGNOSIS: Same    PROCEDURES PERFORMED: Single site robotic assisted laparoscopic cholecystectomy    SURGEON: Dangelo Becker MD    ASSISTANT: Talha Kay SA    ANESTHESIA: General and local 0.25% Marcaine plain. FINDINGS: Acute calculus cholecystitis    DESCRIPTION OF PROCEDURE: The patient was identified in the  holding area where consent for single site robot-assisted  laparoscopic cholecystectomy was verified. In the operating room, the  patient was placed under general anesthesia, supine on the OR table. The abdomen was prepped and draped in sterile fashion using  chlorhexidine solution and sterile drapes. The time-out was performed  to ensure correct procedure. Local anesthetic was infiltrated into the  skin and deep dermal tissues in the umbilicus. The 2.5 cm incision was  created through the umbilicus using the 11 blade. The electrocautery  was used to dissect down to expose the fascia. The fascia was opened  using electrocautery. The peritoneum was bluntly opened using the  Jagjit Faster clamp. The fascia was opened for the 2.5 cm length of the  incision. The single site trocar was inserted into the incision and seated  against the anterior abdominal wall. The pneumoperitoneum was  obtained using carbon dioxide gas to 15 mmHg. The camera trocar  was then inserted. The site check was then performed to ensure that  this was a surgery that we could do with single site. No  contraindications were visualized.  The endoscopic left trocar was then  inserted and docked. The endoscopic right trocar was then inserted  and docked. The assistant trocar was inserted. The system was  burped to take pressure off of the trocar. The scope was turned to a 30  down. The hook was inserted into the endoscopic left trocar #3 and  the Prograf was introduced into the endoscopic right trocar #1. The  patient had been positioned in reverse Trendelenburg with the right  side up prior to docking the camera. The gallbladder was grasped and  retracted toward the patient's right shoulder to open the triangle of  Calot. The crocodile grasper and the hook were used to dissect the  fatty tissue off of the anterior wall of the gallbladder and allow  visualization of the infundibulum cystic duct junction. Indocyanine  green had been given preoperatively. A Firefly was used to visualize  the cystic duct. The cystic duct was dissected free circumferentially  and clipped and ligated using scissors and the medium-size clips. The  cystic artery was then identified, isolated and clipped and ligated. The  gallbladder was removed from the gallbladder fossa using  electrocautery. No leaking was noted at the clips. No bleeding was  noted at the gallbladder fossa or artery site. The surgical cart was  undocked. The port was removed. The gallbladder was held by the  surgical assistant with a laparoscopic clamp. It was removed with the  single site trocar. The fascia was then reapproximated using running #1 single-stranded PDS suture. The skin was  reapproximated using 4-0 Monocryl suture. Bacitracin, gauze and Tegaderm were used to create a sterile dressing. The patient tolerated the procedure very well. DISPOSITION: She was extubated and stable upon transport to the recovery  room.       Luli Paulino MD, FACS

## 2019-01-08 NOTE — ANESTHESIA POSTPROCEDURE EVALUATION
Procedure(s):  ROBOTIC ASSISTED SINGLE SITE LAPAROSCOPIC CHOLECYSTECTOMY WITH FIREFLY.     Anesthesia Post Evaluation      Multimodal analgesia: multimodal analgesia used between 6 hours prior to anesthesia start to PACU discharge  Patient location during evaluation: bedside  Patient participation: complete - patient participated  Level of consciousness: awake  Pain management: adequate  Airway patency: patent  Anesthetic complications: no  Cardiovascular status: stable  Respiratory status: acceptable  Hydration status: acceptable  Post anesthesia nausea and vomiting:  controlled      Visit Vitals  /74 (BP 1 Location: Right arm, BP Patient Position: At rest)   Pulse 82   Temp 36.4 °C (97.6 °F)   Resp 18   Ht 5' 5\" (1.651 m)   Wt 75.5 kg (166 lb 8 oz)   SpO2 100%   BMI 27.71 kg/m²

## 2019-05-08 ENCOUNTER — HOSPITAL ENCOUNTER (EMERGENCY)
Age: 44
Discharge: HOME | End: 2019-05-08
Attending: EMERGENCY MEDICINE
Payer: MEDICAID

## 2019-05-08 VITALS
HEART RATE: 69 BPM | SYSTOLIC BLOOD PRESSURE: 120 MMHG | RESPIRATION RATE: 11 BRPM | TEMPERATURE: 98.3 F | OXYGEN SATURATION: 100 % | DIASTOLIC BLOOD PRESSURE: 76 MMHG

## 2019-05-08 DIAGNOSIS — R42 VERTIGO: Primary | ICD-10-CM

## 2019-05-08 LAB
ANION GAP SERPL CALC-SCNC: 5 MMOL/L (ref 3–18)
BASOPHILS # BLD: 0 K/UL (ref 0–0.1)
BASOPHILS NFR BLD: 0 % (ref 0–2)
BUN SERPL-MCNC: 11 MG/DL (ref 7–18)
BUN/CREAT SERPL: 14 (ref 12–20)
CALCIUM SERPL-MCNC: 9.2 MG/DL (ref 8.5–10.1)
CHLORIDE SERPL-SCNC: 110 MMOL/L (ref 100–108)
CO2 SERPL-SCNC: 28 MMOL/L (ref 21–32)
CREAT SERPL-MCNC: 0.77 MG/DL (ref 0.6–1.3)
DIFFERENTIAL METHOD BLD: ABNORMAL
EOSINOPHIL # BLD: 0.1 K/UL (ref 0–0.4)
EOSINOPHIL NFR BLD: 3 % (ref 0–5)
ERYTHROCYTE [DISTWIDTH] IN BLOOD BY AUTOMATED COUNT: 12.2 % (ref 11.6–14.5)
GLUCOSE SERPL-MCNC: 84 MG/DL (ref 74–99)
HCT VFR BLD AUTO: 38.6 % (ref 35–45)
HGB BLD-MCNC: 12.6 G/DL (ref 12–16)
LYMPHOCYTES # BLD: 2.6 K/UL (ref 0.9–3.6)
LYMPHOCYTES NFR BLD: 53 % (ref 21–52)
MCH RBC QN AUTO: 28.6 PG (ref 24–34)
MCHC RBC AUTO-ENTMCNC: 32.6 G/DL (ref 31–37)
MCV RBC AUTO: 87.5 FL (ref 74–97)
MONOCYTES # BLD: 0.4 K/UL (ref 0.05–1.2)
MONOCYTES NFR BLD: 9 % (ref 3–10)
NEUTS SEG # BLD: 1.7 K/UL (ref 1.8–8)
NEUTS SEG NFR BLD: 35 % (ref 40–73)
PLATELET # BLD AUTO: 249 K/UL (ref 135–420)
PMV BLD AUTO: 10.9 FL (ref 9.2–11.8)
POTASSIUM SERPL-SCNC: 4 MMOL/L (ref 3.5–5.5)
RBC # BLD AUTO: 4.41 M/UL (ref 4.2–5.3)
SODIUM SERPL-SCNC: 143 MMOL/L (ref 136–145)
WBC # BLD AUTO: 5 K/UL (ref 4.6–13.2)

## 2019-05-08 PROCEDURE — 85025 COMPLETE CBC W/AUTO DIFF WBC: CPT

## 2019-05-08 PROCEDURE — 80048 BASIC METABOLIC PNL TOTAL CA: CPT

## 2019-05-08 PROCEDURE — 74011250637 HC RX REV CODE- 250/637: Performed by: EMERGENCY MEDICINE

## 2019-05-08 PROCEDURE — 99285 EMERGENCY DEPT VISIT HI MDM: CPT

## 2019-05-08 PROCEDURE — 74011250636 HC RX REV CODE- 250/636: Performed by: EMERGENCY MEDICINE

## 2019-05-08 PROCEDURE — 96360 HYDRATION IV INFUSION INIT: CPT

## 2019-05-08 RX ORDER — DIAZEPAM 5 MG/1
5 TABLET ORAL
Qty: 10 TAB | Refills: 0 | Status: SHIPPED | OUTPATIENT
Start: 2019-05-08 | End: 2021-10-31

## 2019-05-08 RX ORDER — DIAZEPAM 5 MG/1
5 TABLET ORAL
Status: COMPLETED | OUTPATIENT
Start: 2019-05-08 | End: 2019-05-08

## 2019-05-08 RX ADMIN — SODIUM CHLORIDE 1000 ML: 900 INJECTION, SOLUTION INTRAVENOUS at 12:46

## 2019-05-08 RX ADMIN — DIAZEPAM 5 MG: 5 TABLET ORAL at 11:44

## 2019-05-08 NOTE — ED NOTES
Provider reviewed discharge instructions with the patient. The patient verbalized understanding. Discharge medications reviewed with patient and appropriate educational materials and side effects teaching were provided.

## 2019-05-08 NOTE — ED PROVIDER NOTES
EMERGENCY DEPARTMENT HISTORY AND PHYSICAL EXAM    11:39 AM  Date: 2019  Patient Name: Erin Andres    History of Presenting Illness     Chief Complaint   Patient presents with    Dizziness    Anxiety       History Provided By: Patient    HPI: Erin Andres is a 41 y.o. female with history of anemia, vertigo here for dizziness. 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 She saw to follow over the one side and then came here immediately afterwards. She states that there were no precipitants prior to this are identified that she had not eaten her breakfast yet. She denies any kind of focal weakness or fevers or problems speaking or problems hearing or problems seeing.     Location: Neuro  Severity: Severe  Timing/course: Acute constant  Onset/Duration: Hours ago     PCP: Elayne Hernandez MD    Past History     Past Medical History:  Past Medical History:   Diagnosis Date    Anemia     during pregnancy 2nd child    Migraine headache     Ovarian cyst     Vertigo        Past Surgical History:  Past Surgical History:   Procedure Laterality Date     DELIVERY ONLY      DELIVERY   12    1st child    HX CERVICAL FUSION      HX GYN  2017    tubal ligation     HX ORTHOPAEDIC  2005    RIGHT ELBOW    HX WISDOM TEETH EXTRACTION  1996    x4    INTESTINE SURG PROCEDURE UNLISTED      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 Didi Alexandre  2011    left shoulder       Family History:  Family History   Problem Relation Age of Onset    Heart Disease Mother     Hypertension Mother    [facility] MS Mother     Other Mother 54        Lupus    Stroke Mother         x3    Asthma Sister     Diabetes Paternal Grandmother     Hypertension Paternal Grandmother        Social History:  Social History Tobacco Use    Smoking status: Never Smoker    Smokeless tobacco: Never Used   Substance Use Topics    Alcohol use: Yes     Comment: rare    Drug use: No     Types: Prescription, OTC       Allergies: Allergies   Allergen Reactions    Ciprofloxacin (Bulk) Diarrhea    Codeine Itching    Dilaudid [Hydromorphone (Bulk)] Itching    Lyrica [Pregabalin] Swelling    Tramadol Other (comments) and Unknown (comments)     Gets headaches  headache    Codeine Phosphate Unknown (comments)    Hydromorphone Unknown (comments)       Review of Systems     Constitutional: No fevers. Eyes: No visual symptoms. Respiratory: No cough, dyspnea, or wheezing. Cardiovascular: No chest pain, palpitations, or heaviness. Gastrointestinal: No nausea, vomiting, diarrhea. Integumentary: No rashes. Neurological: No headaches, sensory or motor symptoms. Room spinning. All other systems negative. Physical Exam     Patient Vitals for the past 12 hrs:   Temp Pulse Resp BP SpO2   05/08/19 1315 -- 69 11 120/76 100 %   05/08/19 1300 -- 69 11 113/76 100 %   05/08/19 1245 -- 70 12 120/75 100 %   05/08/19 1230 -- 67 11 117/70 100 %   05/08/19 1215 -- 65 13 112/74 --   05/08/19 1200 -- 79 23 121/77 100 %   05/08/19 1148 -- -- -- -- 100 %   05/08/19 1145 -- 77 14 120/70 100 %   05/08/19 1134 98.3 °F (36.8 °C) 73 9 126/72 100 %       Physical Exam   Constitutional: Appears well-developed. Is not diaphoretic. Eyes: Conjunctiva clear, EOMI  Head: Normocephalic and atraumatic. Neck: Normal range of motion. No stridor or tracheal deviation. Cardiovascular: Intact distal pulses. Pulmonary/Chest: Effort normal and no respiratory distress. Abdominal: Non distended. Musculoskeletal: Normal range of motion. Exhibits no tenderness. Neurological: Conversant, alert. Right-sided nystagmus. Cranial nerves II through XII otherwise intact. Patient unable to complete finger-nose due to symptoms. Skin: Skin is warm and dry. Psychiatric: Has a normal mood and affect. Behavior is normal.   Nursing note and vitals reviewed. Diagnostic Study Results     Labs -  Recent Results (from the past 12 hour(s))   CBC WITH AUTOMATED DIFF    Collection Time: 05/08/19 12:00 PM   Result Value Ref Range    WBC 5.0 4.6 - 13.2 K/uL    RBC 4.41 4.20 - 5.30 M/uL    HGB 12.6 12.0 - 16.0 g/dL    HCT 38.6 35.0 - 45.0 %    MCV 87.5 74.0 - 97.0 FL    MCH 28.6 24.0 - 34.0 PG    MCHC 32.6 31.0 - 37.0 g/dL    RDW 12.2 11.6 - 14.5 %    PLATELET 193 766 - 161 K/uL    MPV 10.9 9.2 - 11.8 FL    NEUTROPHILS 35 (L) 40 - 73 %    LYMPHOCYTES 53 (H) 21 - 52 %    MONOCYTES 9 3 - 10 %    EOSINOPHILS 3 0 - 5 %    BASOPHILS 0 0 - 2 %    ABS. NEUTROPHILS 1.7 (L) 1.8 - 8.0 K/UL    ABS. LYMPHOCYTES 2.6 0.9 - 3.6 K/UL    ABS. MONOCYTES 0.4 0.05 - 1.2 K/UL    ABS. EOSINOPHILS 0.1 0.0 - 0.4 K/UL    ABS. BASOPHILS 0.0 0.0 - 0.1 K/UL    DF AUTOMATED     METABOLIC PANEL, BASIC    Collection Time: 05/08/19  2:55 PM   Result Value Ref Range    Sodium 143 136 - 145 mmol/L    Potassium 4.0 3.5 - 5.5 mmol/L    Chloride 110 (H) 100 - 108 mmol/L    CO2 28 21 - 32 mmol/L    Anion gap 5 3.0 - 18 mmol/L    Glucose 84 74 - 99 mg/dL    BUN 11 7.0 - 18 MG/DL    Creatinine 0.77 0.6 - 1.3 MG/DL    BUN/Creatinine ratio 14 12 - 20      GFR est AA >60 >60 ml/min/1.73m2    GFR est non-AA >60 >60 ml/min/1.73m2    Calcium 9.2 8.5 - 10.1 MG/DL       Radiologic Studies -   No results found. Medical Decision Making     ED Course: Progress Notes, Reevaluation, and Consults:    11:39 AM Initial assessment performed. The patients presenting problems have been discussed, and they/their family are in agreement with the care plan formulated and outlined with them. I have encouraged them to ask questions as they arise throughout their visit. 3:40 PM-patient feeling much better after hydration with IV fluids as well as a Valium.     Provider Notes (Medical Decision Making): Based on history, physical exam, and diagnostic evaluation I believe the patient's symptoms are related to acute benign paroxysmal peripheral vertigo. The patient had a normal, detailed, non-focal neurological examination. There is no nuchal rigidity or fever. There is no distress. Nontoxic-appearing. Appears comfortable. There is no change in mental status. Labs do not reflect any significant electrolyte abnormality or anemia. Following therapy with meclizine and fluids, the patient is in excellent condition. The patient is ambulatory without assistance. Discussed diagnosis and results with patient. Advised the patient to follow up with a doctor within 48 hours for repeat exam. The patient was discharged with valium. The pt understands what signs and symptoms require immediate return to the nearest ER. Asked the patient if there were any questions or concerns about the care and discharge instructions. The patient seems satisfied with the care and appears to understand the discharge instructions. Vital Signs-Reviewed the patient's vital signs. Reviewed pt's pulse ox reading. Records Reviewed: Nursing Notes and Old Medical Records (Time of Review: 11:39 AM)  -I am the first provider for this patient.  -I reviewed the vital signs, available nursing notes, past medical history, past surgical history, family history and social history. Current Outpatient Medications   Medication Sig Dispense Refill    diazePAM (VALIUM) 5 mg tablet Take 1 Tab by mouth every eight (8) hours as needed (vertigo). Max Daily Amount: 15 mg. 10 Tab 0    ibuprofen (MOTRIN) 800 mg tablet Take 1 Tab by mouth three (3) times daily as needed for Pain. 40 Tab 0    polyethylene glycol (MIRALAX) 17 gram/dose powder Take 17 g by mouth daily. 1 tablespoon with 8 oz of water daily 289 g 0        Diagnosis     Clinical Impression:   1.  Vertigo        Disposition: MD home

## 2019-05-08 NOTE — LETTER
NOTIFICATION RETURN TO WORK / SCHOOL 
 
5/8/2019 3:42 PM 
 
Ms. Mamadou Chun [address] To Whom It May Concern: 
 
Mamadou Chun is currently under the care of [facility] EM[facility]GENCY [facility]. She will return to work on: 5-11-19 If there are questions or concerns please have the patient contact our office. Sincerely, Pablo Edwards MD

## 2019-05-08 NOTE — ED TRIAGE NOTES
Patient arrived to ER via EMS for complaints of vertigo and anxiety. Patient states she does not have blurred vision but she feels like the room is spinning.

## 2019-05-08 NOTE — DISCHARGE INSTRUCTIONS
Your evaluation today showed vertigo. Please follow up with a doctor as discussed. The evaluation and treatment done today requires that you follow up with a physician for re-evaluation. Not following up could result in chronic pain/disability/injury. Medical problems can change over time and symptoms can get worse or new symptoms can develop over time, therefore, it is important that you follow up as we discussed or return immedediately to the ER. Diseases don't read textbooks and there are limitations to our evaluation and testing, so please immediately return to the ER if you have any concerns. Call the ER if you have any questions about what we discussed. Please visit [employer] for discounts on any prescriptions you may have. At the [facility] Emergency Department we are genuinely concerned about your health and comfort. You may be selected to participate in a patient satisfaction survey mailed to your home. We are excited about the opportunity to learn from your experience so we may continue to improve. In striving for the very best we believe good is not good enough, but if you rate us as EXCELLENT in all boxes, we have succeeded. We strive to provide EXCELLENT care to you and your family. We appreciate the opportunity to take care of you, and hope you do well. Vertigo: Care Instructions  Your Care Instructions    Vertigo is the feeling that you or your surroundings are moving when there is no actual movement. It is often described as a feeling of spinning, whirling, falling, or tilting. Vertigo may make you vomit or feel nauseated. You may have trouble standing or walking and may lose your balance. Vertigo is often related to an inner ear problem, but it can have other more serious causes. If vertigo continues, you may need more tests to find its cause. Follow-up care is a key part of your treatment and safety.  Be sure to make and go to all appointments, and call your doctor if you are having problems. It's also a good idea to know your test results and keep a list of the medicines you take. How can you care for yourself at home? · Do not lie flat on your back. Prop yourself up slightly. This may reduce the spinning feeling. Keep your eyes open. · Move slowly so that you do not fall. · If your doctor recommends medicine, take it exactly as directed. · Do not drive while you are having vertigo. Certain exercises, called Delatorre-Daroff exercises, can help decrease vertigo. To do Delatorre-Daroff exercises:  · Sit on the edge of a bed or sofa and quickly lie down on the side that causes the worst vertigo. Lie on your side with your ear down. · Stay in this position for at least 30 seconds or until the vertigo goes away. · Sit up. If this causes vertigo, wait for it to stop. · Repeat the procedure on the other side. · Repeat this 10 times. Do these exercises 2 times a day until the vertigo is gone. When should you call for help? Call 911 anytime you think you may need emergency care. For example, call if:    · You passed out (lost consciousness).     · You have symptoms of a stroke. These may include:  ? Sudden numbness, tingling, weakness, or loss of movement in your face, arm, or leg, especially on only one side of your body. ? Sudden vision changes. ? Sudden trouble speaking. ? Sudden confusion or trouble understanding simple statements. ? Sudden problems with walking or balance. ? A sudden, severe headache that is different from past headaches.    Call your doctor now or seek immediate medical care if:    · Vertigo occurs with a fever, a headache, or ringing in your ears.     · You have new or increased nausea and vomiting.    Watch closely for changes in your health, and be sure to contact your doctor if:    · Vertigo gets worse or happens more often.     · Vertigo has not gotten better after 2 weeks. Where can you learn more?   Go to http://delores-scotty.info/. Enter B999 in the search box to learn more about \"Vertigo: Care Instructions. \"  Current as of: March 27, 2018  Content Version: 11.9  © 7650-5406 [employer], [facility]. Care instructions adapted under license by [employer] (which disclaims liability or warranty for this information). If you have questions about a medical condition or this instruction, always ask your healthcare professional. [place] [address] any warranty or liability for your use of this information.

## 2019-05-28 NOTE — LETTER
[address] [place] [address] Dr [facility] EMERGENCY DEPT 
[address] [facility] [address] 
[phone] Work/School Note Date: 3/13/2018 To Whom It May concern: 
 
Ksenia Yanez was seen and treated today in the emergency room by the following provider(s): 
Attending Provider: Albino Hager DO Physician Assistant: RONNELL Evans. Ksenia Yanez may return to work on 3/14/18. Sincerely, RONNELL Evans 
 
 
 

[id number] [employer]

## 2019-06-26 ENCOUNTER — HOSPITAL ENCOUNTER (EMERGENCY)
Age: 44
Discharge: HOME | End: 2019-06-26
Attending: EMERGENCY MEDICINE | Admitting: EMERGENCY MEDICINE
Payer: MEDICAID

## 2019-06-26 VITALS
RESPIRATION RATE: 15 BRPM | OXYGEN SATURATION: 100 % | SYSTOLIC BLOOD PRESSURE: 103 MMHG | TEMPERATURE: 97.2 F | HEART RATE: 69 BPM | DIASTOLIC BLOOD PRESSURE: 73 MMHG

## 2019-06-26 DIAGNOSIS — R42 VERTIGO: Primary | ICD-10-CM

## 2019-06-26 LAB
ANION GAP SERPL CALC-SCNC: 4 MMOL/L (ref 3–18)
APPEARANCE UR: ABNORMAL
BACTERIA URNS QL MICRO: ABNORMAL /HPF
BASOPHILS # BLD: 0 K/UL (ref 0–0.1)
BASOPHILS NFR BLD: 1 % (ref 0–2)
BILIRUB UR QL: NEGATIVE
BUN SERPL-MCNC: 15 MG/DL (ref 7–18)
BUN/CREAT SERPL: 18 (ref 12–20)
CALCIUM SERPL-MCNC: 9.6 MG/DL (ref 8.5–10.1)
CHLORIDE SERPL-SCNC: 106 MMOL/L (ref 100–108)
CK MB CFR SERPL CALC: NORMAL % (ref 0–4)
CK MB SERPL-MCNC: <1 NG/ML (ref 5–25)
CK SERPL-CCNC: 104 U/L (ref 26–192)
CO2 SERPL-SCNC: 28 MMOL/L (ref 21–32)
COLOR UR: YELLOW
CREAT SERPL-MCNC: 0.85 MG/DL (ref 0.6–1.3)
DIFFERENTIAL METHOD BLD: ABNORMAL
EOSINOPHIL # BLD: 0.1 K/UL (ref 0–0.4)
EOSINOPHIL NFR BLD: 3 % (ref 0–5)
EPITH CASTS URNS QL MICRO: ABNORMAL /LPF (ref 0–5)
ERYTHROCYTE [DISTWIDTH] IN BLOOD BY AUTOMATED COUNT: 13.1 % (ref 11.6–14.5)
GLUCOSE SERPL-MCNC: 72 MG/DL (ref 74–99)
GLUCOSE UR STRIP.AUTO-MCNC: NEGATIVE MG/DL
HCG SERPL QL: NEGATIVE
HCT VFR BLD AUTO: 37 % (ref 35–45)
HGB BLD-MCNC: 12.3 G/DL (ref 12–16)
HGB UR QL STRIP: ABNORMAL
KETONES UR QL STRIP.AUTO: NEGATIVE MG/DL
LEUKOCYTE ESTERASE UR QL STRIP.AUTO: NEGATIVE
LYMPHOCYTES # BLD: 1.7 K/UL (ref 0.9–3.6)
LYMPHOCYTES NFR BLD: 44 % (ref 21–52)
MCH RBC QN AUTO: 29.2 PG (ref 24–34)
MCHC RBC AUTO-ENTMCNC: 33.2 G/DL (ref 31–37)
MCV RBC AUTO: 87.9 FL (ref 74–97)
MONOCYTES # BLD: 0.3 K/UL (ref 0.05–1.2)
MONOCYTES NFR BLD: 8 % (ref 3–10)
NEUTS SEG # BLD: 1.7 K/UL (ref 1.8–8)
NEUTS SEG NFR BLD: 44 % (ref 40–73)
NITRITE UR QL STRIP.AUTO: NEGATIVE
PH UR STRIP: 6.5 [PH] (ref 5–8)
PLATELET # BLD AUTO: 295 K/UL (ref 135–420)
PMV BLD AUTO: 10.5 FL (ref 9.2–11.8)
POTASSIUM SERPL-SCNC: 4.5 MMOL/L (ref 3.5–5.5)
PROT UR STRIP-MCNC: NEGATIVE MG/DL
RBC # BLD AUTO: 4.21 M/UL (ref 4.2–5.3)
RBC #/AREA URNS HPF: ABNORMAL /HPF (ref 0–5)
SODIUM SERPL-SCNC: 138 MMOL/L (ref 136–145)
SP GR UR REFRACTOMETRY: <1.005 (ref 1–1.03)
TROPONIN I SERPL-MCNC: <0.02 NG/ML (ref 0–0.04)
UROBILINOGEN UR QL STRIP.AUTO: 0.2 EU/DL (ref 0.2–1)
WBC # BLD AUTO: 3.8 K/UL (ref 4.6–13.2)
WBC URNS QL MICRO: ABNORMAL /HPF (ref 0–4)

## 2019-06-26 PROCEDURE — 82550 ASSAY OF CK (CPK): CPT

## 2019-06-26 PROCEDURE — 85025 COMPLETE CBC W/AUTO DIFF WBC: CPT

## 2019-06-26 PROCEDURE — 96361 HYDRATE IV INFUSION ADD-ON: CPT

## 2019-06-26 PROCEDURE — 81001 URINALYSIS AUTO W/SCOPE: CPT

## 2019-06-26 PROCEDURE — 99285 EMERGENCY DEPT VISIT HI MDM: CPT

## 2019-06-26 PROCEDURE — 74011250637 HC RX REV CODE- 250/637: Performed by: PHYSICIAN ASSISTANT

## 2019-06-26 PROCEDURE — 74011250636 HC RX REV CODE- 250/636: Performed by: PHYSICIAN ASSISTANT

## 2019-06-26 PROCEDURE — 80048 BASIC METABOLIC PNL TOTAL CA: CPT

## 2019-06-26 PROCEDURE — 84703 CHORIONIC GONADOTROPIN ASSAY: CPT

## 2019-06-26 PROCEDURE — 96360 HYDRATION IV INFUSION INIT: CPT

## 2019-06-26 PROCEDURE — 93005 ELECTROCARDIOGRAM TRACING: CPT

## 2019-06-26 RX ORDER — DIAZEPAM 5 MG/1
5 TABLET ORAL
Status: COMPLETED | OUTPATIENT
Start: 2019-06-26 | End: 2019-06-26

## 2019-06-26 RX ADMIN — SODIUM CHLORIDE 1000 ML: 900 INJECTION, SOLUTION INTRAVENOUS at 10:23

## 2019-06-26 RX ADMIN — DIAZEPAM 5 MG: 5 TABLET ORAL at 09:41

## 2019-06-26 NOTE — ED PROVIDER NOTES
EMERGENCY DEPARTMENT HISTORY AND PHYSICAL EXAM    11:05 AM      Date: 2019  Patient Name: Erika Santos    History of Presenting Illness     Chief Complaint   Patient presents with    Dizziness         History Provided By: Patient    Additional History (Context): Erika Santos is a 41 y.o. female with hx of migraines, vertigo, hypotension who presents with complaint of lightheadedness and dizziness since this morning. Patient notes she felt as if she was going to pass out while at work. Notes symptoms feel typical of her vertigo. Notes she usually takes diazepam for symptoms. Notes she has not been taking her medication as she should, is on Midrodine for blood pressure. Denies double vision, chest pain, shortness of breath, nausea, vomiting, numbness or tingling, extremity weakness. PCP: Mckayla Stock MD    Current Outpatient Medications   Medication Sig Dispense Refill    diazePAM (VALIUM) 5 mg tablet Take 1 Tab by mouth every eight (8) hours as needed (vertigo). Max Daily Amount: 15 mg. 10 Tab 0    ibuprofen (MOTRIN) 800 mg tablet Take 1 Tab by mouth three (3) times daily as needed for Pain. 40 Tab 0    polyethylene glycol (MIRALAX) 17 gram/dose powder Take 17 g by mouth daily.  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 Jesus Manuel Ragland 9/08/2011    left shoulder       Family History:  Family History   Problem Relation Age of Onset    Heart Disease Mother     Hypertension Mother    [facility] MS Mother     Other Mother 54        Lupus    Stroke Mother         x3    Asthma Sister     Diabetes Paternal Grandmother     Hypertension Paternal Grandmother        Social History:  Social History     Tobacco Use    Smoking status: Never Smoker    Smokeless tobacco: Never Used   Substance Use Topics    Alcohol use: Yes     Comment: rare    Drug use: No     Types: Prescription, OTC       Allergies: Allergies   Allergen Reactions    Ciprofloxacin (Bulk) Diarrhea    Codeine Itching    Dilaudid [Hydromorphone (Bulk)] Itching    Lyrica [Pregabalin] Swelling    Tramadol Other (comments) and Unknown (comments)     Gets headaches  headache    Codeine Phosphate Unknown (comments)    Hydromorphone Unknown (comments)         Review of Systems       Review of Systems   Constitutional: Negative for chills and fever. Respiratory: Negative for shortness of breath. Cardiovascular: Negative for chest pain. Gastrointestinal: Negative for abdominal pain, nausea and vomiting. Skin: Negative for rash. Neurological: Positive for dizziness and light-headedness. Negative for weakness. All other systems reviewed and are negative. Physical Exam     Visit Vitals  /73   Pulse 69   Temp 97.2 °F (36.2 °C)   Resp 15   SpO2 100%         Physical Exam   Constitutional: She is oriented to person, place, and time. She appears well-developed and well-nourished. No distress. HENT:   Head: Normocephalic and atraumatic. Eyes: Pupils are equal, round, and reactive to light. Neck: Normal range of motion. Neck supple. Cardiovascular: Normal rate, regular rhythm, normal heart sounds and intact distal pulses. Exam reveals no gallop and no friction rub. No murmur heard. Pulmonary/Chest: Effort normal and breath sounds normal. No respiratory distress.  She has no wheezes. She has no rales. Musculoskeletal: Normal range of motion. Neurological: She is alert and oriented to person, place, and time. No cranial nerve deficit. Coordination normal.   No pronator drift or dysmetria    Skin: Skin is warm. No rash noted. She is not diaphoretic. Nursing note and vitals reviewed. Diagnostic Study Results     Labs -  Recent Results (from the past 12 hour(s))   CBC WITH AUTOMATED DIFF    Collection Time: 06/26/19 10:00 AM   Result Value Ref Range    WBC 3.8 (L) 4.6 - 13.2 K/uL    RBC 4.21 4.20 - 5.30 M/uL    HGB 12.3 12.0 - 16.0 g/dL    HCT 37.0 35.0 - 45.0 %    MCV 87.9 74.0 - 97.0 FL    MCH 29.2 24.0 - 34.0 PG    MCHC 33.2 31.0 - 37.0 g/dL    RDW 13.1 11.6 - 14.5 %    PLATELET 548 511 - 392 K/uL    MPV 10.5 9.2 - 11.8 FL    NEUTROPHILS 44 40 - 73 %    LYMPHOCYTES 44 21 - 52 %    MONOCYTES 8 3 - 10 %    EOSINOPHILS 3 0 - 5 %    BASOPHILS 1 0 - 2 %    ABS. NEUTROPHILS 1.7 (L) 1.8 - 8.0 K/UL    ABS. LYMPHOCYTES 1.7 0.9 - 3.6 K/UL    ABS. MONOCYTES 0.3 0.05 - 1.2 K/UL    ABS. EOSINOPHILS 0.1 0.0 - 0.4 K/UL    ABS.  BASOPHILS 0.0 0.0 - 0.1 K/UL    DF AUTOMATED     METABOLIC PANEL, BASIC    Collection Time: 06/26/19 10:00 AM   Result Value Ref Range    Sodium 138 136 - 145 mmol/L    Potassium 4.5 3.5 - 5.5 mmol/L    Chloride 106 100 - 108 mmol/L    CO2 28 21 - 32 mmol/L    Anion gap 4 3.0 - 18 mmol/L    Glucose 72 (L) 74 - 99 mg/dL    BUN 15 7.0 - 18 MG/DL    Creatinine 0.85 0.6 - 1.3 MG/DL    BUN/Creatinine ratio 18 12 - 20      GFR est AA >60 >60 ml/min/1.73m2    GFR est non-AA >60 >60 ml/min/1.73m2    Calcium 9.6 8.5 - 10.1 MG/DL   CARDIAC PANEL,(CK, CKMB & TROPONIN)    Collection Time: 06/26/19 10:00 AM   Result Value Ref Range     26 - 192 U/L    CK - MB <1.0 <3.6 ng/ml    CK-MB Index  0.0 - 4.0 %     CALCULATION NOT PERFORMED WHEN RESULT IS BELOW LINEAR LIMIT    Troponin-I, QT <0.02 0.0 - 0.045 NG/ML   HCG QL SERUM    Collection Time: 06/26/19 10:00 AM   Result Value Ref Range    HCG, Ql. NEGATIVE  NEG     URINALYSIS W/ RFLX MICROSCOPIC    Collection Time: 06/26/19 10:09 AM   Result Value Ref Range    Color YELLOW      Appearance CLOUDY      Specific gravity <1.005 (L) 1.005 - 1.030    pH (UA) 6.5 5.0 - 8.0      Protein NEGATIVE  NEG mg/dL    Glucose NEGATIVE  NEG mg/dL    Ketone NEGATIVE  NEG mg/dL    Bilirubin NEGATIVE  NEG      Blood LARGE (A) NEG      Urobilinogen 0.2 0.2 - 1.0 EU/dL    Nitrites NEGATIVE  NEG      Leukocyte Esterase NEGATIVE  NEG     URINE MICROSCOPIC ONLY    Collection Time: 06/26/19 10:09 AM   Result Value Ref Range    WBC 0 to 4 0 - 4 /hpf    RBC 12 to 15 0 - 5 /hpf    Epithelial cells 2+ 0 - 5 /lpf    Bacteria FEW (A) NEG /hpf   EKG, 12 LEAD, INITIAL    Collection Time: 06/26/19 10:53 AM   Result Value Ref Range    Ventricular Rate 64 BPM    Atrial Rate 64 BPM    P-R Interval 144 ms    QRS Duration 70 ms    Q-T Interval 382 ms    QTC Calculation (Bezet) 394 ms    Calculated P Axis 52 degrees    Calculated R Axis 58 degrees    Calculated T Axis 59 degrees    Diagnosis       Normal sinus rhythm  Possible Left atrial enlargement  Low voltage QRS  Septal infarct , age undetermined  Abnormal ECG  When compared with ECG of 28-DEC-2018 14:40,  Septal infarct is now present  T wave inversion now evident in Anterior leads         Radiologic Studies -   No orders to display         Medical Decision Making   I am the first provider for this patient. I reviewed the vital signs, available nursing notes, past medical history, past surgical history, family history and social history. Vital Signs-Reviewed the patient's vital signs. Pulse Oximetry Analysis -  100 on room air     Cardiac Monitor:  Rate: 80  Rhythm:  Normal sinus rhythm     Records Reviewed: Nursing Notes and Old Medical Records (Time of Review: 11:05 AM)    ED Course: Progress Notes, Reevaluation, and Consults:  12:00 PM Reviewed results with patient.   Resting comfortably, eating, no distress. Discussed need for close outpatient follow-up with PMD for further assessment this week. Discussed strict return precautions, including chest pain, shortness of breath, or any other medical concerns. Provider Notes (Medical Decision Making): 28-year-old female with history of vertigo and hypotension who presents due to dizziness and lightheadedness today while at work. Afebrile, nontoxic-appearing, looks well. EKG without ischemic changes, labs essentially unremarkable. Alert and oriented x 3, no neurologic deficits on examination. Patient feeling better after fluids and valium, notes symptoms feel typical of previous episodes of vertigo. Do not feel imaging is warranted at this time. Stable for discharge with close outpatient follow-up      Diagnosis     Clinical Impression:   1. Vertigo        Disposition: home     Follow-up Information     Follow up With Specialties Details Why [address] [facility] EMERGENCY DEPT Emergency Medicine  If symptoms worsen [address]  [phone]    José Manuel Cotto MD Family Practice In 2 days  [address] [place] [address]  [phone]             Discharge Medication List as of 6/26/2019 11:43 AM      CONTINUE these medications which have NOT CHANGED    Details   diazePAM (VALIUM) 5 mg tablet Take 1 Tab by mouth every eight (8) hours as needed (vertigo). Max Daily Amount: 15 mg., Print, Disp-10 Tab, R-0      ibuprofen (MOTRIN) 800 mg tablet Take 1 Tab by mouth three (3) times daily as needed for Pain., Normal, Disp-40 Tab, R-0      polyethylene glycol (MIRALAX) 17 gram/dose powder Take 17 g by mouth daily. 1 tablespoon with 8 oz of water daily, Normal, Disp-289 g, R-0             Dictation disclaimer:  Please note that this dictation was completed with [employer], the [employer] voice recognition software.   Quite often unanticipated grammatical, syntax, homophones, and other interpretive errors are inadvertently transcribed by the computer software. Please disregard these errors. Please excuse any errors that have escaped final proofreading.

## 2019-06-26 NOTE — LETTER
[facility] 
[facility] EMERGENCY DEPT 
[address] [facility] [place] [address] 
[phone] Work/School Note Date: 6/26/2019 To Whom It May concern: 
 
Yanet Vela was seen and treated today in the emergency room by the following provider(s): 
Attending Provider: Sandy Washington DO Physician Assistant: RONNELL Rowe. Yanet Vela may return to work on 6/29/19.  
 
Sincerely, 
 
 
 
 
RONNELL Haines

## 2019-06-26 NOTE — DISCHARGE INSTRUCTIONS
Patient Education     Continue to take medication as prescribed. Follow-up with your primary care physician within 2 days for reassessment. Bring the results from this visit with you for their review. Return to the ED immediately for any new, worsening, or persistent symptoms. Dizziness: Care Instructions  Your Care Instructions  Dizziness is the feeling of unsteadiness or fuzziness in your head. It is different than having vertigo, which is a feeling that the room is spinning or that you are moving or falling. It is also different from lightheadedness, which is the feeling that you are about to faint. It can be hard to know what causes dizziness. Some people feel dizzy when they have migraine headaches. Sometimes bouts of flu can make you feel dizzy. Some medical conditions, such as heart problems or high blood pressure, can make you feel dizzy. Many medicines can cause dizziness, including medicines for high blood pressure, pain, or anxiety. If a medicine causes your symptoms, your doctor may recommend that you stop or change the medicine. If it is a problem with your heart, you may need medicine to help your heart work better. If there is no clear reason for your symptoms, your doctor may suggest watching and waiting for a while to see if the dizziness goes away on its own. Follow-up care is a key part of your treatment and safety. Be sure to make and go to all appointments, and call your doctor if you are having problems. It's also a good idea to know your test results and keep a list of the medicines you take. How can you care for yourself at home? · If your doctor recommends or prescribes medicine, take it exactly as directed. Call your doctor if you think you are having a problem with your medicine. · Do not drive while you feel dizzy. · Try to prevent falls. Steps you can take include:  ? Using nonskid mats, adding grab bars near the tub, and using night-lights.   ? Clearing your home so that walkways are free of anything you might trip on.  ? Letting family and friends know that you have been feeling dizzy. This will help them know how to help you. When should you call for help? Call 911 anytime you think you may need emergency care. For example, call if:    · You passed out (lost consciousness).     · You have dizziness along with symptoms of a heart attack. These may include:  ? Chest pain or pressure, or a strange feeling in the chest.  ? Sweating. ? Shortness of breath. ? Nausea or vomiting. ? Pain, pressure, or a strange feeling in the back, neck, jaw, or upper belly or in one or both shoulders or arms. ? Lightheadedness or sudden weakness. ? A fast or irregular heartbeat.     · You have symptoms of a stroke. These may include:  ? Sudden numbness, tingling, weakness, or loss of movement in your face, arm, or leg, especially on only one side of your body. ? Sudden vision changes. ? Sudden trouble speaking. ? Sudden confusion or trouble understanding simple statements. ? Sudden problems with walking or balance. ? A sudden, severe headache that is different from past headaches.    Call your doctor now or seek immediate medical care if:    · You feel dizzy and have a fever, headache, or ringing in your ears.     · You have new or increased nausea and vomiting.     · Your dizziness does not go away or comes back.    Watch closely for changes in your health, and be sure to contact your doctor if:    · You do not get better as expected. Where can you learn more? Go to http://delores-scotty.info/. Enter N846 in the search box to learn more about \"Dizziness: Care Instructions. \"  Current as of: September 23, 2018  Content Version: 11.9  © 7958-8305 [employer]. Care instructions adapted under license by [employer] (which disclaims liability or warranty for this information).  If you have questions about a medical condition or this instruction, always ask your healthcare professional. [place] [address] any warranty or liability for your use of this information. Patient Education        Vertigo: Care Instructions  Your Care Instructions    Vertigo is the feeling that you or your surroundings are moving when there is no actual movement. It is often described as a feeling of spinning, whirling, falling, or tilting. Vertigo may make you vomit or feel nauseated. You may have trouble standing or walking and may lose your balance. Vertigo is often related to an inner ear problem, but it can have other more serious causes. If vertigo continues, you may need more tests to find its cause. Follow-up care is a key part of your treatment and safety. Be sure to make and go to all appointments, and call your doctor if you are having problems. It's also a good idea to know your test results and keep a list of the medicines you take. How can you care for yourself at home? · Do not lie flat on your back. Prop yourself up slightly. This may reduce the spinning feeling. Keep your eyes open. · Move slowly so that you do not fall. · If your doctor recommends medicine, take it exactly as directed. · Do not drive while you are having vertigo. Certain exercises, called Delatorre-Daroff exercises, can help decrease vertigo. To do Delatorre-Daroff exercises:  · Sit on the edge of a bed or sofa and quickly lie down on the side that causes the worst vertigo. Lie on your side with your ear down. · Stay in this position for at least 30 seconds or until the vertigo goes away. · Sit up. If this causes vertigo, wait for it to stop. · Repeat the procedure on the other side. · Repeat this 10 times. Do these exercises 2 times a day until the vertigo is gone. When should you call for help? Call 911 anytime you think you may need emergency care. For example, call if:    · You passed out (lost consciousness).     · You have symptoms of a stroke.  These may include:  ? Sudden numbness, tingling, weakness, or loss of movement in your face, arm, or leg, especially on only one side of your body. ? Sudden vision changes. ? Sudden trouble speaking. ? Sudden confusion or trouble understanding simple statements. ? Sudden problems with walking or balance. ? A sudden, severe headache that is different from past headaches.    Call your doctor now or seek immediate medical care if:    · Vertigo occurs with a fever, a headache, or ringing in your ears.     · You have new or increased nausea and vomiting.    Watch closely for changes in your health, and be sure to contact your doctor if:    · Vertigo gets worse or happens more often.     · Vertigo has not gotten better after 2 weeks. Where can you learn more? Go to http://delores-scotty.info/. Enter B444 in the search box to learn more about \"Vertigo: Care Instructions. \"  Current as of: March 27, 2018  Content Version: 11.9  © 9388-8094 [employer]. Care instructions adapted under license by [employer] (which disclaims liability or warranty for this information). If you have questions about a medical condition or this instruction, always ask your healthcare professional. [address] any warranty or liability for your use of this information.

## 2019-06-26 NOTE — ED NOTES
Pt arrived via medic from work with complaints of dizziness.  Pt states she has a history of low blood pressure and vertigo

## 2019-06-27 LAB
ATRIAL RATE: 64 BPM
CALCULATED P AXIS, ECG09: 52 DEGREES
CALCULATED R AXIS, ECG10: 58 DEGREES
CALCULATED T AXIS, ECG11: 59 DEGREES
DIAGNOSIS, 93000: NORMAL
P-R INTERVAL, ECG05: 144 MS
Q-T INTERVAL, ECG07: 382 MS
QRS DURATION, ECG06: 70 MS
QTC CALCULATION (BEZET), ECG08: 394 MS
VENTRICULAR RATE, ECG03: 64 BPM

## 2019-07-01 ENCOUNTER — OFFICE VISIT (OUTPATIENT)
Dept: FAMILY MEDICINE CLINIC | Age: 44
End: 2019-07-01

## 2019-07-01 VITALS
TEMPERATURE: 98.1 F | BODY MASS INDEX: 24.89 KG/M2 | HEIGHT: 65 IN | WEIGHT: 149.4 LBS | RESPIRATION RATE: 20 BRPM | DIASTOLIC BLOOD PRESSURE: 69 MMHG | HEART RATE: 81 BPM | SYSTOLIC BLOOD PRESSURE: 104 MMHG

## 2019-07-01 DIAGNOSIS — I95.9 HYPOTENSION, UNSPECIFIED HYPOTENSION TYPE: ICD-10-CM

## 2019-07-01 DIAGNOSIS — R42 DIZZINESS: Primary | ICD-10-CM

## 2019-07-01 DIAGNOSIS — R42 DIZZINESS: ICD-10-CM

## 2019-07-01 DIAGNOSIS — F41.9 ANXIETY: ICD-10-CM

## 2019-07-01 PROBLEM — Z30.2 ENCOUNTER FOR STERILIZATION: Status: ACTIVE | Noted: 2017-08-11

## 2019-07-01 PROBLEM — N83.201 RIGHT OVARIAN CYST: Status: ACTIVE | Noted: 2017-08-11

## 2019-07-01 LAB — TSH SERPL DL<=0.005 MIU/L-ACNC: 0.93 MCU/ML (ref 0.27–4.2)

## 2019-07-01 NOTE — LETTER
NOTIFICATION RETURN TO WORK / SCHOOL 
 
7/1/2019 9:39 AM 
 
Ms. Ulises Flowers [id number] [facility] [address] To Whom It May Concern: 
 
Ulises Flowers is currently under the care of Briana Swain. She will return to work on: 07/02/2019 If there are questions or concerns please have the patient contact our office. Sincerely, Santos Favre, NP

## 2019-07-01 NOTE — PROGRESS NOTES
HPI  Was seen at [facility] ER on  for episode of vertigo. Reports that this has been an on-going issue with her over the past couple of years, and that she has been cleared by neuro, cardiology and ENT. Said ENT told her that she wasn't having vertigo. Agustín Sol that she is having anxiety attacks, increasing in frequency. Says her heart starts racing, and room starts spinning, and she feels \"out of it\". Last episode was when she was at work last week, and ended up in ER. Says blood pressure was low last week. Has been seen by cardiology and was prescribed Midodrine for hypotension, but hasn't been taking it and has not followed up with cardiology. Says that she has also been prescribed Valium in the past for the vertigo and she feels that this has been helpful. Says that she has not had any of these episodes since , but has been taking about 1 Valium/day since then. Says she has lost a lot of weight since gall bladder surgery in February. Overall feels well, except for these episodes. Says she feels like she is eating healthier. Denies alcohol intake, cigarettes, drugs, supplements. Doesn't think that she is depressed but does admit to anxiety and says that the anxiety takes over.     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 Kathleen Porter  2011    left shoulder        Medications  Current Outpatient Medications   Medication Sig Dispense Refill    diazePAM (VALIUM) 5 mg tablet Take 1 Tab by mouth every eight (8) hours as needed (vertigo). Max Daily Amount: 15 mg. 10 Tab 0    ibuprofen (MOTRIN) 800 mg tablet Take 1 Tab by mouth three (3) times daily as needed for Pain. 40 Tab 0    polyethylene glycol (MIRALAX) 17 gram/dose powder Take 17 g by mouth daily.  1 tablespoon with 8 oz of water daily 289 g 0       Allergies  Allergies   Allergen Reactions    Ciprofloxacin (Bulk) Diarrhea    Codeine Itching    Dilaudid [Hydromorphone (Bulk)] Itching    Lyrica [Pregabalin] Swelling    Tramadol Other (comments) and Unknown (comments)     Gets headaches  headache    Codeine Phosphate Unknown (comments)    Hydromorphone Unknown (comments)       Family History  Family History   Problem Relation Age of Onset    Heart Disease Mother     Hypertension Mother    [facility] Mother     Other Mother 54        Lupus    Stroke Mother         x3    Asthma Sister     Diabetes Paternal Grandmother     Hypertension Paternal Grandmother        Social History  Social History     Socioeconomic History    Marital status: SINGLE     Spouse name: Not on file    Number of children: Not on file    Years of education: Not on file    Highest education level: Not on file   Occupational History    Not on file   Social Needs    Financial resource strain: Not on file    Food insecurity:     Worry: Not on file     Inability: Not on file    Transportation needs:     Medical: Not on file     Non-medical: Not on file   Tobacco Use    Smoking status: Never Smoker    Smokeless tobacco: Never Used   Substance and Sexual Activity    Alcohol use: Yes     Comment: rare    Drug use: No     Types: Prescription, OTC    Sexual activity: Yes     Partners: Male     Birth control/protection: Implant     Comment: pregnancy test negative   Lifestyle    Physical activity:     Days per week: Not on file     Minutes per session: Not on file    Stress: Not on file   Relationships    Social connections:     Talks on phone: Not on file     Gets together: Not on file     Attends Jainism service: Not on file     Active member of club or organization: Not on file     Attends meetings of clubs or organizations: Not on file     Relationship status: Not on file    Intimate partner violence:     Fear of current or ex partner: Not on file     Emotionally abused: Not on file     Physically abused: Not on file     Forced sexual activity: Not on file   Other Topics Concern     Service Not Asked    Blood Transfusions Not Asked    Caffeine Concern Not Asked    Occupational Exposure Not Asked    Hobby Hazards Not Asked    Sleep Concern Not Asked    Stress Concern Not Asked    Weight Concern Not Asked    Special Diet Not Asked    Back Care Not Asked    Exercise Not Asked    Bike Helmet Not Asked   [address] Not Asked    Self-Exams Not Asked   Social History Narrative    Not on file       Problem List  Patient Active Problem List   Diagnosis Code    Cystitis N30.90    Ovarian mass N83.9    Family history of blood dyscrasia Z83.2    Family history of cardiovascular disease Z82.49    Abnormal cervical Papanicolaou smear with positive human papilloma virus (HPV) DNA test [id number]    Myofascial muscle pain M79.18    Muscle spasms of neck M62.838    Cervical spondylosis M47.812    DDD (degenerative disc disease), cervical M50.30    Cervical myofascial pain syndrome M79.18    HNP (herniated nucleus pulposus), cervical M50.20    Muscle spasm M62.838    Hx of fusion of cervical spine Z98.1    Migraine without aura and without status migrainosus, not intractable G43.009    Syncope R55    Neck pain M54.2    Vertigo R42    Orthostatic hypotension I95.1    Encounter for sterilization Z30.2    Other abnormal Papanicolaou smear of cervix and cervical HPV(795.09) [id number]    Right ovarian cyst N83.201    Dizziness R42    Anxiety F41.9 Review of Systems  Review of Systems   Constitutional: Negative. Eyes: Negative. Respiratory: Negative. Cardiovascular: Negative. Neurological: Negative. Psychiatric/Behavioral: Negative for depression. The patient is nervous/anxious. Vital Signs  Vitals:    07/01/19 0840   BP: 104/69   Pulse: 81   Resp: 20   Temp: 98.1 °F (36.7 °C)   TempSrc: Oral   Weight: 149 lb 6.4 oz (67.8 kg)   Height: 5' 5\" (1.651 m)   PainSc:   0 - No pain       Physical Exam  Physical Exam    Diagnostics  No orders of the defined types were placed in this encounter. Results  Results for orders placed or performed during the hospital encounter of 06/26/19   CBC WITH AUTOMATED DIFF   Result Value Ref Range    WBC 3.8 (L) 4.6 - 13.2 K/uL    RBC 4.21 4.20 - 5.30 M/uL    HGB 12.3 12.0 - 16.0 g/dL    HCT 37.0 35.0 - 45.0 %    MCV 87.9 74.0 - 97.0 FL    MCH 29.2 24.0 - 34.0 PG    MCHC 33.2 31.0 - 37.0 g/dL    RDW 13.1 11.6 - 14.5 %    PLATELET 225 017 - 598 K/uL    MPV 10.5 9.2 - 11.8 FL    NEUTROPHILS 44 40 - 73 %    LYMPHOCYTES 44 21 - 52 %    MONOCYTES 8 3 - 10 %    EOSINOPHILS 3 0 - 5 %    BASOPHILS 1 0 - 2 %    ABS. NEUTROPHILS 1.7 (L) 1.8 - 8.0 K/UL    ABS. LYMPHOCYTES 1.7 0.9 - 3.6 K/UL    ABS. MONOCYTES 0.3 0.05 - 1.2 K/UL    ABS. EOSINOPHILS 0.1 0.0 - 0.4 K/UL    ABS.  BASOPHILS 0.0 0.0 - 0.1 K/UL    DF AUTOMATED     METABOLIC PANEL, BASIC   Result Value Ref Range    Sodium 138 136 - 145 mmol/L    Potassium 4.5 3.5 - 5.5 mmol/L    Chloride 106 100 - 108 mmol/L    CO2 28 21 - 32 mmol/L    Anion gap 4 3.0 - 18 mmol/L    Glucose 72 (L) 74 - 99 mg/dL    BUN 15 7.0 - 18 MG/DL    Creatinine 0.85 0.6 - 1.3 MG/DL    BUN/Creatinine ratio 18 12 - 20      GFR est AA >60 >60 ml/min/1.73m2    GFR est non-AA >60 >60 ml/min/1.73m2    Calcium 9.6 8.5 - 10.1 MG/DL   URINALYSIS W/ RFLX MICROSCOPIC   Result Value Ref Range    Color YELLOW      Appearance CLOUDY      Specific gravity <1.005 (L) 1.005 - 1.030    pH (UA) 6.5 5.0 - 8.0      Protein NEGATIVE  NEG mg/dL    Glucose NEGATIVE  NEG mg/dL    Ketone NEGATIVE  NEG mg/dL    Bilirubin NEGATIVE  NEG      Blood LARGE (A) NEG      Urobilinogen 0.2 0.2 - 1.0 EU/dL    Nitrites NEGATIVE  NEG      Leukocyte Esterase NEGATIVE  NEG     URINE MICROSCOPIC ONLY   Result Value Ref Range    WBC 0 to 4 0 - 4 /hpf    RBC 12 to 15 0 - 5 /hpf    Epithelial cells 2+ 0 - 5 /lpf    Bacteria FEW (A) NEG /hpf   CARDIAC PANEL,(CK, CKMB & TROPONIN)   Result Value Ref Range     26 - 192 U/L    CK - MB <1.0 <3.6 ng/ml    CK-MB Index  0.0 - 4.0 %     CALCULATION NOT PERFORMED WHEN RESULT IS BELOW LINEAR LIMIT    Troponin-I, QT <0.02 0.0 - 0.045 NG/ML   HCG QL SERUM   Result Value Ref Range    HCG, Ql. NEGATIVE  NEG     EKG, 12 LEAD, INITIAL   Result Value Ref Range    Ventricular Rate 64 BPM    Atrial Rate 64 BPM    P-R Interval 144 ms    QRS Duration 70 ms    Q-T Interval 382 ms    QTC Calculation (Bezet) 394 ms    Calculated P Axis 52 degrees    Calculated R Axis 58 degrees    Calculated T Axis 59 degrees    Diagnosis       Normal sinus rhythm  Possible Left atrial enlargement  Low voltage QRS  Septal infarct , age undetermined  Abnormal ECG  When compared with ECG of 28-DEC-2018 14:40,  Septal infarct is now present  T wave inversion now evident in Anterior leads  Confirmed by Nadine Lawler ([id number]) on 6/27/2019 8:45:35 AM           Assessment and Plan  Diagnoses and all orders for this visit:    1. Dizziness  Pt to call and schedule appointment with cardiology for follow up of these symptoms. Also referral placed to neuropsych. 2. Anxiety  Referral placed to neuropsych. Declined psych referral.  Declined SSRI today. Discussed relaxation, self care, hydration, proper sleep. 3. Hypotension, unspecified hypotension type  Encouraged pt to monitor blood pressure at home and especially when she starts to notice these episodes of vertigo to see if her blood pressure is low.   To follow up with cardiology ASAP. Log blood pressure in bring in to see PCP  Other orders  -     TSH 3RD GENERATION; Future        After care summary printed and reviewed with patient. Plan reviewed with patient. Questions answered. Patient verbalized understanding of plan and is in agreement with plan. Patient to follow up with PCP within one month or earlier if symptoms worsen. Return to work letter given. Encouraged the use of my chart.     Selene Riggins, TORRI-C

## 2019-07-01 NOTE — PROGRESS NOTES
Kayla Chacon presents today for   Chief Complaint   Patient presents with   [facility] Follow Up     patient was seen at [facility] on 6/26/19, dx with vertigo. Patient states she hasn't had an episode since then. Kayla Chacon preferred language for health care discussion is english/other. Is someone accompanying this pt? no    Is the patient using any DME equipment during [address]? no    Depression Screening:  3 most recent PHQ Screens 7/1/2019   Little interest or pleasure in doing things Not at all   Feeling down, depressed, irritable, or hopeless Not at all   Total Score PHQ 2 0       Learning Assessment:  Learning Assessment 7/1/2019   PRIMARY LEARNER Patient   HIGHEST LEVEL OF EDUCATION - PRIMARY LEARNER  GRADUATED HIGH SCHOOL OR GED   BARRIERS PRIMARY LEARNER NONE   CO-LEARNER CAREGIVER No   PRIMARY LANGUAGE ENGLISH    NEED No   LEARNER PREFERENCE PRIMARY DEMONSTRATION     -     -     -     -   ANSWERED BY patient   RELATIONSHIP SELF       Abuse Screening:  Abuse Screening Questionnaire 7/1/2019   Do you ever feel afraid of your partner? N   Are you in a relationship with someone who physically or mentally threatens you? N   Is it safe for you to go home? Y       Health Maintenance Due   Topic Date Due    PAP AKA CERVICAL CYTOLOGY  12/20/2016   . Health Maintenance reviewed and discussed and ordered per Provider. Coordination of Care:  1. Have you been to the ER, urgent care clinic since your last visit? Hospitalized since your last visit? Yes, [facility], vertigo     2. Have you seen or consulted any other health care providers outside of the [address] [place] [address] since your last visit? Include any pap smears or colon screening. no      Advance Directive:  1. Do you have an advance directive in place? Patient Reply:no    2. If not, would you like material regarding how to put one in place?  Patient Reply: yes

## 2019-07-02 ENCOUNTER — HOSPITAL ENCOUNTER (EMERGENCY)
Age: 44
Discharge: HOME | End: 2019-07-02
Attending: EMERGENCY MEDICINE
Payer: MEDICAID

## 2019-07-02 ENCOUNTER — APPOINTMENT (OUTPATIENT)
Dept: GENERAL RADIOLOGY | Age: 44
End: 2019-07-02
Attending: PHYSICIAN ASSISTANT
Payer: MEDICAID

## 2019-07-02 VITALS
TEMPERATURE: 98.6 F | SYSTOLIC BLOOD PRESSURE: 112 MMHG | DIASTOLIC BLOOD PRESSURE: 70 MMHG | RESPIRATION RATE: 14 BRPM | HEART RATE: 70 BPM | OXYGEN SATURATION: 99 %

## 2019-07-02 DIAGNOSIS — R07.9 ACUTE CHEST PAIN: Primary | ICD-10-CM

## 2019-07-02 LAB
ALBUMIN SERPL-MCNC: 4.1 G/DL (ref 3.4–5)
ALBUMIN/GLOB SERPL: 1 {RATIO} (ref 0.8–1.7)
ALP SERPL-CCNC: 69 U/L (ref 45–117)
ALT SERPL-CCNC: 25 U/L (ref 13–56)
ANION GAP SERPL CALC-SCNC: 5 MMOL/L (ref 3–18)
APPEARANCE UR: ABNORMAL
AST SERPL-CCNC: 19 U/L (ref 15–37)
BASOPHILS # BLD: 0 K/UL (ref 0–0.1)
BASOPHILS NFR BLD: 1 % (ref 0–2)
BILIRUB SERPL-MCNC: 0.9 MG/DL (ref 0.2–1)
BILIRUB UR QL: NEGATIVE
BUN SERPL-MCNC: 12 MG/DL (ref 7–18)
BUN/CREAT SERPL: 14 (ref 12–20)
CALCIUM SERPL-MCNC: 9.4 MG/DL (ref 8.5–10.1)
CHLORIDE SERPL-SCNC: 106 MMOL/L (ref 100–108)
CK MB CFR SERPL CALC: 1.2 % (ref 0–4)
CK MB CFR SERPL CALC: NORMAL % (ref 0–4)
CK MB SERPL-MCNC: 1.4 NG/ML (ref 5–25)
CK MB SERPL-MCNC: <1 NG/ML (ref 5–25)
CK SERPL-CCNC: 117 U/L (ref 26–192)
CK SERPL-CCNC: 118 U/L (ref 26–192)
CO2 SERPL-SCNC: 30 MMOL/L (ref 21–32)
COLOR UR: YELLOW
CREAT SERPL-MCNC: 0.88 MG/DL (ref 0.6–1.3)
DIFFERENTIAL METHOD BLD: ABNORMAL
EOSINOPHIL # BLD: 0.2 K/UL (ref 0–0.4)
EOSINOPHIL NFR BLD: 3 % (ref 0–5)
ERYTHROCYTE [DISTWIDTH] IN BLOOD BY AUTOMATED COUNT: 13 % (ref 11.6–14.5)
GLOBULIN SER CALC-MCNC: 4.1 G/DL (ref 2–4)
GLUCOSE SERPL-MCNC: 90 MG/DL (ref 74–99)
GLUCOSE UR STRIP.AUTO-MCNC: NEGATIVE MG/DL
HCG UR QL: NEGATIVE
HCT VFR BLD AUTO: 37.4 % (ref 35–45)
HGB BLD-MCNC: 12.2 G/DL (ref 12–16)
HGB UR QL STRIP: NEGATIVE
KETONES UR QL STRIP.AUTO: ABNORMAL MG/DL
LEUKOCYTE ESTERASE UR QL STRIP.AUTO: NEGATIVE
LYMPHOCYTES # BLD: 2.5 K/UL (ref 0.9–3.6)
LYMPHOCYTES NFR BLD: 53 % (ref 21–52)
MCH RBC QN AUTO: 28.6 PG (ref 24–34)
MCHC RBC AUTO-ENTMCNC: 32.6 G/DL (ref 31–37)
MCV RBC AUTO: 87.8 FL (ref 74–97)
MONOCYTES # BLD: 0.2 K/UL (ref 0.05–1.2)
MONOCYTES NFR BLD: 5 % (ref 3–10)
NEUTS SEG # BLD: 1.7 K/UL (ref 1.8–8)
NEUTS SEG NFR BLD: 38 % (ref 40–73)
NITRITE UR QL STRIP.AUTO: NEGATIVE
PH UR STRIP: 5.5 [PH] (ref 5–8)
PLATELET # BLD AUTO: 186 K/UL (ref 135–420)
PMV BLD AUTO: 11.1 FL (ref 9.2–11.8)
POTASSIUM SERPL-SCNC: 3.8 MMOL/L (ref 3.5–5.5)
PROT SERPL-MCNC: 8.2 G/DL (ref 6.4–8.2)
PROT UR STRIP-MCNC: NEGATIVE MG/DL
RBC # BLD AUTO: 4.26 M/UL (ref 4.2–5.3)
SODIUM SERPL-SCNC: 141 MMOL/L (ref 136–145)
SP GR UR REFRACTOMETRY: 1.03 (ref 1–1.03)
TROPONIN I SERPL-MCNC: <0.02 NG/ML (ref 0–0.04)
TROPONIN I SERPL-MCNC: <0.02 NG/ML (ref 0–0.04)
UROBILINOGEN UR QL STRIP.AUTO: 1 EU/DL (ref 0.2–1)
WBC # BLD AUTO: 4.6 K/UL (ref 4.6–13.2)

## 2019-07-02 PROCEDURE — 81003 URINALYSIS AUTO W/O SCOPE: CPT

## 2019-07-02 PROCEDURE — 81025 URINE PREGNANCY TEST: CPT

## 2019-07-02 PROCEDURE — 82550 ASSAY OF CK (CPK): CPT

## 2019-07-02 PROCEDURE — 71046 X-RAY EXAM CHEST 2 VIEWS: CPT

## 2019-07-02 PROCEDURE — 99284 EMERGENCY DEPT VISIT MOD MDM: CPT

## 2019-07-02 PROCEDURE — 74011250637 HC RX REV CODE- 250/637: Performed by: EMERGENCY MEDICINE

## 2019-07-02 PROCEDURE — 94762 N-INVAS EAR/PLS OXIMTRY CONT: CPT

## 2019-07-02 PROCEDURE — 85025 COMPLETE CBC W/AUTO DIFF WBC: CPT

## 2019-07-02 PROCEDURE — 96360 HYDRATION IV INFUSION INIT: CPT

## 2019-07-02 PROCEDURE — 96361 HYDRATE IV INFUSION ADD-ON: CPT

## 2019-07-02 PROCEDURE — 93005 ELECTROCARDIOGRAM TRACING: CPT

## 2019-07-02 PROCEDURE — 80053 COMPREHEN METABOLIC PANEL: CPT

## 2019-07-02 PROCEDURE — 74011250636 HC RX REV CODE- 250/636: Performed by: EMERGENCY MEDICINE

## 2019-07-02 RX ORDER — GUAIFENESIN 100 MG/5ML
324 LIQUID (ML) ORAL
Status: COMPLETED | OUTPATIENT
Start: 2019-07-02 | End: 2019-07-02

## 2019-07-02 RX ORDER — ACETAMINOPHEN 325 MG/1
325 TABLET ORAL
Status: COMPLETED | OUTPATIENT
Start: 2019-07-02 | End: 2019-07-02

## 2019-07-02 RX ORDER — ACETAMINOPHEN 325 MG/1
975 TABLET ORAL
Status: DISCONTINUED | OUTPATIENT
Start: 2019-07-02 | End: 2019-07-02

## 2019-07-02 RX ORDER — ACETAMINOPHEN 325 MG/1
650 TABLET ORAL
Status: COMPLETED | OUTPATIENT
Start: 2019-07-02 | End: 2019-07-02

## 2019-07-02 RX ORDER — ACETAMINOPHEN 325 MG/1
325 TABLET ORAL
Status: DISCONTINUED | OUTPATIENT
Start: 2019-07-02 | End: 2019-07-02

## 2019-07-02 RX ADMIN — ACETAMINOPHEN 325 MG: 325 TABLET ORAL at 20:16

## 2019-07-02 RX ADMIN — ACETAMINOPHEN 325 MG: 325 TABLET ORAL at 20:09

## 2019-07-02 RX ADMIN — SODIUM CHLORIDE 1000 ML: 900 INJECTION, SOLUTION INTRAVENOUS at 18:20

## 2019-07-02 RX ADMIN — ASPIRIN 81 MG 324 MG: 81 TABLET ORAL at 18:16

## 2019-07-02 RX ADMIN — NITROGLYCERIN 1 INCH: 20 OINTMENT TOPICAL at 19:11

## 2019-07-02 NOTE — PROGRESS NOTES
Chart was reviewed, patient was called after this nurse contacted patients mother for an updated number and notified that her thyroid level is normal. Patient was asked if she had any question and patient stated not at this time.

## 2019-07-02 NOTE — ED PROVIDER NOTES
EMERGENCY DEPARTMENT HISTORY AND PHYSICAL EXAM    Date: 7/2/2019  Patient Name: Shelly Brooks    History of Presenting Illness     Chief Complaint   Patient presents with    Chest Pain         History Provided By: Patient and friend        Additional History (Context): Shelly Brooks is a 41 y.o. female with No significant past medical history who presents with complaint of nonexertional left-sided chest pain that radiated to her left back that began around 1:00. Once pain began, it has not abated. Denies any exertional or positional comfort although she says it does hurt more when she is lying flat on our exam stretcher. Denies shortness of breath fever cough rash history of diabetes high blood pressure high cholesterol. Her mother had an MI in her 35s. Also patient's mother had strokes and congestive heart failure. Patient denies any cardiology evaluation for ischemic heart disease previously including a stress test or an echocardiogram and he does not take aspirin daily. Patient was diagnosed with hypotension after tilt testing as she had multiple near syncopal events. Denies recent hemoptysis travel surgery fractures history of thromboembolism. PCP: Shailesh Tinoco MD    Current Facility-Administered Medications   Medication Dose Route Frequency Provider Last Rate Last Dose    nitroglycerin (NITROBID) 2 % ointment 1 Inch  1 Inch Topical BID Sindhu Barakat PA         Current Outpatient Medications   Medication Sig Dispense Refill    diazePAM (VALIUM) 5 mg tablet Take 1 Tab by mouth every eight (8) hours as needed (vertigo). Max Daily Amount: 15 mg. 10 Tab 0    ibuprofen (MOTRIN) 800 mg tablet Take 1 Tab by mouth three (3) times daily as needed for Pain. 40 Tab 0    polyethylene glycol (MIRALAX) 17 gram/dose powder Take 17 g by mouth daily.  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 Belel Sensing  2011    left shoulder       Family History:  Family History   Problem Relation Age of Onset    Heart Disease Mother     Hypertension Mother    [facility] MS Mother     Other Mother 54        Lupus    Stroke Mother         x3    Asthma Sister     Diabetes Paternal Grandmother     Hypertension Paternal Grandmother        Social History:  Social History     Tobacco Use    Smoking status: Never Smoker    Smokeless tobacco: Never Used   Substance Use Topics    Alcohol use: Yes     Comment: rare    Drug use: No     Types: Prescription, OTC       Allergies: Allergies   Allergen Reactions    Ciprofloxacin (Bulk) Diarrhea    Codeine Itching    Dilaudid [Hydromorphone (Bulk)] Itching    Lyrica [Pregabalin] Swelling    Tramadol Other (comments) and Unknown (comments)     Gets headaches  headache    Codeine Phosphate Unknown (comments)    Hydromorphone Unknown (comments)         Review of Systems   Review of Systems   Constitutional: Negative for fever. Respiratory: Negative for shortness of breath. Cardiovascular: Positive for chest pain. Gastrointestinal: Negative for abdominal pain, nausea and vomiting. Genitourinary: Negative for flank pain. Musculoskeletal: Negative for back pain. Skin: Negative for rash. Neurological: Negative for weakness and numbness.      All Other Systems Negative  Physical Exam     Vitals:    19 1559   BP: 116/74   Pulse: 81   Resp: 16   Temp: 98.4 °F (36.9 °C)   SpO2: 100%     Physical Exam   Constitutional: She is oriented to person, place, and time. She appears well-developed and well-nourished. No distress. HENT:   Head: Normocephalic and atraumatic. Right Ear: External ear normal.   Left Ear: External ear normal.   Nose: Nose normal.   Mouth/Throat: Oropharynx is clear and moist.   Eyes: Pupils are equal, round, and reactive to light. Conjunctivae and EOM are normal.   Neck: Normal range of motion. Neck supple. No JVD present. No tracheal deviation present. Cardiovascular: Normal rate, regular rhythm, normal heart sounds and intact distal pulses. Exam reveals no gallop and no friction rub. No murmur heard. Equal radial pulses. Pulmonary/Chest: Effort normal and breath sounds normal. No respiratory distress. She has no wheezes. She has no rales. Abdominal: Soft. Bowel sounds are normal. She exhibits no distension and no mass. There is no tenderness. There is no rebound and no guarding. Musculoskeletal: Normal range of motion. She exhibits no edema or tenderness. Neurological: She is alert and oriented to person, place, and time. She has normal reflexes. No cranial nerve deficit. Skin: Skin is warm and dry. No rash noted. Psychiatric: She has a normal mood and affect. Her behavior is normal.   Nursing note and vitals reviewed.              Diagnostic Study Results     Labs -     Recent Results (from the past 12 hour(s))   EKG, 12 LEAD, INITIAL    Collection Time: 07/02/19  4:04 PM   Result Value Ref Range    Ventricular Rate 82 BPM    Atrial Rate 82 BPM    P-R Interval 142 ms    QRS Duration 78 ms    Q-T Interval 356 ms    QTC Calculation (Bezet) 415 ms    Calculated P Axis 53 degrees    Calculated R Axis 52 degrees    Calculated T Axis 51 degrees    Diagnosis       Normal sinus rhythm  Possible Left atrial enlargement  Borderline ECG  When compared with ECG of 26-JUN-2019 10:53,  Criteria for Septal infarct are no longer present     CBC WITH AUTOMATED DIFF    Collection Time: 07/02/19  4:15 PM   Result Value Ref Range    WBC 4.6 4.6 - 13.2 K/uL    RBC 4.26 4.20 - 5.30 M/uL    HGB 12.2 12.0 - 16.0 g/dL    HCT 37.4 35.0 - 45.0 %    MCV 87.8 74.0 - 97.0 FL    MCH 28.6 24.0 - 34.0 PG    MCHC 32.6 31.0 - 37.0 g/dL    RDW 13.0 11.6 - 14.5 %    PLATELET 813 621 - 023 K/uL    MPV 11.1 9.2 - 11.8 FL    NEUTROPHILS 38 (L) 40 - 73 %    LYMPHOCYTES 53 (H) 21 - 52 %    MONOCYTES 5 3 - 10 %    EOSINOPHILS 3 0 - 5 %    BASOPHILS 1 0 - 2 %    ABS. NEUTROPHILS 1.7 (L) 1.8 - 8.0 K/UL    ABS. LYMPHOCYTES 2.5 0.9 - 3.6 K/UL    ABS. MONOCYTES 0.2 0.05 - 1.2 K/UL    ABS. EOSINOPHILS 0.2 0.0 - 0.4 K/UL    ABS. BASOPHILS 0.0 0.0 - 0.1 K/UL    DF AUTOMATED     METABOLIC PANEL, COMPREHENSIVE    Collection Time: 07/02/19  4:15 PM   Result Value Ref Range    Sodium 141 136 - 145 mmol/L    Potassium 3.8 3.5 - 5.5 mmol/L    Chloride 106 100 - 108 mmol/L    CO2 30 21 - 32 mmol/L    Anion gap 5 3.0 - 18 mmol/L    Glucose 90 74 - 99 mg/dL    BUN 12 7.0 - 18 MG/DL    Creatinine 0.88 0.6 - 1.3 MG/DL    BUN/Creatinine ratio 14 12 - 20      GFR est AA >60 >60 ml/min/1.73m2    GFR est non-AA >60 >60 ml/min/1.73m2    Calcium 9.4 8.5 - 10.1 MG/DL    Bilirubin, total 0.9 0.2 - 1.0 MG/DL    ALT (SGPT) 25 13 - 56 U/L    AST (SGOT) 19 15 - 37 U/L    Alk. phosphatase 69 45 - 117 U/L    Protein, total 8.2 6.4 - 8.2 g/dL    Albumin 4.1 3.4 - 5.0 g/dL    Globulin 4.1 (H) 2.0 - 4.0 g/dL    A-G Ratio 1.0 0.8 - 1.7     CARDIAC PANEL,(CK, CKMB & TROPONIN)    Collection Time: 07/02/19  4:15 PM   Result Value Ref Range     26 - 192 U/L    CK - MB 1.4 <3.6 ng/ml    CK-MB Index 1.2 0.0 - 4.0 %    Troponin-I, QT <0.02 0.0 - 0.045 NG/ML       Radiologic Studies -   XR CHEST PA LAT   Final Result   IMPRESSION: Stable chest, no evidence of active disease.         CT Results  (Last 48 hours)    None        CXR Results  (Last 48 hours)               07/02/19 1621  XR CHEST PA LAT Final result    Impression:  IMPRESSION: Stable chest, no evidence of active disease. Narrative:  Chest PA and lateral       COMPARISON: December 28, 2018       The lungs appear clear. The heart and mediastinal silhouette are normal. No   pleural effusions are evident. Bony structures appear unremarkable. Medical Decision Making   I am the first provider for this patient. I reviewed the vital signs, available nursing notes, past medical history, past surgical history, family history and social history. Vital Signs-Reviewed the patient's vital signs. Records Reviewed: Nursing Notes    Procedures:  Procedures    Provider Notes (Medical Decision Making): PERC score 0. Radiating constant chest pain with normal EKG first set of enzymes normal without a pleuritic component these are very reassuring aspects to the patient's description of her chest pain. Of concern though is her mother's medical history. Second cardiac enzyme ordered 3 hours apart from the first which will be 6 hours from the onset of the pain. Have given aspirin and nitro patch with IV fluids. Will be turning care over to remaining physician assistant at end of shift. 6:35 PM : Pt care transferred to [address] [place] [address] provider. History of patient complaint(s), available diagnostic reports and current treatment plan has been discussed thoroughly. Bedside rounding on patient occured : no . Intended disposition of patient :home  Pending diagnostics reports and/or labs (please list): repeat troponin    7:09 PM :Pt care assumed from Sharla Christianson  provider. Pt complaint(s), current treatment plan, progression and available diagnostic results have been discussed thoroughly. Rounding occurred: no  Intended Disposition: TBD  Pending diagnostic reports and/or labs (please list): repeat troponin    Pt with constant radiating left sided CP x all day today w/o pleuritic component. PERC neg.  First trop neg. Plan to d/c home with stress test if repeat trop neg. Pt currently w/o complaints. RONNELL Rosales    2nd trop neg. Pt has no further complaints. Nitro removed. Tylenol given for GARNER. RNONELL Rosales      MED RECONCILIATION:  Current Facility-Administered Medications   Medication Dose Route Frequency    nitroglycerin (NITROBID) 2 % ointment 1 Inch  1 Inch Topical BID     Current Outpatient Medications   Medication Sig    diazePAM (VALIUM) 5 mg tablet Take 1 Tab by mouth every eight (8) hours as needed (vertigo). Max Daily Amount: 15 mg.    ibuprofen (MOTRIN) 800 mg tablet Take 1 Tab by mouth three (3) times daily as needed for Pain.  polyethylene glycol (MIRALAX) 17 gram/dose powder Take 17 g by mouth daily. 1 tablespoon with 8 oz of water daily       Disposition:  home  Diagnosis     Clinical Impression:   1.  Acute chest pain

## 2019-07-02 NOTE — ED NOTES
I performed a brief evaluation, including history and physical, of the patient here in triage and I have determined that pt will need further treatment and evaluation from the main side ER physician. I have placed initial orders to help in expediting patients care.      July 02, 2019 at 4:02 PM - Iona Capps PA-C        Visit Vitals  /74 (BP 1 Location: Left arm)   Pulse 81   Temp 98.4 °F (36.9 °C)   Resp 16   SpO2 100%

## 2019-07-02 NOTE — DISCHARGE INSTRUCTIONS

## 2019-07-03 LAB
ATRIAL RATE: 82 BPM
CALCULATED P AXIS, ECG09: 53 DEGREES
CALCULATED R AXIS, ECG10: 52 DEGREES
CALCULATED T AXIS, ECG11: 51 DEGREES
DIAGNOSIS, 93000: NORMAL
P-R INTERVAL, ECG05: 142 MS
Q-T INTERVAL, ECG07: 356 MS
QRS DURATION, ECG06: 78 MS
QTC CALCULATION (BEZET), ECG08: 415 MS
VENTRICULAR RATE, ECG03: 82 BPM

## 2019-07-17 NOTE — PROGRESS NOTES
[facility]  Neuroscience   [address] [facility], [address]  Office:  [phone]  Fax: [phone]                  Initial Office Exam  Patient Name: Khalida Daniel  Age: 41 y.o. Gender: female   Handedness: right handed   Presenting Concern: panic attacks    Primary Care Physician: Iris Latham MD  Referring Provider: Julianne Espinoza NP      REASON FOR REFERRAL:  This comprehensive and medically necessary neuropsychological assessment was requested to assist a differential diagnosis of anxiety symptoms. The use and purpose of this examination, as well as the extent and limitations of confidentiality, were explained prior to obtaining permission to participate. Instructions were provided regarding the necessity to put forth optimal effort and answer questions truthfully in order to obtain reliable and accurate test results. PERTINENT HISTORY:  Ms. Karina Hess was referred from primary care for episodes of vertigo which have a suspected anxiety/panic disorder etiology. Ms. Karina Hess has been cleared by neurology, cardiology, and ENT. Records suggest that Ms. Nunes has declined SSRI treatment and a referral to psychiatry. She has been counseled about relaxation, self care, and sleep hygiene. Records also suggest that Ms. Karina Hess is followed by neurology for headaches and vertigo. Tightness in her chest without shortness of breath has also been reported. Vertiginous migraines and atypical seizures have been considered. An MRI of the brain and an EEG were unremarkable. Current Outpatient Medications   Medication Sig    diazePAM (VALIUM) 5 mg tablet Take 1 Tab by mouth every eight (8) hours as needed (vertigo). Max Daily Amount: 15 mg.    ibuprofen (MOTRIN) 800 mg tablet Take 1 Tab by mouth three (3) times daily as needed for Pain.  polyethylene glycol (MIRALAX) 17 gram/dose powder Take 17 g by mouth daily.  1 tablespoon with 8 oz of water daily     No current facility-administered medications for this visit. 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 Linda Cruz  2011    left shoulder       Social History     Socioeconomic History    Marital status: SINGLE     Spouse name: Not on file    Number of children: Not on file    Years of education: Not on file    Highest education level: Not on file   Tobacco Use    Smoking status: Never Smoker    Smokeless tobacco: Never Used   Substance and Sexual Activity    Alcohol use: Yes     Comment: rare    Drug use: No     Types: Prescription, OTC    Sexual activity: Yes     Partners: Male     Birth control/protection: Implant     Comment: pregnancy test negative   Other Topics Concern       Family History   Problem Relation Age of Onset    Heart Disease Mother     Hypertension Mother     MS Mother     Other Mother 54        Lupus    Stroke Mother         x3    Asthma Sister     Diabetes Paternal Grandmother     Hypertension Paternal Grandmother           CLINICAL INTERVIEW:  Ms. Santiago Rivera arrived for her scheduled appointment on time; she was unaccompanied. Consistent with records, she reported acute episodes of vertigo that first emerged in 2018. Since that time these episodes have increased in intensity but decreased in frequency, now occurring once or twice a month.   They come on uncued and include the following: heart palpitations, sweating, trembling, shortness of breath, chest pain, nausea, dizziness, light-headedness, unsteadiness of her feet, chills and heat sensations, paresthesias, derealization, depersonalization, a fear of going crazy, and a fear of dying. This clinical syndrome is HIGHLY consistent with panic disorder. As other organic etiologies have been ruled out, I strongly suspect that ms. Nunes is experiencing panic attacks which now meet criteria for a panic disorder. Ms. David Chin was able to demonstrate an understanding of this diagnosis and agreed with the diagnostic impressions and the rationale for cognitive behavioral therapy for panic disorder. She was provided with referrals, psychoeducation on the neurological correlates of PD, and methods for autogenic and diaphragmatic breathing training. MENTAL STATUS:    Sensorium  Awake, Aware, Alert   Orientation person, place, time/date, situation, day of week, month of year and year   Relations cooperative   Eye Contact appropriate   Appearance:  age appropriate   Motor Behavior:  within normal limits   Speech:  normal pitch and normal volume   Vocabulary average   Thought Process: within normal limits   Thought Content free of delusions and free of hallucinations   Suicidal ideations none   Homicidal ideations none   Mood:  anxious   Affect:  mood-congruent   Memory recent  adequate   Memory remote:  adequate   Concentration:  adequate   Abstraction:  abstract   Insight:  good   Reliability good   Judgment:  good         DIAGNOSTIC IMPRESSIONS:  1. Panic Disorder       PLAN:  1. Referred to [facility] (Dr. Diane Hernandez) for the delivery of cognitive behavioral therapy for panic disorder. [id number] x 1 Review of records. Face to face interview w/ patient. Determine test protocol: 60 minutes. Total 1 unit    Tray Blake, PHD  Licensed Clinical Psychologist    This note was created using voice recognition software. Despite editing, there may be syntax errors.      This note will not be viewable in [address]

## 2019-08-01 ENCOUNTER — OFFICE VISIT (OUTPATIENT)
Dept: NEUROLOGY | Age: 44
End: 2019-08-01

## 2019-08-01 DIAGNOSIS — F41.0 PANIC DISORDER: Primary | ICD-10-CM

## 2019-08-13 ENCOUNTER — OFFICE VISIT (OUTPATIENT)
Dept: CARDIOLOGY CLINIC | Age: 44
End: 2019-08-13

## 2019-08-13 VITALS
HEART RATE: 58 BPM | DIASTOLIC BLOOD PRESSURE: 68 MMHG | BODY MASS INDEX: 24.66 KG/M2 | SYSTOLIC BLOOD PRESSURE: 109 MMHG | HEIGHT: 65 IN | WEIGHT: 148 LBS

## 2019-08-13 DIAGNOSIS — I95.1 ORTHOSTATIC HYPOTENSION: Primary | ICD-10-CM

## 2019-08-13 DIAGNOSIS — R55 SYNCOPE, UNSPECIFIED SYNCOPE TYPE: ICD-10-CM

## 2019-08-13 NOTE — PROGRESS NOTES
HISTORY OF PRESENT ILLNESS  Zuleyma Pablo is a 45 y.o. female. Hospital Follow Up   Pertinent negatives include no chest pain and no shortness of breath. Dizziness   The history is provided by the patient. This is a chronic problem. The problem has been gradually improving. Pertinent negatives include no chest pain and no shortness of breath. Review of Systems   Constitutional: Negative for chills and weight loss. HENT: Negative for congestion, ear discharge, ear pain, hearing loss, nosebleeds and tinnitus. Eyes: Negative for blurred vision. Respiratory: Negative for shortness of breath, wheezing and stridor. Cardiovascular: Negative for chest pain and leg swelling. Gastrointestinal: Negative for heartburn. Musculoskeletal: Negative for myalgias and neck pain. Skin: Negative for itching and rash. Neurological: Positive for dizziness. Negative for tingling, tremors, focal weakness and loss of consciousness. Psychiatric/Behavioral: Negative for depression and suicidal ideas.      Family History   Problem Relation Age of Onset    Heart Disease Mother     Hypertension Mother    [employer] MS Mother     Other Mother 54        Lupus    Stroke Mother         x3    Asthma Sister     Diabetes Paternal Grandmother     Hypertension Paternal Grandmother        Past Medical History:   Diagnosis Date    Anemia     during pregnancy 2nd child    Migraine headache     Ovarian cyst     Vertigo        Past Surgical History:   Procedure Laterality Date     DELIVERY ONLY      DELIVERY       1st child    HX CERVICAL FUSION      HX GYN  2017    tubal ligation     HX ORTHOPAEDIC  2005    RIGHT ELBOW    HX WISDOM TEETH EXTRACTION  1996    x4    INTESTINE SURG PROCEDURE UNLISTED      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 Sanchez Fraction  2011    left shoulder       Social History     Tobacco Use    Smoking status: Never Smoker    Smokeless tobacco: Never Used   Substance Use Topics    Alcohol use: Yes     Frequency: Monthly or less     Comment: rare       Allergies   Allergen Reactions    Ciprofloxacin (Bulk) Diarrhea    Codeine Itching    Dilaudid [Hydromorphone (Bulk)] Itching    Lyrica [Pregabalin] Swelling    Tramadol Other (comments) and Unknown (comments)     Gets headaches  headache    Codeine Phosphate Unknown (comments)    Hydromorphone Unknown (comments)       Outpatient Medications Marked as Taking for the 8/13/19 encounter (Office Visit) with Tomy Raphael MD   Medication Sig Dispense Refill    diazePAM (VALIUM) 5 mg tablet Take 1 Tab by mouth every eight (8) hours as needed (vertigo). Max Daily Amount: 15 mg. 10 Tab 0    ibuprofen (MOTRIN) 800 mg tablet Take 1 Tab by mouth three (3) times daily as needed for Pain. 40 Tab 0        Visit Vitals  /68   Pulse (!) 58   Ht 5' 5\" (1.651 m)   Wt 67.1 kg (148 lb)   BMI 24.63 kg/m²     Physical Exam   Constitutional: She is oriented to person, place, and time. She appears well-developed and well-nourished. No distress. HENT:   Head: Atraumatic. Mouth/Throat: No oropharyngeal exudate. Eyes: Conjunctivae are normal. Right eye exhibits no discharge. Left eye exhibits no discharge. No scleral icterus. Neck: Normal range of motion. Neck supple. No JVD present. No tracheal deviation present. No thyromegaly present. Cardiovascular: Normal rate and regular rhythm. Exam reveals no gallop. No murmur heard. Pulmonary/Chest: Effort normal and breath sounds normal. No stridor. She has no wheezes. She has no rales. Abdominal: Soft. There is no tenderness. There is no rebound and no guarding. Musculoskeletal: Normal range of motion. She exhibits no edema or tenderness. Lymphadenopathy:     She has no cervical adenopathy. Neurological: She is alert and oriented to person, place, and time.  She exhibits normal muscle tone. Skin: Skin is warm. She is not diaphoretic. Psychiatric: She has a normal mood and affect. Her behavior is normal.     ekg sinus rhythm with no acute st-t changes  Echo 04/2018:  SUMMARY:  Left ventricle: Systolic function was normal. Ejection fraction was  estimated in the range of 55 % to 60 %. There were no regional wall motion  abnormalities. Mitral valve: There was mild regurgitation. Tricuspid valve: There was mild regurgitation. Pulmonic valve: There was mild regurgitation. ASSESSMENT and PLAN    ICD-10-CM ICD-9-CM    1. Orthostatic hypotension I95.1 458.0     improved   2. Syncope, unspecified syncope type R55 780.2     no recurrence     No orders of the defined types were placed in this encounter. Follow-up and Dispositions    · Return in about 1 year (around 8/13/2020). current treatment plan is effective, no change in therapy. Patient with h/o recurrent syncope-- has recurrent dizziness- slowly improving on midodrine  No significant VHD by exam.  No preexcitation on ekg    Tilt table test negative. Currently not taking Midodrine. Had one episode of dizziness he was admitted to the emergency room since last follow-up. Doesn't want to be on meds  Advised adequate hydration.

## 2021-06-28 ENCOUNTER — OFFICE VISIT (OUTPATIENT)
Dept: ORTHOPEDIC SURGERY | Age: 46
End: 2021-06-28
Payer: MEDICAID

## 2021-06-28 VITALS
HEIGHT: 65 IN | HEART RATE: 60 BPM | WEIGHT: 172 LBS | TEMPERATURE: 97.1 F | BODY MASS INDEX: 28.66 KG/M2 | OXYGEN SATURATION: 100 %

## 2021-06-28 DIAGNOSIS — M25.461 PAIN AND SWELLING OF RIGHT KNEE: ICD-10-CM

## 2021-06-28 DIAGNOSIS — S83.411A SPRAIN OF MEDIAL COLLATERAL LIGAMENT OF RIGHT KNEE, INITIAL ENCOUNTER: ICD-10-CM

## 2021-06-28 DIAGNOSIS — M25.561 RIGHT KNEE PAIN, UNSPECIFIED CHRONICITY: Primary | ICD-10-CM

## 2021-06-28 DIAGNOSIS — M25.561 PAIN AND SWELLING OF RIGHT KNEE: ICD-10-CM

## 2021-06-28 PROCEDURE — 73562 X-RAY EXAM OF KNEE 3: CPT | Performed by: PHYSICIAN ASSISTANT

## 2021-06-28 PROCEDURE — 99203 OFFICE O/P NEW LOW 30 MIN: CPT | Performed by: PHYSICIAN ASSISTANT

## 2021-06-28 RX ORDER — DICLOFENAC SODIUM 75 MG/1
75 TABLET, DELAYED RELEASE ORAL 2 TIMES DAILY
Qty: 60 TABLET | Refills: 0 | Status: SHIPPED | OUTPATIENT
Start: 2021-06-28 | End: 2021-07-30 | Stop reason: SDUPTHER

## 2021-06-28 NOTE — PROGRESS NOTES
Patient: Bhavna Morse                MRN: [id number]       SSN: [id number]  YOB: 1975        AGE: 39 y.o. SEX: female          PCP: Tanya Galan MD  06/28/21    Chief Complaint   Patient presents with    Knee Pain     right knee       HISTORY:  Bhavna Morse is a 39 y.o. female presents to the office with a 4-week history of right knee pain. She is active as a recreational runner but reports no acute injury or incident that may have led up to her right knee pain. She has pain with activity and rest.  Patient otherwise ignored her pain when it first began but now she is concerned that there may be something internally wrong. Patient has pain when she walks short distances and stands for short periods of time. She stands from a sitting position and sits from a standing position pain is also reproduced over the inner portion of her right knee. She has had no locking or give way senses. No history of trauma to the knee. Pain Assessment  6/28/2021   Location of Pain Knee   Pain Location Comment -   Location Modifiers Right   Severity of Pain 7   Quality of Pain Aching; Sharp;Cracking   Quality of Pain Comment -   Duration of Pain Persistent   Frequency of Pain Constant   Aggravating Factors Bending;Stairs;Squatting;Kneeling;Exercise; Other (Comment)   Aggravating Factors Comment sitting to standing position   Limiting Behavior Yes   Relieving Factors Rest;Elevation;Heat;Ice   Relieving Factors Comment -   Result of Injury No           No results found for: HBA1C, PAW7QMTL, SUB1LPAO  Weight Metrics 6/28/2021 8/13/2019 7/1/2019 1/7/2019 12/28/2018 12/19/2018 12/14/2018   Weight 172 lb 148 lb 149 lb 6.4 oz 166 lb 8 oz 165 lb 168 lb 3.2 oz -   BMI 28.62 kg/m2 24.63 kg/m2 24.86 kg/m2 27.71 kg/m2 26.63 kg/m2 27.15 kg/m2 28.25 kg/m2            Problem List Items Addressed This Visit     None      Visit Diagnoses     Right knee pain, unspecified chronicity    -  Primary    Relevant Orders    AMB POC X-RAY KNEE 3 VIEW (Completed)    Sprain of medial collateral ligament of right knee, initial encounter        Pain and swelling of right knee              PAST MEDICAL HISTORY:   Past Medical History:   Diagnosis Date    Anemia     during pregnancy 2nd child    Low blood pressure     Migraine headache 1997    Ovarian cyst     Vertigo        PAST SURGICAL HISTORY:   Past Surgical History:   Procedure Laterality Date    DELIVERY   12    1st child    HX CERVICAL FUSION      HX GYN  2017    tubal ligation     HX ORTHOPAEDIC  2005    RIGHT ELBOW    HX WISDOM TEETH EXTRACTION  1996    x4    [place]  DELIVERY ONLY      [place] LAP,CHOLECYSTECTOMY N/A 2019    Dr. 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 Authorizing Provider   diazePAM (VALIUM) 5 mg tablet Take 1 Tab by mouth every eight (8) hours as needed (vertigo). Max Daily Amount: 15 mg. 19   Tanya Ruiz MD   ibuprofen (MOTRIN) 800 mg tablet Take 1 Tab by mouth three (3) times daily as needed for Pain. 19   Doug Wilkes MD   polyethylene glycol [facility]) 17 gram/dose powder Take 17 g by mouth daily.  1 tablespoon with 8 oz of water daily 18   Iona James Gladys Marie MD       FAMILY HISTORY:   Family History   Problem Relation Age of Onset    Heart Disease Mother     Hypertension Mother    Georgianna Olszewski MS Mother     Other Mother 54        Lupus    Stroke Mother         x3    Asthma Sister     Diabetes Paternal Grandmother     Hypertension Paternal Grandmother        SOCIAL HISTORY:   Social History     Socioeconomic History    Marital status: SINGLE     Spouse name: Not on file    Number of children: Not on file    Years of education: Not on file    Highest education level: Not on file   Tobacco Use    Smoking status: Never Smoker    Smokeless tobacco: Never Used   Substance and Sexual Activity    Alcohol use: Yes     Comment: rare    Drug use: No     Types: Prescription, OTC    Sexual activity: Yes     Partners: Male     Birth control/protection: Implant     Comment: pregnancy test negative   Other Topics Concern     Social Determinants of Health     Financial Resource Strain:     Difficulty of Paying Living Expenses:    Food Insecurity:     Worried About Running Out of Food in the Last Year:     [address] in the Last Year:    Transportation Needs:     Lack of Transportation (Medical):  Lack of Transportation (Non-Medical):    Physical Activity:     Days of Exercise per Week:     Minutes of Exercise per Session:    Stress:     Feeling of Stress :    Social Connections:     Frequency of Communication with Friends and Family:     Frequency of Social Gatherings with Friends and Family:     Attends [employer] Services:     Active Member of Clubs or Organizations:     Attends Club or Organization Meetings:     Marital Status:        ROS:No CP, No SOB, No fever/chills nor night sweats. No headaches, vision abnormalities to include double and oral loss of vision. No hearing abnormalities. Musculoskeletal pain per HPI. Pain is exacerbated positionally. Pt denies h/o spinal surgery, injections, or PT/chiropractor.  Self treated with less than adequate relief on oral antiinflammatories. . Pt denies change in bowel or bladder habits. Pt denies fever, weight loss, or skin changes. PHYSICAL EXAM:    Visit Vitals  Pulse 60   Temp 97.1 °F (36.2 °C) (Temporal)   Ht 5' 5\" (1.651 m)   Wt 172 lb (78 kg)   SpO2 100%   BMI 28.62 kg/m²       Constitutional: Appears well-developed and well-nourished. No distress. Sitting comfortably in the exam room, interacting with conversation with pleasant affect. Skin: Skin over the head, neck, bilateral limbs, and trunk is warm and dry. No rash or erythema noted. Cranial Nerves II-XII grossly intact  HENT: NC/AT. Normal symmetry, bulk and tone of facial and neck musculature. Trachea midline. No discernible thyromegaly or masses. No involuntary movements. Lymphatic: No preauricular, submandibuar, anterior or posterior cervical lymphadenopathy. Psychiatric: The patient is awake, alert, and oriented to person, place and time. Behavior is normal. Thought content normal.   Cardiovascular: No clubbing, cyanosis. No edema bilateral lower extremities. Pulmonary: No tripoding nor accessory muscle recruitment. Breathing normally, no distress, no audible wheezing. Distal cap refill intact at 2/2 Farrukh UE / LE. Neuro intact Farrukh UE/LE to noxious stimuli        Ortho Specific exam:    Right knee reveals no warmth, erythema, edema, or ecchymosis. She has a 1+ effusion. While supine in shorts she has active range of motion in the flexion plane to 105 degrees with patellar crepitation tracking patella midline. There is pain reproduced over the medial portion of the knee during that plane of motion. She has full extension from her flexion terminal point with also pain reproduced over the origin of the MCL. Insertion of the MCL also is slightly uncomfortable. Valgus stressing does increase medial joint line discomfort but there is no laxity there noted of the MCL. LCL intact with no laxity. Nontender.     ACL and PCL intact with no laxity. Patella tracks midline again with ranging with crepitation. X-ray: [facility]arn[facility] 6/28/2021 space 3 view of the right knee reveals early patellofemoral osteoarthritis with early narrowing of both medial lateral joint spaces. No evidence of soft tissue calcifications. No lesions masses step-offs otherwise identified. IMPRESSION:      ICD-10-CM ICD-9-CM    1. Right knee pain, unspecified chronicity  M25.561 719.46 AMB POC X-RAY KNEE 3 VIEW   2. Sprain of medial collateral ligament of right knee, initial encounter  S83.411A 844.1    3. Pain and swelling of right knee  M25.561 719.46     M25.461 719.06         PLAN: Today we discussed alternatives to care to include but not limited to activity management initially with no running or jumping. Patient may walk therapeutically for cardiovascular health and weight maintenance. She was recommended a medial lateral stay neoprene brace rebound sport type. She may use Tylenol/Motrin over-the-counter symptom management per 's recommended dosing. Today her x-rays reviewed copies provided all of her questions answered to her satisfaction. No formal physical therapy needed at this time. Patient to follow back with our office in about 3 weeks. Today all of her questions answered to her satisfaction. Additionally today we discussed the diagnosis of obesity and the importance of weight management for both cardiovascular health. The patient was recommended to decrease carbohydrate and sugar intake. Patient recommended a formal dietary consult which they will consider and return a call to our office. In light of the patient's osteoarthritic findings I am making a recommendation for aerobic exercise to include but not limited to stationary bicycle, elliptical, therapeutic walking with good shoes and or swimming. Patient should avoid any running or jumping.   If using the treadmill then recommendation for no elevation and no running or jogging. Walking is improved. No Narcotic indicated today. Patient given pain medication for short term acute pain relief. Goal is to treat patient according to above plan and to ultimately have patient off all pain medications once appropriate. If chronic pain management is required beyond what is expected for current orthopedic problem, will refer patient to pain management.  was reviewed and will be reviewed with every medication refill request.         Patient provided a reminder for a \"due or due soon\" health maintenance. I have asked the patient to schedule an appointment with their primary care provider for follow-up on general health maintenance concerns. Today all the patient's questions were answered to their satisfaction. Copies of x-rays reviewed if obtained this visit, and provided to patient. Dictation disclaimer:  Please note that this dictation was completed with [employer], the computer voice recognition software. Quite often unanticipated grammatical, syntax, homophones, and other interpretive errors are inadvertently transcribed by the computer software. Please disregard these errors. Please excuse any errors that have escaped final proofreading. Modesto CARROLL, APC, MPAS, PA-C  [facility]

## 2021-07-21 ENCOUNTER — OFFICE VISIT (OUTPATIENT)
Dept: ORTHOPEDIC SURGERY | Age: 46
End: 2021-07-21
Payer: MEDICAID

## 2021-07-21 VITALS
WEIGHT: 174.8 LBS | TEMPERATURE: 97.3 F | HEIGHT: 65 IN | HEART RATE: 65 BPM | OXYGEN SATURATION: 100 % | BODY MASS INDEX: 29.12 KG/M2

## 2021-07-21 DIAGNOSIS — M25.461 PAIN AND SWELLING OF RIGHT KNEE: ICD-10-CM

## 2021-07-21 DIAGNOSIS — M25.561 RIGHT KNEE PAIN, UNSPECIFIED CHRONICITY: Primary | ICD-10-CM

## 2021-07-21 DIAGNOSIS — M25.561 PAIN AND SWELLING OF RIGHT KNEE: ICD-10-CM

## 2021-07-21 DIAGNOSIS — M22.91 DISORDER OF RIGHT PATELLA: ICD-10-CM

## 2021-07-21 PROCEDURE — 99213 OFFICE O/P EST LOW 20 MIN: CPT | Performed by: PHYSICIAN ASSISTANT

## 2021-07-21 NOTE — PROGRESS NOTES
Patient: Gilberto Ramos                MRN: [id number]       SSN: [id number]  YOB: 1975        AGE: 39 y.o. SEX: female          PCP: Marva Garrett MD  07/21/21 7/21/2021: Patient returns the office follow-up regarding her right knee pain. She has been wearing her rebound sport brace as recommended from last OV. Has pain to the right knee. She has not been sleeping in the brace. She maintains full weightbearing of the right lower extremity. She reports the kneecap sliding when she bends her knee to the inside of her joint. She had a similar occurrence when she first complained of knee pain from her initial injury. The patient feels better when she is wearing her brace but of recent within the past week the brace is now causing pain with the kneecap slipping. Patient has been performing her own self-guided exercises which have not helped her symptoms and actually she feels like they may have worsened now causing her kneecap to slip inward. Chief Complaint   Patient presents with    Knee Pain     Right       HISTORY:  Gilberto Ramos is a 39 y.o. female presents to the office with a 4-week history of right knee pain. She is active as a recreational runner but reports no acute injury or incident that may have led up to her right knee pain. She has pain with activity and rest.  Patient otherwise ignored her pain when it first began but now she is concerned that there may be something internally wrong. Patient has pain when she walks short distances and stands for short periods of time. She stands from a sitting position and sits from a standing position pain is also reproduced over the inner portion of her right knee. She has had no locking or give way senses. No history of trauma to the knee.           Pain Assessment  7/21/2021   Location of Pain Knee   Pain Location Comment -   Location Modifiers Right   Severity of Pain 7   Quality of Pain Sharp   Quality of Pain Comment -   Duration of Pain Persistent   Frequency of Pain Constant   Aggravating Factors Stairs; Walking;Standing   Aggravating Factors Comment -   Limiting Behavior No   Relieving Factors Elevation; Rest   Relieving Factors Comment -   Result of Injury No           No results found for: HBA1C, MYL5FOZB, UJX6CAPB, THV5DSRD  Weight Metrics 2018   Weight 174 lb 12.8 oz 172 lb 148 lb 149 lb 6.4 oz 166 lb 8 oz 165 lb 168 lb 3.2 oz   BMI 29.09 kg/m2 28.62 kg/m2 24.63 kg/m2 24.86 kg/m2 27.71 kg/m2 26.63 kg/m2 27.15 kg/m2            Problem List Items Addressed This Visit     None      Visit Diagnoses     Right knee pain, unspecified chronicity    -  Primary    Pain and swelling of right knee        Disorder of right patella              PAST MEDICAL HISTORY:   Past Medical History:   Diagnosis Date    Anemia     during pregnancy 2nd child    Low blood pressure     Migraine headache     Ovarian cyst     Vertigo        PAST SURGICAL HISTORY:   Past Surgical History:   Procedure Laterality Date    DELIVERY   12    1st child    HX CERVICAL FUSION      HX GYN  2017    tubal ligation     HX ORTHOPAEDIC  2005    RIGHT ELBOW    HX WISDOM TEETH EXTRACTION  1996    x4    [place]  DEL[place]      [place] LAP[place]CHOLECYSTECTOMY N/A 2019    Dr. 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 Authorizing Provider   diclofenac EC (VOLTAREN) 75 mg EC tablet Take 1 Tablet by mouth two (2) times a day. 6/28/21  Yes Michael Cantu PA-C   diazePAM (VALIUM) 5 mg tablet Take 1 Tab by mouth every eight (8) hours as needed (vertigo). Max Daily Amount: 15 mg. 5/8/19   Kate Mota MD   ibuprofen (MOTRIN) 800 mg tablet Take 1 Tab by mouth three (3) times daily as needed for Pain. 1/7/19   Liv Reynolds MD   polyethylene glycol [facility]) 17 gram/dose powder Take 17 g by mouth daily. 1 tablespoon with 8 oz of water daily 12/11/18   Blayne Hess MD       FAMILY HISTORY:   Family History   Problem Relation Age of Onset    Heart Disease Mother     Hypertension Mother    Sherrel Mignon MS Mother     Other Mother 54        Lupus    Stroke Mother         x3    Asthma Sister     Diabetes Paternal Grandmother     Hypertension Paternal Grandmother        SOCIAL HISTORY:   Social History     Socioeconomic History    Marital status: SINGLE     Spouse name: Not on file    Number of children: Not on file    Years of education: Not on file    Highest education level: Not on file   Tobacco Use    Smoking status: Never Smoker    Smokeless tobacco: Never Used   Substance and Sexual Activity    Alcohol use: Yes     Comment: rare    Drug use: No     Types: Prescription, OTC    Sexual activity: Yes     Partners: Male     Birth control/protection: Implant     Comment: pregnancy test negative   Other Topics Concern     Social Determinants of Health     Financial Resource Strain:     Difficulty of Paying Living Expenses:    Food Insecurity:     Worried About Running Out of Food in the Last Year:     [address] in the Last Year:    Transportation Needs:     Lack of Transportation (Medical):      Lack of Transportation (Non-Medical):    Physical Activity:     Days of Exercise per Week:     Minutes of Exercise per Session:    Stress:     Feeling of Stress :    Social Connections:     Frequency of Communication with Friends and Family:     Frequency of Social Gatherings with Friends and Family:     Attends Buddhism Services:     Active Member of Clubs or Organizations:     Attends Club or Organization Meetings:     Marital Status:        ROS:No CP, No SOB, No fever/chills nor night sweats. No headaches, vision abnormalities to include double and oral loss of vision. No hearing abnormalities. Musculoskeletal pain per HPI. Pain is exacerbated positionally. Pt denies h/o spinal surgery, injections, or PT/chiropractor. Self treated with less than adequate relief on oral antiinflammatories. . Pt denies change in bowel or bladder habits. Pt denies fever, weight loss, or skin changes. PHYSICAL EXAM:    Visit Vitals  Pulse 65   Temp 97.3 °F (36.3 °C) (Temporal)   Ht 5' 5\" (1.651 m)   Wt 174 lb 12.8 oz (79.3 kg)   SpO2 100%   BMI 29.09 kg/m²       Constitutional: Appears well-developed and well-nourished. No distress. Sitting comfortably in the exam room, interacting with conversation with pleasant affect. Skin: Skin over the head, neck, bilateral limbs, and trunk is warm and dry. No rash or erythema noted. Cranial Nerves II-XII grossly intact  HENT: NC/AT. Normal symmetry, bulk and tone of facial and neck musculature. Trachea midline. No discernible thyromegaly or masses. No involuntary movements. Lymphatic: No preauricular, submandibuar, anterior or posterior cervical lymphadenopathy. Psychiatric: The patient is awake, alert, and oriented to person, place and time. Behavior is normal. Thought content normal.   Cardiovascular: No clubbing, cyanosis. No edema bilateral lower extremities. Pulmonary: No tripoding nor accessory muscle recruitment. Breathing normally, no distress, no audible wheezing.    Distal cap refill intact at 2/2 Farrukh UE / LE. Neuro intact Farrukh UE/LE to noxious stimuli        Ortho Specific exam:    Right knee reveals no warmth, erythema, edema, or ecchymosis. She has a trace effusion. While supine in shorts she has active range of motion in the flexion plane to 105 degrees with patellar crepitation and subluxation of the patella medial.  With patient in full extension the patella can be subluxed beyond the midpoint of the trochlear groove on the right. There is no palpable defect in the patellar retinacular tissue medial or lateral however there is pain noted in the lateral patellar retinacular tissue attachment. There is pain reproduced over the medial portion of the knee during that plane of motion. She has full extension from her flexion terminal point with also pain reproduced over the origin of the MCL. Insertion of the MCL also is slightly uncomfortable. Valgus stressing does increase medial joint line discomfort but there is no laxity there noted of the MCL. LCL intact with no laxity. Nontender. ACL and PCL intact with no laxity. Patella tracks midline again with ranging with crepitation. X-ray: [facility] 6/28/2021 space 3 view of the right knee reveals early patellofemoral osteoarthritis with early narrowing of both medial lateral joint spaces. No evidence of soft tissue calcifications. No lesions masses step-offs otherwise identified. IMPRESSION:      ICD-10-CM ICD-9-CM    1. Right knee pain, unspecified chronicity  M25.561 719.46    2. Pain and swelling of right knee  M25.561 719.46     M25.461 719.06    3. Disorder of right patella  M22.91 719.96         PLAN: Patient is recommended to continue her rebound sport range of motion brace with stays. The fact that now she is subluxing both actively and passively about the patella of the right knee I am recommending a MRI to fully assess for patellar retinacular internal derangement. Today all the patient's questions were answered to her satisfaction. We will follow her back after the MRI. Additionally today we discussed the diagnosis of obesity and the importance of weight management for both cardiovascular health. The patient was recommended to decrease carbohydrate and sugar intake. Patient recommended a formal dietary consult which they will consider and return a call to our office. In light of the patient's osteoarthritic findings I am making a recommendation for aerobic exercise to include but not limited to stationary bicycle, elliptical, therapeutic walking with good shoes and or swimming. Patient should avoid any running or jumping. If using the treadmill then recommendation for no elevation and no running or jogging. Walking is improved. No Narcotic indicated today. Patient given pain medication for short term acute pain relief. Goal is to treat patient according to above plan and to ultimately have patient off all pain medications once appropriate. If chronic pain management is required beyond what is expected for current orthopedic problem, will refer patient to pain management.  was reviewed and will be reviewed with every medication refill request.         Patient provided a reminder for a \"due or due soon\" health maintenance. I have asked the patient to schedule an appointment with their primary care provider for follow-up on general health maintenance concerns. Today all the patient's questions were answered to their satisfaction. Copies of x-rays reviewed if obtained this visit, and provided to patient. Dictation disclaimer:  Please note that this dictation was completed with [employer], the [employer] voice recognition software. Quite often unanticipated grammatical, syntax, homophones, and other interpretive errors are inadvertently transcribed by the computer software. Please disregard these errors.   Please excuse any errors that have escaped final proofreading. Robert Cantu  APA, APC, MPAS, PA-C  [facility]

## 2021-07-28 DIAGNOSIS — M25.561 RIGHT KNEE PAIN, UNSPECIFIED CHRONICITY: ICD-10-CM

## 2021-07-28 DIAGNOSIS — M25.461 PAIN AND SWELLING OF RIGHT KNEE: ICD-10-CM

## 2021-07-28 DIAGNOSIS — M22.91 DISORDER OF RIGHT PATELLA: ICD-10-CM

## 2021-07-28 DIAGNOSIS — M25.561 PAIN AND SWELLING OF RIGHT KNEE: ICD-10-CM

## 2021-07-30 RX ORDER — DICLOFENAC SODIUM 75 MG/1
75 TABLET, DELAYED RELEASE ORAL 2 TIMES DAILY
Qty: 60 TABLET | Refills: 0 | Status: SHIPPED | OUTPATIENT
Start: 2021-07-30 | End: 2021-08-31

## 2021-07-30 NOTE — TELEPHONE ENCOUNTER
Last Visit: 7/21/21 with PA [address]  Next Appointment: none  Previous Refill Encounter(s): 6/28/21 #60    Requested Prescriptions     Pending Prescriptions Disp Refills    diclofenac EC (VOLTAREN) 75 mg EC tablet 60 Tablet 0     Sig: Take 1 Tablet by mouth two (2) times a day.

## 2021-08-03 PROBLEM — R42 DIZZINESS: Status: RESOLVED | Noted: 2019-07-01 | Resolved: 2021-08-03

## 2021-08-15 ENCOUNTER — HOSPITAL ENCOUNTER (OUTPATIENT)
Age: 46
Discharge: HOME | End: 2021-08-15
Attending: PHYSICIAN ASSISTANT
Payer: MEDICAID

## 2021-08-15 PROCEDURE — 73721 MRI JNT OF LWR EXTRE W/O DYE: CPT

## 2021-08-18 ENCOUNTER — OFFICE VISIT (OUTPATIENT)
Dept: ORTHOPEDIC SURGERY | Age: 46
End: 2021-08-18
Payer: MEDICAID

## 2021-08-18 VITALS
HEIGHT: 65 IN | WEIGHT: 174 LBS | TEMPERATURE: 97.5 F | OXYGEN SATURATION: 99 % | HEART RATE: 72 BPM | BODY MASS INDEX: 28.99 KG/M2

## 2021-08-18 DIAGNOSIS — M22.41 PATELLOFEMORAL CHONDROSIS OF RIGHT KNEE: Primary | ICD-10-CM

## 2021-08-18 PROCEDURE — 99213 OFFICE O/P EST LOW 20 MIN: CPT | Performed by: PHYSICIAN ASSISTANT

## 2021-08-18 NOTE — PROGRESS NOTES
First returns the office for follow-up regarding her knee and for interpretation of MRI as below:      Result Information    Status: Final result (Exam End: 8/15/2021 10:56) Provider Status: Reviewed   Study Result    Narrative & Impression   Multisequence multiplanar MR images of the right knee were obtained.     HISTORY: Pain.     Medial meniscus, medial compartment cartilage and medial collateral ligament are  intact.     Cruciate ligaments are intact.     Lateral meniscus, lateral compartment cartilage and lateral collateral ligament  complex are intact.     No joint effusion. There is mild to moderate inflammation along the lateral  aspect of the superior infrapatellar fat pad. Mild patellar tendinosis. There is  near full-thickness fraying of the patellar cartilage along the medial facet  with subchondral edema. Retinacula are intact. TT TG distance 9 mm. Quadriceps  tendon is intact.     IMPRESSION  1. 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 2. 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 Patient was placed previously on diclofenac 75 mg tablets and that did help her symptoms associated with her right knee however did cause some sedation. She subsequently stopped the medication. Her pain is currently along the inner portion of her left kneecap and she notes swelling intermittently. The patient has continued her medial lateral stay range of motion brace which after reviewing the findings above and the grade 3-4 chondrosis of the medial facet of the patella I do not believe the brace is necessary to help her symptoms. She may return to using Tylenol over-the-counter persistent symptoms no need per 's recommended dosing and/or over-the-counter Motrin. If sleep is a problem with the diclofenac she should only take it at bedtime.   Today her MRI was reviewed all of her questions answered to her satisfaction. Follow-up as needed.

## 2021-08-28 LAB — SARS-COV-2, NAA: DETECTED

## 2021-08-31 RX ORDER — DICLOFENAC SODIUM 75 MG/1
TABLET, DELAYED RELEASE ORAL
Qty: 60 TABLET | Refills: 0 | Status: SHIPPED | OUTPATIENT
Start: 2021-08-31 | End: 2021-10-31

## 2021-10-28 ENCOUNTER — TELEPHONE (OUTPATIENT)
Dept: FAMILY MEDICINE CLINIC | Age: 46
End: 2021-10-28

## 2021-10-28 NOTE — TELEPHONE ENCOUNTER
Would like to re-establish care with you. Last seen by EZ Rose. Are you willing to accept patient back?

## 2021-10-31 ENCOUNTER — HOSPITAL ENCOUNTER (EMERGENCY)
Age: 46
Discharge: HOME | End: 2021-11-01
Attending: EMERGENCY MEDICINE
Payer: MEDICAID

## 2021-10-31 DIAGNOSIS — E86.0 DEHYDRATION: ICD-10-CM

## 2021-10-31 DIAGNOSIS — R42 DIZZINESS: Primary | ICD-10-CM

## 2021-10-31 LAB
ALBUMIN SERPL-MCNC: 3.9 G/DL (ref 3.4–5)
ALBUMIN/GLOB SERPL: 1 {RATIO} (ref 0.8–1.7)
ALP SERPL-CCNC: 59 U/L (ref 45–117)
ALT SERPL-CCNC: 22 U/L (ref 13–56)
ANION GAP SERPL CALC-SCNC: 7 MMOL/L (ref 3–18)
APPEARANCE UR: CLEAR
AST SERPL-CCNC: 17 U/L (ref 10–38)
BASOPHILS # BLD: 0.1 K/UL (ref 0–0.1)
BASOPHILS NFR BLD: 1 % (ref 0–2)
BILIRUB SERPL-MCNC: 0.6 MG/DL (ref 0.2–1)
BILIRUB UR QL: NEGATIVE
BUN SERPL-MCNC: 17 MG/DL (ref 7–18)
BUN/CREAT SERPL: 19 (ref 12–20)
CALCIUM SERPL-MCNC: 9.3 MG/DL (ref 8.5–10.1)
CHLORIDE SERPL-SCNC: 102 MMOL/L (ref 100–111)
CO2 SERPL-SCNC: 30 MMOL/L (ref 21–32)
COLOR UR: YELLOW
CREAT SERPL-MCNC: 0.91 MG/DL (ref 0.6–1.3)
DIFFERENTIAL METHOD BLD: NORMAL
EOSINOPHIL # BLD: 0.1 K/UL (ref 0–0.4)
EOSINOPHIL NFR BLD: 1 % (ref 0–5)
ERYTHROCYTE [DISTWIDTH] IN BLOOD BY AUTOMATED COUNT: 12.2 % (ref 11.6–14.5)
GLOBULIN SER CALC-MCNC: 4.1 G/DL (ref 2–4)
GLUCOSE SERPL-MCNC: 86 MG/DL (ref 74–99)
GLUCOSE UR STRIP.AUTO-MCNC: NEGATIVE MG/DL
HCG UR QL: NEGATIVE
HCT VFR BLD AUTO: 39.4 % (ref 35–45)
HGB BLD-MCNC: 12.4 G/DL (ref 12–16)
HGB UR QL STRIP: NEGATIVE
KETONES UR QL STRIP.AUTO: ABNORMAL MG/DL
LEUKOCYTE ESTERASE UR QL STRIP.AUTO: NEGATIVE
LYMPHOCYTES # BLD: 3.6 K/UL (ref 0.9–3.6)
LYMPHOCYTES NFR BLD: 41 % (ref 21–52)
MCH RBC QN AUTO: 28.8 PG (ref 24–34)
MCHC RBC AUTO-ENTMCNC: 31.5 G/DL (ref 31–37)
MCV RBC AUTO: 91.4 FL (ref 78–100)
MONOCYTES # BLD: 0.7 K/UL (ref 0.05–1.2)
MONOCYTES NFR BLD: 8 % (ref 3–10)
NEUTS SEG # BLD: 4.4 K/UL (ref 1.8–8)
NEUTS SEG NFR BLD: 50 % (ref 40–73)
NITRITE UR QL STRIP.AUTO: NEGATIVE
PH UR STRIP: 6 [PH] (ref 5–8)
PLATELET # BLD AUTO: 273 K/UL (ref 135–420)
PMV BLD AUTO: 11.1 FL (ref 9.2–11.8)
POTASSIUM SERPL-SCNC: 3.8 MMOL/L (ref 3.5–5.5)
PROT SERPL-MCNC: 8 G/DL (ref 6.4–8.2)
PROT UR STRIP-MCNC: NEGATIVE MG/DL
RBC # BLD AUTO: 4.31 M/UL (ref 4.2–5.3)
SODIUM SERPL-SCNC: 139 MMOL/L (ref 136–145)
SP GR UR REFRACTOMETRY: 1.02 (ref 1–1.03)
UROBILINOGEN UR QL STRIP.AUTO: 1 EU/DL (ref 0.2–1)
WBC # BLD AUTO: 8.8 K/UL (ref 4.6–13.2)

## 2021-10-31 PROCEDURE — 93005 ELECTROCARDIOGRAM TRACING: CPT

## 2021-10-31 PROCEDURE — 85025 COMPLETE CBC W/AUTO DIFF WBC: CPT

## 2021-10-31 PROCEDURE — 96361 HYDRATE IV INFUSION ADD-ON: CPT

## 2021-10-31 PROCEDURE — 99285 EMERGENCY DEPT VISIT HI MDM: CPT

## 2021-10-31 PROCEDURE — 96360 HYDRATION IV INFUSION INIT: CPT

## 2021-10-31 PROCEDURE — 80053 COMPREHEN METABOLIC PANEL: CPT

## 2021-10-31 PROCEDURE — 81025 URINE PREGNANCY TEST: CPT

## 2021-10-31 PROCEDURE — 81003 URINALYSIS AUTO W/O SCOPE: CPT

## 2021-10-31 PROCEDURE — 74011250636 HC RX REV CODE- 250/636: Performed by: EMERGENCY MEDICINE

## 2021-10-31 RX ORDER — METHYLPREDNISOLONE 4 MG/1
TABLET ORAL
COMMUNITY
Start: 2021-10-26 | End: 2021-11-15

## 2021-10-31 RX ADMIN — SODIUM CHLORIDE 1000 ML: 900 INJECTION, SOLUTION INTRAVENOUS at 23:07

## 2021-11-01 VITALS
HEART RATE: 74 BPM | RESPIRATION RATE: 16 BRPM | TEMPERATURE: 99.6 F | SYSTOLIC BLOOD PRESSURE: 113 MMHG | OXYGEN SATURATION: 100 % | BODY MASS INDEX: 27.97 KG/M2 | HEIGHT: 66 IN | WEIGHT: 174 LBS | DIASTOLIC BLOOD PRESSURE: 83 MMHG

## 2021-11-01 LAB
ATRIAL RATE: 63 BPM
CALCULATED P AXIS, ECG09: 35 DEGREES
CALCULATED R AXIS, ECG10: 54 DEGREES
CALCULATED T AXIS, ECG11: 64 DEGREES
DIAGNOSIS, 93000: NORMAL
P-R INTERVAL, ECG05: 144 MS
Q-T INTERVAL, ECG07: 390 MS
QRS DURATION, ECG06: 80 MS
QTC CALCULATION (BEZET), ECG08: 399 MS
VENTRICULAR RATE, ECG03: 63 BPM

## 2021-11-01 RX ORDER — ONDANSETRON 4 MG/1
4 TABLET, ORALLY DISINTEGRATING ORAL
Qty: 20 TABLET | Refills: 0 | Status: SHIPPED | OUTPATIENT
Start: 2021-11-01 | End: 2021-11-15

## 2021-11-01 NOTE — ED NOTES
Magui Haro is a 42 y.o. female that was discharged in stable condition. The patients diagnosis, condition and treatment were explained to  patient and aftercare instructions were given. The patient verbalized understanding. Patient armband removed and shredded.

## 2021-11-01 NOTE — ED TRIAGE NOTES
Patient has hx of vertigo. She states having vertigo for some time, but advises that vertigo worsened today. C/o nausea. She denies pain.

## 2021-11-25 NOTE — ED PROVIDER NOTES
Late entry, I believe my original note was deleted    The patient is a 43-year-old woman with past medical history significant for anemia, low blood pressure, and vertigo, who presents to the ED today with dizziness and nausea. She states that she stood up today and felt very dizzy and lightheaded. She felt like her legs gave out. She did not lose consciousness. She did not hit her head. She has no neck pain. She states that she is currently taking steroids for costochondritis. She is also complaining of some chills but denies any fevers. She denies any nausea, vomiting, diarrhea or dysuria. She has been immunized for Covid.            Past Medical History:   Diagnosis Date    Anemia     during pregnancy 2nd child    Dizziness     Low blood pressure     Migraine headache     Ovarian cyst     Vertigo        Past Surgical History:   Procedure Laterality Date    DELIVERY       1st child    HX CERVICAL FUSION      HX GYN  2017    tubal ligation     HX ORTHOPAEDIC  2005    RIGHT ELBOW    HX WISDOM TEETH EXTRACTION  1996    x4    [place]  DELIVERY ONLY      [place] LAP,CHOLECYSTECTOMY N/A 2019    Dr. Brittney Damon [address] UNLISTED  2011    left shoulder    [place] SPINE SURGERY PROCEDURE UNLISTED  2017    spinal fusion    [place] UNLISTED PROCEDURE SMALL INTESTINE      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         Family History:   Problem Relation Age of Onset    Heart Disease Mother     Hypertension Mother     Mult Sclerosis Mother     Other Mother 54        Lupus    Stroke Mother         x3    Asthma Sister     Diabetes Paternal Grandmother     Hypertension Paternal Grandmother        Social History     Socioeconomic History    Marital status: SINGLE     Spouse name: Not on file    Number of children: Not on file    Years of education: Not on file    Highest education level: Not on file   Occupational History    Not on file   Tobacco Use    Smoking status: Never Smoker    Smokeless tobacco: Never Used   Substance and Sexual Activity    Alcohol use: Yes     Comment: rare    Drug use: No     Types: Prescription, OTC    Sexual activity: Yes     Partners: Male   Other Topics Concern     Service Not Asked    Blood Transfusions Not Asked    Caffeine Concern Not Asked    Occupational Exposure Not Asked    Hobby Hazards Not Asked    Sleep Concern Not Asked    Stress Concern Not Asked    Weight Concern Not Asked    Special Diet Not Asked    Back Care Not Asked    Exercise Not Asked    Bike Helmet Not Asked   [address] Not Asked    Self-Exams Not Asked   Social History Narrative    Not on file     Social Determinants of Health     Financial Resource Strain:     Difficulty of Paying Living Expenses: Not on file   Food Insecurity:     Worried About Running Out of Food in the Last Year: Not on file    Meaghan of Food in the Last Year: Not on file   Transportation Needs:     Lack of Transportation (Medical): Not on file    Lack of Transportation (Non-Medical):  Not on file   Physical Activity:     Days of Exercise per Week: Not on file    Minutes of Exercise per Session: Not on file   Stress:     Feeling of Stress : Not on file   Social Connections:     Frequency of Communication with Friends and Family: Not on file    Frequency of Social Gatherings with Friends and Family: Not on file    Attends Evangelical Services: Not on file    Active Member of Clubs or Organizations: Not on file    Attends Club or Organization Meetings: Not on file    Marital Status: Not on file   Intimate Partner Violence:     Fear of Current or Ex-Partner: Not on file    Emotionally Abused: Not on file    Physically Abused: Not on file    Sexually Abused: Not on file   Housing Stability:     Unable to Pay for Housing in the Last Year: Not on file    Number of [place] in the Last Year: Not on file    Unstable Housing in the Last Year: Not on file         ALLERGIES: Ciprofloxacin (bulk), Codeine, Dilaudid [hydromorphone (bulk)], Lyrica [pregabalin], Tramadol, Codeine phosphate, and Hydromorphone    Review of Systems   All other systems reviewed and are negative. Vitals:    10/31/21 2308 10/31/21 2310 10/31/21 2312 10/31/21 2330   BP: 122/73 117/79 (!) 121/91 113/83   Pulse: (!) 58 (!) 58 74    Resp:       Temp:       SpO2:    100%   Weight:       Height:                Physical Exam  Vitals and nursing note reviewed. Constitutional:       Appearance: Normal appearance. HENT:      Head: Normocephalic and atraumatic. Right Ear: Tympanic membrane, ear canal and external ear normal.      Left Ear: Tympanic membrane, ear canal and external ear normal.      Nose: Nose normal.      Mouth/Throat:      Mouth: Mucous membranes are dry. Pharynx: Oropharynx is clear. Eyes:      Extraocular Movements: Extraocular movements intact. Conjunctiva/sclera: Conjunctivae normal.      Pupils: Pupils are equal, round, and reactive to light. Cardiovascular:      Rate and Rhythm: Normal rate and regular rhythm. Pulses: Normal pulses. Heart sounds: Normal heart sounds. Pulmonary:      Effort: Pulmonary effort is normal.      Breath sounds: Normal breath sounds. Abdominal:      General: Abdomen is flat. Bowel sounds are normal.      Palpations: Abdomen is soft. Musculoskeletal:         General: Normal range of motion. Cervical back: Normal range of motion and neck supple. Skin:     General: Skin is warm and dry. Capillary Refill: Capillary refill takes less than 2 seconds. Neurological:      General: No focal deficit present. Mental Status: She is alert and oriented to person, place, and time. Cranial Nerves: No cranial nerve deficit. Sensory: No sensory deficit. Motor: No weakness.       Coordination: Coordination normal.      Gait: Gait normal.      Deep Tendon Reflexes: Reflexes normal.   Psychiatric:         Mood and Affect: Mood normal.         Behavior: Behavior normal.         Thought Content: Thought content normal.         Judgment: Judgment normal.          MDM  Number of Diagnoses or Management Options  Dehydration  Dizziness  Diagnosis management comments: The patient is a 61-year-old woman with past medical history significant for vertigo, who became dizzy and nauseous when she stood up. She became very lightheaded and thought she was going to pass out. She appears to be somewhat dehydrated. Her orthostatics are positive. She has received IV fluids in the ED. She is feeling better. She will be discharged home and advised to continue to hydrate. She has been advised to follow-up with her primary care physician in 2 to 3 days. Return precautions have been given. ED Course as of 11/25/21 0644   Thu Nov 25, 2021   3168 COMPREHENSIVE METABOLIC PANEL(!):    Sodium 139   Potassium 3.8   Chloride 102   CO2 30   Anion gap 7   Glucose 86   BUN 17   Creatinine 0.91   BUN/Creatinine ratio 19   GFR est AA >60   GFR est non-AA >60   Calcium 9.3   Bilirubin, total 0.6   ALT 22   AST 17   Alk. phosphatase 59   Protein, total 8.0   Albumin 3.9   Globulin 4.1(!)   A-G Ratio 1.0 [EB]   0644 HCG URINE, QL:    HCG urine, QL Negative [EB]   0644 CBC WITH AUTOMATED DIFF:    WBC 8.8   RBC 4.31   HGB 12.4   HCT 39.4   MCV 91.4   MCH 28.8   MCHC 31.5   RDW 12.2   PLATELET 319   MPV 69.8   NEUTROPHILS 50   LYMPHOCYTES 41   MONOCYTES 8   EOSINOPHILS 1   BASOPHILS 1   ABS. NEUTROPHILS 4.4   ABS. LYMPHOCYTES 3.6   ABS. MONOCYTES 0.7   ABS. EOSINOPHILS 0.1   ABS.  BASOPHILS 0.1   DF AUTOMATED [EB]   0644 URINALYSIS W/ RFLX MICROSCOPIC(!):    Color YELLOW   Appearance CLEAR   Specific gravity 1.022   pH (UA) 6.0   Protein Negative   Glucose Negative   Ketone TRACE(!)   Bilirubin Negative   Blood Negative   Urobilinogen 1.0   Nitrites Negative   Leukocyte Esterase Negative [EB]   0644 EKG, 12 LEAD, INITIAL [EB]      ED Course User Index  [EB] Loco Deng MD       Procedures

## 2021-12-30 ENCOUNTER — OFFICE VISIT (OUTPATIENT)
Dept: CARDIOLOGY CLINIC | Age: 46
End: 2021-12-30
Payer: MEDICAID

## 2021-12-30 VITALS
DIASTOLIC BLOOD PRESSURE: 80 MMHG | SYSTOLIC BLOOD PRESSURE: 118 MMHG | BODY MASS INDEX: 28.28 KG/M2 | WEIGHT: 176 LBS | HEIGHT: 66 IN | HEART RATE: 70 BPM | OXYGEN SATURATION: 98 %

## 2021-12-30 DIAGNOSIS — G90.9 AUTONOMIC DYSFUNCTION: Primary | ICD-10-CM

## 2021-12-30 DIAGNOSIS — R55 NEUROCARDIOGENIC SYNCOPE: ICD-10-CM

## 2021-12-30 PROCEDURE — 99204 OFFICE O/P NEW MOD 45 MIN: CPT | Performed by: INTERNAL MEDICINE

## 2021-12-30 NOTE — PROGRESS NOTES
Kerri Kenney presents today for   Chief Complaint   Patient presents with    New Patient     reffered by PCP for syncope       Kerri Kenney preferred language for health care discussion is english/other. Is someone accompanying this pt? n0    Is the patient using any DME equipment during OV? no    Depression Screening:  3 most recent PHQ Screens 12/30/2021   Little interest or pleasure in doing things Not at all   Feeling down, depressed, irritable, or hopeless Not at all   Total Score PHQ 2 0       Learning Assessment:  Learning Assessment 12/30/2021   PRIMARY LEARNER Patient   HIGHEST LEVEL OF EDUCATION - PRIMARY LEARNER  -   BARRIERS PRIMARY LEARNER -   [facility]    NEED -   LEARNER PREFERENCE PRIMARY DEMONSTRATION     -     -     -     -   ANSWERED BY patient   RELATIONSHIP SELF       Abuse Screening:  Abuse Screening Questionnaire 12/30/2021   Do you ever feel afraid of your partner? N   Are you in a relationship with someone who physically or mentally threatens you? N   Is it safe for you to go home? Y       Fall Risk  Fall Risk Assessment, last 12 mths 12/11/2018   Able to walk? Yes   Fall in past 12 months? No           Pt currently taking Anticoagulant therapy? no    Pt currently taking Antiplatelet therapy ? no      Coordination of Care:  1. Have you been to the ER, urgent care clinic since your last visit? Hospitalized since your last visit? no    2. Have you seen or consulted any other health care providers outside of the [address] [place] [address] since your last visit? Include any pap smears or colon screening.  no

## 2021-12-30 NOTE — LETTER
12/30/2021    Patient: Luis Crook   YOB: 1975   Date of Visit: 12/30/2021     Ingris Patton MD  [id number]  [phone]  [address] [facility] [address]    Dear Ingris Patton MD,      Thank you for referring Ms. Gabriela Esteban to [address] [place] [address] for evaluation. My notes for this consultation are attached. If you have questions, please do not hesitate to call me. I look forward to following your patient along with you.       Sincerely,    Bubba De La Cruz, DO

## 2021-12-30 NOTE — PROGRESS NOTES
Dawood Zazueta    Chief Complaint   Patient presents with    New Patient     reffered by PCP for syncope    Palpitations     when having sycope episodes    Dizziness     frequently    Fall     frequently       HPI    Dawood Zazueta is a 42 y.o. AAF with autonomic dysfunction, h/o syncope/ near syncope, referred by PCP for ER follow up/ CV evaluation of recurrent syncope. Pt has seen Cards, in past [facility]), his notes were obtained and reviewed. Normal echo . Several ekgs all WNL. She has had extensive past testing, incl neg tilt table, eventually told by hospital that \"nothing is wrong with you\" and sent her to psych. She is self employed model, writer, , has published theater plays and 3 books. Her youngest is 6 yrs old and has kids in 25s with grandkids. Her passing out has been bad sine ~. She works out daily, runs and core workout. Feels heart racing and legs \"give out\" with near syncopal events but HR monitor will say HR in 70s. Has tried midodrine in past with little help.     Past Medical History:   Diagnosis Date    Anemia     during pregnancy 2nd child    Dizziness     Low blood pressure     Migraine headache     Ovarian cyst     Vertigo        Past Surgical History:   Procedure Laterality Date    DELIVERY       1st child    HX CERVICAL FUSION      HX GYN  2017    tubal ligation     HX ORTHOPAEDIC  2005    RIGHT ELBOW    HX WISDOM TEETH EXTRACTION  1996    x4    [place]  DELIVERY ONLY      [place] LAP,CHOLECYSTECTOMY N/A 2019    Dr. Ned Fox UNLISTED  2011    left shoulder    [place] SPINE SURGERY PROCEDURE UNLISTED  2017    spinal fusion    [place] UNLISTED PROCEDURE SMALL INTESTINE      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           Allergies   Allergen Reactions    Ciprofloxacin (Bulk) Diarrhea    Codeine Itching    Dilaudid [Hydromorphone (Bulk)] Itching    Lyrica [Pregabalin] Swelling    Tramadol Other (comments) and Unknown (comments)     Gets headaches  headache    Codeine Phosphate Unknown (comments)    Hydromorphone Unknown (comments)       Social History     Socioeconomic History    Marital status: SINGLE     Spouse name: Not on file    Number of children: Not on file    Years of education: Not on file    Highest education level: Not on file   Occupational History    Not on file   Tobacco Use    Smoking status: Never Smoker    Smokeless tobacco: Never Used   Substance and Sexual Activity    Alcohol use: Yes     Comment: rare    Drug use: No     Types: Prescription, OTC    Sexual activity: Yes     Partners: Male   Other Topics Concern     Service Not Asked    Blood Transfusions Not Asked    Caffeine Concern Not Asked    Occupational Exposure Not Asked    Hobby Hazards Not Asked    Sleep Concern Not Asked    Stress Concern Not Asked    Weight Concern Not Asked    Special Diet Not Asked    Back Care Not Asked    Exercise Not Asked    Bike Helmet Not Asked    Seat Belt Not Asked    Self-Exams Not Asked   Social History Narrative    Not on file     Social Determinants of Health     Financial Resource Strain:     Difficulty of Paying Living Expenses: Not on file   Food Insecurity:     Worried About Running Out of Food in the Last Year: Not on file    Meaghan of Food in the Last Year: Not on file   Transportation Needs:     Lack of Transportation (Medical): Not on file    Lack of Transportation (Non-Medical):  Not on file   Physical Activity:     Days of Exercise per Week: Not on file    Minutes of Exercise per Session: Not on file   Stress:     Feeling of Stress : Not on file   Social Connections:     Frequency of Communication with Friends and Family: Not on file    Frequency of Social Gatherings with Friends and Family: Not on file    Attends Episcopal Services: Not on file    Active Member of Clubs or Organizations: Not on file    Attends Club or Organization Meetings: Not on file    Marital Status: Not on file   Intimate Partner Violence:     Fear of Current or Ex-Partner: Not on file    Emotionally Abused: Not on file    Physically Abused: Not on file    Sexually Abused: Not on file   Housing Stability:     Unable to Pay for Housing in the Last Year: Not on file    Number of [place] in the Last Year: Not on file    Unstable Housing in the Last Year: Not on file        The patient has a family history of    Review of Systems    14 pt Review of Systems is negative unless otherwise mentioned in the HPI. Wt Readings from Last 3 Encounters:   12/30/21 79.8 kg (176 lb)   11/10/21 79.4 kg (175 lb)   10/31/21 78.9 kg (174 lb)     Temp Readings from Last 3 Encounters:   11/10/21 99.1 °F (37.3 °C) (Oral)   10/31/21 99.6 °F (37.6 °C)   08/18/21 97.5 °F (36.4 °C) (Temporal)     BP Readings from Last 3 Encounters:   12/30/21 118/80   11/10/21 104/70   10/31/21 113/83     Pulse Readings from Last 3 Encounters:   12/30/21 70   11/10/21 73   10/31/21 74           Physical Exam:    Visit Vitals  /80 (BP 1 Location: Left upper arm, BP Patient Position: Sitting, BP Cuff Size: Adult)   Pulse 70   Ht 5' 6\" (1.676 m)   Wt 79.8 kg (176 lb)   SpO2 98%   BMI 28.41 kg/m²      Physical Exam  HENT:      Head: Normocephalic and atraumatic. Eyes:      Pupils: Pupils are equal, round, and reactive to light. Cardiovascular:      Rate and Rhythm: Normal rate and regular rhythm. Heart sounds: Normal heart sounds. No murmur heard. No friction rub. No gallop. Pulmonary:      Effort: Pulmonary effort is normal. No respiratory distress. Breath sounds: Normal breath sounds. No wheezing or rales. Chest:      Chest wall: No tenderness. Abdominal:      General: Bowel sounds are normal.      Palpations: Abdomen is soft. Musculoskeletal:         General: No tenderness. Skin:     General: Skin is warm and dry.    Neurological:      Mental Status: She is alert and oriented to person, place, and time. EKG today shows: NSR, normal axis and intervals, no ST segment abnormalities    Impression and Plan:  Dinh Dubose is a 42 y. o. with:    Autonomic dysfunction  Neurocardiogenic syncope  Normal echo  Lower resting BP, little improvement with midodrine in past    The most frequent mechanism for reflex syncope is a mixed hemodynamic response, although an individual patient may have syncopal events characterized principally by vasodepressor, cardioinhibitory, or mixed responses. The cardioinhibitory response results from increased parasympathetic activation and may be manifested by sinus bradycardia, SC interval prolongation, advanced atrioventricular block, and/or asystole. The vasodepressor response is due to decreased sympathetic activity and can lead to symptomatic hypotension even in the absence of severe bradycardia. Education today included etiology and natural history of Neurocardiogenic/ Reflex syncope, and ways to avoid actually losing consciousness such as staying well hydrated and sitting/ or laying down when you feel \"premonition. \"      Thank you for allowing me to participate in the care of your patient, please do not hesitate to call with questions or concerns. Follow-up and Dispositions    · Return in about 6 months (around 6/30/2022).      [address] [facility],    Marilyn Lyle DO

## 2022-03-18 PROBLEM — F41.9 ANXIETY: Status: ACTIVE | Noted: 2019-07-01

## 2022-03-19 PROBLEM — N83.201 RIGHT OVARIAN CYST: Status: ACTIVE | Noted: 2017-08-11

## 2022-03-19 PROBLEM — Z30.2 ENCOUNTER FOR STERILIZATION: Status: ACTIVE | Noted: 2017-08-11

## 2022-03-19 PROBLEM — R55 SYNCOPE: Status: ACTIVE | Noted: 2018-04-02

## 2022-03-19 PROBLEM — M54.2 NECK PAIN: Status: ACTIVE | Noted: 2018-05-30

## 2022-03-19 PROBLEM — Z98.1 HX OF FUSION OF CERVICAL SPINE: Status: ACTIVE | Noted: 2017-04-04

## 2022-03-19 PROBLEM — I95.1 ORTHOSTATIC HYPOTENSION: Status: ACTIVE | Noted: 2018-07-20

## 2022-03-19 PROBLEM — G43.009 MIGRAINE WITHOUT AURA AND WITHOUT STATUS MIGRAINOSUS, NOT INTRACTABLE: Status: ACTIVE | Noted: 2018-01-22

## 2022-03-19 PROBLEM — R42 VERTIGO: Status: ACTIVE | Noted: 2018-06-04

## 2022-05-09 ENCOUNTER — OFFICE VISIT (OUTPATIENT)
Dept: ORTHOPEDIC SURGERY | Age: 47
End: 2022-05-09
Payer: MEDICAID

## 2022-05-09 VITALS — BODY MASS INDEX: 28.32 KG/M2 | HEIGHT: 65 IN | OXYGEN SATURATION: 96 % | WEIGHT: 170 LBS | HEART RATE: 69 BPM

## 2022-05-09 DIAGNOSIS — G56.21 CUBITAL TUNNEL SYNDROME, RIGHT: Primary | ICD-10-CM

## 2022-05-09 PROCEDURE — 99214 OFFICE O/P EST MOD 30 MIN: CPT | Performed by: ORTHOPAEDIC SURGERY

## 2022-05-09 NOTE — PROGRESS NOTES
Suzanna Barker is a 42 y.o. female right handed . Worker's Compensation and legal considerations: none filed. Vitals:    22 1043   Pulse: 69   SpO2: 96%   Weight: 170 lb (77.1 kg)   Height: 5' 5\" (1.651 m)   PainSc:   6   PainLoc: Hand   LMP: 2022           Chief Complaint   Patient presents with    Hand Pain     right hand/ wrist         HPI: Patient presents today with complaints of pain and tingling in her hand especially the ring finger and little finger. Date of onset: Late     Injury: Yes: Comment: Remote history of elbow dislocation requiring open reduction.     Prior Treatment:  No    Numbness/ Tingling: Yes: Comment: Right little and ring finger      ROS: Review of Systems - General ROS: negative  Psychological ROS: negative  ENT ROS: negative  Allergy and Immunology ROS: negative  Hematological and Lymphatic ROS: negative  Respiratory ROS: no cough, shortness of breath, or wheezing  Cardiovascular ROS: no chest pain or dyspnea on exertion  Gastrointestinal ROS: no abdominal pain, change in bowel habits, or black or bloody stools  Musculoskeletal ROS: negative  Neurological ROS: positive for - numbness/tingling  Dermatological ROS: negative    Past Medical History:   Diagnosis Date    Anemia     during pregnancy 2nd child    Dizziness     Low blood pressure     Migraine headache     Ovarian cyst     Vertigo        Past Surgical History:   Procedure Laterality Date    DELIVERY   14    1st child    HX CERVICAL FUSION      HX GYN  2017    tubal ligation     HX ORTHOPAEDIC  2005    RIGHT ELBOW    HX WISDOM TEETH EXTRACTION  1996    x4    [place]  DELIVERY ONLY      [place] LAP,CHOLECYSTECTOMY N/A 2019    Dr. Reyna Bones  2011    left shoulder    [place] SPINE SURGERY PROCEDURE UNLISTED  2017    spinal fusion    [place] UNLISTED PROCEDURE SMALL INTESTINE      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           Allergies   Allergen Reactions    Ciprofloxacin (Bulk) Diarrhea    Codeine Itching    Dilaudid [Hydromorphone (Bulk)] Itching    Lyrica [Pregabalin] Swelling    Tramadol Other (comments) and Unknown (comments)     Gets headaches  headache    Codeine Phosphate Unknown (comments)    Hydromorphone Unknown (comments)           PE:     Physical Exam  Vitals and nursing note reviewed. Constitutional:       General: She is not in acute distress. Appearance: Normal appearance. She is not ill-appearing. Cardiovascular:      Pulses: Normal pulses. Pulmonary:      Effort: Pulmonary effort is normal. No respiratory distress. Musculoskeletal:         General: No swelling, tenderness, deformity or signs of injury. Normal range of motion. Cervical back: Normal range of motion and neck supple. Right lower leg: No edema. Left lower leg: No edema. Skin:     General: Skin is warm and dry. Capillary Refill: Capillary refill takes less than 2 seconds. Findings: No bruising or erythema. Neurological:      General: No focal deficit present. Mental Status: She is alert and oriented to person, place, and time. Psychiatric:         Mood and Affect: Mood normal.         Behavior: Behavior normal.            Right upper extremity: There is a healed incision over the lateral aspect of the elbow on the right. NEUROVASCULAR    Examination L R Examination L R   Carpal Comp. - - Pronator Comp. - -   Carpal Tinel - - Pronator Tinel - -   Phalen's - - Pronator Stress - -   Cubital Comp. - - Guyon Comp. - -   Cubital Tinel - + Guyon Tinel - -   Elbow Hyperflexion - - Adson's - -   Spurling's - - SC Comp. - -   PCB Median abn - - SC Tinel - -   Radial Tinel - - IC Comp.  - -   Digital Tinel - - IC Tinel - -   Radial 2-Pt WNL WNL Ulnar 2-Pt WNL WNL     Radial Pulse: 2+  Capillary Refill: < 2 sec  Santos: Not Performed  Digital Santos: Not Performed        Imaging:     Deferred today        ICD-10-CM ICD-9-CM    1. Cubital tunnel syndrome, right  G56.21 354. 2 EMG ONE EXTREMITY UPPER RT      NCV/LAT MOTOR PER NERVE UP/RT         Plan:     Right upper extremity EMG. Also instructed patient on looking up nerve gliding exercises for cubital tunnel syndrome.     Follow-up and Dispositions    · Return for EMG review and right elbow x-rays on arrival.          Plan was reviewed with patient, who verbalized agreement and understanding of the plan

## 2022-05-23 DIAGNOSIS — G56.21 CUBITAL TUNNEL SYNDROME, RIGHT: ICD-10-CM

## 2022-05-31 ENCOUNTER — OFFICE VISIT (OUTPATIENT)
Dept: ORTHOPEDIC SURGERY | Age: 47
End: 2022-05-31
Payer: MEDICAID

## 2022-05-31 DIAGNOSIS — M54.16 RIGHT LUMBAR RADICULOPATHY: Primary | ICD-10-CM

## 2022-05-31 DIAGNOSIS — M54.16 RIGHT LUMBAR RADICULOPATHY: ICD-10-CM

## 2022-05-31 PROCEDURE — 99204 OFFICE O/P NEW MOD 45 MIN: CPT | Performed by: PHYSICAL MEDICINE & REHABILITATION

## 2022-05-31 RX ORDER — MELOXICAM 15 MG/1
15 TABLET ORAL
Qty: 30 TABLET | Refills: 0 | Status: SHIPPED | OUTPATIENT
Start: 2022-05-31 | End: 2022-06-30

## 2022-05-31 NOTE — PROGRESS NOTES
[address]  [address]  [address]  Phone: [phone]  Fax: [phone]      Patient: Chris Self                                                                              MRN: [id number]        YOB: 1975          AGE: 42 y. o. PCP: Sandee Watt MD  Date:  05/31/22    Reason for Consultation: low back pain    HPI:  Chris Self is a 42 y.o. female with relevant PMH of cervical fusion ACDF C5/6 in 2017 with Dr. Benito Clay-  who presents with low back pain radiating down the right leg. Low back pain began several years ago but acutely worsened 3/11/2022. Denies any precipitating incident or trauma but had been on her feet at a networking event. [facility] which helped for a few days. Neurologic symptoms: No numbness, tingling, weakness, bowel or bladder changes. No recent falls      Location: The pain is located in the low back  Radiation: The pain does radiate down the right leg gluteal towards the heel. Pain Score: Currently: 10/10  Quality: Pain is of a Achy, Burning, Stabbing, Tight and Pulling quality. Aggravating: Pain is exacerbated by walking, sitting, standing, lying down and exercise  Alleviating: The pain is alleviated by medication medrol pack     Prior Treatments:  Physical therapy: NO  Injections: cervical in 2016 Dr. aCm Anne    Previous Medications: medrol pack about one month ago, robaxin   Current Medications: BC arthritis   Previous work-up has included:   X-ray lumbar spine 2018  There is normal vertebral body alignment and joint spaces. There is no fracture or significant bony abnormality. The intervertebral disc spaces are preserved.   The soft tissues are unremarkable except for probable bilateral tubal ligation clips in the visualized pelvis.     Past Medical History:   Past Medical History:   Diagnosis Date    Anemia 1997 during pregnancy 2nd child    Dizziness     Low blood pressure     Migraine headache     Ovarian cyst     Vertigo       Past Surgical History:   Past Surgical History:   Procedure Laterality Date    DELIVERY   12    1st child    HX CERVICAL FUSION      HX GYN  2017    tubal ligation     HX ORTHOPAEDIC  2005    RIGHT ELBOW    HX WISDOM TEETH EXTRACTION  1996    x4    [place]  DELIVERY ONLY      [place] LAP,CHOLECYSTECTOMY N/A 2019    Dr. Roxane Pearson UNLISTED  2011    left shoulder    [place] SPINE SURGERY PROCEDURE UNLISTED  2017    spinal fusion    [place] UNLISTED PROCEDURE SMALL INTESTINE      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      SocHx:   Social History     Tobacco Use    Smoking status: Never Smoker    Smokeless tobacco: Never Used   Substance Use Topics    Alcohol use: Yes     Comment: rare      FamHx:? Family History   Problem Relation Age of Onset    Heart Disease Mother     Hypertension Mother     Mult Sclerosis Mother     Other Mother 54        Lupus    Stroke Mother         x3    Asthma Sister     Diabetes Paternal Grandmother     Hypertension Paternal Grandmother        Current Medications: Allergies:     Allergies   Allergen Reactions    Ciprofloxacin (Bulk) Diarrhea    Codeine Itching    Dilaudid [Hydromorphone (Bulk)] Itching    Lyrica [Pregabalin] Swelling    Tramadol Other (comments) and Unknown (comments)     Gets headaches  headache    Codeine Phosphate Unknown (comments)    Hydromorphone Unknown (comments)        Review of Systems:   Gen:    Denied fevers, chills, malaise, fatigue, weight changes   Resp: Denied shortness of breath, cough, wheezing   CVS: Denied chest pain, palpitations   : Denied urinary urgency, frequency, incontinence   GI: Denied nausea, vomiting, constipation, diarrhea   Skin: Denied rashes, wounds   Psych: Denied anxiety, depression   Vasc: Denied claudication, ulcers Hem: Denied easy bruising/bleeding   MSK: See HPI   Neuro: See HPI         Physical Exam     Vital Signs:   Visit Vitals  LMP 05/30/2022      General: ??????? Well nourished and well developed female without any acute distress   Psychiatric: ?  Alert and oriented x 3 with normal mood    HEENT: ???????? Atraumatic   Respiratory:   Breathing non-labored and non dyspneic   CV: ???????????????? Peripheral pulses intact, no peripheral edema   Skin: ????????????? No rashes       Neurologic: ?? Sensation: normal and grossly intact thebilateral, lower extremity(s)    Strength: 5/5 in the bilateral, lower extremity(s) except unable to calf raise on the right leg   Reflexes: reveals 2+ symmetric DTRs throughout   Gait: normal     Musculoskeletal: Lumbar Exam     Inspection:   Alignment: Normal  Atrophy: None     Tenderness to Palpation:   Lumbar paraspinals Positive right  Lumbar spinous processes Negative  SI Joint:  Positive right  Gluteal:Positive   Greater trochanter: Negative      ROM:   Lumbar ROM: Abnormal pain with flexion and extension  Lumbar facet loading: Negative  Hip ROM: No reproduction of pain with movement     Special Tests      Slump test: Positive right   SLR: Positive  ALEJANDRO: Positive right low back pain  FADIR: Negative  Log Roll: Negative       Medical Decision Making:    Images: The imaging results as well as the actual images of the studies below were reviewed, visualized and interpreted by me. Labs: The results below were reviewed.       x-ray lumbar spine 12/3/2018-  Unremarkable      Assessment:   - right lumbar radiculopathy    Plan:      -Physical therapy -  Referral to PT  -Medications - will try short course of meloxicam 15mg with food x 10 days, not to take with other NSAIDS and can then take prn   Counseled regarding side effects and appropriate administration of medications.    -Diagnostics/Imaging - MRI lumbar spine to evaluate for right sided nerve impingement  -Injections - Consider MARINA   -Lifestyle - Discussed work restrictions- Note provided  -Education - The patient's diagnosis, prognosis and treatment options were discussed today. All questions were answered.    F/U - after MRI         [address] [facility] [address] [facility]

## 2022-06-29 ENCOUNTER — OFFICE VISIT (OUTPATIENT)
Dept: CARDIOLOGY CLINIC | Age: 47
End: 2022-06-29
Payer: MEDICAID

## 2022-06-29 VITALS
BODY MASS INDEX: 27.99 KG/M2 | DIASTOLIC BLOOD PRESSURE: 82 MMHG | HEART RATE: 75 BPM | WEIGHT: 168 LBS | HEIGHT: 65 IN | OXYGEN SATURATION: 100 % | SYSTOLIC BLOOD PRESSURE: 112 MMHG

## 2022-06-29 DIAGNOSIS — R55 NEUROCARDIOGENIC SYNCOPE: ICD-10-CM

## 2022-06-29 DIAGNOSIS — G90.9 AUTONOMIC DYSFUNCTION: Primary | ICD-10-CM

## 2022-06-29 PROCEDURE — 99214 OFFICE O/P EST MOD 30 MIN: CPT | Performed by: INTERNAL MEDICINE

## 2022-06-29 NOTE — PROGRESS NOTES
Rocio Green presents today for   Chief Complaint   Patient presents with    Follow-up     6 month    Chest Pain     chest discomfort    Dizziness       Niallia ELIJAH Nunes preferred language for health care discussion is english/other. Is someone accompanying this pt? no    Is the patient using any DME equipment during [address]? no    Depression Screening:  3 most recent PHQ Screens 6/29/2022   Little interest or pleasure in doing things Not at all   Feeling down, depressed, irritable, or hopeless Not at all   Total Score PHQ 2 0       Learning Assessment:  Learning Assessment 6/29/2022   PRIMARY LEARNER Patient   HIGHEST LEVEL OF EDUCATION - PRIMARY LEARNER  -   BARRIERS PRIMARY LEARNER -   [facility]    NEED -   LEARNER PREFERENCE PRIMARY DEMONSTRATION     -     -     -     -   ANSWERED BY patient   RELATIONSHIP SELF       Abuse Screening:  Abuse Screening Questionnaire 6/29/2022   Do you ever feel afraid of your partner? N   Are you in a relationship with someone who physically or mentally threatens you? N   Is it safe for you to go home? Y       Fall Risk  Fall Risk Assessment, last 12 mths 12/11/2018   Able to walk? Yes   Fall in past 12 months? No           Pt currently taking Anticoagulant therapy? no    Pt currently taking Antiplatelet therapy ? no      Coordination of Care:  1. Have you been to the ER, urgent care clinic since your last visit? Hospitalized since your last visit? no    2. Have you seen or consulted any other health care providers outside of the [address] [place] [address] since your last visit? Include any pap smears or colon screening.  no

## 2022-06-29 NOTE — PROGRESS NOTES
Jessica Rush    Chief Complaint   Patient presents with    Follow-up     6 month    Chest Pain     chest discomfort    Dizziness       HPI    Jessica Rush is a 42 y.o. AAF with autonomic dysfunction, h/o syncope/ near syncope, referred by PCP for ER follow up/ CV evaluation of recurrent syncope. Pt has seen Cards, in past Judith Santos), his notes were obtained and reviewed. Normal echo . Several ekgs all WNL. She has had extensive past testing, incl neg tilt table, eventually told by hospital that \"nothing is wrong with you\" and sent her to psych. She is self employed model, writer, , has published theater plays and 3 books. Her youngest is 6 yrs old and has kids in 25s with grandkids. Her passing out has been bad sine ~. She works out daily, runs and core workout. Feels heart racing and legs \"give out\" with near syncopal events but HR monitor will say HR in 70s. Has tried midodrine in past with little help. Overall unchanged to better, can feel a bit dizzy but no actual syncope since last seeing me.     Past Medical History:   Diagnosis Date    Anemia     during pregnancy 2nd child    Dizziness     Low blood pressure     Migraine headache     Ovarian cyst     Vertigo        Past Surgical History:   Procedure Laterality Date    DELIVERY       1st child    HX CERVICAL FUSION      HX GYN  2017    tubal ligation     HX ORTHOPAEDIC  2005    RIGHT ELBOW    HX WISDOM TEETH EXTRACTION  1996    x4    [place]  DELIVERY ONLY      [place] LAP,CHOLECYSTECTOMY N/A 2019    Dr. Davi Romero UNLISTED  2011    left shoulder    [place] SPINE SURGERY PROCEDURE UNLISTED  2017    spinal fusion    [place] UNLISTED PROCEDURE SMALL INTESTINE      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           Allergies   Allergen Reactions    Ciprofloxacin (Bulk) Diarrhea    Codeine Itching    Dilaudid [Hydromorphone (Bulk)] Itching    Lyrica [Pregabalin] Swelling    Tramadol Other (comments) and Unknown (comments)     Gets headaches  headache    Codeine Phosphate Unknown (comments)    Hydromorphone Unknown (comments)       Social History     Socioeconomic History    Marital status: SINGLE     Spouse name: Not on file    Number of children: Not on file    Years of education: Not on file    Highest education level: Not on file   Occupational History    Not on file   Tobacco Use    Smoking status: Never Smoker    Smokeless tobacco: Never Used   Vaping Use    Vaping Use: Never used   Substance and Sexual Activity    Alcohol use: Yes     Comment: rare    Drug use: No     Types: Prescription, OTC    Sexual activity: Yes     Partners: Male   Other Topics Concern     Service Not Asked    Blood Transfusions Not Asked    Caffeine Concern Not Asked    Occupational Exposure Not Asked    Hobby Hazards Not Asked    Sleep Concern Not Asked    Stress Concern Not Asked    Weight Concern Not Asked    Special Diet Not Asked    Back Care Not Asked    Exercise Not Asked    Bike Helmet Not Asked    Seat Belt Not Asked    Self-Exams Not Asked   Social History Narrative    Not on file     Social Determinants of Health     Financial Resource Strain:     Difficulty of Paying Living Expenses: Not on file   Food Insecurity:     Worried About Running Out of Food in the Last Year: Not on file    [place] of Food in the Last Year: Not on file   Transportation Needs:     Lack of Transportation (Medical): Not on file    Lack of Transportation (Non-Medical):  Not on file   Physical Activity: Insufficiently Active    Days of Exercise per Week: 3 days    Minutes of Exercise per Session: 40 min   Stress:     Feeling of Stress : Not on file   Social Connections:     Frequency of Communication with Friends and Family: Not on file    Frequency of Social Gatherings with Friends and Family: Not on file    Attends Judaism Services: Not on file   [facility] [employer] or Organizations: Not on file    Attends Club or Organization Meetings: Not on file    Marital Status: Not on file   Intimate Partner Violence: Not At Risk    Fear of Current or Ex-Partner: No    Emotionally Abused: No    Physically Abused: No    Sexually Abused: No   Housing Stability:     Unable to Pay for Housing in the Last Year: Not on file    Number of [place] in the Last Year: Not on file    Unstable Housing in the Last Year: Not on file        The patient has a family history of    Review of Systems    14 pt Review of Systems is negative unless otherwise mentioned in the HPI. Wt Readings from Last 3 Encounters:   06/29/22 76.2 kg (168 lb)   05/09/22 77.1 kg (170 lb)   12/30/21 79.8 kg (176 lb)     Temp Readings from Last 3 Encounters:   11/10/21 99.1 °F (37.3 °C) (Oral)   10/31/21 99.6 °F (37.6 °C)   08/18/21 97.5 °F (36.4 °C) (Temporal)     BP Readings from Last 3 Encounters:   06/29/22 112/82   12/30/21 118/80   11/10/21 104/70     Pulse Readings from Last 3 Encounters:   06/29/22 75   05/09/22 69   12/30/21 70           Physical Exam:    Visit Vitals  /82 (BP 1 Location: Left upper arm, BP Patient Position: Sitting, BP Cuff Size: Adult)   Pulse 75   Ht 5' 5\" (1.651 m)   Wt 76.2 kg (168 lb)   LMP 05/30/2022   SpO2 100%   BMI 27.96 kg/m²      Physical Exam  HENT:      Head: Normocephalic and atraumatic. Eyes:      Pupils: Pupils are equal, round, and reactive to light. Cardiovascular:      Rate and Rhythm: Normal rate and regular rhythm. Heart sounds: Normal heart sounds. No murmur heard. No friction rub. No gallop. Pulmonary:      Effort: Pulmonary effort is normal. No respiratory distress. Breath sounds: Normal breath sounds. No wheezing or rales. Chest:      Chest wall: No tenderness. Abdominal:      General: Bowel sounds are normal.      Palpations: Abdomen is soft. Musculoskeletal:         General: No tenderness.    Skin: General: Skin is warm and dry. Neurological:      Mental Status: She is alert and oriented to person, place, and time. EKG today shows: NSR, normal axis and intervals, no ST segment abnormalities    Impression and Plan:  Rocio Green is a 42 y. o. with:    Autonomic dysfunction  Neurocardiogenic syncope  Normal echo  Lower resting BP, little improvement with midodrine in past    Can see me yearly  35 mins    The most frequent mechanism for reflex syncope is a mixed hemodynamic response, although an individual patient may have syncopal events characterized principally by vasodepressor, cardioinhibitory, or mixed responses. The cardioinhibitory response results from increased parasympathetic activation and may be manifested by sinus bradycardia, SC interval prolongation, advanced atrioventricular block, and/or asystole. The vasodepressor response is due to decreased sympathetic activity and can lead to symptomatic hypotension even in the absence of severe bradycardia. Education today included etiology and natural history of Neurocardiogenic/ Reflex syncope, and ways to avoid actually losing consciousness such as staying well hydrated and sitting/ or laying down when you feel \"premonition. \"      Thank you for allowing me to participate in the care of your patient, please do not hesitate to call with questions or concerns. Follow-up and Dispositions    · Return in about 1 year (around 6/29/2023).      [address],    Shala Galindo, DO

## 2022-09-05 ENCOUNTER — HOSPITAL ENCOUNTER (EMERGENCY)
Age: 47
Discharge: HOME | End: 2022-09-05
Attending: STUDENT IN AN ORGANIZED HEALTH CARE EDUCATION/TRAINING PROGRAM
Payer: MEDICAID

## 2022-09-05 VITALS
RESPIRATION RATE: 18 BRPM | TEMPERATURE: 97.7 F | OXYGEN SATURATION: 96 % | SYSTOLIC BLOOD PRESSURE: 115 MMHG | DIASTOLIC BLOOD PRESSURE: 68 MMHG | HEART RATE: 57 BPM

## 2022-09-05 DIAGNOSIS — M54.42 ACUTE MIDLINE LOW BACK PAIN WITH LEFT-SIDED SCIATICA: Primary | ICD-10-CM

## 2022-09-05 PROCEDURE — 74011250637 HC RX REV CODE- 250/637: Performed by: EMERGENCY MEDICINE

## 2022-09-05 PROCEDURE — 99283 EMERGENCY DEPT VISIT LOW MDM: CPT

## 2022-09-05 RX ORDER — OXYCODONE AND ACETAMINOPHEN 5; 325 MG/1; MG/1
1 TABLET ORAL ONCE
Status: COMPLETED | OUTPATIENT
Start: 2022-09-05 | End: 2022-09-05

## 2022-09-05 RX ORDER — OXYCODONE AND ACETAMINOPHEN 5; 325 MG/1; MG/1
1 TABLET ORAL
Qty: 9 TABLET | Refills: 0 | Status: SHIPPED | OUTPATIENT
Start: 2022-09-05 | End: 2022-09-08

## 2022-09-05 RX ORDER — KETOROLAC TROMETHAMINE 10 MG/1
10 TABLET, FILM COATED ORAL
Qty: 16 TABLET | Refills: 0 | Status: SHIPPED | OUTPATIENT
Start: 2022-09-05 | End: 2022-09-20

## 2022-09-05 RX ADMIN — OXYCODONE HYDROCHLORIDE AND ACETAMINOPHEN 1 TABLET: 5; 325 TABLET ORAL at 10:14

## 2022-09-05 NOTE — ED TRIAGE NOTES
42 yo F to ED for lower back pain. Reports she went bowling over the weekend and woke up yesterday in pain.  Reports \"pins and needle\" all across lower back

## 2022-09-05 NOTE — ED PROVIDER NOTES
EMERGENCY DEPARTMENT HISTORY AND PHYSICAL EXAM    Date: 2022  Patient Name: Rowan Quintana    History of Presenting Illness     Chief Complaint   Patient presents with    Back Pain         History Provided By: Patient      Additional History (Context): Rowan Quintana is a 41 y.o. female with  grains, chronic low back pain with right-sided sciatica  who presents with complaint of midline low back pain radiating to her left hip after bowling on Saturday evening. Denies any direct trauma fever rash IVDU possibility of pregnancy saddle anesthesia bowel incontinence or urinary retention. Patient says when she sits on a hard chair it feels like pins-and-needles and then when she stands it feels like a pressure pain. Patient has been seen previously by Dr. Yury Jung and they are working on her low back issues with stretching exercises and walking more. Patient says that she applied Biofreeze and heat and ice for her pain prior to arrival.    PCP: Daylin Vance MD    Current Outpatient Medications   Medication Sig Dispense Refill    ketorolac (TORADOL) 10 mg tablet Take 1 Tablet by mouth every six (6) hours as needed for Pain for up to 16 doses. 16 Tablet 0    oxyCODONE-acetaminophen (Percocet) 5-325 mg per tablet Take 1 Tablet by mouth every eight (8) hours as needed for Pain for up to 3 days. Max Daily Amount: 3 Tablets.  9 Tablet 0       Past History     Past Medical History:  Past Medical History:   Diagnosis Date    Anemia     during pregnancy 2nd child    Dizziness     Low blood pressure     Migraine headache     Ovarian cyst     Vertigo        Past Surgical History:  Past Surgical History:   Procedure Laterality Date    DELIVERY       1st child    HX CERVICAL FUSION      HX GYN  2017    tubal ligation     HX ORTHOPAEDIC  2005    RIGHT ELBOW    HX WISDOM TEETH EXTRACTION  1996    x4    [place]  DELIVERY ONLY      [place] LAP,CHOLECYSTECTOMY N/A 2019    Dr. Felipa Meigs SHOULDER SURG PROC UNLISTED  9/08/2011    left shoulder    [place] SPINE SURGERY PROCEDURE UNLISTED  03/2017    spinal fusion    [place] UNLISTED PROCEDURE SMALL INTESTINE      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       Family History:  Family History   Problem Relation Age of Onset    Heart Disease Mother     Hypertension Mother     Mult Sclerosis Mother     Other Mother 54        Lupus    Stroke Mother         x3    Asthma Sister     Diabetes Paternal Grandmother     Hypertension Paternal Grandmother        Social History:  Social History     Tobacco Use    Smoking status: Never    Smokeless tobacco: Never   Vaping Use    Vaping Use: Never used   Substance Use Topics    Alcohol use: Yes     Comment: rare    Drug use: No     Types: Prescription, OTC       Allergies: Allergies   Allergen Reactions    Ciprofloxacin (Bulk) Diarrhea    Codeine Itching    Dilaudid [Hydromorphone (Bulk)] Itching    Lyrica [Pregabalin] Swelling    Tramadol Other (comments) and Unknown (comments)     Gets headaches  headache    Codeine Phosphate Unknown (comments)    Hydromorphone Unknown (comments)         Review of Systems   Review of Systems   Constitutional:  Negative for fever and unexpected weight change. Musculoskeletal:  Positive for back pain. Skin:  Negative for rash and wound. Allergic/Immunologic: Negative for immunocompromised state. Neurological:  Negative for weakness and numbness. All Other Systems Negative  Physical Exam     Vitals:    09/05/22 0840   BP: 115/68   Pulse: (!) 57   Resp: 18   Temp: 97.7 °F (36.5 °C)   SpO2: 96%     Physical Exam  Vitals and nursing note reviewed. Constitutional:       Appearance: She is well-developed. HENT:      Head: Normocephalic and atraumatic. Right Ear: External ear normal.      Left Ear: External ear normal.      Nose: Nose normal.   Eyes:      Conjunctiva/sclera: Conjunctivae normal.      Pupils: Pupils are equal, round, and reactive to light.    Neck: Vascular: No JVD. Trachea: No tracheal deviation. Cardiovascular:      Rate and Rhythm: Normal rate and regular rhythm. Heart sounds: Normal heart sounds. No murmur heard. No friction rub. No gallop. Pulmonary:      Effort: Pulmonary effort is normal. No respiratory distress. Breath sounds: Normal breath sounds. No wheezing or rales. Abdominal:      General: Bowel sounds are normal. There is no distension. Palpations: Abdomen is soft. There is no mass. Tenderness: There is no abdominal tenderness. There is no guarding or rebound. Comments: No pulsatile mass palpated. Musculoskeletal:         General: Tenderness present. Normal range of motion. Cervical back: Normal range of motion and neck supple. Comments: Midline inferior lumbar spine tenderness as well as left SI joint tenderness. DP PT pulses palpable. Skin:     General: Skin is warm and dry. Findings: No rash. Neurological:      Mental Status: She is alert and oriented to person, place, and time. Cranial Nerves: No cranial nerve deficit. Deep Tendon Reflexes: Reflexes are normal and symmetric. Psychiatric:         Behavior: Behavior normal.            Diagnostic Study Results     Labs -   No results found for this or any previous visit (from the past 12 hour(s)). Radiologic Studies -   No orders to display     CT Results  (Last 48 hours)      None          CXR Results  (Last 48 hours)      None              Medical Decision Making   I am the first provider for this patient. I reviewed the vital signs, available nursing notes, past medical history, past surgical history, family history and social history. Vital Signs-Reviewed the patient's vital signs. Records Reviewed: Nursing Notes    Procedures:  Procedures    Provider Notes (Medical Decision Making): Patient with history of sciatica to the right now having symptoms to the left after bowling.   Has previously talked tolerated Percocet well per patient. Have her follow back up with Dr. Juan Ham for her chronic back pain. MED RECONCILIATION:  No current facility-administered medications for this encounter. Current Outpatient Medications   Medication Sig    ketorolac (TORADOL) 10 mg tablet Take 1 Tablet by mouth every six (6) hours as needed for Pain for up to 16 doses. oxyCODONE-acetaminophen (Percocet) 5-325 mg per tablet Take 1 Tablet by mouth every eight (8) hours as needed for Pain for up to 3 days. Max Daily Amount: 3 Tablets. Disposition:  home    DISCHARGE NOTE:   9:20 AM    Pt has been reexamined. Patient has no new complaints, changes, or physical findings. Care plan outlined and precautions discussed. Results of exam were reviewed with the patient. All medications were reviewed with the patient; will d/c home with percocet, ibuporfen. All of pt's questions and concerns were addressed. Patient was instructed and agrees to follow up with ortho, as well as to return to the ED upon further deterioration. Patient is ready to go home. Follow-up Information       Follow up With Specialties Details Why Contact Info    Becky Laboy MD Physical Medicine and Rehabilitation Physician, Family Medicine Schedule an appointment as soon as possible for a visit in 1 day  [facility] [address] [facility] EMERGENCY DEPT Emergency Medicine  If symptoms worsen return immediately [address]  [phone]            Current Discharge Medication List        START taking these medications    Details   ketorolac (TORADOL) 10 mg tablet Take 1 Tablet by mouth every six (6) hours as needed for Pain for up to 16 doses. Qty: 16 Tablet, Refills: 0  Start date: 9/5/2022      oxyCODONE-acetaminophen (Percocet) 5-325 mg per tablet Take 1 Tablet by mouth every eight (8) hours as needed for Pain for up to 3 days. Max Daily Amount: 3 Tablets.   Qty: 9 Tablet, Refills: 0  Start date: 9/5/2022, End date: 9/8/2022    Comments: ED Attending: Jenny Carbajal DO   SHERIF: [id number]  Associated Diagnoses: Acute midline low back pain with left-sided sciatica               Diagnosis     Clinical Impression:   1.  Acute midline low back pain with left-sided sciatica

## 2022-09-17 ENCOUNTER — HOSPITAL ENCOUNTER (EMERGENCY)
Age: 47
Discharge: HOME | End: 2022-09-18
Attending: EMERGENCY MEDICINE
Payer: MEDICAID

## 2022-09-17 VITALS
HEART RATE: 64 BPM | TEMPERATURE: 98.5 F | OXYGEN SATURATION: 98 % | SYSTOLIC BLOOD PRESSURE: 157 MMHG | RESPIRATION RATE: 16 BRPM | WEIGHT: 168 LBS | BODY MASS INDEX: 27.99 KG/M2 | DIASTOLIC BLOOD PRESSURE: 78 MMHG | HEIGHT: 65 IN

## 2022-09-17 DIAGNOSIS — K52.9 GASTROENTERITIS: Primary | ICD-10-CM

## 2022-09-17 LAB
ALBUMIN SERPL-MCNC: 4.3 G/DL (ref 3.4–5)
ALBUMIN/GLOB SERPL: 1.4 {RATIO} (ref 0.8–1.7)
ALP SERPL-CCNC: 48 U/L (ref 45–117)
ALT SERPL-CCNC: 30 U/L (ref 13–56)
ANION GAP SERPL CALC-SCNC: 7 MMOL/L (ref 3–18)
AST SERPL-CCNC: 33 U/L (ref 10–38)
BASOPHILS # BLD: 0 K/UL (ref 0–0.1)
BASOPHILS NFR BLD: 0 % (ref 0–2)
BILIRUB SERPL-MCNC: 1.5 MG/DL (ref 0.2–1)
BUN SERPL-MCNC: 17 MG/DL (ref 7–18)
BUN/CREAT SERPL: 20 (ref 12–20)
CALCIUM SERPL-MCNC: 9.8 MG/DL (ref 8.5–10.1)
CHLORIDE SERPL-SCNC: 107 MMOL/L (ref 100–111)
CO2 SERPL-SCNC: 24 MMOL/L (ref 21–32)
CREAT SERPL-MCNC: 0.83 MG/DL (ref 0.6–1.3)
DIFFERENTIAL METHOD BLD: ABNORMAL
EOSINOPHIL # BLD: 0 K/UL (ref 0–0.4)
EOSINOPHIL NFR BLD: 0 % (ref 0–5)
ERYTHROCYTE [DISTWIDTH] IN BLOOD BY AUTOMATED COUNT: 12.2 % (ref 11.6–14.5)
GLOBULIN SER CALC-MCNC: 3.1 G/DL (ref 2–4)
GLUCOSE SERPL-MCNC: 96 MG/DL (ref 74–99)
HCG SERPL QL: NEGATIVE
HCT VFR BLD AUTO: 31.7 % (ref 35–45)
HGB BLD-MCNC: 10.6 G/DL (ref 12–16)
IMM GRANULOCYTES # BLD AUTO: 0 K/UL (ref 0–0.04)
IMM GRANULOCYTES NFR BLD AUTO: 0 % (ref 0–0.5)
LIPASE SERPL-CCNC: 46 U/L (ref 73–393)
LYMPHOCYTES # BLD: 2.4 K/UL (ref 0.9–3.6)
LYMPHOCYTES NFR BLD: 29 % (ref 21–52)
MAGNESIUM SERPL-MCNC: 3.1 MG/DL (ref 1.6–2.6)
MCH RBC QN AUTO: 28.7 PG (ref 24–34)
MCHC RBC AUTO-ENTMCNC: 33.4 G/DL (ref 31–37)
MCV RBC AUTO: 85.9 FL (ref 78–100)
MONOCYTES # BLD: 0.6 K/UL (ref 0.05–1.2)
MONOCYTES NFR BLD: 7 % (ref 3–10)
NEUTS SEG # BLD: 5.5 K/UL (ref 1.8–8)
NEUTS SEG NFR BLD: 64 % (ref 40–73)
NRBC # BLD: 0 K/UL (ref 0–0.01)
NRBC BLD-RTO: 0 PER 100 WBC
PLATELET # BLD AUTO: 197 K/UL (ref 135–420)
PMV BLD AUTO: 12.4 FL (ref 9.2–11.8)
POTASSIUM SERPL-SCNC: 4 MMOL/L (ref 3.5–5.5)
PROT SERPL-MCNC: 7.4 G/DL (ref 6.4–8.2)
RBC # BLD AUTO: 3.69 M/UL (ref 4.2–5.3)
SODIUM SERPL-SCNC: 138 MMOL/L (ref 136–145)
WBC # BLD AUTO: 8.5 K/UL (ref 4.6–13.2)

## 2022-09-17 PROCEDURE — 83690 ASSAY OF LIPASE: CPT

## 2022-09-17 PROCEDURE — 85025 COMPLETE CBC W/AUTO DIFF WBC: CPT

## 2022-09-17 PROCEDURE — 80053 COMPREHEN METABOLIC PANEL: CPT

## 2022-09-17 PROCEDURE — 83735 ASSAY OF MAGNESIUM: CPT

## 2022-09-17 PROCEDURE — 84703 CHORIONIC GONADOTROPIN ASSAY: CPT

## 2022-09-17 PROCEDURE — 99284 EMERGENCY DEPT VISIT MOD MDM: CPT

## 2022-09-18 LAB
ATRIAL RATE: 53 BPM
CALCULATED P AXIS, ECG09: 14 DEGREES
CALCULATED R AXIS, ECG10: 55 DEGREES
CALCULATED T AXIS, ECG11: 56 DEGREES
DIAGNOSIS, 93000: NORMAL
P-R INTERVAL, ECG05: 124 MS
Q-T INTERVAL, ECG07: 470 MS
QRS DURATION, ECG06: 78 MS
QTC CALCULATION (BEZET), ECG08: 441 MS
VENTRICULAR RATE, ECG03: 53 BPM

## 2022-09-18 PROCEDURE — 96375 TX/PRO/DX INJ NEW DRUG ADDON: CPT

## 2022-09-18 PROCEDURE — C9113 INJ PANTOPRAZOLE SODIUM, VIA: HCPCS | Performed by: PHYSICIAN ASSISTANT

## 2022-09-18 PROCEDURE — 93005 ELECTROCARDIOGRAM TRACING: CPT

## 2022-09-18 PROCEDURE — 74011000250 HC RX REV CODE- 250: Performed by: PHYSICIAN ASSISTANT

## 2022-09-18 PROCEDURE — 96374 THER/PROPH/DIAG INJ IV PUSH: CPT

## 2022-09-18 PROCEDURE — 74011250636 HC RX REV CODE- 250/636: Performed by: PHYSICIAN ASSISTANT

## 2022-09-18 RX ORDER — HALOPERIDOL 5 MG/ML
2 INJECTION INTRAMUSCULAR
Status: COMPLETED | OUTPATIENT
Start: 2022-09-18 | End: 2022-09-18

## 2022-09-18 RX ADMIN — HALOPERIDOL LACTATE 2 MG: 5 INJECTION, SOLUTION INTRAMUSCULAR at 01:08

## 2022-09-18 RX ADMIN — SODIUM CHLORIDE 1000 ML: 900 INJECTION, SOLUTION INTRAVENOUS at 00:41

## 2022-09-18 RX ADMIN — SODIUM CHLORIDE 40 MG: 9 INJECTION, SOLUTION INTRAMUSCULAR; INTRAVENOUS; SUBCUTANEOUS at 01:34

## 2022-09-18 NOTE — ED TRIAGE NOTES
Patient comes in complaining of abdominal pain. Patient was just seen at a hospital in [facility] for the same and everything was negative. Patient states that she has been vomiting and can not keep anything down and the Zofran that she was prescribed is not helping.

## 2022-09-18 NOTE — ED PROVIDER NOTES
EMERGENCY DEPARTMENT HISTORY AND PHYSICAL EXAM        Date: 2022  Patient Name: Vu Martinez    History of Presenting Illness     Chief Complaint   Patient presents with    Abdominal Pain       History Provided By: Patient    HPI: Vu Martinez, 41 y.o. female PMHx significant for anemia, migraines, ovarian cyst, presents ambulatory to the ED. Patient reports intermittent aching cramping and sharp pain to epigastric area began 4 days ago. Patient reports multiple bouts of nonbloody emesis. Denies diarrhea. Previous abdominal surgeries include cholecystectomy. LMP:  She was seen the ED in [place] and she was diagnosed with gastroenteritis. Patient discharged home with Zofran which she states has not been helping. Pt unsure of eating aggravates sx states she is not able to keep down fluids. There are no other complaints, changes, or physical findings at this time. PCP: Dionicio Zayas MD    No current facility-administered medications on file prior to encounter. Current Outpatient Medications on File Prior to Encounter   Medication Sig Dispense Refill    ketorolac (TORADOL) 10 mg tablet Take 1 Tablet by mouth every six (6) hours as needed for Pain for up to 16 doses.  16 Tablet 0       Past History     Past Medical History:  Past Medical History:   Diagnosis Date    Anemia     during pregnancy 2nd child    Dizziness     Low blood pressure     Migraine headache     Ovarian cyst     Vertigo        Past Surgical History:  Past Surgical History:   Procedure Laterality Date    DELIVERY       1st child    HX CERVICAL FUSION      HX GYN  2017    tubal ligation     HX ORTHOPAEDIC  2005    RIGHT ELBOW    HX WISDOM TEETH EXTRACTION  1996    x4    [place]  DELIVERY ONLY      [place] LAP,CHOLECYSTECTOMY N/A 2019    Dr. Karen Davies  2011    left shoulder    [place] SPINE SURGERY PROCEDURE UNLISTED  2017    spinal fusion    [place] UNLISTED PROCEDURE SMALL INTESTINE      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       Family History:  Family History   Problem Relation Age of Onset    Heart Disease Mother     Hypertension Mother     Mult Sclerosis Mother     Other Mother 54        Lupus    Stroke Mother         x3    Asthma Sister     Diabetes Paternal Grandmother     Hypertension Paternal Grandmother        Social History:  Social History     Tobacco Use    Smoking status: Never    Smokeless tobacco: Never   Vaping Use    Vaping Use: Never used   Substance Use Topics    Alcohol use: Yes     Comment: rare    Drug use: No     Types: Prescription, OTC       Allergies: Allergies   Allergen Reactions    Ciprofloxacin (Bulk) Diarrhea    Codeine Itching    Dilaudid [Hydromorphone (Bulk)] Itching    Lyrica [Pregabalin] Swelling    Tramadol Other (comments) and Unknown (comments)     Gets headaches  headache    Codeine Phosphate Unknown (comments)    Hydromorphone Unknown (comments)         Review of Systems   Review of Systems   Constitutional:  Negative for chills and fever. Respiratory:  Negative for shortness of breath. Cardiovascular:  Negative for chest pain. Gastrointestinal:  Positive for abdominal pain, nausea and vomiting. Genitourinary:  Negative for flank pain. Musculoskeletal:  Negative for back pain and myalgias. Skin:  Negative for color change, pallor, rash and wound. Neurological:  Negative for dizziness, weakness and light-headedness. All other systems reviewed and are negative. Physical Exam   Physical Exam  Vitals and nursing note reviewed. Constitutional:       General: She is not in acute distress. Appearance: She is well-developed. Comments: Pt in NAD   HENT:      Head: Normocephalic and atraumatic. Eyes:      Conjunctiva/sclera: Conjunctivae normal.   Cardiovascular:      Rate and Rhythm: Normal rate and regular rhythm. Heart sounds: Normal heart sounds.    Pulmonary:      Effort: Pulmonary effort is normal. No respiratory distress. Breath sounds: Normal breath sounds. Abdominal:      General: Bowel sounds are normal. There is no distension. Palpations: Abdomen is soft. Tenderness: There is abdominal tenderness in the epigastric area. Comments: Abdomen soft, nontender  Nondistended  No guarding or rigidity   Musculoskeletal:         General: Normal range of motion. Skin:     General: Skin is warm. Findings: No rash. Neurological:      Mental Status: She is alert and oriented to person, place, and time. Psychiatric:         Behavior: Behavior normal.       Diagnostic Study Results     Labs -   No results found for this or any previous visit (from the past 12 hour(s)). Radiologic Studies -   No orders to display     CT Results  (Last 48 hours)      None          CXR Results  (Last 48 hours)      None            Medical Decision Making   I am the first provider for this patient. I reviewed the vital signs, available nursing notes, past medical history, past surgical history, family history and social history. Vital Signs-Reviewed the patient's vital signs. No data found. EKG interpretation: (Preliminary)  Rhythm: sinus bradycardia; and regular . Rate (approx.): 53; Axis: normal; TN interval: normal; QRS interval: normal ; ST/T wave: normal; Other findings: normal.    No acute ischemic changes. Records Reviewed: Nursing Notes, Old Medical Records, Previous electrocardiograms, Previous Radiology Studies, and Previous Laboratory Studies    Provider Notes (Medical Decision Making):   DDx: Gastritis, GERD, PUD, Cyclical vomiting, Dehydration, Electrolyte abnormality    42 yo F who presents with intermittent aching abdominal pain with vomiting x4 days. On exam epigastric tenderness. Recent CT scan on 9/15 suggests likely gastroenteritis. Labs show no leukocytosis or left shift. Negative pregnancy test.  EKG shows no ischemic changes.   Symptoms improved with Haldol and patient requesting discharge. At time of discharge, pt non-toxic appearing in NAD. Pt stable for prompt outpatient follow-up with PCP 1 to 2 days. Patient given strict instructions to return if symptoms worsen. ED Course:   Initial assessment performed. The patients presenting problems have been discussed, and they are in agreement with the care plan formulated and outlined with them. I have encouraged them to ask questions as they arise throughout their visit. Chart review  9/15 - Lab results - No leukocytosis  CT abd pelv:   Findings suggest gastroenteritis. No bowel obstruction. Mild bladder wall thickening. Correlate for possible cystitis. Trace fluid in the right adnexa may be physiologic. The appendix is not identified, however there are no other findings to suggest acute appendicitis. ED Course as of 09/19/22 1513   Sun Sep 18, 2022   0145 Pt reports improvement of sx are haldol given and requesting discharge. [ET]      ED Course User Index  [ET] RONNELL Oconnor     Disposition:  3:14 PM  Discussed lab and imaging results with pt along with dx and treatment plan. Discussed importance of PCP follow up. All questions answered. Pt voiced they understood. Return if sx worsen. PLAN:  1. Discharge Medication List as of 9/18/2022  1:58 AM        2. Follow-up Information       Follow up With Specialties Details Why Contact Info    Monica Hirsch MD Family Medicine Schedule an appointment as soon as possible for a visit in 1 day  [address] [facility] EMERGENCY DEPT Emergency Medicine  As needed, If symptoms worsen [address] [facility]  [phone]          Return to ED if worse     Diagnosis     Clinical Impression:   1. Gastroenteritis        Attestations:    RONNELL Kirkland    Please note that this dictation was completed with [employer], the computer voice recognition software.   Quite often unanticipated grammatical, syntax, homophones, and other interpretive errors are inadvertently transcribed by the computer software. Please disregard these errors. Please excuse any errors that have escaped final proofreading. Thank you.

## 2022-09-19 ENCOUNTER — TELEPHONE (OUTPATIENT)
Dept: FAMILY MEDICINE CLINIC | Age: 47
End: 2022-09-19

## 2022-09-20 ENCOUNTER — HOSPITAL ENCOUNTER (EMERGENCY)
Age: 47
Discharge: HOME | End: 2022-09-20
Attending: EMERGENCY MEDICINE
Payer: MEDICAID

## 2022-09-20 ENCOUNTER — APPOINTMENT (OUTPATIENT)
Dept: CT IMAGING | Age: 47
End: 2022-09-20
Attending: EMERGENCY MEDICINE
Payer: MEDICAID

## 2022-09-20 VITALS
TEMPERATURE: 99 F | HEIGHT: 65 IN | WEIGHT: 150 LBS | RESPIRATION RATE: 18 BRPM | DIASTOLIC BLOOD PRESSURE: 85 MMHG | SYSTOLIC BLOOD PRESSURE: 169 MMHG | BODY MASS INDEX: 24.99 KG/M2 | HEART RATE: 50 BPM | OXYGEN SATURATION: 100 %

## 2022-09-20 DIAGNOSIS — R11.2 NAUSEA AND VOMITING IN ADULT: ICD-10-CM

## 2022-09-20 DIAGNOSIS — R10.13 ABDOMINAL PAIN, EPIGASTRIC: Primary | ICD-10-CM

## 2022-09-20 LAB
ALBUMIN SERPL-MCNC: 4.3 G/DL (ref 3.4–5)
ALBUMIN/GLOB SERPL: 1.1 {RATIO} (ref 0.8–1.7)
ALP SERPL-CCNC: 65 U/L (ref 45–117)
ALT SERPL-CCNC: 98 U/L (ref 13–56)
ANION GAP SERPL CALC-SCNC: 11 MMOL/L (ref 3–18)
APPEARANCE UR: CLEAR
AST SERPL-CCNC: 45 U/L (ref 10–38)
ATRIAL RATE: 61 BPM
BASOPHILS # BLD: 0 K/UL (ref 0–0.1)
BASOPHILS NFR BLD: 0 % (ref 0–2)
BILIRUB SERPL-MCNC: 1.9 MG/DL (ref 0.2–1)
BILIRUB UR QL: NEGATIVE
BUN SERPL-MCNC: 24 MG/DL (ref 7–18)
BUN/CREAT SERPL: 25 (ref 12–20)
CALCIUM SERPL-MCNC: 9.6 MG/DL (ref 8.5–10.1)
CALCULATED P AXIS, ECG09: 71 DEGREES
CALCULATED R AXIS, ECG10: 72 DEGREES
CALCULATED T AXIS, ECG11: 81 DEGREES
CHLORIDE SERPL-SCNC: 97 MMOL/L (ref 100–111)
CO2 SERPL-SCNC: 26 MMOL/L (ref 21–32)
COLOR UR: YELLOW
CREAT SERPL-MCNC: 0.96 MG/DL (ref 0.6–1.3)
DIAGNOSIS, 93000: NORMAL
DIFFERENTIAL METHOD BLD: ABNORMAL
EOSINOPHIL # BLD: 0.1 K/UL (ref 0–0.4)
EOSINOPHIL NFR BLD: 1 % (ref 0–5)
EPITH CASTS URNS QL MICRO: NORMAL /LPF (ref 0–5)
ERYTHROCYTE [DISTWIDTH] IN BLOOD BY AUTOMATED COUNT: 11.5 % (ref 11.6–14.5)
GLOBULIN SER CALC-MCNC: 3.8 G/DL (ref 2–4)
GLUCOSE SERPL-MCNC: 92 MG/DL (ref 74–99)
GLUCOSE UR STRIP.AUTO-MCNC: NEGATIVE MG/DL
HCG SERPL QL: NEGATIVE
HCT VFR BLD AUTO: 38.8 % (ref 35–45)
HGB BLD-MCNC: 13.1 G/DL (ref 12–16)
HGB UR QL STRIP: ABNORMAL
IMM GRANULOCYTES # BLD AUTO: 0 K/UL (ref 0–0.04)
IMM GRANULOCYTES NFR BLD AUTO: 0 % (ref 0–0.5)
KETONES UR QL STRIP.AUTO: 40 MG/DL
LEUKOCYTE ESTERASE UR QL STRIP.AUTO: NEGATIVE
LIPASE SERPL-CCNC: 127 U/L (ref 73–393)
LYMPHOCYTES # BLD: 2.3 K/UL (ref 0.9–3.6)
LYMPHOCYTES NFR BLD: 29 % (ref 21–52)
MAGNESIUM SERPL-MCNC: 2.1 MG/DL (ref 1.6–2.6)
MCH RBC QN AUTO: 29.3 PG (ref 24–34)
MCHC RBC AUTO-ENTMCNC: 33.8 G/DL (ref 31–37)
MCV RBC AUTO: 86.8 FL (ref 78–100)
MONOCYTES # BLD: 0.8 K/UL (ref 0.05–1.2)
MONOCYTES NFR BLD: 10 % (ref 3–10)
NEUTS SEG # BLD: 4.8 K/UL (ref 1.8–8)
NEUTS SEG NFR BLD: 60 % (ref 40–73)
NITRITE UR QL STRIP.AUTO: NEGATIVE
NRBC # BLD: 0 K/UL (ref 0–0.01)
NRBC BLD-RTO: 0 PER 100 WBC
P-R INTERVAL, ECG05: 130 MS
PH UR STRIP: 6 [PH] (ref 5–8)
PLATELET # BLD AUTO: 207 K/UL (ref 135–420)
PMV BLD AUTO: 12.1 FL (ref 9.2–11.8)
POTASSIUM SERPL-SCNC: 3.8 MMOL/L (ref 3.5–5.5)
PROT SERPL-MCNC: 8.1 G/DL (ref 6.4–8.2)
PROT UR STRIP-MCNC: NEGATIVE MG/DL
Q-T INTERVAL, ECG07: 436 MS
QRS DURATION, ECG06: 68 MS
QTC CALCULATION (BEZET), ECG08: 438 MS
RBC # BLD AUTO: 4.47 M/UL (ref 4.2–5.3)
RBC #/AREA URNS HPF: NORMAL /HPF (ref 0–5)
SODIUM SERPL-SCNC: 134 MMOL/L (ref 136–145)
SP GR UR REFRACTOMETRY: >1.03 (ref 1–1.03)
UROBILINOGEN UR QL STRIP.AUTO: 1 EU/DL (ref 0.2–1)
VENTRICULAR RATE, ECG03: 61 BPM
WBC # BLD AUTO: 8.1 K/UL (ref 4.6–13.2)

## 2022-09-20 PROCEDURE — 96375 TX/PRO/DX INJ NEW DRUG ADDON: CPT

## 2022-09-20 PROCEDURE — 81001 URINALYSIS AUTO W/SCOPE: CPT

## 2022-09-20 PROCEDURE — 80053 COMPREHEN METABOLIC PANEL: CPT

## 2022-09-20 PROCEDURE — 74177 CT ABD & PELVIS W/CONTRAST: CPT

## 2022-09-20 PROCEDURE — 83735 ASSAY OF MAGNESIUM: CPT

## 2022-09-20 PROCEDURE — 96374 THER/PROPH/DIAG INJ IV PUSH: CPT

## 2022-09-20 PROCEDURE — 74011000250 HC RX REV CODE- 250: Performed by: EMERGENCY MEDICINE

## 2022-09-20 PROCEDURE — 99285 EMERGENCY DEPT VISIT HI MDM: CPT

## 2022-09-20 PROCEDURE — 74011000636 HC RX REV CODE- 636: Performed by: EMERGENCY MEDICINE

## 2022-09-20 PROCEDURE — 85025 COMPLETE CBC W/AUTO DIFF WBC: CPT

## 2022-09-20 PROCEDURE — 93005 ELECTROCARDIOGRAM TRACING: CPT

## 2022-09-20 PROCEDURE — 74011250636 HC RX REV CODE- 250/636: Performed by: EMERGENCY MEDICINE

## 2022-09-20 PROCEDURE — 83690 ASSAY OF LIPASE: CPT

## 2022-09-20 PROCEDURE — 84703 CHORIONIC GONADOTROPIN ASSAY: CPT

## 2022-09-20 RX ORDER — KETOROLAC TROMETHAMINE 15 MG/ML
15 INJECTION, SOLUTION INTRAMUSCULAR; INTRAVENOUS ONCE
Status: COMPLETED | OUTPATIENT
Start: 2022-09-20 | End: 2022-09-20

## 2022-09-20 RX ORDER — ONDANSETRON 2 MG/ML
4 INJECTION INTRAMUSCULAR; INTRAVENOUS
Status: COMPLETED | OUTPATIENT
Start: 2022-09-20 | End: 2022-09-20

## 2022-09-20 RX ORDER — FENTANYL CITRATE 50 UG/ML
50 INJECTION, SOLUTION INTRAMUSCULAR; INTRAVENOUS
Status: COMPLETED | OUTPATIENT
Start: 2022-09-20 | End: 2022-09-20

## 2022-09-20 RX ORDER — POLYETHYLENE GLYCOL 3350 17 G/17G
17 POWDER, FOR SOLUTION ORAL 2 TIMES DAILY
Qty: 289 G | Refills: 0 | Status: SHIPPED | OUTPATIENT
Start: 2022-09-20

## 2022-09-20 RX ORDER — FAMOTIDINE 20 MG/1
20 TABLET, FILM COATED ORAL 2 TIMES DAILY
Qty: 20 TABLET | Refills: 0 | Status: SHIPPED | OUTPATIENT
Start: 2022-09-20 | End: 2022-09-30

## 2022-09-20 RX ORDER — ONDANSETRON 4 MG/1
4 TABLET, FILM COATED ORAL
COMMUNITY

## 2022-09-20 RX ORDER — METOCLOPRAMIDE 10 MG/1
10 TABLET ORAL
Qty: 12 TABLET | Refills: 0 | Status: SHIPPED | OUTPATIENT
Start: 2022-09-20 | End: 2022-09-30

## 2022-09-20 RX ADMIN — KETOROLAC TROMETHAMINE 15 MG: 15 INJECTION, SOLUTION INTRAMUSCULAR; INTRAVENOUS at 12:58

## 2022-09-20 RX ADMIN — SODIUM CHLORIDE 1000 ML: 900 INJECTION, SOLUTION INTRAVENOUS at 13:03

## 2022-09-20 RX ADMIN — FAMOTIDINE 20 MG: 10 INJECTION, SOLUTION INTRAVENOUS at 13:00

## 2022-09-20 RX ADMIN — ONDANSETRON 4 MG: 2 INJECTION INTRAMUSCULAR; INTRAVENOUS at 13:01

## 2022-09-20 RX ADMIN — IOPAMIDOL 80 ML: 612 INJECTION, SOLUTION INTRAVENOUS at 13:23

## 2022-09-20 RX ADMIN — FENTANYL CITRATE 50 MCG: 50 INJECTION, SOLUTION INTRAMUSCULAR; INTRAVENOUS at 14:35

## 2022-09-20 NOTE — ED PROVIDER NOTES
EMERGENCY DEPARTMENT HISTORY AND PHYSICAL EXAM    Date: 9/20/2022  Patient Name: Kelley Blanco    History of Presenting Illness     Chief Complaint   Patient presents with    Abdominal Pain    Vomiting    Constipation         History Provided By: Patient and Patient's Sister    11:35 AM  Kelley Blanco is a 41 y.o. female with PMHX of prior cholecystectomy and bowel resection for obstruction who presents to the emergency department C/O aminal pain. Patient reports the pain started on Thursday last week, 5 days ago. She was traveling at the time in [facility] and fainted from the pain. She was kept in the hospital overnight Thursday and Friday and had a CT scan at that time and labs without acute process. She traveled home on Friday. She was then seen at [facility] emergency department on Saturday for similar symptoms. She reports is associate with nausea and vomiting and she cannot keep anything down. She reports has not had a bowel movement since the symptoms started. She reports taking a hot bath helps somewhat. Pain is worse when she lays back. Denies any fever, chest pain, vaginal bleeding or discharge. No other relieving or exacerbating factors identified. No known sick contacts. PCP: Lionel Conner MD    Current Outpatient Medications   Medication Sig Dispense Refill    ondansetron hcl (Zofran) 4 mg tablet Take 4 mg by mouth every eight (8) hours as needed for Nausea or Vomiting.      metoclopramide HCl (Reglan) 10 mg tablet Take 1 Tablet by mouth every six (6) hours as needed for Nausea for up to 10 days. 12 Tablet 0    polyethylene glycol (Miralax) 17 gram/dose powder Take 17 g by mouth two (2) times a day. 289 g 0    famotidine (Pepcid) 20 mg tablet Take 1 Tablet by mouth two (2) times a day for 10 days.  20 Tablet 0       Past History       Past Medical History:  Past Medical History:   Diagnosis Date    Anemia 1997    during pregnancy 2nd child    Dizziness     Low blood pressure Migraine headache     Ovarian cyst     Vertigo        Past Surgical History:  Past Surgical History:   Procedure Laterality Date    DELIVERY       1st child    HX CERVICAL FUSION      HX GYN  2017    tubal ligation     HX ORTHOPAEDIC  2005    RIGHT ELBOW    HX WISDOM TEETH EXTRACTION  1996    x4    [place]CHOLE[place]STECT[place]Y N/A 2019    Dr. Ender Mcnulty  2011    left shoulder    [place] SPINE SURGERY PROCEDURE UNLISTED  2017    spinal fusion    [place] UNLISTED PROCEDURE SMALL INTESTINE      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       Family History:  Family History   Problem Relation Age of Onset    Heart Disease Mother     Hypertension Mother     Mult Sclerosis Mother     Other Mother 54        Lupus    Stroke Mother         x3    Asthma Sister     Diabetes Paternal Grandmother     Hypertension Paternal Grandmother        Social History:  Social History     Tobacco Use    Smoking status: Never    Smokeless tobacco: Never   Vaping Use    Vaping Use: Never used   Substance Use Topics    Alcohol use: Yes     Comment: rare    Drug use: Yes     Types: Marijuana       Allergies: Allergies   Allergen Reactions    Ciprofloxacin (Bulk) Diarrhea    Codeine Itching    Dilaudid [Hydromorphone (Bulk)] Itching    Lyrica [Pregabalin] Swelling    Tramadol Other (comments) and Unknown (comments)     Gets headaches  headache    Codeine Phosphate Unknown (comments)    Hydromorphone Unknown (comments)         Review of Systems   Review of Systems   Constitutional:  Negative for fever. Respiratory:  Negative for shortness of breath. Cardiovascular:  Negative for chest pain. Gastrointestinal:  Positive for abdominal pain, constipation, nausea and vomiting. All other systems reviewed and are negative.       Physical Exam     Vitals:    22 1131 22 1317 22 1400   BP: (!) 143/93 (!) 157/92 (!) 169/85   Pulse: 63 (!) 50    Resp: 18 18    Temp: 99 °F (37.2 °C)     SpO2: 99% 100% 100%   Weight: 68 kg (150 lb)     Height: 5' 5\" (1.651 m)       Physical Exam    Nursing notes and vital signs reviewed    Constitutional: Non toxic appearing, moderate distress  Head: Normocephalic, Atraumatic  Eyes: EOMI  Neck: Supple  Cardiovascular: Regular rate and rhythm, no murmurs, rubs, or gallops  Chest: Normal work of breathing and chest excursion bilaterally  Lungs: Clear to ausculation bilaterally  Abdomen: Soft, gastric tenderness with guarding, otherwise nontender, non distended, normoactive bowel sounds  Back: No evidence of trauma or deformity  Extremities: No evidence of trauma or deformity, no LE edema  Skin: Warm and dry, normal cap refill  Neuro: Alert and appropriate  Psychiatric: Normal mood and affect      Diagnostic Study Results     Labs -     Recent Results (from the past 12 hour(s))   EKG, 12 LEAD, INITIAL    Collection Time: 09/20/22 11:39 AM   Result Value Ref Range    Ventricular Rate 61 BPM    Atrial Rate 61 BPM    P-R Interval 130 ms    QRS Duration 68 ms    Q-T Interval 436 ms    QTC Calculation (Bezet) 438 ms    Calculated P Axis 71 degrees    Calculated R Axis 72 degrees    Calculated T Axis 81 degrees    Diagnosis       Normal sinus rhythm with sinus arrhythmia  Biatrial enlargement  Abnormal ECG  When compared with ECG of 18-SEP-2022 00:42,  No significant change was found  Confirmed by Dodie Espinoza ([id number]) on 9/20/2022 12:58:07 PM     CBC WITH AUTOMATED DIFF    Collection Time: 09/20/22 11:50 AM   Result Value Ref Range    WBC 8.1 4.6 - 13.2 K/uL    RBC 4.47 4.20 - 5.30 M/uL    HGB 13.1 12.0 - 16.0 g/dL    HCT 38.8 35.0 - 45.0 %    MCV 86.8 78.0 - 100.0 FL    MCH 29.3 24.0 - 34.0 PG    MCHC 33.8 31.0 - 37.0 g/dL    RDW 11.5 (L) 11.6 - 14.5 %    PLATELET 429 934 - 131 K/uL    MPV 12.1 (H) 9.2 - 11.8 FL    NRBC 0.0 0  WBC    ABSOLUTE NRBC 0.00 0.00 - 0.01 K/uL    NEUTROPHILS 60 40 - 73 %    LYMPHOCYTES 29 21 - 52 %    MONOCYTES 10 3 - 10 %    EOSINOPHILS 1 0 - 5 %    BASOPHILS 0 0 - 2 %    IMMATURE GRANULOCYTES 0 0.0 - 0.5 %    ABS. NEUTROPHILS 4.8 1.8 - 8.0 K/UL    ABS. LYMPHOCYTES 2.3 0.9 - 3.6 K/UL    ABS. MONOCYTES 0.8 0.05 - 1.2 K/UL    ABS. EOSINOPHILS 0.1 0.0 - 0.4 K/UL    ABS. BASOPHILS 0.0 0.0 - 0.1 K/UL    ABS. IMM. GRANS. 0.0 0.00 - 0.04 K/UL    DF AUTOMATED     METABOLIC PANEL, COMPREHENSIVE    Collection Time: 09/20/22 11:50 AM   Result Value Ref Range    Sodium 134 (L) 136 - 145 mmol/L    Potassium 3.8 3.5 - 5.5 mmol/L    Chloride 97 (L) 100 - 111 mmol/L    CO2 26 21 - 32 mmol/L    Anion gap 11 3.0 - 18 mmol/L    Glucose 92 74 - 99 mg/dL    BUN 24 (H) 7.0 - 18 MG/DL    Creatinine 0.96 0.6 - 1.3 MG/DL    BUN/Creatinine ratio 25 (H) 12 - 20      GFR est AA >60 >60 ml/min/1.73m2    GFR est non-AA >60 >60 ml/min/1.73m2    Calcium 9.6 8.5 - 10.1 MG/DL    Bilirubin, total 1.9 (H) 0.2 - 1.0 MG/DL    ALT (SGPT) 98 (H) 13 - 56 U/L    AST (SGOT) 45 (H) 10 - 38 U/L    Alk.  phosphatase 65 45 - 117 U/L    Protein, total 8.1 6.4 - 8.2 g/dL    Albumin 4.3 3.4 - 5.0 g/dL    Globulin 3.8 2.0 - 4.0 g/dL    A-G Ratio 1.1 0.8 - 1.7     LIPASE    Collection Time: 09/20/22 11:50 AM   Result Value Ref Range    Lipase 127 73 - 393 U/L   MAGNESIUM    Collection Time: 09/20/22 11:50 AM   Result Value Ref Range    Magnesium 2.1 1.6 - 2.6 mg/dL   HCG QL SERUM    Collection Time: 09/20/22 11:50 AM   Result Value Ref Range    HCG, Ql. Negative NEG     URINALYSIS W/ RFLX MICROSCOPIC    Collection Time: 09/20/22  2:39 PM   Result Value Ref Range    Color YELLOW      Appearance CLEAR      Specific gravity >1.030 (H) 1.003 - 1.030    pH (UA) 6.0 5.0 - 8.0      Protein Negative NEG mg/dL    Glucose Negative NEG mg/dL    Ketone 40 (A) NEG mg/dL    Bilirubin Negative NEG      Blood MODERATE (A) NEG      Urobilinogen 1.0 0.2 - 1.0 EU/dL    Nitrites Negative NEG      Leukocyte Esterase Negative NEG     URINE MICROSCOPIC ONLY    Collection Time: 09/20/22  2:39 PM   Result Value Ref Range    RBC 4 to 10 0 - 5 /hpf    Epithelial cells 1+ 0 - 5 /lpf       Radiologic Studies -   CT ABD PELV W CONT   Final Result      1. No acute diagnostic abnormality to explain patient's pain. 2. No bowel dilatation, obstruction or inflammation. Thank you for this referral.         CT Results  (Last 48 hours)                 09/20/22 1322  CT ABD PELV W CONT Final result    Impression:      1. No acute diagnostic abnormality to explain patient's pain. 2. No bowel dilatation, obstruction or inflammation. Thank you for this referral.        Narrative:  CT of abdomen and pelvis with contrast        INDICATION: abd pain       COMPARISON: CT 12/7/18. TECHNIQUE: 5 mm helical scan to the abdomen and pelvis is obtained  from the   diaphragm to the symphysis pubis after uneventful nonionic IV contrast   administration. All CT scans at this facility performed using dose optimization techniques as   appreciated to a performed exam, to include automated exposure control,   adjustment of the mA and or KU according to patient size (including appropriate   matching for site specific examination), or use of iterative reconstruction   technique. FINDINGS:        Lung Bases: Clear. Liver: Normal.   Gallbladder: Normal.    Biliary System: No ductal dilatation. Spleen: Normal.   Pancreas: Normal.   Bowel: Tiny hiatal hernia present. The small and large bowel are nondilated. The appendix appears normal. No pericecal inflammation seen. No significant   colonic diverticulosis or diverticulitis. Adrenal Glands: Normal.   Kidneys: Normal.   Lower genitourinary system: The bladder is suboptimally distended. The uterus   appears unremarkable. Peritoneum/Retroperitoneum: No adenopathy. Vasculature: Unremarkable for age. Other: Minimal free fluid in the cul-de-sac, likely physiologic. Osseous structures:  Unremarkable for age. CXR Results  (Last 48 hours)      None            Medications given in the ED-  Medications   sodium chloride 0.9 % bolus infusion 1,000 mL (1,000 mL IntraVENous New Bag 9/20/22 1303)   famotidine (PF) (PEPCID) 20 mg in 0.9% sodium chloride 10 mL injection (20 mg IntraVENous Given 9/20/22 1300)   ondansetron (ZOFRAN) injection 4 mg (4 mg IntraVENous Given 9/20/22 1301)   ketorolac (TORADOL) injection 15 mg (15 mg IntraVENous Given 9/20/22 1258)   iopamidoL (ISOVUE 300) 61 % contrast injection  mL (80 mL IntraVENous Given 9/20/22 1323)   fentaNYL citrate (PF) injection 50 mcg (50 mcg IntraVENous Given 9/20/22 1435)         Medical Decision Making   I am the first provider for this patient. I reviewed the vital signs, available nursing notes, past medical history, past surgical history, family history and social history. Vital Signs-Reviewed the patient's vital signs. Pulse Oximetry Analysis - 99% on room air, not hypoxic     Cardiac Monitor:  Rate: 54 bpm  Rhythm: Bradycardia    EKG interpretation: (Preliminary)  EKG read by Dr. Maicol Contreras at 11:40 AM  Normal sinus rhythm at a rate of 79 bpm, KS interval 146 ms, QRS duration of 70 ms unchanged when compared to prior from September 18, 2022    Records Reviewed: Nursing Notes, Old Medical Records, and Previous electrocardiograms    Provider Notes (Medical Decision Making): Gwen Monreal is a 41 y.o. female with complaint of abdominal pain with nausea and vomiting and constipation. Patient has had the symptoms since last week when she was seen in a hospital in [facility] and had a negative evaluation at that time and was told she had gastroenteritis. She reports that symptoms have persisted and not gotten any better. Repeat CT imaging was obtained without acute process and patient's labs are benign. Patient treated symptomatically here with improvement in her symptoms.   We will continue symptom management at home including a bowel regimen for patient's constipation and discharged with plan for primary care follow-up with strict return precautions. Patient understands and agrees with this plan. Procedures:  Procedures    ED Course:   3:05 PM  Updated patient on all results and plan. All questions answered. Diagnosis and Disposition     Critical Care: None    DISCHARGE NOTE:    Jung Nunes's  results have been reviewed with her. She has been counseled regarding her diagnosis, treatment, and plan. She verbally conveys understanding and agreement of the signs, symptoms, diagnosis, treatment and prognosis and additionally agrees to follow up as discussed. She also agrees with the care-plan and conveys that all of her questions have been answered. I have also provided discharge instructions for her that include: educational information regarding their diagnosis and treatment, and list of reasons why they would want to return to the ED prior to their follow-up appointment, should her condition change. She has been provided with education for proper emergency department utilization. CLINICAL IMPRESSION:    1. Abdominal pain, epigastric    2. Nausea and vomiting in adult        PLAN:  1. D/C Home  2. Current Discharge Medication List        START taking these medications    Details   metoclopramide HCl (Reglan) 10 mg tablet Take 1 Tablet by mouth every six (6) hours as needed for Nausea for up to 10 days. Qty: 12 Tablet, Refills: 0  Start date: 9/20/2022, End date: 9/30/2022      polyethylene glycol (Miralax) 17 gram/dose powder Take 17 g by mouth two (2) times a day. Qty: 289 g, Refills: 0  Start date: 9/20/2022      famotidine (Pepcid) 20 mg tablet Take 1 Tablet by mouth two (2) times a day for 10 days. Qty: 20 Tablet, Refills: 0  Start date: 9/20/2022, End date: 9/30/2022           3.    Follow-up Information       Follow up With Specialties Details Why Contact Info    Feng Carr MD Family Medicine Schedule an appointment as soon as possible for a visit   [facility] [place] [address] [phone]      [facility] EMERGENCY DEPT Emergency Medicine  If symptoms worsen [address] [facility]  [phone]          _______________________________      Please note that this dictation was completed with [employer], the computer voice recognition software. Quite often unanticipated grammatical, syntax, homophones, and other interpretive errors are inadvertently transcribed by the computer software. Please disregard these errors. Please excuse any errors that have escaped final proofreading.

## 2022-09-20 NOTE — ED TRIAGE NOTES
Patient c/o having upper abdominal pain, vomiting and constipation that began on September 15, 2022. She states being evaluated in [place] and then again at [facility]. She states having small bowel movement today, but states having prior hx of constipation. States taking Zofran for nausea.

## 2022-09-21 ENCOUNTER — VIRTUAL VISIT (OUTPATIENT)
Dept: FAMILY MEDICINE CLINIC | Age: 47
End: 2022-09-21

## 2022-09-21 ENCOUNTER — TELEPHONE (OUTPATIENT)
Dept: FAMILY MEDICINE CLINIC | Age: 47
End: 2022-09-21

## 2022-09-21 NOTE — PROGRESS NOTES
Domo Barraza presents today for   Chief Complaint   Patient presents with    Hospital Follow Up       Virtual/telephone visit    Depression Screening:  3 most recent PHQ Screens 6/29/2022   Little interest or pleasure in doing things Not at all   Feeling down, depressed, irritable, or hopeless Not at all   Total Score PHQ 2 0       Learning Assessment:  Learning Assessment 6/29/2022   PRIMARY LEARNER Patient   HIGHEST LEVEL OF EDUCATION - PRIMARY LEARNER  -   BARRIERS PRIMARY LEARNER -   [facility]    NEED -   LEARNER PREFERENCE PRIMARY DEMONSTRATION     -     -     -     -   ANSWERED BY patient   RELATIONSHIP SELF       Fall Risk  Fall Risk Assessment, last 12 mths 12/11/2018   Able to walk? Yes   Fall in past 12 months? No       ADL  No flowsheet data found. Travel Screening:    Travel Screening       Question Response    In the last 10 days, have you been in contact with someone who was confirmed or suspected to have Coronavirus/COVID-19? No / Unsure    Have you had a COVID-19 viral test in the last 10 days? No    Do you have any of the following new or worsening symptoms? Vomiting; Abdominal pain    Have you traveled internationally or domestically in the last month? Yes          Travel History   Travel since 08/21/22        Location Start Date End Date     [facility] 09/15/22 09/15/22            Health Maintenance reviewed and discussed and ordered per Provider. Health Maintenance Due   Topic Date Due    Hepatitis C Screening  Never done    Lipid Screen  08/05/2017    Cervical cancer screen  12/20/2018    COVID-19 Vaccine (3 - Booster for Pfizer series) 02/21/2022    Flu Vaccine (1) Never done    Colorectal Cancer Screening Combo  Never done   . Coordination of Care:    1. Have you been to the ER, urgent care clinic since your last visit? Hospitalized since your last visit? yes    2.  Have you seen or consulted any other health care providers outside of the [facility] since your last visit? Include any pap smears or colon screening.  no

## 2022-09-21 NOTE — TELEPHONE ENCOUNTER
Patient had a virtual appointment today but once connected on video call, patient was not in front of the camera. Have called out her name several times, no response. Also tried to call patient by phone twice, no answer. Please call this patient to make sure that she is okay and safe. If emergency contact no reachable, please call non-emergency police department and request for a wellness check on the patient. Thanks.

## 2022-09-21 NOTE — TELEPHONE ENCOUNTER
Attempted to call the patient 2x  LVM both times . Also reached out to the patients mother Nafisa Steele  LVM for her as well . No return calls as of yet . Attempted to have [employer] do wellness check for patient however patients address leads [facility] so they are unable to preform wellness check .

## 2022-09-21 NOTE — PROGRESS NOTES
Patient's video was on but she was not present in front of the camera. Attempted to reach her again by phone x2 without success. This encounter was created in error - please disregard.

## 2022-10-18 NOTE — LETTER
5/31/2022 3:35 PM    Ms. Jossue Martínez  [address] [place] [address]    To whom it may concern,    Jossue Martínez is currently under my care for low back pain. I recommend modified activity while at work.   She should avoid:  Bending  Lifting > 10lbs  Pushing/pulling > 40 lbs  Prolonged standing- she may need a stool or a seat for rest breaks  Prolonged sitting- she may need to stand and walk at times    Please contact me with any questions           Sincerely,      Nikko Steele MD
Normal for race

## 2023-10-24 NOTE — TELEPHONE ENCOUNTER
Personally reviewed patients records from referring Physician. Pt requesting a referral to a urologist,is having same problem as was seen for  Last visit with mrs. wyatt normal mood with appropriate affect , good eye contact

## (undated) DEVICE — KIT CLN UP BON SECOURS MARYV

## (undated) DEVICE — SUTURE MCRYL SZ 4-0 L18IN ABSRB UD L19MM PS-2 3/8 CIR PRIM Y496G

## (undated) DEVICE — ARM DRAPE

## (undated) DEVICE — STOCKING COMPR M L26-29IN SHT 19MMHG ANK 8-9IN CALF 12-15IN

## (undated) DEVICE — 3.0MM PRECISION NEURO (MATCH HEAD)

## (undated) DEVICE — 3M™ TEGADERM™ TRANSPARENT FILM DRESSING FRAME STYLE, 1626W, 4 IN X 4-3/4 IN (10 CM X 12 CM), 50/CT 4CT/CASE: Brand: 3M™ TEGADERM™

## (undated) DEVICE — FLEX ADVANTAGE 3000CC: Brand: FLEX ADVANTAGE

## (undated) DEVICE — GAUZE,SPONGE,4"X4",12PLY,STERILE,LF,2'S: Brand: MEDLINE

## (undated) DEVICE — INSULATED BLADE ELECTRODE: Brand: EDGE

## (undated) DEVICE — CANNULA SEAL: Brand: SINGLE-SITE

## (undated) DEVICE — SUTURE VCRL SZ 0 L18IN ABSRB UD POLYGLACTIN 910 BRAID TIE J912G

## (undated) DEVICE — 10FR FRAZIER SUCTION HANDLE: Brand: CARDINAL HEALTH

## (undated) DEVICE — DRAPE TOWEL: Brand: CONVERTORS

## (undated) DEVICE — SUTURE VCRL SZ 3-0 L27IN ABSRB UD L26MM SH 1/2 CIR J416H

## (undated) DEVICE — INTENDED FOR TISSUE SEPARATION, AND OTHER PROCEDURES THAT REQUIRE A SHARP SURGICAL BLADE TO PUNCTURE OR CUT.: Brand: BARD-PARKER ®  SAFETY SCALPED

## (undated) DEVICE — SUTURE PDS II SZ 1 L36IN ABSRB VLT L36MM CT-1 1/2 CIR Z347H

## (undated) DEVICE — COLUMN DRAPE

## (undated) DEVICE — SOFT SILICONE HYDROCELLULAR SACRUM DRESSING WITH LOCK AWAY LAYER: Brand: ALLEVYN LIFE SACRUM (LARGE) PACK OF 10

## (undated) DEVICE — TIP COVER ACCESSORY

## (undated) DEVICE — GLOVE SURG BIOGEL 8.0 STRL -- SKINSENSE

## (undated) DEVICE — KENDALL SCD EXPRESS SLEEVES, KNEE LENGTH, MEDIUM: Brand: KENDALL SCD

## (undated) DEVICE — REM POLYHESIVE ADULT PATIENT RETURN ELECTRODE: Brand: VALLEYLAB

## (undated) DEVICE — COVER LT HNDL BLU STRL -- MEDICHOICE

## (undated) DEVICE — MAYO STAND COVER: Brand: CONVERTORS

## (undated) DEVICE — 3M™ BAIR PAWS FLEX™ WARMING GOWN, STANDARD, 20 PER CASE 81003: Brand: BAIR PAWS™

## (undated) DEVICE — SYR 10ML LUER LOK 1/5ML GRAD --

## (undated) DEVICE — SOLUTION IV 1000ML 0.9% SOD CHL

## (undated) DEVICE — ENDOSCOPE PORT: Brand: SINGLE-SITE

## (undated) DEVICE — (D)PREP SKN CHLRAPRP APPL 26ML -- CONVERT TO ITEM 371833

## (undated) DEVICE — SUTURE MCRYL SZ 4-0 L27IN ABSRB UD L24MM PS-1 3/8 CIR PRIM Y935H

## (undated) DEVICE — STERILE LATEX POWDER-FREE SURGICAL GLOVESWITH NITRILE COATING: Brand: PROTEXIS

## (undated) DEVICE — GAUZE SPONGES,USP TYPE VII GAUZE, 12 PLY: Brand: CURITY

## (undated) DEVICE — DERMABOND SKIN ADH 0.7ML -- DERMABOND ADVANCED 12/BX

## (undated) DEVICE — STERILE POLYISOPRENE POWDER-FREE SURGICAL GLOVES: Brand: PROTEXIS

## (undated) DEVICE — NEEDLE HYPO 25GA L1.5IN BVL ORIENTED ECLIPSE

## (undated) DEVICE — PREP SKN CHLRAPRP 26ML TNT -- CONVERT TO ITEM 373320

## (undated) DEVICE — CARTRIDGE CLP LIG HEMLOK GRN --

## (undated) DEVICE — INTENDED FOR TISSUE SEPARATION, AND OTHER PROCEDURES THAT REQUIRE A SHARP SURGICAL BLADE TO PUNCTURE OR CUT.: Brand: BARD-PARKER SAFETY BLADES SIZE 10, STERILE

## (undated) DEVICE — SPONGE DISSECT PNUT SM 3/8IN -- 5/PK

## (undated) DEVICE — SYRINGE MED 3ML NDL 22GA L1 1/2IN REG BVL SFGLDE

## (undated) DEVICE — TELFA NON-ADHERENT PADS PREPAK: Brand: TELFA

## (undated) DEVICE — Device

## (undated) DEVICE — SMOKE EVACUATION PENCIL: Brand: VALLEYLAB

## (undated) DEVICE — (D)DRSG BORD MPLX SACRUM 23X23 -- DISC BY MFR USE ITEM 340908